# Patient Record
Sex: MALE | Race: WHITE | NOT HISPANIC OR LATINO | Employment: PART TIME | URBAN - METROPOLITAN AREA
[De-identification: names, ages, dates, MRNs, and addresses within clinical notes are randomized per-mention and may not be internally consistent; named-entity substitution may affect disease eponyms.]

---

## 2017-02-03 ENCOUNTER — ALLSCRIPTS OFFICE VISIT (OUTPATIENT)
Dept: OTHER | Facility: OTHER | Age: 72
End: 2017-02-03

## 2017-02-03 DIAGNOSIS — J44.9 CHRONIC OBSTRUCTIVE PULMONARY DISEASE (HCC): ICD-10-CM

## 2017-02-13 ENCOUNTER — HOSPITAL ENCOUNTER (OUTPATIENT)
Dept: CT IMAGING | Facility: HOSPITAL | Age: 72
Discharge: HOME | End: 2017-02-13
Attending: INTERNAL MEDICINE
Payer: COMMERCIAL

## 2017-02-13 DIAGNOSIS — J44.9 CHRONIC OBSTRUCTIVE PULMONARY DISEASE (HCC): ICD-10-CM

## 2017-02-23 ENCOUNTER — GENERIC CONVERSION - ENCOUNTER (OUTPATIENT)
Dept: OTHER | Facility: OTHER | Age: 72
End: 2017-02-23

## 2017-02-24 ENCOUNTER — ALLSCRIPTS OFFICE VISIT (OUTPATIENT)
Dept: OTHER | Facility: OTHER | Age: 72
End: 2017-02-24

## 2018-01-12 VITALS
SYSTOLIC BLOOD PRESSURE: 132 MMHG | HEIGHT: 65 IN | BODY MASS INDEX: 36.99 KG/M2 | OXYGEN SATURATION: 98 % | HEART RATE: 68 BPM | WEIGHT: 222 LBS | DIASTOLIC BLOOD PRESSURE: 62 MMHG

## 2018-01-14 VITALS
HEART RATE: 70 BPM | SYSTOLIC BLOOD PRESSURE: 150 MMHG | HEIGHT: 65 IN | BODY MASS INDEX: 38.32 KG/M2 | DIASTOLIC BLOOD PRESSURE: 80 MMHG | OXYGEN SATURATION: 95 % | WEIGHT: 230 LBS

## 2018-01-16 NOTE — RESULT NOTES
PFT Results v2:   Diagnosis/Reason For Study: emphysema   Referring Provider: Dr Paulette Blevins   Spirometry: Forced vital capacity: 3 02L and 85% Predicted Values  Forced expiratory volume in one second: 2 22L and 87% Predicted Value  FEV1/FVC ratio is 100% Predicted Values  Post Bronchodilator Spirometry: Forced vital capacity : 2 95L and 83% Predicted Values  Forced expiratory volume in one second : 2 18L and 85% Predicted Value  FEV1/FVC ratio is 101% Predicted Values  Lung Volumes: Total lung capacity : 4 30L and 71% Predicted Values  RV: 58% Predicted Values  RV/T% Predicted Values  DLCO:   DLCO 68% Predicted Values  DLCO corrected for volume is 88%    PFT Interpretation:   patient had a full lung function testing with spirometry lung volumes and DLCO  Patient gave a good effort  Results meet the ATS standards for acceptability and repeat ability  The flow volume curve is normal   There is no obstructive or restrictive ventilatory limitation  The lung volumes and DLCO are normal   Clinical correlation is required  Future Appointments    Date/Time Provider Specialty Site   2018 08:30 AM RALPH Pace   Pulmonary Medicine [facility] [employer] [facility]      Electronically signed by : RALPH Foster ; Mar 13 2017  4:04PM EST                       (Author)

## 2018-02-20 DIAGNOSIS — Z87.891 PERSONAL HISTORY OF NICOTINE DEPENDENCE: ICD-10-CM

## 2018-02-26 ENCOUNTER — OFFICE VISIT (OUTPATIENT)
Dept: PULMONOLOGY | Facility: CLINIC | Age: 73
End: 2018-02-26
Payer: MEDICARE

## 2018-02-26 VITALS
HEART RATE: 73 BPM | OXYGEN SATURATION: 98 % | BODY MASS INDEX: 35.99 KG/M2 | SYSTOLIC BLOOD PRESSURE: 122 MMHG | DIASTOLIC BLOOD PRESSURE: 84 MMHG | HEIGHT: 65 IN | WEIGHT: 216 LBS

## 2018-02-26 DIAGNOSIS — J41.0 SIMPLE CHRONIC BRONCHITIS (HCC): Primary | ICD-10-CM

## 2018-02-26 DIAGNOSIS — E66.9 OBESITY (BMI 30-39.9): ICD-10-CM

## 2018-02-26 DIAGNOSIS — G47.33 OSA ON CPAP: ICD-10-CM

## 2018-02-26 DIAGNOSIS — Z99.89 OSA ON CPAP: ICD-10-CM

## 2018-02-26 PROCEDURE — 99214 OFFICE O/P EST MOD 30 MIN: CPT | Performed by: INTERNAL MEDICINE

## 2018-02-26 RX ORDER — BUDESONIDE AND FORMOTEROL FUMARATE DIHYDRATE 160; 4.5 UG/1; UG/1
AEROSOL RESPIRATORY (INHALATION)
COMMUNITY
Start: 2017-12-22

## 2018-02-26 RX ORDER — METOPROLOL SUCCINATE 100 MG/1
TABLET, EXTENDED RELEASE ORAL
COMMUNITY
Start: 2018-02-02

## 2018-02-26 RX ORDER — METFORMIN HYDROCHLORIDE 500 MG/1
TABLET, EXTENDED RELEASE ORAL
COMMUNITY
Start: 2017-06-29

## 2018-02-26 RX ORDER — ALBUTEROL SULFATE 90 UG/1
AEROSOL, METERED RESPIRATORY (INHALATION)
COMMUNITY
Start: 2015-06-16

## 2018-02-26 RX ORDER — IPRATROPIUM BROMIDE 21 UG/1
SPRAY, METERED NASAL
COMMUNITY
Start: 2017-12-22

## 2018-02-26 RX ORDER — ALBUTEROL SULFATE 2.5 MG/3ML
2.5 SOLUTION RESPIRATORY (INHALATION)
COMMUNITY
Start: 2016-01-15

## 2018-02-26 RX ORDER — LOVASTATIN 10 MG/1
TABLET ORAL
COMMUNITY
Start: 2018-02-02

## 2018-02-26 RX ORDER — ERGOCALCIFEROL (VITAMIN D2) 10 MCG
TABLET ORAL
COMMUNITY

## 2018-02-26 RX ORDER — LANCETS
EACH MISCELLANEOUS
COMMUNITY
Start: 2016-03-18

## 2018-02-26 RX ORDER — GLIPIZIDE 10 MG/1
TABLET, FILM COATED, EXTENDED RELEASE ORAL
COMMUNITY
Start: 2017-12-22

## 2018-02-26 NOTE — PROGRESS NOTES
Assessment/Plan:   Diagnoses and all orders for this visit:    Simple chronic bronchitis (HCC)    ARACELY on CPAP    Obesity (BMI 30-39  9)    Other orders  -     albuterol (PROVENTIL HFA,VENTOLIN HFA) 90 mcg/act inhaler; 2 puffs every 4 hours as needed  -     SYMBICORT 160-4 5 MCG/ACT inhaler;   -     albuterol (2 5 mg/3 mL) 0 083 % nebulizer solution; Inhale 2 5 mg  -     aspirin 81 MG tablet; Take by mouth  -     multivitamin-minerals (CENTRUM) tablet; Take by mouth  -     Multiple Vitamin (DAILY VALUE MULTIVITAMIN) TABS; Take by mouth  -     Omega-3 Fatty Acids (FISH OIL) 645 MG CAPS; Take by mouth  -     glipiZIDE (GLUCOTROL XL) 10 mg 24 hr tablet;   -     glucose blood test strip; Check twice a day and as needed, dx E11 9,  -     ipratropium (ATROVENT) 0 03 % nasal spray; 4 times daily  -     lovastatin (MEVACOR) 10 MG tablet;   -     metFORMIN (GLUCOPHAGE-XR) 500 mg 24 hr tablet; TAKE 2 TABLETS IN MORNING AND 2 TABLETS WITH DINNER  -     metoprolol succinate (TOPROL-XL) 100 mg 24 hr tablet;   -     Misc  Devices MISC; by Does not apply route  -     Lancets (ONETOUCH ULTRASOFT) lancets; Check twice a day and as needed, dx E11 9,  -     hydrocortisone (PROCTOSOL HC) 2 5 % rectal cream; Insert into the rectum      Chronic bronchitis/COPD  PFTs with moderate restrictive ventilatory limitation with normal DLCO probably secondary to his obesity  Patient will continue with his Symbicort 160/4 52 puffs twice daily  MDI technique reviewed with the patient  Rinse mouth after use  Albuterol via the new blood is a 4 times daily as needed  CT of the chest for lung cancer screening with evidence of bilateral centrilobular emphysema no lung nodules or masses seen  Needs repeat CT of the chest in one year for lung cancer screening    Obstructive sleep apnea on CPAP continue with the current settings  Changes CPAP supplies every 6 months discussed      Return in about 3 months (around 5/26/2018)    All questions are answered to the patient's satisfaction and understanding  He verbalizes understanding  He is encouraged to call with any further questions or concerns  Portions of the record may have been created with voice recognition software  Occasional wrong word or "sound a like" substitutions may have occurred due to the inherent limitations of voice recognition software  Read the chart carefully and recognize, using context, where substitutions have occurred  ______________________________________________________________________    Chief Complaint:   Chief Complaint   Patient presents with    COPD    Sleep Apnea       Patient ID: Cata Hernández is a 67 y o  y o  male has a past medical history of Chronic bronchitis ([address] ); COPD (chronic obstructive pulmonary disease) ([facility] [address] ); Obesity; and ARACELY (obstructive sleep apnea)  2/26/2018  Patient is a very pleasant 25-year-old gentleman former smoker who quit several years ago, with history of chronic bronchitis and COPD being followed up at [facility], has been on bronchodilators  has history of obstructive sleep apnea on CPAP being followed up by a sleep specialist in [facility]  Review of Systems   Constitutional: Positive for fatigue  Negative for activity change, appetite change, chills, diaphoresis, fever and unexpected weight change  HENT: Negative for congestion, dental problem, drooling, nosebleeds, postnasal drip, rhinorrhea, sinus pressure, sore throat and voice change  Eyes: Negative for discharge, itching and visual disturbance  Respiratory: Positive for cough (clear sputum, no hemoptysis), shortness of breath and wheezing  Cardiovascular: Negative for chest pain, palpitations and leg swelling  Endocrine: Negative for cold intolerance and heat intolerance  Allergic/Immunologic: Negative for food allergies and immunocompromised state  Neurological: Negative for dizziness, facial asymmetry, speech difficulty and weakness     Hematological: Negative for adenopathy  Psychiatric/Behavioral: Negative for agitation, confusion and sleep disturbance  The patient is not nervous/anxious  Smoking history: He reports that he quit smoking about 31 years ago  His smoking use included Cigarettes  He has a 175 00 pack-year smoking history  He has never used smokeless tobacco     The following portions of the patient's history were reviewed and updated as appropriate: allergies, current medications, past family history, past medical history, past social history, past surgical history and problem list       There is no immunization history on file for this patient  Current Outpatient Prescriptions   Medication Sig Dispense Refill    albuterol (2 5 mg/3 mL) 0 083 % nebulizer solution Inhale 2 5 mg      albuterol (PROVENTIL HFA,VENTOLIN HFA) 90 mcg/act inhaler 2 puffs every 4 hours as needed      glucose blood test strip Check twice a day and as needed, dx E11 9,      ipratropium (ATROVENT) 0 03 % nasal spray 4 times daily      Lancets (ONETOUCH ULTRASOFT) lancets Check twice a day and as needed, dx E11 9,      metFORMIN (GLUCOPHAGE-XR) 500 mg 24 hr tablet TAKE 2 TABLETS IN MORNING AND 2 TABLETS WITH DINNER      multivitamin-minerals (CENTRUM) tablet Take by mouth      aspirin 81 MG tablet Take by mouth      glipiZIDE (GLUCOTROL XL) 10 mg 24 hr tablet       hydrocortisone (PROCTOSOL HC) 2 5 % rectal cream Insert into the rectum      lovastatin (MEVACOR) 10 MG tablet       metoprolol succinate (TOPROL-XL) 100 mg 24 hr tablet       Misc  Devices MISC by Does not apply route      Multiple Vitamin (DAILY VALUE MULTIVITAMIN) TABS Take by mouth      Omega-3 Fatty Acids (FISH OIL) 645 MG CAPS Take by mouth      SYMBICORT 160-4 5 MCG/ACT inhaler        No current facility-administered medications for this visit  Allergies: Gemfibrozil;  Carbamazepine; and Carbamazepine and analogs    Objective:  Vitals:    02/26/18 0839   BP: 122/84   BP Location: Left arm Patient Position: Sitting   Cuff Size: Standard   Pulse: 73   SpO2: 98%   Weight: 98 kg (216 lb)   Height: 5' 5" (1 651 m)   Oxygen Therapy  SpO2: 98 %    Wt Readings from Last 3 Encounters:   02/26/18 98 kg (216 lb)   02/24/17 101 kg (222 lb)   02/03/17 104 kg (230 lb)     Body mass index is 35 94 kg/m²  Physical Exam   Constitutional: He is oriented to person, place, and time  He appears well-developed and well-nourished  HENT:   Head: Normocephalic and atraumatic  Eyes: Conjunctivae are normal  Pupils are equal, round, and reactive to light  Neck: Normal range of motion  Neck supple  No JVD present  No thyromegaly present  Cardiovascular: Normal rate, regular rhythm and normal heart sounds  Exam reveals no gallop and no friction rub  No murmur heard  Pulmonary/Chest: Effort normal and breath sounds normal  No respiratory distress  He has no wheezes  He has no rales  He exhibits no tenderness  Abdominal: Soft  Bowel sounds are normal    Musculoskeletal: Normal range of motion  He exhibits no edema, tenderness or deformity  Lymphadenopathy:     He has no cervical adenopathy  Neurological: He is alert and oriented to person, place, and time  Skin: Skin is warm and dry  Psychiatric: He has a normal mood and affect  Nursing note and vitals reviewed  Ghazala Sifuentes

## 2018-02-28 ENCOUNTER — HOSPITAL ENCOUNTER (OUTPATIENT)
Dept: CT IMAGING | Facility: HOSPITAL | Age: 73
Discharge: HOME | End: 2018-02-28
Attending: INTERNAL MEDICINE
Payer: COMMERCIAL

## 2018-02-28 DIAGNOSIS — Z87.891 PERSONAL HISTORY OF NICOTINE DEPENDENCE: ICD-10-CM

## 2018-05-14 ENCOUNTER — OFFICE VISIT (OUTPATIENT)
Dept: PULMONOLOGY | Facility: CLINIC | Age: 73
End: 2018-05-14
Payer: MEDICARE

## 2018-05-14 VITALS
HEART RATE: 71 BPM | HEIGHT: 65 IN | WEIGHT: 223 LBS | OXYGEN SATURATION: 95 % | BODY MASS INDEX: 37.15 KG/M2 | DIASTOLIC BLOOD PRESSURE: 90 MMHG | SYSTOLIC BLOOD PRESSURE: 160 MMHG

## 2018-05-14 DIAGNOSIS — Z99.89 OSA ON CPAP: ICD-10-CM

## 2018-05-14 DIAGNOSIS — G47.33 OSA ON CPAP: ICD-10-CM

## 2018-05-14 DIAGNOSIS — E66.9 OBESITY (BMI 30-39.9): ICD-10-CM

## 2018-05-14 DIAGNOSIS — J41.0 SIMPLE CHRONIC BRONCHITIS (HCC): Primary | ICD-10-CM

## 2018-05-14 PROCEDURE — 99214 OFFICE O/P EST MOD 30 MIN: CPT | Performed by: INTERNAL MEDICINE

## 2018-05-15 NOTE — PROGRESS NOTES
Assessment/Plan:   Diagnoses and all orders for this visit:    Simple chronic bronchitis (HCC)    ARACELY on CPAP    Obesity (BMI 30-39  9)      Chronic bronchitis/COPD  PFTs with moderate restrictive ventilatory limitation with normal DLCO probably secondary to his obesity  Discussed with the patient to continue with Symbicort 160/4 52 puffs twice daily  Rinse mouth after use  Albuterol via the nebulizer 4 times daily as needed  Recent CT of the chest for lung cancer screening with bilateral centrilobular emphysema no lung nodules, needs CT of the chest in one year for lung cancer screening  Obstructive sleep apnea continue with current settings  Change of CPAP supplies every 6 months discussed  Recommend weight loss  Follow-up in 6 months or when necessary earlier as needed  Return in about 6 months (around 11/14/2018)  All questions are answered to the patient's satisfaction and understanding  He verbalizes understanding  He is encouraged to call with any further questions or concerns  Portions of the record may have been created with voice recognition software  Occasional wrong word or "sound a like" substitutions may have occurred due to the inherent limitations of voice recognition software  Read the chart carefully and recognize, using context, where substitutions have occurred  ______________________________________________________________________    Chief Complaint: No chief complaint on file  Patient ID: Hilario Little is a 67 y o  y o  male has a past medical history of Chronic bronchitis ([address] ); COPD (chronic obstructive pulmonary disease) ([facility] [address] ); Obesity; and ARACELY (obstructive sleep apnea)  5/14/2018  Patient with history of COPD and obstructive sleep apnea on CPAP  For follow-up  Review of Systems   Constitutional: Positive for fatigue  Negative for activity change, appetite change, chills, diaphoresis, fever and unexpected weight change     HENT: Negative for congestion, dental problem, drooling, nosebleeds, postnasal drip, rhinorrhea, sinus pressure, sore throat and voice change  Eyes: Negative for discharge, itching and visual disturbance  Respiratory: Positive for cough (clear sputum, no hemoptysis) and shortness of breath  Negative for wheezing  Cardiovascular: Negative for chest pain, palpitations and leg swelling  Endocrine: Negative for cold intolerance and heat intolerance  Allergic/Immunologic: Negative for food allergies and immunocompromised state  Neurological: Negative for dizziness, facial asymmetry, speech difficulty and weakness  Hematological: Negative for adenopathy  Psychiatric/Behavioral: Negative for agitation, confusion and sleep disturbance  The patient is not nervous/anxious  Smoking history: He reports that he quit smoking about 31 years ago  His smoking use included Cigarettes  He has a 175 00 pack-year smoking history  He has never used smokeless tobacco     The following portions of the patient's history were reviewed and updated as appropriate: allergies, current medications, past family history, past medical history, past social history, past surgical history and problem list       There is no immunization history on file for this patient    Current Outpatient Prescriptions   Medication Sig Dispense Refill    albuterol (2 5 mg/3 mL) 0 083 % nebulizer solution Inhale 2 5 mg      albuterol (PROVENTIL HFA,VENTOLIN HFA) 90 mcg/act inhaler 2 puffs every 4 hours as needed      aspirin 81 MG tablet Take by mouth      glipiZIDE (GLUCOTROL XL) 10 mg 24 hr tablet       glucose blood test strip Check twice a day and as needed, dx E11 9,      hydrocortisone (PROCTOSOL HC) 2 5 % rectal cream Insert into the rectum      ipratropium (ATROVENT) 0 03 % nasal spray 4 times daily      Lancets (ONETOUCH ULTRASOFT) lancets Check twice a day and as needed, dx E11 9,      lovastatin (MEVACOR) 10 MG tablet       metFORMIN (GLUCOPHAGE-XR) 500 mg 24 hr tablet TAKE 2 TABLETS IN MORNING AND 2 TABLETS WITH DINNER      metoprolol succinate (TOPROL-XL) 100 mg 24 hr tablet       Misc  Devices MISC by Does not apply route      Multiple Vitamin (DAILY VALUE MULTIVITAMIN) TABS Take by mouth      Omega-3 Fatty Acids (FISH OIL) 645 MG CAPS Take by mouth      SYMBICORT 160-4 5 MCG/ACT inhaler        No current facility-administered medications for this visit  Allergies: Gemfibrozil; Carbamazepine; and Carbamazepine and analogs    Objective:  Vitals:    05/14/18 1147   BP: 160/90   Pulse: 71   SpO2: 95%   Weight: 101 kg (223 lb)   Height: 5' 5" (1 651 m)   Oxygen Therapy  SpO2: 95 %    Wt Readings from Last 3 Encounters:   05/14/18 101 kg (223 lb)   02/26/18 98 kg (216 lb)   02/24/17 101 kg (222 lb)     Body mass index is 37 11 kg/m²  Physical Exam   Constitutional: He is oriented to person, place, and time  He appears well-developed and well-nourished  HENT:   Head: Normocephalic and atraumatic  Crowded oropharyngeal airways   Eyes: Conjunctivae are normal  Pupils are equal, round, and reactive to light  Neck: Normal range of motion  Neck supple  No JVD present  No thyromegaly present  Short and wide neck   Cardiovascular: Normal rate, regular rhythm and normal heart sounds  Exam reveals no gallop and no friction rub  No murmur heard  Pulmonary/Chest: Effort normal and breath sounds normal  No respiratory distress  He has no wheezes  He has no rales  He exhibits no tenderness  Abdominal: Soft  Bowel sounds are normal    Musculoskeletal: Normal range of motion  He exhibits no edema, tenderness or deformity  Lymphadenopathy:     He has no cervical adenopathy  Neurological: He is alert and oriented to person, place, and time  Skin: Skin is warm and dry  Psychiatric: He has a normal mood and affect  Nursing note and vitals reviewed

## 2018-11-26 ENCOUNTER — OFFICE VISIT (OUTPATIENT)
Dept: PULMONOLOGY | Facility: CLINIC | Age: 73
End: 2018-11-26
Payer: MEDICARE

## 2018-11-26 VITALS
SYSTOLIC BLOOD PRESSURE: 140 MMHG | HEART RATE: 67 BPM | OXYGEN SATURATION: 96 % | BODY MASS INDEX: 36.82 KG/M2 | HEIGHT: 65 IN | DIASTOLIC BLOOD PRESSURE: 86 MMHG | WEIGHT: 221 LBS

## 2018-11-26 DIAGNOSIS — Z99.89 OSA ON CPAP: ICD-10-CM

## 2018-11-26 DIAGNOSIS — G47.33 OSA ON CPAP: ICD-10-CM

## 2018-11-26 DIAGNOSIS — E66.9 OBESITY (BMI 30-39.9): ICD-10-CM

## 2018-11-26 DIAGNOSIS — J41.0 SIMPLE CHRONIC BRONCHITIS (HCC): Primary | ICD-10-CM

## 2018-11-26 DIAGNOSIS — Z87.891 FORMER SMOKER: ICD-10-CM

## 2018-11-26 PROCEDURE — 99214 OFFICE O/P EST MOD 30 MIN: CPT | Performed by: INTERNAL MEDICINE

## 2018-11-26 NOTE — PROGRESS NOTES
Assessment/Plan:   Diagnoses and all orders for this visit:    Simple chronic bronchitis (HCC)    Obesity (BMI 30-39 9)    ARACELY on CPAP    Former smoker  -     CT lung screening program; Future          Chronic bronchitis/COPD  PFTs with moderate restrictive ventilatory limitation with normal DLCO probably secondary to his obesity  Discussed with the patient to continue with Symbicort 160/4 52 puffs twice daily  Rinse mouth after use  Albuterol via the nebulizer 4 times daily as needed  Recent CT of the chest for lung cancer screening with bilateral centrilobular emphysema no lung nodules, needs CT of the chest in one year  From then , due in feb/march 2019, for lung cancer screening  Obstructive sleep apnea continue with current settings  Change of CPAP supplies every 6 months discussed  Recommend weight loss  Follow-up in 6 months or when necessary earlier as needed  Return in about 6 months (around 5/26/2019)  All questions are answered to the patient's satisfaction and understanding  He verbalizes understanding  He is encouraged to call with any further questions or concerns  Portions of the record may have been created with voice recognition software  Occasional wrong word or "sound a like" substitutions may have occurred due to the inherent limitations of voice recognition software  Read the chart carefully and recognize, using context, where substitutions have occurred  Electronically Signed by Isabella Cleary MD    ______________________________________________________________________    Chief Complaint:   Chief Complaint   Patient presents with    Bronchitis     fup       Patient ID: Ingrid Saul is a 75 y o  y o  male has a past medical history of Chronic bronchitis ([address] ); COPD (chronic obstructive pulmonary disease) ([address] ); Obesity; and ARACELY (obstructive sleep apnea)  11/26/2018  Patient presents today for follow-up visit    Patient is a very pleasant 69-year-old gentleman former smoker who quit several years ago, with history of chronic bronchitis and COPD being followed up at [facility], has been on bronchodilators  has history of obstructive sleep apnea on CPAP being followed up by a sleep specialist in [address]   Constitutional: Negative for appetite change, chills, diaphoresis, fatigue, fever and unexpected weight change  HENT: Negative for congestion, ear discharge, ear pain, nosebleeds, postnasal drip, rhinorrhea, sinus pain, sore throat and voice change  Eyes: Negative for pain, discharge and visual disturbance  Respiratory: Negative for apnea, cough, choking, chest tightness, shortness of breath, wheezing and stridor  Cardiovascular: Negative for chest pain, palpitations and leg swelling  Gastrointestinal: Negative for abdominal pain, blood in stool, constipation, diarrhea and vomiting  Endocrine: Negative for cold intolerance, heat intolerance, polydipsia, polyphagia and polyuria  Genitourinary: Negative for difficulty urinating and dysuria  Musculoskeletal: Negative for arthralgias and neck pain  Skin: Negative for pallor and rash  Allergic/Immunologic: Negative for environmental allergies and food allergies  Neurological: Negative for dizziness, speech difficulty, weakness and light-headedness  Hematological: Negative for adenopathy  Does not bruise/bleed easily  Psychiatric/Behavioral: Negative for agitation, confusion and sleep disturbance  The patient is not nervous/anxious  Smoking history: He reports that he quit smoking about 31 years ago  His smoking use included Cigarettes  He has a 175 00 pack-year smoking history   He has never used smokeless tobacco     The following portions of the patient's history were reviewed and updated as appropriate: allergies, current medications, past family history, past medical history, past social history, past surgical history and problem list       There is no immunization history on file for this patient  Current Outpatient Prescriptions   Medication Sig Dispense Refill    albuterol (2 5 mg/3 mL) 0 083 % nebulizer solution Inhale 2 5 mg      albuterol (PROVENTIL HFA,VENTOLIN HFA) 90 mcg/act inhaler 2 puffs every 4 hours as needed      aspirin 81 MG tablet Take by mouth      glucose blood test strip Check twice a day and as needed, dx E11 9,      hydrocortisone (PROCTOSOL HC) 2 5 % rectal cream Insert into the rectum      Lancets (ONETOUCH ULTRASOFT) lancets Check twice a day and as needed, dx E11 9,      lovastatin (MEVACOR) 10 MG tablet       metFORMIN (GLUCOPHAGE-XR) 500 mg 24 hr tablet TAKE 2 TABLETS IN MORNING AND 2 TABLETS WITH DINNER      metoprolol succinate (TOPROL-XL) 100 mg 24 hr tablet       Misc  Devices MISC by Does not apply route      Multiple Vitamin (DAILY VALUE MULTIVITAMIN) TABS Take by mouth      Omega-3 Fatty Acids (FISH OIL) 645 MG CAPS Take by mouth      SYMBICORT 160-4 5 MCG/ACT inhaler       glipiZIDE (GLUCOTROL XL) 10 mg 24 hr tablet       ipratropium (ATROVENT) 0 03 % nasal spray 4 times daily       No current facility-administered medications for this visit  Allergies: Gemfibrozil; Carbamazepine; and Carbamazepine and analogs    Objective:  Vitals:    11/26/18 0841   BP: 140/86   Pulse: 67   SpO2: 96%   Weight: 100 kg (221 lb)   Height: 5' 5" (1 651 m)   Oxygen Therapy  SpO2: 96 %    Wt Readings from Last 3 Encounters:   11/26/18 100 kg (221 lb)   05/14/18 101 kg (223 lb)   02/26/18 98 kg (216 lb)     Body mass index is 36 78 kg/m²  Physical Exam   Constitutional: He is oriented to person, place, and time  He appears well-developed and well-nourished  HENT:   Head: Normocephalic and atraumatic  Crowded oropharyngeal airways, Mallampati score   Eyes: Pupils are equal, round, and reactive to light  EOM are normal    Neck: Normal range of motion  Neck supple  Short and wide neck   Cardiovascular: Normal rate, regular rhythm and normal heart sounds  Pulmonary/Chest: Effort normal and breath sounds normal    Abdominal: Soft  Bowel sounds are normal    Musculoskeletal: Normal range of motion  Neurological: He is alert and oriented to person, place, and time  Skin: Skin is warm and dry  Psychiatric: He has a normal mood and affect   His behavior is normal

## 2019-03-26 ENCOUNTER — HOSPITAL ENCOUNTER (OUTPATIENT)
Dept: CT IMAGING | Facility: HOSPITAL | Age: 74
Discharge: HOME | End: 2019-03-26
Attending: INTERNAL MEDICINE
Payer: COMMERCIAL

## 2019-03-26 DIAGNOSIS — Z87.891 FORMER SMOKER: ICD-10-CM

## 2019-05-20 ENCOUNTER — OFFICE VISIT (OUTPATIENT)
Dept: PULMONOLOGY | Facility: CLINIC | Age: 74
End: 2019-05-20
Payer: MEDICARE

## 2019-05-20 VITALS
OXYGEN SATURATION: 98 % | BODY MASS INDEX: 36.65 KG/M2 | WEIGHT: 220 LBS | DIASTOLIC BLOOD PRESSURE: 80 MMHG | HEIGHT: 65 IN | SYSTOLIC BLOOD PRESSURE: 136 MMHG | HEART RATE: 76 BPM

## 2019-05-20 DIAGNOSIS — G47.33 OSA ON CPAP: ICD-10-CM

## 2019-05-20 DIAGNOSIS — Z99.89 OSA ON CPAP: ICD-10-CM

## 2019-05-20 DIAGNOSIS — Z87.891 FORMER SMOKER: ICD-10-CM

## 2019-05-20 DIAGNOSIS — J41.0 SIMPLE CHRONIC BRONCHITIS (HCC): Primary | ICD-10-CM

## 2019-05-20 DIAGNOSIS — E66.9 OBESITY (BMI 30-39.9): ICD-10-CM

## 2019-05-20 PROCEDURE — 99214 OFFICE O/P EST MOD 30 MIN: CPT | Performed by: INTERNAL MEDICINE

## 2019-05-20 RX ORDER — ALBUTEROL SULFATE 90 UG/1
2 AEROSOL, METERED RESPIRATORY (INHALATION) EVERY 6 HOURS PRN
Qty: 1 INHALER | Refills: 3 | Status: SHIPPED | OUTPATIENT
Start: 2019-05-20

## 2020-05-29 ENCOUNTER — TELEPHONE (OUTPATIENT)
Dept: PULMONOLOGY | Facility: CLINIC | Age: 75
End: 2020-05-29

## 2020-06-18 ENCOUNTER — HOSPITAL ENCOUNTER (OUTPATIENT)
Dept: CT IMAGING | Facility: HOSPITAL | Age: 75
Discharge: HOME | End: 2020-06-18
Attending: INTERNAL MEDICINE
Payer: COMMERCIAL

## 2020-06-18 DIAGNOSIS — Z87.891 FORMER SMOKER: ICD-10-CM

## 2020-06-18 PROCEDURE — G0297 LDCT FOR LUNG CA SCREEN: HCPCS

## 2024-04-09 ENCOUNTER — APPOINTMENT (EMERGENCY)
Dept: RADIOLOGY | Facility: HOSPITAL | Age: 79
DRG: 432 | End: 2024-04-09
Payer: MEDICARE

## 2024-04-09 ENCOUNTER — HOSPITAL ENCOUNTER (INPATIENT)
Facility: HOSPITAL | Age: 79
LOS: 4 days | Discharge: HOME | DRG: 432 | End: 2024-04-13
Attending: STUDENT IN AN ORGANIZED HEALTH CARE EDUCATION/TRAINING PROGRAM | Admitting: INTERNAL MEDICINE
Payer: MEDICARE

## 2024-04-09 DIAGNOSIS — D64.9 SYMPTOMATIC ANEMIA: ICD-10-CM

## 2024-04-09 DIAGNOSIS — K92.2 GI BLEEDING: Primary | ICD-10-CM

## 2024-04-09 DIAGNOSIS — K70.31 ALCOHOLIC CIRRHOSIS OF LIVER WITH ASCITES (HCC): ICD-10-CM

## 2024-04-09 PROBLEM — I25.10 CAD (CORONARY ARTERY DISEASE): Status: ACTIVE | Noted: 2024-04-09

## 2024-04-09 PROBLEM — N18.31 CHRONIC KIDNEY DISEASE, STAGE 3A (HCC): Status: ACTIVE | Noted: 2021-10-11

## 2024-04-09 PROBLEM — D61.818 PANCYTOPENIA (HCC): Status: ACTIVE | Noted: 2024-01-11

## 2024-04-09 PROBLEM — I73.9 PVD (PERIPHERAL VASCULAR DISEASE) (HCC): Status: ACTIVE | Noted: 2018-11-14

## 2024-04-09 PROBLEM — J44.89 CHRONIC BRONCHITIS WITH COPD (CHRONIC OBSTRUCTIVE PULMONARY DISEASE): Status: ACTIVE | Noted: 2017-02-03

## 2024-04-09 PROBLEM — K74.60 CIRRHOSIS (HCC): Status: ACTIVE | Noted: 2024-04-09

## 2024-04-09 PROBLEM — E11.9 DIABETES (HCC): Status: ACTIVE | Noted: 2024-04-09

## 2024-04-09 PROBLEM — I70.219 ATHEROSCLEROSIS OF LOWER EXTREMITY WITH INTERMITTENT CLAUDICATION (HCC): Chronic | Status: ACTIVE | Noted: 2018-05-24

## 2024-04-09 LAB
2HR DELTA HS TROPONIN: -2 NG/L
ABO GROUP BLD: NORMAL
ABO GROUP BLD: NORMAL
ALBUMIN SERPL BCP-MCNC: 2.5 G/DL (ref 3.5–5)
ALP SERPL-CCNC: 82 U/L (ref 34–104)
ALT SERPL W P-5'-P-CCNC: 21 U/L (ref 7–52)
ANION GAP SERPL CALCULATED.3IONS-SCNC: 12 MMOL/L (ref 4–13)
AST SERPL W P-5'-P-CCNC: 36 U/L (ref 13–39)
BASOPHILS # BLD AUTO: 0.04 THOUSANDS/ÂΜL (ref 0–0.1)
BASOPHILS NFR BLD AUTO: 1 % (ref 0–1)
BILIRUB SERPL-MCNC: 0.54 MG/DL (ref 0.2–1)
BLD GP AB SCN SERPL QL: NEGATIVE
BUN SERPL-MCNC: 29 MG/DL (ref 5–25)
CALCIUM ALBUM COR SERPL-MCNC: 8.8 MG/DL (ref 8.3–10.1)
CALCIUM SERPL-MCNC: 7.6 MG/DL (ref 8.4–10.2)
CARDIAC TROPONIN I PNL SERPL HS: 17 NG/L
CARDIAC TROPONIN I PNL SERPL HS: 19 NG/L
CHLORIDE SERPL-SCNC: 100 MMOL/L (ref 96–108)
CO2 SERPL-SCNC: 20 MMOL/L (ref 21–32)
CREAT SERPL-MCNC: 1.36 MG/DL (ref 0.6–1.3)
EOSINOPHIL # BLD AUTO: 0.11 THOUSAND/ÂΜL (ref 0–0.61)
EOSINOPHIL NFR BLD AUTO: 2 % (ref 0–6)
ERYTHROCYTE [DISTWIDTH] IN BLOOD BY AUTOMATED COUNT: 18 % (ref 11.6–15.1)
GFR SERPL CREATININE-BSD FRML MDRD: 49 ML/MIN/1.73SQ M
GLUCOSE SERPL-MCNC: 116 MG/DL (ref 65–140)
HCT VFR BLD AUTO: 21.9 % (ref 36.5–49.3)
HGB BLD-MCNC: 7.1 G/DL (ref 12–17)
IMM GRANULOCYTES # BLD AUTO: 0.04 THOUSAND/UL (ref 0–0.2)
IMM GRANULOCYTES NFR BLD AUTO: 1 % (ref 0–2)
LYMPHOCYTES # BLD AUTO: 1.19 THOUSANDS/ÂΜL (ref 0.6–4.47)
LYMPHOCYTES NFR BLD AUTO: 16 % (ref 14–44)
MCH RBC QN AUTO: 28.4 PG (ref 26.8–34.3)
MCHC RBC AUTO-ENTMCNC: 32.4 G/DL (ref 31.4–37.4)
MCV RBC AUTO: 88 FL (ref 82–98)
MONOCYTES # BLD AUTO: 0.98 THOUSAND/ÂΜL (ref 0.17–1.22)
MONOCYTES NFR BLD AUTO: 13 % (ref 4–12)
NEUTROPHILS # BLD AUTO: 5.13 THOUSANDS/ÂΜL (ref 1.85–7.62)
NEUTS SEG NFR BLD AUTO: 67 % (ref 43–75)
NRBC BLD AUTO-RTO: 0 /100 WBCS
PLATELET # BLD AUTO: 106 THOUSANDS/UL (ref 149–390)
PMV BLD AUTO: 10.4 FL (ref 8.9–12.7)
POTASSIUM SERPL-SCNC: 3.5 MMOL/L (ref 3.5–5.3)
PROT SERPL-MCNC: 5.6 G/DL (ref 6.4–8.4)
RBC # BLD AUTO: 2.5 MILLION/UL (ref 3.88–5.62)
RH BLD: POSITIVE
RH BLD: POSITIVE
SODIUM SERPL-SCNC: 132 MMOL/L (ref 135–147)
SPECIMEN EXPIRATION DATE: NORMAL
TSH SERPL DL<=0.05 MIU/L-ACNC: 2.19 UIU/ML (ref 0.45–4.5)
WBC # BLD AUTO: 7.49 THOUSAND/UL (ref 4.31–10.16)

## 2024-04-09 PROCEDURE — 86923 COMPATIBILITY TEST ELECTRIC: CPT

## 2024-04-09 PROCEDURE — 86850 RBC ANTIBODY SCREEN: CPT | Performed by: STUDENT IN AN ORGANIZED HEALTH CARE EDUCATION/TRAINING PROGRAM

## 2024-04-09 PROCEDURE — 86900 BLOOD TYPING SEROLOGIC ABO: CPT | Performed by: STUDENT IN AN ORGANIZED HEALTH CARE EDUCATION/TRAINING PROGRAM

## 2024-04-09 PROCEDURE — 30233N1 TRANSFUSION OF NONAUTOLOGOUS RED BLOOD CELLS INTO PERIPHERAL VEIN, PERCUTANEOUS APPROACH: ICD-10-PCS | Performed by: STUDENT IN AN ORGANIZED HEALTH CARE EDUCATION/TRAINING PROGRAM

## 2024-04-09 PROCEDURE — 84443 ASSAY THYROID STIM HORMONE: CPT | Performed by: STUDENT IN AN ORGANIZED HEALTH CARE EDUCATION/TRAINING PROGRAM

## 2024-04-09 PROCEDURE — 84484 ASSAY OF TROPONIN QUANT: CPT | Performed by: STUDENT IN AN ORGANIZED HEALTH CARE EDUCATION/TRAINING PROGRAM

## 2024-04-09 PROCEDURE — 86920 COMPATIBILITY TEST SPIN: CPT

## 2024-04-09 PROCEDURE — 93005 ELECTROCARDIOGRAM TRACING: CPT

## 2024-04-09 PROCEDURE — 36430 TRANSFUSION BLD/BLD COMPNT: CPT

## 2024-04-09 PROCEDURE — 99285 EMERGENCY DEPT VISIT HI MDM: CPT | Performed by: STUDENT IN AN ORGANIZED HEALTH CARE EDUCATION/TRAINING PROGRAM

## 2024-04-09 PROCEDURE — 80053 COMPREHEN METABOLIC PANEL: CPT | Performed by: STUDENT IN AN ORGANIZED HEALTH CARE EDUCATION/TRAINING PROGRAM

## 2024-04-09 PROCEDURE — P9016 RBC LEUKOCYTES REDUCED: HCPCS

## 2024-04-09 PROCEDURE — 36415 COLL VENOUS BLD VENIPUNCTURE: CPT | Performed by: STUDENT IN AN ORGANIZED HEALTH CARE EDUCATION/TRAINING PROGRAM

## 2024-04-09 PROCEDURE — 71046 X-RAY EXAM CHEST 2 VIEWS: CPT

## 2024-04-09 PROCEDURE — 85025 COMPLETE CBC W/AUTO DIFF WBC: CPT | Performed by: STUDENT IN AN ORGANIZED HEALTH CARE EDUCATION/TRAINING PROGRAM

## 2024-04-09 PROCEDURE — 99285 EMERGENCY DEPT VISIT HI MDM: CPT

## 2024-04-09 PROCEDURE — 86901 BLOOD TYPING SEROLOGIC RH(D): CPT | Performed by: STUDENT IN AN ORGANIZED HEALTH CARE EDUCATION/TRAINING PROGRAM

## 2024-04-09 PROCEDURE — 99223 1ST HOSP IP/OBS HIGH 75: CPT | Performed by: INTERNAL MEDICINE

## 2024-04-09 RX ORDER — INSULIN LISPRO 100 [IU]/ML
1-5 INJECTION, SOLUTION INTRAVENOUS; SUBCUTANEOUS
Status: DISCONTINUED | OUTPATIENT
Start: 2024-04-10 | End: 2024-04-13 | Stop reason: HOSPADM

## 2024-04-09 RX ORDER — BUDESONIDE AND FORMOTEROL FUMARATE DIHYDRATE 160; 4.5 UG/1; UG/1
2 AEROSOL RESPIRATORY (INHALATION) 2 TIMES DAILY
Status: DISCONTINUED | OUTPATIENT
Start: 2024-04-09 | End: 2024-04-13 | Stop reason: HOSPADM

## 2024-04-09 RX ORDER — METOPROLOL SUCCINATE 100 MG/1
100 TABLET, EXTENDED RELEASE ORAL DAILY
Status: DISCONTINUED | OUTPATIENT
Start: 2024-04-10 | End: 2024-04-13 | Stop reason: HOSPADM

## 2024-04-09 RX ORDER — OCTREOTIDE ACETATE 100 UG/ML
50 INJECTION, SOLUTION INTRAVENOUS; SUBCUTANEOUS ONCE
Qty: 1 ML | Refills: 0 | Status: COMPLETED | OUTPATIENT
Start: 2024-04-09 | End: 2024-04-10

## 2024-04-09 RX ORDER — ACETAMINOPHEN 325 MG/1
650 TABLET ORAL EVERY 6 HOURS PRN
Status: DISCONTINUED | OUTPATIENT
Start: 2024-04-09 | End: 2024-04-09

## 2024-04-09 RX ORDER — PANTOPRAZOLE SODIUM 40 MG/10ML
40 INJECTION, POWDER, LYOPHILIZED, FOR SOLUTION INTRAVENOUS EVERY 12 HOURS SCHEDULED
Status: DISCONTINUED | OUTPATIENT
Start: 2024-04-09 | End: 2024-04-13 | Stop reason: HOSPADM

## 2024-04-09 RX ORDER — ALBUTEROL SULFATE 90 UG/1
2 AEROSOL, METERED RESPIRATORY (INHALATION) EVERY 6 HOURS PRN
Status: DISCONTINUED | OUTPATIENT
Start: 2024-04-09 | End: 2024-04-13 | Stop reason: HOSPADM

## 2024-04-09 NOTE — ED PROVIDER NOTES
History  Chief Complaint   Patient presents with    Weakness - Generalized     Weakness that started today after cardiac rehab this morning. PT presents with abdominal swelling. Paracentesis earlier this week. Recent aortic valve replacement 24.        HPI    Patient is a 78-year-old male present emerged from for generalized weakness.  Patient was walking and gently sat on the ground.  Patient is unable to stand up even with assistance or use a walker.  Patient just says he has whole body weakness.  Discomfort is not unilateral.  Denies any headache or vision changes.  Patient has felt weak previously.  Denies any recent fevers chills nausea or vomiting.  Denies any chest pain or difficulty breathing.  Denies any abdominal discomfort or change in bowel bladder habits.  Patient recently had 6 L drained via paracentesis.  Still undergoing evaluation for his liver.  Other history includes COPD and ARACELY.  Former smoker.    Prior to Admission Medications   Prescriptions Last Dose Informant Patient Reported? Taking?   Lancets (ONETOUCH ULTRASOFT) lancets 2024 Self Yes Yes   Sig: Check twice a day and as needed, dx E11.9,   Misc. Devices MISC 2024 Self Yes Yes   Sig: by Does not apply route   Multiple Vitamin (DAILY VALUE MULTIVITAMIN) TABS 2024 Self Yes Yes   Sig: Take by mouth   Omega-3 Fatty Acids (FISH OIL) 645 MG CAPS Past Month Self Yes Yes   Sig: Take by mouth   SYMBICORT 160-4.5 MCG/ACT inhaler 2024 Self Yes Yes   albuterol (2.5 mg/3 mL) 0.083 % nebulizer solution Not Taking Self Yes No   Sig: Inhale 2.5 mg   Patient not taking: Reported on 2024   albuterol (PROVENTIL HFA,VENTOLIN HFA) 90 mcg/act inhaler Unknown Self Yes No   Si puffs every 4 hours as needed   albuterol (VENTOLIN HFA) 90 mcg/act inhaler Unknown  No No   Sig: Inhale 2 puffs every 6 (six) hours as needed for wheezing   aspirin 81 MG tablet 2024 Self Yes Yes   Sig: Take by mouth   glipiZIDE (GLUCOTROL XL) 10 mg 24 hr  tablet Not Taking Self Yes No   Patient not taking: Reported on 2024   glucose blood test strip 2024 Self Yes Yes   Sig: Check twice a day and as needed, dx E11.9,   hydrocortisone (PROCTOSOL HC) 2.5 % rectal cream More than a month Self Yes No   Sig: Insert into the rectum   ipratropium (ATROVENT) 0.03 % nasal spray Not Taking Self Yes No   Si times daily   Patient not taking: Reported on 2024   lovastatin (MEVACOR) 10 MG tablet 2024 Self Yes Yes   metFORMIN (GLUCOPHAGE-XR) 500 mg 24 hr tablet 2024 Self Yes Yes   Sig: TAKE 2 TABLETS IN MORNING AND 2 TABLETS WITH DINNER   metoprolol succinate (TOPROL-XL) 100 mg 24 hr tablet 2024 Self Yes Yes      Facility-Administered Medications: None       Past Medical History:   Diagnosis Date    Chronic bronchitis (HCC)     COPD (chronic obstructive pulmonary disease) (HCC)     Obesity     ARACELY (obstructive sleep apnea)        Past Surgical History:   Procedure Laterality Date    IR PARACENTESIS  2024    IR PARACENTESIS  3/7/2024    IR PARACENTESIS  2024       History reviewed. No pertinent family history.  I have reviewed and agree with the history as documented.    E-Cigarette/Vaping    E-Cigarette Use Never User      E-Cigarette/Vaping Substances    Nicotine No     THC No     CBD No     Flavoring No     Other No     Unknown No      Social History     Tobacco Use    Smoking status: Former     Current packs/day: 0.00     Average packs/day: 5.0 packs/day for 35.0 years (175.0 ttl pk-yrs)     Types: Cigarettes     Start date:      Quit date:      Years since quittin.3    Smokeless tobacco: Never   Vaping Use    Vaping status: Never Used   Substance Use Topics    Alcohol use: No    Drug use: No       Review of Systems   Constitutional:  Negative for chills and fever.   HENT:  Negative for ear pain and sore throat.    Eyes:  Negative for pain and visual disturbance.   Respiratory:  Negative for cough and shortness of breath.     Cardiovascular:  Negative for chest pain and palpitations.   Gastrointestinal:  Negative for abdominal pain and vomiting.   Genitourinary:  Negative for dysuria and hematuria.   Musculoskeletal:  Negative for arthralgias and back pain.   Skin:  Negative for color change and rash.   Neurological:  Positive for weakness. Negative for seizures and syncope.   All other systems reviewed and are negative.      Physical Exam  Physical Exam  Vitals and nursing note reviewed.   Constitutional:       General: He is not in acute distress.     Appearance: He is well-developed.   HENT:      Head: Normocephalic and atraumatic.   Eyes:      Conjunctiva/sclera: Conjunctivae normal.   Cardiovascular:      Rate and Rhythm: Normal rate and regular rhythm.      Heart sounds: No murmur heard.  Pulmonary:      Effort: Pulmonary effort is normal. No respiratory distress.      Breath sounds: Normal breath sounds.   Abdominal:      General: There is distension.      Palpations: Abdomen is soft. There is no mass.      Tenderness: There is no abdominal tenderness.   Musculoskeletal:         General: No swelling.      Cervical back: Neck supple.      Right lower leg: Edema present.      Left lower leg: Edema present.   Skin:     General: Skin is warm and dry.      Capillary Refill: Capillary refill takes less than 2 seconds.      Coloration: Skin is pale.   Neurological:      Mental Status: He is alert.   Psychiatric:         Mood and Affect: Mood normal.         Vital Signs  ED Triage Vitals   Temperature Pulse Respirations Blood Pressure SpO2   04/09/24 1802 04/09/24 1757 04/09/24 1757 04/09/24 1757 04/09/24 1757   98.9 °F (37.2 °C) 97 20 114/65 97 %      Temp Source Heart Rate Source Patient Position - Orthostatic VS BP Location FiO2 (%)   04/09/24 1802 04/09/24 1757 04/09/24 1757 04/09/24 1757 --   Oral Monitor Sitting Right arm       Pain Score       04/09/24 2347       No Pain           Vitals:    04/12/24 1500 04/12/24 2207 04/13/24  0804 04/13/24 1445   BP: 142/65 143/65 137/64    Pulse: 65 70 60 62   Patient Position - Orthostatic VS: Lying  Lying          Visual Acuity  Visual Acuity      Flowsheet Row Most Recent Value   L Pupil Size (mm) 3   R Pupil Size (mm) 3   L Pupil Shape Round   R Pupil Shape Round            ED Medications  Medications   octreotide (SandoSTATIN) injection 50 mcg (50 mcg Intravenous Given 4/10/24 0205)   lidocaine 1% buffered (10 mL Infiltration Given 4/11/24 1419)   albumin human (FLEXBUMIN) 25 % injection 50 g (50 g Intravenous New Bag 4/11/24 1546)   magnesium sulfate 2 g/50 mL IVPB (premix) 2 g (2 g Intravenous New Bag 4/12/24 0971)       Diagnostic Studies  Results Reviewed       Procedure Component Value Units Date/Time    Basic metabolic panel [[id number]]  (Abnormal) Collected: 04/10/24 0525    Lab Status: Final result Specimen: Blood from Arm, Left Updated: 04/10/24 0608     Sodium 133 mmol/L      Potassium 3.4 mmol/L      Chloride 102 mmol/L      CO2 22 mmol/L      ANION GAP 9 mmol/L      BUN 30 mg/dL      Creatinine 1.38 mg/dL      Glucose 96 mg/dL      Calcium 7.5 mg/dL      eGFR 48 ml/min/1.73sq m     Narrative:      National Kidney Disease Foundation guidelines for Chronic Kidney Disease (CKD):     Stage 1 with normal or high GFR (GFR > 90 mL/min/1.73 square meters)    Stage 2 Mild CKD (GFR = 60-89 mL/min/1.73 square meters)    Stage 3A Moderate CKD (GFR = 45-59 mL/min/1.73 square meters)    Stage 3B Moderate CKD (GFR = 30-44 mL/min/1.73 square meters)    Stage 4 Severe CKD (GFR = 15-29 mL/min/1.73 square meters)    Stage 5 End Stage CKD (GFR <15 mL/min/1.73 square meters)  Note: GFR calculation is accurate only with a steady state creatinine    CBC and differential [[id number]]  (Abnormal) Collected: 04/10/24 0525    Lab Status: Final result Specimen: Blood from Arm, Left Updated: 04/10/24 0601     WBC 3.30 Thousand/uL      RBC 2.45 Million/uL      Hemoglobin 7.1 g/dL      Hematocrit 21.2 %      MCV 87 fL       MCH 29.0 pg      MCHC 33.5 g/dL      RDW 17.2 %      MPV 11.5 fL      Platelets 62 Thousands/uL      nRBC 0 /100 WBCs      Segmented % 60 %      Immature Grans % 0 %      Lymphocytes % 22 %      Monocytes % 15 %      Eosinophils Relative 2 %      Basophils Relative 1 %      Absolute Neutrophils 1.95 Thousands/µL      Absolute Immature Grans 0.01 Thousand/uL      Absolute Lymphocytes 0.74 Thousands/µL      Absolute Monocytes 0.50 Thousand/µL      Eosinophils Absolute 0.08 Thousand/µL      Basophils Absolute 0.02 Thousands/µL     HS Troponin I 2hr [[id number]]  (Normal) Collected: 04/09/24 2050    Lab Status: Final result Specimen: Blood from Arm, Right Updated: 04/09/24 2122     hs TnI 2hr 17 ng/L      Delta 2hr hsTnI -2 ng/L     TSH [[id number]]  (Normal) Collected: 04/09/24 1828    Lab Status: Final result Specimen: Blood from Arm, Right Updated: 04/09/24 1903     TSH 3RD GENERATON 2.185 uIU/mL     HS Troponin 0hr (reflex protocol) [[id number]]  (Normal) Collected: 04/09/24 1828    Lab Status: Final result Specimen: Blood from Arm, Right Updated: 04/09/24 1854     hs TnI 0hr 19 ng/L     Comprehensive metabolic panel [[id number]]  (Abnormal) Collected: 04/09/24 1828    Lab Status: Final result Specimen: Blood from Arm, Right Updated: 04/09/24 1847     Sodium 132 mmol/L      Potassium 3.5 mmol/L      Chloride 100 mmol/L      CO2 20 mmol/L      ANION GAP 12 mmol/L      BUN 29 mg/dL      Creatinine 1.36 mg/dL      Glucose 116 mg/dL      Calcium 7.6 mg/dL      Corrected Calcium 8.8 mg/dL      AST 36 U/L      ALT 21 U/L      Alkaline Phosphatase 82 U/L      Total Protein 5.6 g/dL      Albumin 2.5 g/dL      Total Bilirubin 0.54 mg/dL      eGFR 49 ml/min/1.73sq m     Narrative:      National Kidney Disease Foundation guidelines for Chronic Kidney Disease (CKD):     Stage 1 with normal or high GFR (GFR > 90 mL/min/1.73 square meters)    Stage 2 Mild CKD (GFR = 60-89 mL/min/1.73 square meters)    Stage 3A Moderate CKD (GFR  = 45-59 mL/min/1.73 square meters)    Stage 3B Moderate CKD (GFR = 30-44 mL/min/1.73 square meters)    Stage 4 Severe CKD (GFR = 15-29 mL/min/1.73 square meters)    Stage 5 End Stage CKD (GFR <15 mL/min/1.73 square meters)  Note: GFR calculation is accurate only with a steady state creatinine    CBC and differential [[id number]]  (Abnormal) Collected: 04/09/24 1828    Lab Status: Final result Specimen: Blood from Arm, Right Updated: 04/09/24 1832     WBC 7.49 Thousand/uL      RBC 2.50 Million/uL      Hemoglobin 7.1 g/dL      Hematocrit 21.9 %      MCV 88 fL      MCH 28.4 pg      MCHC 32.4 g/dL      RDW 18.0 %      MPV 10.4 fL      Platelets 106 Thousands/uL      nRBC 0 /100 WBCs      Segmented % 67 %      Immature Grans % 1 %      Lymphocytes % 16 %      Monocytes % 13 %      Eosinophils Relative 2 %      Basophils Relative 1 %      Absolute Neutrophils 5.13 Thousands/µL      Absolute Immature Grans 0.04 Thousand/uL      Absolute Lymphocytes 1.19 Thousands/µL      Absolute Monocytes 0.98 Thousand/µL      Eosinophils Absolute 0.11 Thousand/µL      Basophils Absolute 0.04 Thousands/µL                    IR INPATIENT Paracentesis   Final Result by Agus Calvillo MD (04/15 0848)   Impression:      Ultrasound-guided paracentesis.      Signed, performed, and dictated by Ying BRAND under the supervision of Dr. Agus Calvillo.            Workstation performed: [id number]         XR chest 2 views   Final Result by Juan Rosado MD (04/10 0818)      No radiographic evidence of acute intrathoracic process.            Workstation performed: [id number]                    Procedures  Procedures         ED Course  ED Course as of 04/23/24 2337   Tue Apr 09, 2024   1857 Hemoglobin(!): 7.1   1900 GFR, Calculated: 49                               SBIRT 22yo+      Flowsheet Row Most Recent Value   Initial Alcohol Screen: US AUDIT-C     1. How often do you have a drink containing alcohol? 0  Filed at: 04/09/2024 1800   2. How many drinks containing alcohol do you have on a typical day you are drinking?  0 Filed at: 04/09/2024 1800   3a. Male UNDER 65: How often do you have five or more drinks on one occasion? 0 Filed at: 04/09/2024 1800   Audit-C Score 0 Filed at: 04/09/2024 1800   GRAHAM: How many times in the past year have you...    Used an illegal drug or used a prescription medication for non-medical reasons? Never Filed at: 04/09/2024 1800                      Medical Decision Making  Differential ACS, dehydration, arrhythmia, anemia.    Patient is a 78-year-old male present emergency department in no acute respiratory distress.  Patient appears to have symptomatic weakness.    Patient's Hemoccult did test positive for blood.  Patient's hemoglobin also 7.1.  Blood was typed and screened.  Consent was obtained for transfusion.  Will transfuse a unit of blood given his symptomatic presentation.    Will admit patient for further monitoring and management.  Patient agreeable disposition answer questions at bedside with family present.    EKG: rate 89 a fib, with PVC.  No prior EKGs.    Amount and/or Complexity of Data Reviewed  Labs: ordered. Decision-making details documented in ED Course.  Radiology: ordered.    Risk  Decision regarding hospitalization.             Disposition  Final diagnoses:   GI bleeding   Symptomatic anemia     Time reflects when diagnosis was documented in both MDM as applicable and the Disposition within this note       Time User Action Codes Description Comment    4/9/2024  8:55 PM Michaelle Dacosta Add [K92.2] GI bleeding     4/9/2024  8:56 PM Michaelle Dacosta Add [D64.9] Symptomatic anemia     4/10/2024  9:07 AM Grazyna Mtz Add [K70.31] 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)     4/10/2024 11:16 AM Chelle Ward Modify [K92.2] GI bleeding     4/10/2024 11:16 AM Chelle Ward Modify [D64.9] Symptomatic anemia     4/10/2024 11:16 AM Chelle Ward Modify  [K70.31] 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)           ED Disposition       ED Disposition   Admit    Condition   Stable    Date/Time   Tue Apr 9, 2024 2056    Comment   Case was discussed with hospitalist and the patient's admission status was agreed to be Admission Status: inpatient status to the service of Dr. Tellez .               Follow-up Information    None         Discharge Medication List as of 4/13/2024 11:47 AM        START taking these medications    Details   furosemide (LASIX) 40 mg tablet Take 1 tablet (40 mg total) by mouth daily Do not start before April 14, 2024., Starting Sun 4/14/2024, Normal      pantoprazole (PROTONIX) 40 mg tablet Take 1 tablet (40 mg total) by mouth daily, Starting Sat 4/13/2024, Normal      spironolactone (ALDACTONE) 50 mg tablet Take 1 tablet (50 mg total) by mouth daily, Starting Sun 4/14/2024, Normal           CONTINUE these medications which have NOT CHANGED    Details   aspirin 81 MG tablet Take by mouth, Historical Med      glucose blood test strip Check twice a day and as needed, dx E11.9,, Historical Med      Lancets (ONETOUCH ULTRASOFT) lancets Check twice a day and as needed, dx E11.9,, Historical Med      lovastatin (MEVACOR) 10 MG tablet Starting Fri 2/2/2018, Historical Med      metFORMIN (GLUCOPHAGE-XR) 500 mg 24 hr tablet TAKE 2 TABLETS IN MORNING AND 2 TABLETS WITH DINNER, Historical Med      metoprolol succinate (TOPROL-XL) 100 mg 24 hr tablet Starting Fri 2/2/2018, Historical Med      Misc. Devices MISC by Does not apply route, Historical Med      Multiple Vitamin (DAILY VALUE MULTIVITAMIN) TABS Take by mouth, Historical Med      Omega-3 Fatty Acids (FISH OIL) 645 MG CAPS Take by mouth, Historical Med      SYMBICORT 160-4.5 MCG/ACT inhaler Starting Fri 12/22/2017, Historical Med      albuterol (PROVENTIL HFA,VENTOLIN HFA) 90 mcg/act inhaler 2 puffs every 4 hours as needed, Historical Med      hydrocortisone (PROCTOSOL HC) 2.5 % rectal cream  Insert into the rectum, Historical Med           STOP taking these medications       albuterol (2.5 mg/3 mL) 0.083 % nebulizer solution Comments:   Reason for Stopping:         glipiZIDE (GLUCOTROL XL) 10 mg 24 hr tablet Comments:   Reason for Stopping:         ipratropium (ATROVENT) 0.03 % nasal spray Comments:   Reason for Stopping:               Outpatient Discharge Orders   Ambulatory referral to Gastroenterology   Standing Status: Future Standing Exp. Date: 04/13/25      Discharge Diet     Activity as tolerated       PDMP Review       None            ED Provider  Electronically Signed by             Michaelle Dacosta DO  04/23/24 8073

## 2024-04-10 ENCOUNTER — ANESTHESIA EVENT (INPATIENT)
Dept: GASTROENTEROLOGY | Facility: HOSPITAL | Age: 79
End: 2024-04-10
Payer: MEDICARE

## 2024-04-10 ENCOUNTER — APPOINTMENT (INPATIENT)
Dept: GASTROENTEROLOGY | Facility: HOSPITAL | Age: 79
DRG: 432 | End: 2024-04-10
Payer: MEDICARE

## 2024-04-10 ENCOUNTER — ANESTHESIA (INPATIENT)
Dept: GASTROENTEROLOGY | Facility: HOSPITAL | Age: 79
End: 2024-04-10
Payer: MEDICARE

## 2024-04-10 PROBLEM — Z95.2 HISTORY OF AORTIC VALVE REPLACEMENT: Status: ACTIVE | Noted: 2024-04-10

## 2024-04-10 PROBLEM — J44.9 CHRONIC OBSTRUCTIVE PULMONARY DISEASE (HCC): Status: ACTIVE | Noted: 2024-04-10

## 2024-04-10 PROBLEM — M54.9 BACK PAIN: Status: ACTIVE | Noted: 2024-04-10

## 2024-04-10 LAB
ABO GROUP BLD BPU: NORMAL
ANION GAP SERPL CALCULATED.3IONS-SCNC: 9 MMOL/L (ref 4–13)
ATRIAL RATE: 84 BPM
BASOPHILS # BLD AUTO: 0.02 THOUSANDS/ÂΜL (ref 0–0.1)
BASOPHILS NFR BLD AUTO: 1 % (ref 0–1)
BPU ID: NORMAL
BUN SERPL-MCNC: 30 MG/DL (ref 5–25)
CALCIUM SERPL-MCNC: 7.5 MG/DL (ref 8.4–10.2)
CHLORIDE SERPL-SCNC: 102 MMOL/L (ref 96–108)
CO2 SERPL-SCNC: 22 MMOL/L (ref 21–32)
CREAT SERPL-MCNC: 1.38 MG/DL (ref 0.6–1.3)
CROSSMATCH: NORMAL
EOSINOPHIL # BLD AUTO: 0.08 THOUSAND/ÂΜL (ref 0–0.61)
EOSINOPHIL NFR BLD AUTO: 2 % (ref 0–6)
ERYTHROCYTE [DISTWIDTH] IN BLOOD BY AUTOMATED COUNT: 17.2 % (ref 11.6–15.1)
GFR SERPL CREATININE-BSD FRML MDRD: 48 ML/MIN/1.73SQ M
GLUCOSE SERPL-MCNC: 135 MG/DL (ref 65–140)
GLUCOSE SERPL-MCNC: 197 MG/DL (ref 65–140)
GLUCOSE SERPL-MCNC: 96 MG/DL (ref 65–140)
HCT VFR BLD AUTO: 21.2 % (ref 36.5–49.3)
HGB BLD-MCNC: 7.1 G/DL (ref 12–17)
IMM GRANULOCYTES # BLD AUTO: 0.01 THOUSAND/UL (ref 0–0.2)
IMM GRANULOCYTES NFR BLD AUTO: 0 % (ref 0–2)
LYMPHOCYTES # BLD AUTO: 0.74 THOUSANDS/ÂΜL (ref 0.6–4.47)
LYMPHOCYTES NFR BLD AUTO: 22 % (ref 14–44)
MCH RBC QN AUTO: 29 PG (ref 26.8–34.3)
MCHC RBC AUTO-ENTMCNC: 33.5 G/DL (ref 31.4–37.4)
MCV RBC AUTO: 87 FL (ref 82–98)
MONOCYTES # BLD AUTO: 0.5 THOUSAND/ÂΜL (ref 0.17–1.22)
MONOCYTES NFR BLD AUTO: 15 % (ref 4–12)
NEUTROPHILS # BLD AUTO: 1.95 THOUSANDS/ÂΜL (ref 1.85–7.62)
NEUTS SEG NFR BLD AUTO: 60 % (ref 43–75)
NRBC BLD AUTO-RTO: 0 /100 WBCS
PLATELET # BLD AUTO: 62 THOUSANDS/UL (ref 149–390)
PMV BLD AUTO: 11.5 FL (ref 8.9–12.7)
POTASSIUM SERPL-SCNC: 3.4 MMOL/L (ref 3.5–5.3)
QRS AXIS: 76 DEGREES
QRSD INTERVAL: 136 MS
QT INTERVAL: 434 MS
QTC INTERVAL: 528 MS
RBC # BLD AUTO: 2.45 MILLION/UL (ref 3.88–5.62)
SODIUM SERPL-SCNC: 133 MMOL/L (ref 135–147)
T WAVE AXIS: 25 DEGREES
UNIT DISPENSE STATUS: NORMAL
UNIT PRODUCT CODE: NORMAL
UNIT PRODUCT VOLUME: 350 ML
UNIT RH: NORMAL
VENTRICULAR RATE: 89 BPM
WBC # BLD AUTO: 3.3 THOUSAND/UL (ref 4.31–10.16)

## 2024-04-10 PROCEDURE — P9016 RBC LEUKOCYTES REDUCED: HCPCS

## 2024-04-10 PROCEDURE — 97162 PT EVAL MOD COMPLEX 30 MIN: CPT

## 2024-04-10 PROCEDURE — 85025 COMPLETE CBC W/AUTO DIFF WBC: CPT | Performed by: INTERNAL MEDICINE

## 2024-04-10 PROCEDURE — C9113 INJ PANTOPRAZOLE SODIUM, VIA: HCPCS | Performed by: INTERNAL MEDICINE

## 2024-04-10 PROCEDURE — 43239 EGD BIOPSY SINGLE/MULTIPLE: CPT | Performed by: INTERNAL MEDICINE

## 2024-04-10 PROCEDURE — 88305 TISSUE EXAM BY PATHOLOGIST: CPT | Performed by: PATHOLOGY

## 2024-04-10 PROCEDURE — 80048 BASIC METABOLIC PNL TOTAL CA: CPT | Performed by: INTERNAL MEDICINE

## 2024-04-10 PROCEDURE — 06L38CZ OCCLUSION OF ESOPHAGEAL VEIN WITH EXTRALUMINAL DEVICE, VIA NATURAL OR ARTIFICIAL OPENING ENDOSCOPIC: ICD-10-PCS | Performed by: INTERNAL MEDICINE

## 2024-04-10 PROCEDURE — 99233 SBSQ HOSP IP/OBS HIGH 50: CPT | Performed by: INTERNAL MEDICINE

## 2024-04-10 PROCEDURE — 82948 REAGENT STRIP/BLOOD GLUCOSE: CPT

## 2024-04-10 PROCEDURE — 97110 THERAPEUTIC EXERCISES: CPT

## 2024-04-10 PROCEDURE — 93010 ELECTROCARDIOGRAM REPORT: CPT | Performed by: INTERNAL MEDICINE

## 2024-04-10 PROCEDURE — 97166 OT EVAL MOD COMPLEX 45 MIN: CPT

## 2024-04-10 PROCEDURE — 43244 EGD VARICES LIGATION: CPT | Performed by: INTERNAL MEDICINE

## 2024-04-10 RX ORDER — LIDOCAINE HYDROCHLORIDE 20 MG/ML
INJECTION, SOLUTION EPIDURAL; INFILTRATION; INTRACAUDAL; PERINEURAL AS NEEDED
Status: DISCONTINUED | OUTPATIENT
Start: 2024-04-10 | End: 2024-04-10

## 2024-04-10 RX ORDER — PHENYLEPHRINE HCL IN 0.9% NACL 1 MG/10 ML
SYRINGE (ML) INTRAVENOUS AS NEEDED
Status: DISCONTINUED | OUTPATIENT
Start: 2024-04-10 | End: 2024-04-10

## 2024-04-10 RX ORDER — SODIUM CHLORIDE, SODIUM LACTATE, POTASSIUM CHLORIDE, CALCIUM CHLORIDE 600; 310; 30; 20 MG/100ML; MG/100ML; MG/100ML; MG/100ML
INJECTION, SOLUTION INTRAVENOUS CONTINUOUS PRN
Status: DISCONTINUED | OUTPATIENT
Start: 2024-04-10 | End: 2024-04-10

## 2024-04-10 RX ORDER — PROPOFOL 10 MG/ML
INJECTION, EMULSION INTRAVENOUS AS NEEDED
Status: DISCONTINUED | OUTPATIENT
Start: 2024-04-10 | End: 2024-04-10

## 2024-04-10 RX ADMIN — INSULIN LISPRO 1 UNITS: 100 INJECTION, SOLUTION INTRAVENOUS; SUBCUTANEOUS at 18:14

## 2024-04-10 RX ADMIN — METOPROLOL SUCCINATE 100 MG: 100 TABLET, EXTENDED RELEASE ORAL at 09:15

## 2024-04-10 RX ADMIN — LIDOCAINE HYDROCHLORIDE 60 MG: 20 INJECTION, SOLUTION EPIDURAL; INFILTRATION; INTRACAUDAL; PERINEURAL at 11:08

## 2024-04-10 RX ADMIN — PROPOFOL 20 MG: 10 INJECTION, EMULSION INTRAVENOUS at 11:19

## 2024-04-10 RX ADMIN — PANTOPRAZOLE SODIUM 40 MG: 40 INJECTION, POWDER, FOR SOLUTION INTRAVENOUS at 09:15

## 2024-04-10 RX ADMIN — OCTREOTIDE ACETATE 50 MCG/HR: 500 INJECTION, SOLUTION INTRAVENOUS; SUBCUTANEOUS at 19:36

## 2024-04-10 RX ADMIN — CEFTRIAXONE SODIUM 1000 MG: 10 INJECTION, POWDER, FOR SOLUTION INTRAVENOUS at 02:06

## 2024-04-10 RX ADMIN — BUDESONIDE AND FORMOTEROL FUMARATE DIHYDRATE 2 PUFF: 160; 4.5 AEROSOL RESPIRATORY (INHALATION) at 18:13

## 2024-04-10 RX ADMIN — PANTOPRAZOLE SODIUM 40 MG: 40 INJECTION, POWDER, FOR SOLUTION INTRAVENOUS at 00:30

## 2024-04-10 RX ADMIN — OCTREOTIDE ACETATE 50 MCG/HR: 500 INJECTION, SOLUTION INTRAVENOUS; SUBCUTANEOUS at 03:50

## 2024-04-10 RX ADMIN — PROPOFOL 80 MG: 10 INJECTION, EMULSION INTRAVENOUS at 11:08

## 2024-04-10 RX ADMIN — BUDESONIDE AND FORMOTEROL FUMARATE DIHYDRATE 2 PUFF: 160; 4.5 AEROSOL RESPIRATORY (INHALATION) at 02:01

## 2024-04-10 RX ADMIN — PROPOFOL 20 MG: 10 INJECTION, EMULSION INTRAVENOUS at 11:09

## 2024-04-10 RX ADMIN — BUDESONIDE AND FORMOTEROL FUMARATE DIHYDRATE 2 PUFF: 160; 4.5 AEROSOL RESPIRATORY (INHALATION) at 09:26

## 2024-04-10 RX ADMIN — PROPOFOL 20 MG: 10 INJECTION, EMULSION INTRAVENOUS at 11:12

## 2024-04-10 RX ADMIN — SODIUM CHLORIDE, SODIUM LACTATE, POTASSIUM CHLORIDE, AND CALCIUM CHLORIDE: .6; .31; .03; .02 INJECTION, SOLUTION INTRAVENOUS at 11:04

## 2024-04-10 RX ADMIN — OCTREOTIDE ACETATE 50 MCG: 100 INJECTION, SOLUTION INTRAVENOUS; SUBCUTANEOUS at 02:05

## 2024-04-10 RX ADMIN — Medication 100 MCG: at 11:17

## 2024-04-10 NOTE — ASSESSMENT & PLAN NOTE
77yo M with hx of cirrhosis and ascites presents with symptomatic anemia - Hb 7.1 on admission baseline previously 11  Has not had recent endoscopy. Mentions hemorrhoids but no significant active bleeding down there currently although does endorse that intermittently.  Patient admitted with acute blood loss anemia needing blood transfusion.  Differential diagnosis includes variceal bleed, PUD, esophagitis among other causes. Continue IV PPI twice daily. Keep NPO for EGD. Octreotide drip and ceftriaxone.   According to ED Hemoccult positive  Start IV Protonix twice a day  Start octreotide gtt  Transfused 1U PRBC (patient consented by ED provider), still Hb 7.1, given that patient is symptomatic from acute blood needing blood transfusion- will give 1 more unit blood transfusion  Ceftriaxone for SBP prophylaxis  Monitor hemodynamics  Anemia likely in setting of previous variceal bleed and/or severe esophagitis with esophageal ulcer.  No evidence of active bleeding.

## 2024-04-10 NOTE — CONSULTS
Consultation -  [facility]  Bimal Lugo 78 y.o. male MRN: [id number]  Unit/Bed#: -01 Encounter: [id number]        Inpatient consult to gastroenterology  Consult performed by: Grazyna Mtz PA-C  Consult ordered by: Michaelle Dacosta DO          Reason for Consult / Principal Problem: Anemia    HPI: 78-year-old male with a history of alcohol cirrhosis, valvular heart disease status post aortic valve replacement in January of this year, diabetes mellitus, peripheral vascular disease, chronic kidney disease, COPD, coronary artery disease who presented to [facility] yesterday with weakness.  Per the patient and his wife at bedside he has been having shortness of breath and weakness off and on for over a year.  It was all felt to be secondary to aortic valve stenosis.  He underwent an aortic valve replacement in January with out significant for quick improvement in his symptoms.  Yesterday the patient had gone out to breakfast with his wife and going to cardiac rehab.  When he returned home he was barely able to get out of the car due to profound weakness.  When he was brought to the emergency room he was noted to have a hemoglobin of 7.1.  He did admit to black stools.  He reports that this has been problematic for several weeks.  He initially blamed it on taking an iron supplement but admits that it has been worse recently.  He has had abdominal discomfort, early satiety and nausea.  He said no hematemesis.  He denies any issues with bright red blood per rectum.  He does follow with the [facility] gastroenterology department.  He has been diagnosed with alcohol cirrhosis.  He was diagnosed with this several years ago and admits that he has not had alcohol over that time.  He previously drank a significant amount.  Recently he has struggled with ascites.  This is known.  In March she had a paracentesis for the first time with 3.9 L of fluid removed.  This was sent for analysis  without any evidence of infection or malignancy.  On April 4 this was repeated this time was 6.2 L removed.  Per the patient's wife after the first paracentesis many of his gastrointestinal symptoms improved significantly however after this last 1 they did not.  He reports that he is maintained on diuretics in the outpatient setting however this is not listed on his home medication list.    He denies ever having had an upper endoscopy.  He reports that he had a colonoscopy for the first time 3 years ago.  He notes having had 11 polyps removed.  Because of this they recommended a repeat colonoscopy in 1 year which she had done with 2 polyps removed.  He was again advised to follow-up in 1 year for repeat exam.  This was last year but due to his cardiac issues was deferred.    REVIEW OF SYSTEMS:    CONSTITUTIONAL: Denies any fever, chills, or rigors. Good appetite, and no recent weight loss.  HEENT: No earache or tinnitus. Denies hearing loss or visual disturbances.  CARDIOVASCULAR: No chest pain or palpitations.   RESPIRATORY: Denies any cough, hemoptysis, shortness of breath or dyspnea on exertion.  GASTROINTESTINAL: As noted in the History of Present Illness.   GENITOURINARY: No problems with urination. Denies any hematuria or dysuria.  NEUROLOGIC: No dizziness or vertigo, denies headaches.   MUSCULOSKELETAL: Denies any muscle or joint pain.   SKIN: Denies skin rashes or itching.   ENDOCRINE: Denies excessive thirst. Denies intolerance to heat or cold.  PSYCHOSOCIAL: Denies depression or anxiety. Denies any recent memory loss.       Historical Information   Past Medical History:   Diagnosis Date    Chronic bronchitis (HCC)     COPD (chronic obstructive pulmonary disease) (HCC)     Obesity     ARACELY (obstructive sleep apnea)      Past Surgical History:   Procedure Laterality Date    IR PARACENTESIS  4/4/2024    IR PARACENTESIS  3/7/2024     Social History   Social History     Substance and Sexual Activity   Alcohol  Use No     Social History     Substance and Sexual Activity   Drug Use No     Social History     Tobacco Use   Smoking Status Former    Current packs/day: 0.00    Average packs/day: 5.0 packs/day for 35.0 years (175.0 ttl pk-yrs)    Types: Cigarettes    Start date:     Quit date:     Years since quittin.2   Smokeless Tobacco Never     No family history on file.    Meds/Allergies     Medications Prior to Admission   Medication    aspirin 81 MG tablet    glucose blood test strip    Lancets (ONETOUCH ULTRASOFT) lancets    lovastatin (MEVACOR) 10 MG tablet    metFORMIN (GLUCOPHAGE-XR) 500 mg 24 hr tablet    metoprolol succinate (TOPROL-XL) 100 mg 24 hr tablet    Misc. Devices MISC    Multiple Vitamin (DAILY VALUE MULTIVITAMIN) TABS    Omega-3 Fatty Acids (FISH OIL) 645 MG CAPS    SYMBICORT 160-4.5 MCG/ACT inhaler    albuterol (2.5 mg/3 mL) 0.083 % nebulizer solution    albuterol (PROVENTIL HFA,VENTOLIN HFA) 90 mcg/act inhaler    albuterol (VENTOLIN HFA) 90 mcg/act inhaler    glipiZIDE (GLUCOTROL XL) 10 mg 24 hr tablet    hydrocortisone (PROCTOSOL HC) 2.5 % rectal cream    ipratropium (ATROVENT) 0.03 % nasal spray     Current Facility-Administered Medications   Medication Dose Route Frequency    albuterol (PROVENTIL HFA,VENTOLIN HFA) inhaler 2 puff  2 puff Inhalation Q6H PRN    budesonide-formoterol (SYMBICORT) 160-4.5 mcg/act inhaler 2 puff  2 puff Inhalation BID    ceftriaxone (ROCEPHIN) 1 g/50 mL in dextrose IVPB  1,000 mg Intravenous Q24H    insulin lispro (HumALOG/ADMELOG) 100 units/mL subcutaneous injection 1-5 Units  1-5 Units Subcutaneous TID AC    metoprolol succinate (TOPROL-XL) 24 hr tablet 100 mg  100 mg Oral Daily    octreotide (SandoSTATIN) 500 mcg in sodium chloride 0.9 % 250 mL infusion  50 mcg/hr Intravenous Continuous    pantoprazole (PROTONIX) injection 40 mg  40 mg Intravenous Q12H LUBA       Allergies   Allergen Reactions    Gemfibrozil Swelling and Rash    Carbamazepine     Carbamazepine  And Analogs      swelling    Oxycodone Other (See Comments)     Pt states he hallucinates           Objective     Blood pressure 131/58, pulse 78, temperature 98.4 °F (36.9 °C), temperature source Oral, resp. rate 18, weight 101 kg (223 lb 8.7 oz), SpO2 96%.      Intake/Output Summary (Last 24 hours) at 4/10/2024 0959  Last data filed at 4/10/2024 0005  Gross per 24 hour   Intake 336.67 ml   Output --   Net 336.67 ml         PHYSICAL EXAM:      General Appearance:   Alert, cooperative, no distress, appears stated age    HEENT:   Normocephalic, atraumatic, anicteric.     Neck:  Supple, symmetrical, trachea midline, no adenopathy;    thyroid: no enlargement/tenderness/nodules; no carotid  bruit or JVD    Lungs:   Clear to auscultation bilaterally; no rales, rhonchi or wheezing; respirations unlabored    Heart::   S1 and S2 normal; regular rate and rhythm; no murmur, rub, or gallop.   Abdomen:   Soft, tender, distended; normal bowel sounds; no masses, no organomegaly    Genitalia:   Deferred    Rectal:   Deferred    Extremities:  No cyanosis, clubbing (+) edema LE B/L   Pulses:  2+ and symmetric all extremities    Skin:  Skin color, texture, turgor normal, no rashes or lesions    Lymph nodes:  No palpable cervical, axillary or inguinal lymphadenopathy        Lab Results:   Results from last 7 days   Lab Units 04/10/24  0525   WBC Thousand/uL 3.30*   HEMOGLOBIN g/dL 7.1*   HEMATOCRIT % 21.2*   PLATELETS Thousands/uL 62*   SEGS PCT % 60   LYMPHO PCT % 22   MONO PCT % 15*   EOS PCT % 2     Results from last 7 days   Lab Units 04/10/24  0525 04/09/24  1828   POTASSIUM mmol/L 3.4* 3.5   CHLORIDE mmol/L 102 100   CO2 mmol/L 22 20*   BUN mg/dL 30* 29*   CREATININE mg/dL 1.38* 1.36*   CALCIUM mg/dL 7.5* 7.6*   ALK PHOS U/L  --  82   ALT U/L  --  21   AST U/L  --  36               Imaging Studies: I have personally reviewed pertinent imaging studies.    XR chest 2 views    Result Date: 4/10/2024  Impression: No radiographic  evidence of acute intrathoracic process. Workstation performed: [id number]       ASSESSMENT and PLAN:      Anemia  Melena  - Patient presented to [facility] yesterday with severe weakness and shortness of breath  - Hgb on admission was noted to be 7.1 (11.0 on 1/23/24)  - He reports black stools for several weeks  - Will plan EGD today - he denies ever having one  - If normal will need colonoscopy tomorrow.  His last colonoscopy was 2 years ago with 2 polyps removed.  He was told to have a f/u exam last year but this was put on hold due to cardiac issues  - He is s/p AVR in January of this year only maintained on Asa 81mg daily  - Continue to trend Hgb/Hct and transfuse PRBC as needed  - Patient will be kept NPO  - He has cirrhosis and is currently on octreotide until EGD rules out variceal bleeding    ETOH Cirrhosis  - Will check INR in order to calculate MELD  - He notes no alcohol in several years  - Complicated by ascites.  This is new.  Paracentesis 3/7/24 with 3.9 liters removed with analysis negative for SBP and repeated on 4/4 with 6.2 liters removed.  He reports being maintained on 2 diuretics in the outpatient setting which are not listed on his home medications  - Will repeat paracentesis with fluid analysis  - Will give antibiotic prophylaxis given GIB - Patient is on Ceftriaxone  - Advised low Na diet <2000mg per day  - Will restart diuresis  - EGD today to evaluate for ascites or PHG  - CT AP from 9/1/23 without liver lesions.  He will need updated HCC screening.  Also check AFP  - Per patient/wife no history of HE.  Currently grade 0  - Continue to avoid ETOH      Patient was seen and examined by Dr Roy. All rodriguez medical decisions were made by Dr Roy. Thank you for allowing us to participate in the care of this present patient. We will follow-up with you closely.

## 2024-04-10 NOTE — H&P
"[facility]  H&P  Name: Bimal Lugo 78 y.o. male I MRN: [id number]  Unit/Bed#: ED 09 I Date of Admission: 4/9/2024   Date of Service: 4/9/2024 I Hospital Day: 0      Assessment/Plan   * Symptomatic anemia  Assessment & Plan  77yo M with hx of cirrhosis and ascites presents with symptomatic anemia - Hb 7.1 on admission baseline previously 11  Has not had recent endoscopy. Mentions hemorrhoids but no significant active bleeding down there currently although does endorse that intermittently.  According to ED Hemoccult positive    Start IV Protonix twice a day  Start octreotide gtt  Transfuse 1U PRBC (patient consented by ED provider), monitor CBC after  N.p.o. after midnight  Ceftriaxone for SBP prophylaxis  GI consult, may benefit from EGD +/- flexsig or colonoscopy  Monitor hemodynamics    Cirrhosis (HCC)  Assessment & Plan  Hx of cirrhosis requiring periodic paracentesis   Currently no signs of encephalopathy  GI consult    Diabetes (HCC)  Assessment & Plan  Lab Results   Component Value Date    HGBA1C 5.7 (H) 04/04/2024       No results for input(s): \"POCGLU\" in the last 72 hours.    Blood Sugar Average: Last 72 hrs:    Well controlled with A1c of 5.7  HOLD glipizide and metformin for now.  SSI      PVD (peripheral vascular disease) ([facility])  Assessment & Plan  On aspirin  Currently on hold due to anemia    Chronic kidney disease, stage 3a (HCC)  Assessment & Plan  Lab Results   Component Value Date    EGFR 49 04/09/2024    EGFR 54 (L) 01/23/2024    EGFR 54 (L) 09/14/2023    CREATININE 1.36 (H) 04/09/2024    CREATININE 1.3 (H) 01/23/2024    CREATININE 1.3 (H) 09/14/2023     Creatinine is 1.3-1.5 at baseline  Currently at baseline  Monitor  Avoid nephrotoxins    Chronic bronchitis with COPD (chronic obstructive pulmonary disease)  Assessment & Plan  Overall stable respiratory status  Continue Symbicort and PRN albuterol    CAD (coronary artery disease)  Assessment & Plan  Reported hx of CAD and " AVR on Jan 2024.  DENIES chest pain or SOB currently  Monitor           VTE Prophylaxis: Pharmacologic VTE Prophylaxis contraindicated due to anemia   / sequential compression device   Code Status: FC  POLST: POLST form is not discussed and not completed at this time.  Discussion with family: Discussed with family at the bedside    Anticipated Length of Stay:  Patient will be admitted on an Inpatient basis with an anticipated length of stay of  at least 2 midnights.   Justification for Hospital Stay: Anemia, symptomatic    Total Time for Visit, including Counseling / Coordination of Care: 90 minutes.  Greater than 50% of this total time spent on direct patient counseling and coordination of care.    Chief Complaint:   Fatigue,     History of Present Illness:    Bimal Lugo is a 78 y.o. male who presents with Lightheadedness, extreme fatigue.  The patient has a history of cirrhosis with recurrent ascites status post paracentesis, most recent 1 yesterday, also has a history of recent aortic valve replacement on January, currently undergoing outpatient cardiac rehab, patient mentions that recently has been having severe weakness and extreme fatigue to the point that he was not able to complete cardiac rehab this morning.  He has extreme fatigue and weakness to the point that he had to sit on the ground.  He is only on aspirin is not taking any other blood thinners apparently.  He does have a history of hemorrhoids and states that they do bleed occasionally although not large volume.  Also mentions dark stools but apparently he was on iron tablets at some point in the past.  He was currently undergoing outpatient investigation for his cirrhosis which apparently so far is cryptogenic, he never had an EGD because apparently they are waiting to fix his valve before.  At the ED he was noted to have a hemoglobin of 7.1 (baseline was around 11 back in January), Hemoccult positive as per ED,        Review of Systems:    Review  of Systems   Constitutional:  Positive for fatigue.   All other systems reviewed and are negative.      Past Medical and Surgical History:     Past Medical History:   Diagnosis Date    Chronic bronchitis (HCC)     COPD (chronic obstructive pulmonary disease) (HCC)     Obesity     ARACELY (obstructive sleep apnea)        Past Surgical History:   Procedure Laterality Date    IR PARACENTESIS  4/4/2024    IR PARACENTESIS  3/7/2024       Meds/Allergies:    Prior to Admission medications    Medication Sig Start Date End Date Taking? Authorizing Provider   albuterol (2.5 mg/3 mL) 0.083 % nebulizer solution Inhale 2.5 mg 1/15/16   Historical Provider, MD   albuterol (PROVENTIL HFA,VENTOLIN HFA) 90 mcg/act inhaler 2 puffs every 4 hours as needed 6/16/15   Historical Provider, MD   albuterol (VENTOLIN HFA) 90 mcg/act inhaler Inhale 2 puffs every 6 (six) hours as needed for wheezing 5/20/19   Anabel Haywood MD   aspirin 81 MG tablet Take by mouth    Historical Provider, MD   glipiZIDE (GLUCOTROL XL) 10 mg 24 hr tablet  12/22/17   Historical Provider, MD   glucose blood test strip Check twice a day and as needed, dx E11.9, 3/18/16   Historical Provider, MD   hydrocortisone (PROCTOSOL HC) 2.5 % rectal cream Insert into the rectum    Historical Provider, MD   ipratropium (ATROVENT) 0.03 % nasal spray 4 times daily 12/22/17   Historical Provider, MD   Lancets (ONETOUCH ULTRASOFT) lancets Check twice a day and as needed, dx E11.9, 3/18/16   Historical Provider, MD   lovastatin (MEVACOR) 10 MG tablet  2/2/18   Historical Provider, MD   metFORMIN (GLUCOPHAGE-XR) 500 mg 24 hr tablet TAKE 2 TABLETS IN MORNING AND 2 TABLETS WITH DINNER 6/29/17   Historical Provider, MD   metoprolol succinate (TOPROL-XL) 100 mg 24 hr tablet  2/2/18   Historical Provider, MD   Misc. Devices MISC by Does not apply route    Historical Provider, MD   Multiple Vitamin (DAILY VALUE MULTIVITAMIN) TABS Take by mouth    Historical Provider, MD   Omega-3 Fatty Acids  (FISH OIL) 645 MG CAPS Take by mouth    Historical Provider, MD   SYMBICORT 160-4.5 MCG/ACT inhaler  17   Historical Provider, MD     I have reviewed home medications with patient personally.    Allergies:   Allergies   Allergen Reactions    Gemfibrozil Swelling and Rash    Carbamazepine     Carbamazepine And Analogs      swelling       Social History:     Marital Status: /Civil Union   Substance Use History:   Social History     Substance and Sexual Activity   Alcohol Use No     Social History     Tobacco Use   Smoking Status Former    Current packs/day: 0.00    Average packs/day: 5.0 packs/day for 35.0 years (175.0 ttl pk-yrs)    Types: Cigarettes    Start date:     Quit date:     Years since quittin.2   Smokeless Tobacco Never     Social History     Substance and Sexual Activity   Drug Use No       Family History:    non-contributory    Physical Exam:     Vitals:   Blood Pressure: 112/59 (24)  Pulse: 96 (24)  Temperature: 98.2 °F (36.8 °C) (24)  Temp Source: Oral (24)  Respirations: 22 (24)  Weight - Scale: 101 kg (223 lb 8.7 oz) (24)  SpO2: 98 % (24)    Physical Exam  Vitals and nursing note reviewed.   Constitutional:       Appearance: Normal appearance. He is ill-appearing. He is not diaphoretic.      Comments: Chronically ill appearing male in bed, awake   HENT:      Head: Normocephalic and atraumatic.      Right Ear: External ear normal.      Left Ear: External ear normal.      Nose: Nose normal. No congestion or rhinorrhea.      Mouth/Throat:      Mouth: Mucous membranes are dry.      Pharynx: Oropharynx is clear. No oropharyngeal exudate or posterior oropharyngeal erythema.   Eyes:      General: No scleral icterus.        Right eye: No discharge.         Left eye: No discharge.      Pupils: Pupils are equal, round, and reactive to light.   Neck:      Vascular: No carotid bruit.   Cardiovascular:      Rate  and Rhythm: Normal rate and regular rhythm.      Pulses: Normal pulses.      Heart sounds: No murmur heard.     No friction rub. No gallop.   Pulmonary:      Effort: Pulmonary effort is normal. No respiratory distress.      Breath sounds: Normal breath sounds. No stridor. No wheezing, rhonchi or rales.   Abdominal:      General: Abdomen is flat. Bowel sounds are normal. There is distension.      Palpations: Abdomen is soft. There is no mass.      Tenderness: There is no abdominal tenderness. There is no guarding or rebound.      Hernia: No hernia is present.   Musculoskeletal:         General: No swelling, tenderness, deformity or signs of injury. Normal range of motion.      Cervical back: Normal range of motion. No rigidity. No muscular tenderness.      Right lower leg: Edema present.      Left lower leg: Edema present.      Comments: Edema 3+/4+ bilateral lower extremities   Lymphadenopathy:      Cervical: No cervical adenopathy.   Skin:     General: Skin is warm.      Capillary Refill: Capillary refill takes less than 2 seconds.      Coloration: Skin is pale. Skin is not jaundiced.      Findings: No bruising or erythema.   Neurological:      General: No focal deficit present.      Mental Status: He is alert and oriented to person, place, and time. Mental status is at baseline.      Cranial Nerves: No cranial nerve deficit.      Sensory: No sensory deficit.      Motor: No weakness.      Coordination: Coordination normal.      Deep Tendon Reflexes: Reflexes normal.   Psychiatric:         Mood and Affect: Mood normal.         Behavior: Behavior normal.         Thought Content: Thought content normal.         Judgment: Judgment normal.             Additional Data:     Lab Results: I have personally reviewed pertinent reports.      Results from last 7 days   Lab Units 04/09/24  1828   WBC Thousand/uL 7.49   HEMOGLOBIN g/dL 7.1*   HEMATOCRIT % 21.9*   PLATELETS Thousands/uL 106*   SEGS PCT % 67   LYMPHO PCT % 16    MONO PCT % 13*   EOS PCT % 2     Results from last 7 days   Lab Units 04/09/24  1828   SODIUM mmol/L 132*   POTASSIUM mmol/L 3.5   CHLORIDE mmol/L 100   CO2 mmol/L 20*   BUN mg/dL 29*   CREATININE mg/dL 1.36*   ANION GAP mmol/L 12   CALCIUM mg/dL 7.6*   ALBUMIN g/dL 2.5*   TOTAL BILIRUBIN mg/dL 0.54   ALK PHOS U/L 82   ALT U/L 21   AST U/L 36   GLUCOSE RANDOM mg/dL 116             Results from last 7 days   Lab Units 04/04/24  0717   HEMOGLOBIN A1C % 5.7*           Imaging: I have personally reviewed pertinent reports.      XR chest 2 views    (Results Pending)           [facility]\"" / Epic Records Reviewed: Yes     ** Please Note: This note has been constructed using a voice recognition system. **

## 2024-04-10 NOTE — UTILIZATION REVIEW
Initial Clinical Review    Admission: Date/Time/Statement:   Admission Orders (From admission, onward)       Ordered        04/09/24 2056  Inpatient Admission  Once                          Orders Placed This Encounter   Procedures    Inpatient Admission     Standing Status:   Standing     Number of Occurrences:   1     Order Specific Question:   Level of Care     Answer:   Med Surg [16]     Order Specific Question:   Estimated length of stay     Answer:   More than 2 Midnights     Order Specific Question:   Certification     Answer:   I certify that inpatient services are medically necessary for this patient for a duration of greater than two midnights. See H&P and MD Progress Notes for additional information about the patient's course of treatment.     ED Arrival Information       Expected   -    Arrival   4/9/2024 17:47    Acuity   Emergent              Means of arrival   Wheelchair    Escorted by   Family Member    Service   Hospitalist    Admission type   Emergency              Arrival complaint   Weakness             Chief Complaint   Patient presents with    Weakness - Generalized     Weakness that started today after cardiac rehab this morning. PT presents with abdominal swelling. Paracentesis earlier this week. Recent aortic valve replacement 1/4/24.          Initial Presentation: 78 y.o. male      Date: ***   Day 2: ***    ED Triage Vitals   Temperature Pulse Respirations Blood Pressure SpO2   04/09/24 1802 04/09/24 1757 04/09/24 1757 04/09/24 1757 04/09/24 1757   98.9 °F (37.2 °C) 97 20 114/65 97 %      Temp Source Heart Rate Source Patient Position - Orthostatic VS BP Location FiO2 (%)   04/09/24 1802 04/09/24 1757 04/09/24 1757 04/09/24 1757 --   Oral Monitor Sitting Right arm       Pain Score       04/09/24 2347       No Pain          Wt Readings from Last 1 Encounters:   04/09/24 101 kg (223 lb 8.7 oz)     Additional Vital Signs: vital Signs (last 2 days)    Date/Time Temp Pulse Resp BP MAP (mmHg)  SpO2 O2 Device Patient Position - Orthostatic VS   04/10/24 09:21:43 -- 78 -- 131/58 82 96 % -- --   04/10/24 0915 -- 84 -- 131/58 -- -- -- --   04/10/24 0726 98.4 °F (36.9 °C) 88 18 135/55 82 95 % None (Room air) Lying   04/10/24 0000 98.3 °F (36.8 °C) 92 20 110/60 -- 94 % None (Room air) --   04/09/24 22:00:08 98.4 °F (36.9 °C) 95 -- 108/59 75 98 % -- --   04/09/24 2130 98.7 °F (37.1 °C) 94 20 118/65 -- 98 % None (Room air) --   04/09/24 2053 98.2 °F (36.8 °C) 96 22 112/59 -- 98 % None (Room air) --   04/09/24 2034 98.7 °F (37.1 °C) 104 24 Abnormal  112/59 -- 99 % None (Room air) Lying   04/09/24 1802 98.9 °F (37.2 °C) -- -- -- -- -- -- --   04/09/24 1757 -- 97 20 114/65 80 97 % None (Room air) Sitting     Pertinent Labs/Diagnostic Test Results:     XR chest 2 views   Final Result by Juan Rosado MD (04/10 0818)      No radiographic evidence of acute intrathoracic process.            Workstation performed: [id number]         IR INPATIENT Paracentesis    (Results Pending)         Results from last 7 days   Lab Units 04/10/24  0525 04/09/24  1828   WBC Thousand/uL 3.30* 7.49   HEMOGLOBIN g/dL 7.1* 7.1*   HEMATOCRIT % 21.2* 21.9*   PLATELETS Thousands/uL 62* 106*   TOTAL NEUT ABS Thousands/µL 1.95 5.13         Results from last 7 days   Lab Units 04/10/24  0525 04/09/24  1828   SODIUM mmol/L 133* 132*   POTASSIUM mmol/L 3.4* 3.5   CHLORIDE mmol/L 102 100   CO2 mmol/L 22 20*   ANION GAP mmol/L 9 12   BUN mg/dL 30* 29*   CREATININE mg/dL 1.38* 1.36*   EGFR ml/min/1.73sq m 48 49   CALCIUM mg/dL 7.5* 7.6*     Results from last 7 days   Lab Units 04/09/24  1828   AST U/L 36   ALT U/L 21   ALK PHOS U/L 82   TOTAL PROTEIN g/dL 5.6*   ALBUMIN g/dL 2.5*   TOTAL BILIRUBIN mg/dL 0.54     Results from last 7 days   Lab Units 04/10/24  0723   POC GLUCOSE mg/dl 135     Results from last 7 days   Lab Units 04/10/24  0525 04/09/24  1828   GLUCOSE RANDOM mg/dL 96 116         Results from last 7 days   Lab Units  04/04/24  0717   HEMOGLOBIN A1C % 5.7*   EAG mg/dL 117     Results from last 7 days   Lab Units 04/09/24  2050 04/09/24  1828   HS TNI 0HR ng/L  --  19   HS TNI 2HR ng/L 17  --    HSTNI D2 ng/L -2  --      Results from last 7 days   Lab Units 04/09/24  1828   TSH 3RD GENERATON uIU/mL 2.185           Results from last 7 days   Lab Units 04/10/24  0410   UNIT PRODUCT CODE  [id number]   UNIT NUMBER  [id number]   UNITABO  O   UNITRH  POS   CROSSMATCH  Compatible   UNIT DISPENSE STATUS  Presumed Trans   UNIT PRODUCT VOL ml 350     Results from last 7 days   Lab Units 04/04/24  0717   CREATININE UR mg/dL 99         Past Medical History:   Diagnosis Date    Chronic bronchitis (HCC)     COPD (chronic obstructive pulmonary disease) (HCC)     Obesity     ARACELY (obstructive sleep apnea)            Admitting Diagnosis: GI bleeding [K92.2]  Weakness [R53.1]  Symptomatic anemia [D64.9]  Age/Sex: 78 y.o. male  Admission Orders:  Scheduled Medications:  budesonide-formoterol, 2 puff, Inhalation, BID  cefTRIAXone, 1,000 mg, Intravenous, Q24H  insulin lispro, 1-5 Units, Subcutaneous, TID AC  metoprolol succinate, 100 mg, Oral, Daily  pantoprazole, 40 mg, Intravenous, Q12H LUBA      Continuous IV Infusions:  octreotide, 50 mcg/hr, Intravenous, Continuous      PRN Meds:  albuterol, 2 puff, Inhalation, Q6H PRN        IP CONSULT TO GASTROENTEROLOGY    Network Utilization Review Department  ATTENTION: Please call with any questions or concerns to [phone] and carefully listen to the prompts so that you are directed to the right person. All voicemails are confidential.   For Discharge needs, contact Care Management DC Support Team at [phone] opt. [phone]  Send all requests for admission clinical reviews, approved or denied determinations and any other requests to dedicated fax number below belonging to the campus where the patient is receiving treatment. List of dedicated fax numbers for the Facilities:  FACILITY NAME UR FAX NUMBER    ADMISSION DENIALS (Administrative/Medical Necessity) [phone]   DISCHARGE SUPPORT TEAM (NETWORK) [phone]   PARENT CHILD HEALTH (Maternity/NICU/Pediatrics) [phone]   [facility] [phone]   [facility] [phone]   [facility] [phone]   [facility] [phone]   [facility] [phone]   [facility] [phone]   [facility] [phone]   [facility] [phone]   [facility] [phone]   Critical access hospital [phone]   [facility] [phone]   [facility] [phone]

## 2024-04-10 NOTE — ASSESSMENT & PLAN NOTE
"Lab Results   Component Value Date    HGBA1C 5.7 (H) 04/04/2024       No results for input(s): \"POCGLU\" in the last 72 hours.    Blood Sugar Average: Last 72 hrs:    Well controlled with A1c of 5.7  HOLD glipizide and metformin for now.  SSI    "

## 2024-04-10 NOTE — ASSESSMENT & PLAN NOTE
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 pylori  The 1st part of the duodenum and 2nd part of the duodenum appeared normal.    Surgery Recs: Okay to restart diet.  Continue octreotide for 72 hours total then patient can be discharged.  Ceftriaxone or oral equivalent for 5 days.  Patient will need repeat EGD in 4 weeks for repeat variceal ligation.   GI following, appreciate recommendations

## 2024-04-10 NOTE — PROGRESS NOTES
[facility]  Progress Note  Name: Bimal Lugo I  MRN: [id number]  Unit/Bed#: -01 I Date of Admission: 4/9/2024   Date of Service: 4/10/2024 I Hospital Day: 1    Assessment/Plan   * Symptomatic anemia  Assessment & Plan  77yo M with hx of cirrhosis and ascites presents with symptomatic anemia - Hb 7.1 on admission baseline previously 11  Has not had recent endoscopy. Mentions hemorrhoids but no significant active bleeding down there currently although does endorse that intermittently.  Patient admitted with acute blood loss anemia needing blood transfusion.  Differential diagnosis includes variceal bleed, PUD, esophagitis among other causes. Continue IV PPI twice daily. Keep NPO for EGD. Octreotide drip and ceftriaxone.   According to ED Hemoccult positive  Start IV Protonix twice a day  Start octreotide gtt  Transfused 1U PRBC (patient consented by ED provider), still Hb 7.1, given that patient is symptomatic from acute blood needing blood transfusion- will give 1 more unit blood transfusion  Ceftriaxone for SBP prophylaxis  Monitor hemodynamics  Anemia likely in setting of previous variceal bleed and/or severe esophagitis with esophageal ulcer.  No evidence of active bleeding.    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 pylori  The 1st part of the duodenum and 2nd part of the duodenum appeared normal.    Surgery Recs: Okay to restart diet.  Continue octreotide for 72 hours total then  patient can be discharged.  Ceftriaxone or oral equivalent for 5 days.  Patient will need repeat EGD in 4 weeks for repeat variceal ligation.   GI following, appreciate recommendations    Diabetes ([facility])  Assessment & Plan  Lab Results   Component Value Date    HGBA1C 5.7 (H) 04/04/2024       Recent Labs     04/10/24  0723   POCGLU 135       Blood Sugar Average: Last 72 hrs:  (P) 135  Well controlled with A1c of 5.7  HOLD glipizide and metformin for now.  SSI      PVD (peripheral vascular disease) ([facility])  Assessment & Plan  On aspirin  Currently on hold due to anemia    Chronic kidney disease, stage 3a ([facility])  Assessment & Plan  Lab Results   Component Value Date    EGFR 48 04/10/2024    EGFR 49 04/09/2024    EGFR 54 (L) 01/23/2024    CREATININE 1.38 (H) 04/10/2024    CREATININE 1.36 (H) 04/09/2024    CREATININE 1.3 (H) 01/23/2024     Creatinine is 1.3-1.5 at baseline  Currently at baseline  Monitor  Avoid nephrotoxins    Chronic bronchitis with COPD (chronic obstructive pulmonary disease)  Assessment & Plan  Overall stable respiratory status  Continue Symbicort and PRN albuterol    CAD (coronary artery disease)  Assessment & Plan  Reported hx of CAD and AVR on Jan 2024.  DENIES chest pain or SOB currently  Monitor               VTE Pharmacologic Prophylaxis:   High Risk (Score >/= 5) - Pharmacological DVT Prophylaxis Contraindicated. Sequential Compression Devices Ordered.    Mobility:   Basic Mobility Inpatient Raw Score: 18  JH-HLM Goal: 6: Walk 10 steps or more  JH-HLM Achieved: 4: Move to chair/commode  JH-HLM Goal NOT achieved. Continue with multidisciplinary rounding and encourage appropriate mobility to improve upon JH-HLM goals.    Patient Centered Rounds: I performed bedside rounds with nursing staff today.  Discussions with Specialists or Other Care Team Provider:  IP CONSULT TO GASTROENTEROLOGY    Total Time for Visit, including Counseling / Coordination of Care: 50 minutes.  Greater than 50% of this total  time spent on direct patient counseling and coordination of care.  I spoke extensively with gastroenterology and the patient and his wife    Education and Discussions with Family / Patient: Patient, gastroenterology    Current Length of Stay: 1 day(s)  Current Patient Status: Inpatient   Certification Statement: The patient will continue to require additional inpatient hospital stay due to plan as noted above  Discharge Plan: Anticipate discharge in 24-48 hrs to home.    Code Status: Level 1 - Full Code    Subjective:   Patient was sitting in the chair joined by his wife on the couch. Very pleasant. He says he is doing much better than last night, where he was pale, dizzy, weak, and SOB. In the ED, transfused 1U pRBCs and after, felt much better. Last Thursday, he had 12L removed via paracentesis. He is going in for endoscopy this morning and has been NPO.     Objective:     Vitals:   Temp (24hrs), Av.1 °F (36.7 °C), Min:97.4 °F (36.3 °C), Max:98.9 °F (37.2 °C)    Temp:  [97.4 °F (36.3 °C)-98.9 °F (37.2 °C)] 97.6 °F (36.4 °C)  HR:  [] 77  Resp:  [16-24] 18  BP: ()/(53-66) 99/66  SpO2:  [91 %-99 %] 95 %  Body mass index is 37.2 kg/m².     Input and Output Summary (last 24 hours):     Intake/Output Summary (Last 24 hours) at 4/10/2024 1636  Last data filed at 4/10/2024 1122  Gross per 24 hour   Intake 436.67 ml   Output --   Net 436.67 ml       Physical Exam:   Physical Exam  Cardiovascular:      Rate and Rhythm: Normal rate and regular rhythm.      Pulses: Normal pulses.      Heart sounds: No murmur heard.     No friction rub. No gallop.   Pulmonary:      Effort: Pulmonary effort is normal.      Breath sounds: Normal breath sounds.   Abdominal:      General: Abdomen is protuberant. There is distension.      Palpations: There is shifting dullness and fluid wave.      Tenderness: There is generalized abdominal tenderness. There is rebound.   Musculoskeletal:         General: Swelling present.      Right  lower leg: 3+ Edema present.      Left lower le+ Edema present.   Skin:     General: Skin is warm.   Neurological:      General: No focal deficit present.      Mental Status: He is alert and oriented to person, place, and time.   Psychiatric:         Mood and Affect: Mood normal.         Behavior: Behavior normal.         Thought Content: Thought content normal.      General exam- looks a little weak  HEENT - atraumatic and normocephalic  Neck- supple  Skin - no fresh rash  Extremities no fresh focal deformities  Cardiovascular- S1-S2 heard  Respiratory- bilateral air entry present, no crackles or rhonchi  Skin - no fresh rash  Abdomen - normal bowel sounds present, no rebound tenderness  CNS- No fresh focal deficits  Psych- no acute psychosis      Additional Data:     Labs:  Results from last 7 days   Lab Units 04/10/24  0525   WBC Thousand/uL 3.30*   HEMOGLOBIN g/dL 7.1*   HEMATOCRIT % 21.2*   PLATELETS Thousands/uL 62*   SEGS PCT % 60   LYMPHO PCT % 22   MONO PCT % 15*   EOS PCT % 2     Results from last 7 days   Lab Units 04/10/24  0525 24  1828   SODIUM mmol/L 133* 132*   POTASSIUM mmol/L 3.4* 3.5   CHLORIDE mmol/L 102 100   CO2 mmol/L 22 20*   BUN mg/dL 30* 29*   CREATININE mg/dL 1.38* 1.36*   ANION GAP mmol/L 9 12   CALCIUM mg/dL 7.5* 7.6*   ALBUMIN g/dL  --  2.5*   TOTAL BILIRUBIN mg/dL  --  0.54   ALK PHOS U/L  --  82   ALT U/L  --  21   AST U/L  --  36   GLUCOSE RANDOM mg/dL 96 116         Results from last 7 days   Lab Units 04/10/24  1615 04/10/24  0723   POC GLUCOSE mg/dl 197* 135     Results from last 7 days   Lab Units 24  0717   HEMOGLOBIN A1C % 5.7*           Lines/Drains:  Invasive Devices       Peripheral Intravenous Line  Duration             Peripheral IV 24 Right Antecubital <1 day                          Imaging: Reviewed radiology reports from this admission including:   XR chest 2 views    Result Date: 4/10/2024  Impression: No radiographic evidence of acute  intrathoracic process. Workstation performed: [id number]       XR chest 2 views    Result Date: 4/10/2024  Impression No radiographic evidence of acute intrathoracic process. Workstation performed: [id number]        No results found for this or any previous visit.      Recent Cultures (last 7 days):         Last 24 Hours Medication List:   Current Facility-Administered Medications   Medication Dose Route Frequency Provider Last Rate    albuterol  2 puff Inhalation Q6H PRN Anival Obrien MD      budesonide-formoterol  2 puff Inhalation BID Anival Obrien MD      cefTRIAXone  1,000 mg Intravenous Q24H Anival Obrien MD 1,000 mg (04/10/24 0206)    insulin lispro  1-5 Units Subcutaneous TID AC Anival Obrien MD      metoprolol succinate  100 mg Oral Daily Anival Obrien MD      octreotide  50 mcg/hr Intravenous Continuous Anival Obrien MD 50 mcg/hr (04/10/24 0350)    pantoprazole  40 mg Intravenous Q12H [facility] Anival Obrien MD          Today, Patient Was Seen By: Ramone Green MD       **Please Note: This note may have been constructed using a voice recognition system.**

## 2024-04-10 NOTE — ASSESSMENT & PLAN NOTE
79yo M with hx of cirrhosis and ascites presents with symptomatic anemia - Hb 7.1 on admission baseline previously 11  Has not had recent endoscopy. Mentions hemorrhoids but no significant active bleeding down there currently although does endorse that intermittently.  According to ED Hemoccult positive    Start IV Protonix twice a day  Start octreotide gtt  Transfuse 1U PRBC (patient consented by ED provider), monitor CBC after  N.p.o. after midnight  Ceftriaxone for SBP prophylaxis  GI consult, may benefit from EGD +/- flexsig or colonoscopy  Monitor hemodynamics

## 2024-04-10 NOTE — ANESTHESIA PREPROCEDURE EVALUATION
Procedure:  EGD    Relevant Problems   ANESTHESIA (within normal limits)      CARDIO   (+) CAD (coronary artery disease)   (+) History of aortic valve replacement (Bioprosthetic AVR 1/2024)   (+) PVD (peripheral vascular disease) (HCC)      ENDO (within normal limits)      GI/HEPATIC  Confirmed NPO appropriate  Has required large volume paracenteses in past, most recently several weeks ago.  Reports his current level of abdominal distention is at his baseline   (+) Cirrhosis (HCC)      /RENAL   (+) Chronic kidney disease, stage 3a (HCC)      HEMATOLOGY   (+) Symptomatic anemia      MUSCULOSKELETAL   (+) Back pain      NEURO/PSYCH (within normal limits)      PULMONARY  Former smoker   (+) Chronic obstructive pulmonary disease (HCC)   (-) Smoking   (-) URI (upper respiratory infection)      Endocrine   (+) Diabetes (HCC)      Respiratory/Allergy   (+) Chronic bronchitis with COPD (chronic obstructive pulmonary disease)        Physical Exam    Airway    Mallampati score: II  TM Distance: >3 FB  Neck ROM: full     Dental   Comment: edentulous     Cardiovascular  Rhythm: regular, Rate: normal    Pulmonary   Breath sounds clear to auscultation    Other Findings        Anesthesia Plan  ASA Score- 3     Anesthesia Type- IV sedation with anesthesia with ASA Monitors.         Additional Monitors:     Airway Plan:     Comment: I discussed the risks and benefits of IV sedation anesthesia including the possibility of the need to convert to general anesthesia and the potential risk of awareness.  The patient was given the opportunity to ask questions, which were answered..       Plan Factors-Exercise tolerance (METS): <4 METS.Exercise comment: Functional status limited 2/2 hip and back pain.    Chart reviewed. EKG reviewed.  Existing labs reviewed.     Patient is not a current smoker.              Induction- intravenous.    Postoperative Plan-     Informed Consent- Anesthetic plan and risks discussed with patient.  I personally  reviewed this patient with the CRNA. Discussed and agreed on the Anesthesia Plan with the CRNA..              Lab Results   Component Value Date    WBC 3.30 (L) 04/10/2024    HGB 7.1 (L) 04/10/2024    HCT 21.2 (L) 04/10/2024    MCV 87 04/10/2024    PLT 62 (L) 04/10/2024     Lab Results   Component Value Date    SODIUM 133 (L) 04/10/2024    K 3.4 (L) 04/10/2024     04/10/2024    CO2 22 04/10/2024    BUN 30 (H) 04/10/2024    CREATININE 1.38 (H) 04/10/2024    GLUC 96 04/10/2024    CALCIUM 7.5 (L) 04/10/2024     Lab Results   Component Value Date    ALT 21 04/09/2024    AST 36 04/09/2024    ALKPHOS 82 04/09/2024     Lab Results   Component Value Date    INR 1.13 (H) 05/31/2019    INR 1.12 (H) 03/29/2019    INR 1.08 03/07/2019     Lab Results   Component Value Date    HGBA1C 5.7 (H) 04/04/2024

## 2024-04-10 NOTE — ASSESSMENT & PLAN NOTE
Lab Results   Component Value Date    EGFR 49 04/09/2024    EGFR 54 (L) 01/23/2024    EGFR 54 (L) 09/14/2023    CREATININE 1.36 (H) 04/09/2024    CREATININE 1.3 (H) 01/23/2024    CREATININE 1.3 (H) 09/14/2023     Creatinine is 1.3-1.5 at baseline  Currently at baseline  Monitor  Avoid nephrotoxins

## 2024-04-10 NOTE — ASSESSMENT & PLAN NOTE
Lab Results   Component Value Date    HGBA1C 5.7 (H) 04/04/2024       Recent Labs     04/10/24  0723   POCGLU 135       Blood Sugar Average: Last 72 hrs:  (P) 135  Well controlled with A1c of 5.7  HOLD glipizide and metformin for now.  SSI

## 2024-04-10 NOTE — ASSESSMENT & PLAN NOTE
Hx of cirrhosis requiring periodic paracentesis   Currently no signs of encephalopathy  GI consult

## 2024-04-10 NOTE — ASSESSMENT & PLAN NOTE
Lab Results   Component Value Date    EGFR 48 04/10/2024    EGFR 49 04/09/2024    EGFR 54 (L) 01/23/2024    CREATININE 1.38 (H) 04/10/2024    CREATININE 1.36 (H) 04/09/2024    CREATININE 1.3 (H) 01/23/2024     Creatinine is 1.3-1.5 at baseline  Currently at baseline  Monitor  Avoid nephrotoxins

## 2024-04-10 NOTE — PHYSICAL THERAPY NOTE
Physical Therapy Evaluation     Patient's Name: Bimal Lugo    Admitting Diagnosis  GI bleeding [K92.2]  Weakness [R53.1]  Symptomatic anemia [D64.9]    Problem List  Patient Active Problem List   Diagnosis    Atherosclerosis of lower extremity with intermittent claudication (HCC)    CAD (coronary artery disease)    Chronic bronchitis with COPD (chronic obstructive pulmonary disease)    Chronic kidney disease, stage 3a (HCC)    PVD (peripheral vascular disease) (HCC)    Symptomatic anemia    Diabetes (HCC)    Cirrhosis (HCC)    Chronic obstructive pulmonary disease (HCC)    History of aortic valve replacement    Back pain     Past Medical History  Past Medical History:   Diagnosis Date    Chronic bronchitis (HCC)     COPD (chronic obstructive pulmonary disease) (HCC)     Obesity     ARACELY (obstructive sleep apnea)      Past Surgical History  Past Surgical History:   Procedure Laterality Date    IR PARACENTESIS  4/4/2024    IR PARACENTESIS  3/7/2024      04/10/24 0724   PT Last Visit   PT Visit Date 04/10/24   Note Type   Note type Evaluation and Treatment   Pain Assessment   Pain Assessment Tool 0-10   Pain Score 8   Pain Location/Orientation Orientation: Left;Location: Hip   Pain Onset/Description Frequency: Intermittent;Other (Comment)  (pt reported with ambulation)   Hospital Pain Intervention(s) Repositioned;Ambulation/increased activity   Restrictions/Precautions   Weight Bearing Precautions Per Order No   Other Precautions Chair Alarm;Bed Alarm;Multiple lines;Fall Risk;Telemetry;Pain   Home Living   Type of Home House   Home Layout One level;Able to live on main level with bedroom/bathroom;Performs ADLs on one level;Stairs to enter with rails  (3 SCARLETT)   Bathroom Shower/Tub Walk-in shower   Bathroom Toilet Raised   Bathroom Equipment Grab bars in shower;Shower chair;Grab bars around toilet   Bathroom Accessibility Accessible   Home Equipment Other (Comment)  (none per patient; plans to order a cane)  "  Additional Comments Pt ambulates without an AD.   Prior Function   Level of [place] Independent with functional mobility;Independent with ADLs;Independent with IADLS   Lives With Spouse   Receives Help From Family;Outpatient therapy  (OP PT 3x/week)   IADLs Independent with driving;Independent with medication management;Family/Friend/Other provides meals   Falls in the last 6 months 1 to 4  (1 fall)   Vocational Retired   General   Family/Caregiver Present Yes  (pt's spouse)   Cognition   Overall Cognitive Status WFL   Arousal/Participation Alert   Attention Within functional limits   Orientation Level Oriented X4   Memory Within functional limits   Following Commands Follows all commands and directions without difficulty   Comments Pt agreeable to PT.   Subjective   Subjective \"I can walk, but I was weak.\"   RLE Assessment   RLE Assessment X   Strength RLE   RLE Overall Strength 4-/5   LLE Assessment   LLE Assessment X   Strength LLE   LLE Overall Strength 4-/5   Light Touch   RLE Light Touch Grossly intact   LLE Light Touch Grossly intact   Bed Mobility   Supine to Sit 4  Minimal assistance   Additional items Assist x 1;HOB elevated;Increased time required;Verbal cues;LE management   Transfers   Sit to Stand   (CG assist)   Additional items Assist x 1;Increased time required;Verbal cues   Stand to Sit   (CG assist)   Additional items Assist x 1;Armrests;Increased time required;Verbal cues   Ambulation/Elevation   Gait pattern Decreased toe off;Decreased heel strike;Decreased hip extension;Short stride;Shuffling   Gait Assistance 4  Minimal assist  (CG assist)   Additional items Assist x 1;Verbal cues   Assistive Device None   Distance 80 feet   Balance   Static Sitting Fair +   Dynamic Sitting Fair   Static Standing Fair   Dynamic Standing Fair -   Ambulatory Fair -   Endurance Deficit   Endurance Deficit Yes   Endurance Deficit Description decreased activity tolerance   Activity Tolerance   Activity " Tolerance Patient tolerated treatment well   Medical Staff Made Aware OT Tigist  (Co-evaluation performed with OT secondary to complex medical condition of patient and regression of functional status from baseline. PT/OT goals were addressed separately.)   Assessment   Prognosis Good   Problem List Decreased strength;Decreased endurance;Decreased mobility;Impaired balance;Pain   Assessment Pt is 78 year old male seen for PT evaluation s/p admit to [facility] on 4/9/2024 with Symptomatic anemia. PT consulted to assess pt's functional mobility and discharge needs. Order placed for PT evaluation and treatment, with up and out of bed as tolerated order. Comorbidities affecting pt's physical performance at time of assessment include CAD, chronic bronchitis with COPD, CKD stage 3a, peripheral vascular disease, diabetes, cirrhosis, and history of aortic valve replacement. Prior to hospitalization, pt was independent with all functional mobility without an AD. Pt ambulates unrestricted distances on all terrain and elevations. Pt resides with his spouse, in a one level house with three steps to enter. Personal factors affecting pt at time of initial evaluation include stairs to enter home, inability to ambulate community distances, difficulty navigating level surfaces without external assistance, unable to perform dynamic tasks in the community, positive fall history, and difficulty performing ADLs and IADLs. Please find objective findings from PT assessment regarding body systems outlined above with impairments and limitations including weakness, impaired balance, decreased endurance, gait deviations, pain, decreased activity tolerance, decreased functional mobility tolerance, and fall risk. The following objective measures were performed on initial evaluation Barthel Index: 45/100, Modified [place]: 4 (moderate/severe disability), and AM-PAC 6-Clicks: 18/24. Pt's clinical presentation is currently evolving seen in  pt's presentation of need for ongoing medical management/monitoring, pt is a fall risk, and pt requires cues and assist for safety with functional mobility. Pt to benefit from continued PT treatment to address deficits as defined above and maximize pt's level of function and independence with mobility. From a PT standpoint, recommendation at time of discharge would be level 3, minimal resource intensity in order to facilitate return to prior level of function.   Barriers to Discharge Inaccessible home environment   Goals   Patient Goals to get stronger   STG Expiration Date 04/20/24   Short Term Goal #1 In 10 days: Increase bilateral LE strength 1/2 grade to facilitate independent mobility, Perform all bed mobility tasks modified independent to decrease caregiver burden, Perform all transfers modified independent to improve independence, Ambulate > 250 ft. with least restrictive assistive device modified independent w/o LOB and w/ normalized gait pattern 100% of the time, Navigate 3 stairs modified independent with unilateral handrail to facilitate return to previous living environment, and Increase all balance 1/2 grade to decrease risk for falls   PT Treatment Day 1   Plan   Treatment/Interventions Functional transfer training;LE strengthening/ROM;Elevations;Therapeutic exercise;Endurance training;Patient/family training;Bed mobility;Gait training;Spoke to nursing;OT;Family   PT Frequency 2-3x/wk   Discharge Recommendation   Rehab Resource Intensity Level, PT III (Minimum Resource Intensity)   AM-PAC Basic Mobility Inpatient   Turning in Flat Bed Without Bedrails 3   Lying on Back to Sitting on Edge of Flat Bed Without Bedrails 3   Moving Bed to Chair 3   Standing Up From Chair Using Arms 3   Walk in Room 3   Climb 3-5 Stairs With Railing 3   Basic Mobility Inpatient Raw Score 18   Basic Mobility Standardized Score 41.05   [facility] Highest Level Of Mobility   -[facility] Goal 6: Walk 10 steps or more   -[facility]  Achieved 7: Walk 25 feet or more   Modified Marty Scale   Modified [place] Scale 4   Barthel Index   Feeding 10   Bathing 0   Grooming Score 0   Dressing Score 5   Bladder Score 5   Bowels Score 10   Toilet Use Score 5   Transfers (Bed/Chair) Score 10   Mobility (Level Surface) Score 0   Stairs Score 0   Barthel Index Score 45   Additional Treatment Session   Start Time 0737   End Time 0748   Treatment Assessment Pt agreeable to PT treatment session following PT evaluation. Pt performed seated therapeutic exercise as indicated below. Pt required verbal cues for correct technique and form. Pt tolerated therapeutic exercise well without complaints of pain. Pt continues to exhibit decreased lower extremity strength, impaired balance, decreased endurance, gait deviations, and decreased functional mobility. PT to continue to recommend level 3, minimum resource intensity. PT to continue to follow and treat as appropriate.   Exercises   Hip Flexion Sitting;20 reps;AROM;Bilateral   Hip Adduction Sitting;20 reps;AROM;Bilateral   Knee AROM Long Arc Quad Sitting;20 reps;AROM;Bilateral   Ankle Pumps Sitting;20 reps;AROM;Bilateral   End of Consult   Patient Position at End of Consult Bedside chair;Bed/Chair alarm activated;All needs within reach     PT Evaluation Time: 0724-0736    PT Treatment Time: 8196-6508  11 minutes  Lori Wang, PT, DPT

## 2024-04-10 NOTE — CASE MANAGEMENT
Case Management Assessment & Discharge Planning Note    Patient name Bimal Lugo  Location /-01 MRN [id number]  : 1945 Date 4/10/2024       Current Admission Date: 2024  Current Admission Diagnosis:Symptomatic anemia   Patient Active Problem List    Diagnosis Date Noted    Chronic obstructive pulmonary disease (HCC) 04/10/2024    History of aortic valve replacement 04/10/2024    Back pain 04/10/2024    CAD (coronary artery disease) 2024    Diabetes (HCC) 2024    Cirrhosis (HCC) 2024    Symptomatic anemia 2024    Chronic kidney disease, stage 3a (HCC) 10/11/2021    PVD (peripheral vascular disease) ([facility]) 2018    Atherosclerosis of lower extremity with intermittent claudication (HCC) 2018    Chronic bronchitis with COPD (chronic obstructive pulmonary disease) 2017      LOS (days): 1  Geometric Mean LOS (GMLOS) (days): 2.2  Days to GMLOS:1.5     OBJECTIVE:    Risk of Unplanned Readmission Score: 14.81         Current admission status: Inpatient       Preferred Pharmacy:   [facility] [place] [place] - [address]  [address] [facility] [address]  Phone: [phone] Fax: [phone]    Primary Care Provider: Vivian Van DO    Primary Insurance: MEDICARE  Secondary Insurance: BLUE CROSS    ASSESSMENT:  Active Health Care Proxies       Viki Lugo Health Care Representative - Spouse   Primary Phone: [phone] (Home)                 Advance Directives  Does patient have a Health Care POA?: No  Was patient offered paperwork?: Yes (Declined.)  Does patient currently have a Health Care decision maker?: Yes, please see Health Care Proxy section  Does patient have Advance Directives?: No  Was patient offered paperwork?: Yes (Declined.)  Primary Contact: Patient's Wife (Viki: [phone])    Readmission Root Cause  30 Day Readmission: No    Patient Information  Admitted from:: Home  Mental Status: Alert  During  Assessment patient was accompanied by: Spouse  Assessment information provided by:: Patient, Spouse  Primary Caregiver: Self  Support Systems: Self, Family members, Spouse/significant other, Children  County of Residence: [place]  What city do you live in[place]  Home entry access options. Select all that apply.: Stairs  Number of steps to enter home.: 4  Do the steps have railings?: Yes  Type of Current Residence: [facility]  Living Arrangements: Lives w/ Spouse/significant other  Is patient a ?: Yes  Is [facility] with [facility] ( behav[facility])?: No    Activities of Daily Living Prior to Admission  Functional Status: Independent  Completes ADLs independently?: Yes  Ambulates independently?: Yes  Does patient use assisted devices?: No  Does patient currently own DME?: Yes  What DME does the patient currently own?: Walker, Wheelchair  Does patient have a history of Outpatient Therapy (PT/OT)?: Yes (Patient reported that he is currently in OP cardiac rehab.)  Does the patient have a history of Short-Term Rehab?: No  Does patient have a history of HHC?: No  Does patient currently have HHC?: No    Patient Information Continued  Income Source: Pension/[employer]  Does patient have prescription coverage?: Yes (Patient confirmed that he uses [facility] in [facility], and he denied any barriers to obtaining or affording prescriptions.)  Does patient receive dialysis treatments?: No  Does patient have a history of substance abuse?: No  Does patient have a history of Mental Health Diagnosis?: No    PHQ 2/9 Screening   Reviewed PHQ 2/9 Depression Screening Score?: No    Means of Transportation  Means of Transport to Appts:: Drives Self    Social Determinants of Health (SDOH)      Flowsheet Row Most Recent Value   Housing Stability    In the last 12 months, was there a time when you were not able to pay the mortgage or rent on time? N   In the last 12 months, how many places have you lived? 1   In the last 12  months, was there a time when you did not have a steady place to sleep or slept in a shelter (including now)? N   Transportation Needs    In the past 12 months, has lack of transportation kept you from medical appointments or from getting medications? no   In the past 12 months, has lack of transportation kept you from meetings, work, or from getting things needed for daily living? No   Food Insecurity    Within the past 12 months, you worried that your food would run out before you got the money to buy more. Never true   Within the past 12 months, the food you bought just didn't last and you didn't have money to get more. Never true   Utilities    In the past 12 months has the electric, gas, oil, or water company threatened to shut off services in your home? No          DISCHARGE DETAILS:    Discharge planning discussed with:: Patient and Patient's Wife  Freedom of Choice: Yes  Comments - Freedom of Choice: CM discussed freedom of choice as it pertains to discharge planning. Patient and his wife denied any needs at this time and prefer that patient resumes OP cardiac rehab once discharged. Patient is aware that he will need to follow up with cardiology and seek medical clearance to resume OP therapy. His children will provide transportation home at the time of discharge.  CM contacted family/caregiver?: Yes  Were Treatment Team discharge recommendations reviewed with patient/caregiver?: Yes  Did patient/caregiver verbalize understanding of patient care needs?: Yes  Were patient/caregiver advised of the risks associated with not following Treatment Team discharge recommendations?: Yes    Contacts  Patient Contacts: Viki  Relationship to Patient:: Family  Contact Method: In Person  Reason/Outcome: Continuity of Care, Discharge Planning    Requested Home Health Care         Is the patient interested in C at discharge?: No    DME Referral Provided  Referral made for DME?: No    Other  Referral/Resources/Interventions Provided:  Interventions: None Indicated    Would you like to participate in our [facility] service program?  : No - Declined    Treatment Team Recommendation: Home  Discharge Destination Plan:: Home  Transport at Discharge : Family

## 2024-04-10 NOTE — PLAN OF CARE
Problem: PHYSICAL THERAPY ADULT  Goal: Performs mobility at highest level of function for planned discharge setting.  See evaluation for individualized goals.  Description: Treatment/Interventions: Functional transfer training, LE strengthening/ROM, Elevations, Therapeutic exercise, Endurance training, Patient/family training, Bed mobility, Gait training, Spoke to nursing, OT, Family          See flowsheet documentation for full assessment, interventions and recommendations.  Note: Prognosis: Good  Problem List: Decreased strength, Decreased endurance, Decreased mobility, Impaired balance, Pain  Assessment: Pt is 78 year old male seen for PT evaluation s/p admit to [facility] on 4/9/2024 with Symptomatic anemia. PT consulted to assess pt's functional mobility and discharge needs. Order placed for PT evaluation and treatment, with up and out of bed as tolerated order. Comorbidities affecting pt's physical performance at time of assessment include CAD, chronic bronchitis with COPD, CKD stage 3a, peripheral vascular disease, diabetes, cirrhosis, and history of aortic valve replacement. Prior to hospitalization, pt was independent with all functional mobility without an AD. Pt ambulates unrestricted distances on all terrain and elevations. Pt resides with his spouse, in a one level house with three steps to enter. Personal factors affecting pt at time of initial evaluation include stairs to enter home, inability to ambulate community distances, difficulty navigating level surfaces without external assistance, unable to perform dynamic tasks in the community, positive fall history, and difficulty performing ADLs and IADLs. Please find objective findings from PT assessment regarding body systems outlined above with impairments and limitations including weakness, impaired balance, decreased endurance, gait deviations, pain, decreased activity tolerance, decreased functional mobility tolerance, and fall risk. The  following objective measures were performed on initial evaluation Barthel Index: 45/100, Modified [place]: 4 (moderate/severe disability), and AM-PAC 6-Clicks: 18/24. Pt's clinical presentation is currently evolving seen in pt's presentation of need for ongoing medical management/monitoring, pt is a fall risk, and pt requires cues and assist for safety with functional mobility. Pt to benefit from continued PT treatment to address deficits as defined above and maximize pt's level of function and independence with mobility. From a PT standpoint, recommendation at time of discharge would be level 3, minimal resource intensity in order to facilitate return to prior level of function.    Barriers to Discharge: Inaccessible home environment     Rehab Resource Intensity Level, PT: III (Minimum Resource Intensity)    See flowsheet documentation for full assessment.

## 2024-04-10 NOTE — ANESTHESIA POSTPROCEDURE EVALUATION
Post-Op Assessment Note    CV Status:  Stable  Pain Score: 0    Pain management: adequate       Mental Status:  Alert and awake   Hydration Status:  Euvolemic   PONV Controlled:  Controlled   Airway Patency:  Patent and adequate  Two or more mitigation strategies used for obstructive sleep apnea   Post Op Vitals Reviewed: Yes    No anethesia notable event occurred.    Staff: MORIAH               BP 98/53 (04/10/24 1129)    Temp 97.5 °F (36.4 °C) (04/10/24 1129)    Pulse 68 (04/10/24 1129)   Resp 16 (04/10/24 1129)    SpO2 93 % (04/10/24 1129)

## 2024-04-10 NOTE — PLAN OF CARE
Problem: OCCUPATIONAL THERAPY ADULT  Goal: Performs self-care activities at highest level of function for planned discharge setting.  See evaluation for individualized goals.  Description: Treatment Interventions: ADL retraining, Functional transfer training, UE strengthening/ROM, Endurance training, Patient/family training, Equipment evaluation/education, Compensatory technique education, Energy conservation  Equipment Recommended: Bedside commode       See flowsheet documentation for full assessment, interventions and recommendations.   Note: Limitation: Decreased ADL status, Decreased UE strength, Decreased endurance, Decreased self-care trans, Decreased high-level ADLs  Prognosis: Good  Assessment: Patient is a 78 y.o. male seen for OT evaluation s/p admit to [facility]  on 4/9/2024 w/Symptomatic anemia. Commorbidities affecting patient's functional performance at time of assessment include: CAD, COPD, CKD, PVD, diabetes, cirrhosis, and hx of aortic valve replacement. Orders placed for OT evaluation and treatment and up and OOB as tolerated . Performed at least two patient identifiers during session including name and wristband.  Prior to admission, Patient reporting being independent with ADLs/IADLs, ambulatory with no AD, and lives with wife. Personal factors affecting patient at time of initial evaluation include: steps to enter, difficulty performing ADLs, and difficulty performing IADLs. Upon evaluation, patient requires supervision assist for UB ADLs, minimal  assist for LB ADLs, transfers and functional ambulation in room and bathroom with contact guard assist, with the use of  no AD .  Patient is oriented x 4.  Occupational performance is affected by the following deficits: decreased muscle strength, decreased activity tolerance, increased pain, and delayed righting and equilibrium reactions. Patient to benefit from continued Occupational Therapy treatment while in the hospital to address  deficits as defined above and maximize level of functional independence with ADLs and functional mobility. Occupational Performance areas to address include: grooming , bathing/ shower, dressing, toilet hygiene, transfer to all surfaces, functional ambulation, medication routine/ management, IADLS: Household maintenance, IADLs: safety procedures, and IADLs: meal prep/ clean up. From OT standpoint, recommendation at time of d/c would be Level III (minimim resource intensity).     Rehab Resource Intensity Level, OT: III (Minimum Resource Intensity)     Tigist TRIPP, OTR/L

## 2024-04-11 ENCOUNTER — APPOINTMENT (OUTPATIENT)
Dept: NON INVASIVE DIAGNOSTICS | Facility: HOSPITAL | Age: 79
DRG: 432 | End: 2024-04-11
Payer: MEDICARE

## 2024-04-11 LAB
ABO GROUP BLD BPU: NORMAL
ALBUMIN SERPL BCP-MCNC: 2.4 G/DL (ref 3.5–5)
ALP SERPL-CCNC: 73 U/L (ref 34–104)
ALT SERPL W P-5'-P-CCNC: 17 U/L (ref 7–52)
ANION GAP SERPL CALCULATED.3IONS-SCNC: 8 MMOL/L (ref 4–13)
APPEARANCE FLD: CLEAR
AST SERPL W P-5'-P-CCNC: 29 U/L (ref 13–39)
BASOPHILS # BLD AUTO: 0.02 THOUSANDS/ÂΜL (ref 0–0.1)
BASOPHILS NFR BLD AUTO: 1 % (ref 0–1)
BILIRUB DIRECT SERPL-MCNC: 0.1 MG/DL (ref 0–0.2)
BILIRUB SERPL-MCNC: 0.56 MG/DL (ref 0.2–1)
BPU ID: NORMAL
BUN SERPL-MCNC: 28 MG/DL (ref 5–25)
CALCIUM SERPL-MCNC: 7.6 MG/DL (ref 8.4–10.2)
CHLORIDE SERPL-SCNC: 102 MMOL/L (ref 96–108)
CO2 SERPL-SCNC: 22 MMOL/L (ref 21–32)
COLOR FLD: NORMAL
CREAT SERPL-MCNC: 1.34 MG/DL (ref 0.6–1.3)
CROSSMATCH: NORMAL
EOSINOPHIL # BLD AUTO: 0.1 THOUSAND/ÂΜL (ref 0–0.61)
EOSINOPHIL NFR BLD AUTO: 2 % (ref 0–6)
ERYTHROCYTE [DISTWIDTH] IN BLOOD BY AUTOMATED COUNT: 17.5 % (ref 11.6–15.1)
GFR SERPL CREATININE-BSD FRML MDRD: 50 ML/MIN/1.73SQ M
GLUCOSE SERPL-MCNC: 144 MG/DL (ref 65–140)
GLUCOSE SERPL-MCNC: 155 MG/DL (ref 65–140)
GLUCOSE SERPL-MCNC: 167 MG/DL (ref 65–140)
GLUCOSE SERPL-MCNC: 191 MG/DL (ref 65–140)
GLUCOSE SERPL-MCNC: 203 MG/DL (ref 65–140)
HCT VFR BLD AUTO: 27.7 % (ref 36.5–49.3)
HGB BLD-MCNC: 9.1 G/DL (ref 12–17)
IMM GRANULOCYTES # BLD AUTO: 0.02 THOUSAND/UL (ref 0–0.2)
IMM GRANULOCYTES NFR BLD AUTO: 1 % (ref 0–2)
INR PPP: 1.25 (ref 0.84–1.19)
LYMPHOCYTES # BLD AUTO: 0.56 THOUSANDS/ÂΜL (ref 0.6–4.47)
LYMPHOCYTES NFR BLD AUTO: 13 % (ref 14–44)
LYMPHOCYTES NFR BLD AUTO: 31 %
MAGNESIUM SERPL-MCNC: 1.6 MG/DL (ref 1.9–2.7)
MCH RBC QN AUTO: 28.5 PG (ref 26.8–34.3)
MCHC RBC AUTO-ENTMCNC: 32.9 G/DL (ref 31.4–37.4)
MCV RBC AUTO: 87 FL (ref 82–98)
MONO+MESO NFR FLD MANUAL: 13 %
MONOCYTES # BLD AUTO: 0.58 THOUSAND/ÂΜL (ref 0.17–1.22)
MONOCYTES NFR BLD AUTO: 14 % (ref 4–12)
MONOCYTES NFR BLD AUTO: 51 %
NEUTROPHILS # BLD AUTO: 3.03 THOUSANDS/ÂΜL (ref 1.85–7.62)
NEUTS SEG NFR BLD AUTO: 5 %
NEUTS SEG NFR BLD AUTO: 69 % (ref 43–75)
NRBC BLD AUTO-RTO: 0 /100 WBCS
PLATELET # BLD AUTO: 72 THOUSANDS/UL (ref 149–390)
PMV BLD AUTO: 10.6 FL (ref 8.9–12.7)
POTASSIUM SERPL-SCNC: 3.7 MMOL/L (ref 3.5–5.3)
PROT SERPL-MCNC: 5.4 G/DL (ref 6.4–8.4)
PROTHROMBIN TIME: 16.4 SECONDS (ref 11.6–14.5)
RBC # BLD AUTO: 3.19 MILLION/UL (ref 3.88–5.62)
SITE: NORMAL
SODIUM SERPL-SCNC: 132 MMOL/L (ref 135–147)
TOTAL CELLS COUNTED SPEC: 100
TOTAL NUCLEATED CELL COUNT: 165 /UL
UNIT DISPENSE STATUS: NORMAL
UNIT PRODUCT CODE: NORMAL
UNIT PRODUCT VOLUME: 350 ML
UNIT RH: NORMAL
WBC # BLD AUTO: 4.31 THOUSAND/UL (ref 4.31–10.16)

## 2024-04-11 PROCEDURE — C9113 INJ PANTOPRAZOLE SODIUM, VIA: HCPCS | Performed by: INTERNAL MEDICINE

## 2024-04-11 PROCEDURE — 83735 ASSAY OF MAGNESIUM: CPT | Performed by: STUDENT IN AN ORGANIZED HEALTH CARE EDUCATION/TRAINING PROGRAM

## 2024-04-11 PROCEDURE — 89051 BODY FLUID CELL COUNT: CPT | Performed by: PHYSICIAN ASSISTANT

## 2024-04-11 PROCEDURE — 49083 ABD PARACENTESIS W/IMAGING: CPT

## 2024-04-11 PROCEDURE — 99233 SBSQ HOSP IP/OBS HIGH 50: CPT | Performed by: STUDENT IN AN ORGANIZED HEALTH CARE EDUCATION/TRAINING PROGRAM

## 2024-04-11 PROCEDURE — 80048 BASIC METABOLIC PNL TOTAL CA: CPT | Performed by: STUDENT IN AN ORGANIZED HEALTH CARE EDUCATION/TRAINING PROGRAM

## 2024-04-11 PROCEDURE — 99232 SBSQ HOSP IP/OBS MODERATE 35: CPT | Performed by: INTERNAL MEDICINE

## 2024-04-11 PROCEDURE — 85025 COMPLETE CBC W/AUTO DIFF WBC: CPT | Performed by: STUDENT IN AN ORGANIZED HEALTH CARE EDUCATION/TRAINING PROGRAM

## 2024-04-11 PROCEDURE — 85610 PROTHROMBIN TIME: CPT | Performed by: PHYSICIAN ASSISTANT

## 2024-04-11 PROCEDURE — 80076 HEPATIC FUNCTION PANEL: CPT | Performed by: PHYSICIAN ASSISTANT

## 2024-04-11 PROCEDURE — 0W9G3ZZ DRAINAGE OF PERITONEAL CAVITY, PERCUTANEOUS APPROACH: ICD-10-PCS | Performed by: STUDENT IN AN ORGANIZED HEALTH CARE EDUCATION/TRAINING PROGRAM

## 2024-04-11 PROCEDURE — 87070 CULTURE OTHR SPECIMN AEROBIC: CPT | Performed by: PHYSICIAN ASSISTANT

## 2024-04-11 PROCEDURE — 82948 REAGENT STRIP/BLOOD GLUCOSE: CPT

## 2024-04-11 PROCEDURE — 87205 SMEAR GRAM STAIN: CPT | Performed by: PHYSICIAN ASSISTANT

## 2024-04-11 RX ORDER — LIDOCAINE WITH 8.4% SOD BICARB 0.9%(10ML)
SYRINGE (ML) INJECTION AS NEEDED
Status: COMPLETED | OUTPATIENT
Start: 2024-04-11 | End: 2024-04-11

## 2024-04-11 RX ORDER — ALBUMIN (HUMAN) 12.5 G/50ML
50 SOLUTION INTRAVENOUS ONCE
Status: COMPLETED | OUTPATIENT
Start: 2024-04-11 | End: 2024-04-11

## 2024-04-11 RX ORDER — HEPARIN SODIUM 5000 [USP'U]/ML
5000 INJECTION, SOLUTION INTRAVENOUS; SUBCUTANEOUS EVERY 8 HOURS SCHEDULED
Status: DISCONTINUED | OUTPATIENT
Start: 2024-04-11 | End: 2024-04-11

## 2024-04-11 RX ORDER — FUROSEMIDE 10 MG/ML
20 INJECTION INTRAMUSCULAR; INTRAVENOUS DAILY
Status: DISCONTINUED | OUTPATIENT
Start: 2024-04-11 | End: 2024-04-13 | Stop reason: HOSPADM

## 2024-04-11 RX ORDER — SPIRONOLACTONE 25 MG/1
50 TABLET ORAL DAILY
Status: DISCONTINUED | OUTPATIENT
Start: 2024-04-11 | End: 2024-04-13 | Stop reason: HOSPADM

## 2024-04-11 RX ADMIN — CEFTRIAXONE SODIUM 1000 MG: 10 INJECTION, POWDER, FOR SOLUTION INTRAVENOUS at 01:00

## 2024-04-11 RX ADMIN — PANTOPRAZOLE SODIUM 40 MG: 40 INJECTION, POWDER, FOR SOLUTION INTRAVENOUS at 00:32

## 2024-04-11 RX ADMIN — INSULIN LISPRO 1 UNITS: 100 INJECTION, SOLUTION INTRAVENOUS; SUBCUTANEOUS at 17:47

## 2024-04-11 RX ADMIN — INSULIN LISPRO 1 UNITS: 100 INJECTION, SOLUTION INTRAVENOUS; SUBCUTANEOUS at 13:08

## 2024-04-11 RX ADMIN — Medication 10 ML: at 14:19

## 2024-04-11 RX ADMIN — INSULIN LISPRO 1 UNITS: 100 INJECTION, SOLUTION INTRAVENOUS; SUBCUTANEOUS at 10:21

## 2024-04-11 RX ADMIN — PANTOPRAZOLE SODIUM 40 MG: 40 INJECTION, POWDER, FOR SOLUTION INTRAVENOUS at 21:27

## 2024-04-11 RX ADMIN — OCTREOTIDE ACETATE 50 MCG/HR: 500 INJECTION, SOLUTION INTRAVENOUS; SUBCUTANEOUS at 09:58

## 2024-04-11 RX ADMIN — METOPROLOL SUCCINATE 100 MG: 100 TABLET, EXTENDED RELEASE ORAL at 09:57

## 2024-04-11 RX ADMIN — BUDESONIDE AND FORMOTEROL FUMARATE DIHYDRATE 2 PUFF: 160; 4.5 AEROSOL RESPIRATORY (INHALATION) at 17:47

## 2024-04-11 RX ADMIN — BUDESONIDE AND FORMOTEROL FUMARATE DIHYDRATE 2 PUFF: 160; 4.5 AEROSOL RESPIRATORY (INHALATION) at 10:21

## 2024-04-11 RX ADMIN — SPIRONOLACTONE 50 MG: 25 TABLET ORAL at 13:15

## 2024-04-11 RX ADMIN — FUROSEMIDE 20 MG: 10 INJECTION, SOLUTION INTRAMUSCULAR; INTRAVENOUS at 13:00

## 2024-04-11 RX ADMIN — ALBUMIN (HUMAN) 50 G: 0.25 INJECTION, SOLUTION INTRAVENOUS at 15:46

## 2024-04-11 RX ADMIN — OCTREOTIDE ACETATE 50 MCG/HR: 500 INJECTION, SOLUTION INTRAVENOUS; SUBCUTANEOUS at 22:08

## 2024-04-11 RX ADMIN — PANTOPRAZOLE SODIUM 40 MG: 40 INJECTION, POWDER, FOR SOLUTION INTRAVENOUS at 09:57

## 2024-04-11 RX ADMIN — CEFTRIAXONE SODIUM 1000 MG: 10 INJECTION, POWDER, FOR SOLUTION INTRAVENOUS at 21:27

## 2024-04-11 NOTE — PLAN OF CARE
Problem: Potential for Falls  Goal: Patient will remain free of falls  Description: INTERVENTIONS:  - Educate patient/family on patient safety including physical limitations  - Instruct patient to call for assistance with activity   - Consult OT/PT to assist with strengthening/mobility   - Keep Call bell within reach  - Keep bed low and locked with side rails adjusted as appropriate  - Keep care items and personal belongings within reach  - Initiate and maintain comfort rounds  - Make Fall Risk Sign visible to staff  - Offer Toileting every 2 Hours, in advance of need  - Initiate/Maintain 2alarm  - Obtain necessary fall risk management equipment: 2  - Apply yellow socks and bracelet for high fall risk patients  - Consider moving patient to room near nurses station  Outcome: Progressing     Problem: Prexisting or High Potential for Compromised Skin Integrity  Goal: Skin integrity is maintained or improved  Description: INTERVENTIONS:  - Identify patients at risk for skin breakdown  - Assess and monitor skin integrity  - Assess and monitor nutrition and hydration status  - Monitor labs   - Assess for incontinence   - Turn and reposition patient  - Assist with mobility/ambulation  - Relieve pressure over bony prominences  - Avoid friction and shearing  - Provide appropriate hygiene as needed including keeping skin clean and dry  - Evaluate need for skin moisturizer/barrier cream  - Collaborate with interdisciplinary team   - Patient/family teaching  - Consider wound care consult   Outcome: Progressing     Problem: GASTROINTESTINAL - ADULT  Goal: Maintains or returns to baseline bowel function  Description: INTERVENTIONS:  - Assess bowel function  - Encourage oral fluids to ensure adequate hydration  - Administer IV fluids if ordered to ensure adequate hydration  - Administer ordered medications as needed  - Encourage mobilization and activity  - Consider nutritional services referral to assist patient with adequate  nutrition and appropriate food choices  Outcome: Progressing     Problem: GENITOURINARY - ADULT  Goal: Absence of urinary retention  Description: INTERVENTIONS:  - Assess patient’s ability to void and empty bladder  - Monitor I/O  - Bladder scan as needed  - Discuss with physician/AP medications to alleviate retention as needed  - Discuss catheterization for long term situations as appropriate  Outcome: Progressing     Problem: METABOLIC, FLUID AND ELECTROLYTES - ADULT  Goal: Fluid balance maintained  Description: INTERVENTIONS:  - Monitor labs   - Monitor I/O and WT  - Instruct patient on fluid and nutrition as appropriate  - Assess for signs & symptoms of volume excess or deficit  Outcome: Progressing     Problem: SKIN/TISSUE INTEGRITY - ADULT  Goal: Skin Integrity remains intact(Skin Breakdown Prevention)  Description: Assess:  -Perform Boby assessment every 2  -Clean and moisturize skin every 2  -Inspect skin when repositioning, toileting, and assisting with ADLS  -Assess under medical devices such as 2 every 2  -Assess extremities for adequate circulation and sensation     Bed Management:  -Have minimal linens on bed & keep smooth, unwrinkled  -Change linens as needed when moist or perspiring  -Avoid sitting or lying in one position for more than 2 hours while in bed  -Keep HOB at 2degrees     Toileting:  -Offer bedside commode  -Assess for incontinence every 2  -Use incontinent care products after each incontinent episode such as 2    Activity:  -Mobilize patient 2 times a day  -Encourage activity and walks on unit  -Encourage or provide ROM exercises   -Turn and reposition patient every 2 Hours  -Use appropriate equipment to lift or move patient in bed  -Instruct/ Assist with weight shifting every 2 when out of bed in chair  -Consider limitation of chair time 2 hour intervals    Skin Care:  -Avoid use of baby powder, tape, friction and shearing, hot water or constrictive clothing  -Relieve pressure over  bony prominences using 2  -Do not massage red bony areas    Next Steps:  -Teach patient strategies to minimize risks such as 2   -Consider consults to  interdisciplinary teams such as 2  Outcome: Progressing     Problem: HEMATOLOGIC - ADULT  Goal: Maintains hematologic stability  Description: INTERVENTIONS  - Assess for signs and symptoms of bleeding or hemorrhage  - Monitor labs  - Administer supportive blood products/factors as ordered and appropriate  Outcome: Progressing

## 2024-04-11 NOTE — ASSESSMENT & PLAN NOTE
Lab Results   Component Value Date    HGBA1C 5.7 (H) 04/04/2024       Recent Labs     04/10/24  0723 04/10/24  1615 04/11/24  0712 04/11/24  1106   POCGLU 135 197* 155* 191*         Blood Sugar Average: Last 72 hrs:  (P) 169.5  Well controlled with A1c of 5.7  HOLD glipizide and metformin for now.  SSI

## 2024-04-11 NOTE — BRIEF OP NOTE (RAD/CATH)
IR PARACENTESIS Procedure Note    PATIENT NAME: Bimal Lugo  : 1945  MRN: [id number]    Pre-op Diagnosis:   1. GI bleeding    2. Symptomatic anemia    3. 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 GI bleeding    2. Symptomatic anemia    3. 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)        Surgeon:   SUNDAY Fierro  Assistants:     No qualified resident was available, Resident is only observing    Estimated Blood Loss: minimal  Findings: 7000 ml cloudy yellow ascites fluid, RUQ.    Specimens: sent as requested - culture/gram stain/cell count    Complications:  none immediate    Anesthesia: local    SUNDAY Fierro     Date: 2024  Time: 3:21 PM

## 2024-04-11 NOTE — PROGRESS NOTES
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 pylori  The 1st part of the duodenum and 2nd part of the duodenum appeared normal.    Surgery Recs: Okay to restart diet.  Continue octreotide for 72 hours total then patient can be discharged.  Ceftriaxone or oral equivalent for 5 days.  Patient will need repeat EGD in 4 weeks for repeat variceal ligation.   GI following, appreciate recommendations    Diabetes (HCC)  Assessment & Plan  Lab Results   Component Value Date    HGBA1C 5.7 (H) 04/04/2024       Recent Labs     04/10/24  0723 04/10/24  1615 04/11/24  0712 04/11/24  1106   POCGLU 135 197* 155* 191*         Blood Sugar Average: Last 72 hrs:  (P) 169.5  Well controlled with A1c of 5.7  HOLD glipizide and metformin for now.  SSI      PVD (peripheral vascular disease) (HCC)  Assessment & Plan  On aspirin  Currently on hold due to anemia    Chronic kidney disease, stage 3a (HCC)  Assessment & Plan  Lab Results   Component Value Date    EGFR 50 04/11/2024    EGFR 48 04/10/2024    EGFR 49 04/09/2024    CREATININE 1.34 (H) 04/11/2024    CREATININE 1.38 (H) 04/10/2024    CREATININE 1.36 (H) 04/09/2024     Creatinine is 1.3-1.5 at baseline  Currently at baseline  Monitor  Avoid nephrotoxins    Chronic bronchitis with COPD  (chronic obstructive pulmonary disease)  Assessment & Plan  Overall stable respiratory status  Continue Symbicort and PRN albuterol    CAD (coronary artery disease)  Assessment & Plan  Reported hx of CAD and AVR on Jan 2024.  Denied chest pain or SOB currently  Monitor    * Symptomatic anemia  Assessment & Plan  77yo M with hx of cirrhosis and ascites presents with symptomatic anemia - Hb 7.1 on admission baseline previously 11  Has not had recent endoscopy. Mentions hemorrhoids but no significant active bleeding down there currently although does endorse that intermittently.  Patient admitted with acute blood loss anemia needing blood transfusion.  Differential diagnosis includes variceal bleed, PUD, esophagitis among other causes. Continue IV PPI twice daily. Keep NPO for EGD. Octreotide drip and ceftriaxone.   According to ED Hemoccult positive  Start IV Protonix twice a day  Start octreotide gtt  Transfused 1U PRBC (patient consented by ED provider), still Hb 7.1, given that patient is symptomatic from acute blood needing blood transfusion- will give 1 more unit blood transfusion  Ceftriaxone for SBP prophylaxis  Monitor hemodynamics  Anemia likely in setting of previous variceal bleed and/or severe esophagitis with esophageal ulcer.  No evidence of active bleeding.               VTE Pharmacologic Prophylaxis:   Moderate Risk (Score 3-4) - Pharmacological DVT Prophylaxis Ordered: heparin.    Mobility:   Basic Mobility Inpatient Raw Score: 18  JH-HLM Goal: 6: Walk 10 steps or more  JH-HLM Achieved: 3: Sit at edge of bed  JH-HLM Goal achieved. Continue to encourage appropriate mobility.    Patient Centered Rounds: I performed bedside rounds with nursing staff today.   Discussions with Specialists or Other Care Team Provider: none    Education and Discussions with Family / Patient: Updated  (daughter) at bedside.    Total Time Spent on Date of Encounter in care of patient: 55 mins. This time was spent  on one or more of the following: performing physical exam; counseling and coordination of care; obtaining or reviewing history; documenting in the medical record; reviewing/ordering tests, medications or procedures; communicating with other healthcare professionals and discussing with patient's family/caregivers.    Current Length of Stay: 2 day(s)  Current Patient Status: Inpatient   Certification Statement: The patient will continue to require additional inpatient hospital stay due to GIB  Discharge Plan: Anticipate discharge in 24-48 hrs to home.    Code Status: Level 1 - Full Code    Subjective:   No new issues. Denied chest pain     Objective:     Vitals:   Temp (24hrs), Av.6 °F (37 °C), Min:97.5 °F (36.4 °C), Max:99.1 °F (37.3 °C)    Temp:  [97.5 °F (36.4 °C)-99.1 °F (37.3 °C)] 98.5 °F (36.9 °C)  HR:  [64-77] 69  Resp:  [16-20] 16  BP: ()/(55-71) 142/67  SpO2:  [93 %-99 %] 95 %  Body mass index is 37.2 kg/m².     Input and Output Summary (last 24 hours):     Intake/Output Summary (Last 24 hours) at 2024 1552  Last data filed at 2024 1501  Gross per 24 hour   Intake 707.5 ml   Output 7475 ml   Net -6767.5 ml       Physical Exam:   Physical Exam   NAD  Nonlabored resp   RRR  NTND abd   aaox3    Additional Data:     Labs:  Results from last 7 days   Lab Units 24  0913   WBC Thousand/uL 4.31   HEMOGLOBIN g/dL 9.1*   HEMATOCRIT % 27.7*   PLATELETS Thousands/uL 72*   SEGS PCT % 69   LYMPHO PCT % 13*   MONO PCT % 14*   EOS PCT % 2     Results from last 7 days   Lab Units 24  0913   SODIUM mmol/L 132*   POTASSIUM mmol/L 3.7   CHLORIDE mmol/L 102   CO2 mmol/L 22   BUN mg/dL 28*   CREATININE mg/dL 1.34*   ANION GAP mmol/L 8   CALCIUM mg/dL 7.6*   ALBUMIN g/dL 2.4*   TOTAL BILIRUBIN mg/dL 0.56   ALK PHOS U/L 73   ALT U/L 17   AST U/L 29   GLUCOSE RANDOM mg/dL 203*     Results from last 7 days   Lab Units 24  1142   INR  1.25*     Results from last 7 days   Lab Units 24  1106  04/11/24  0712 04/10/24  1615 04/10/24  0723   POC GLUCOSE mg/dl 191* 155* 197* 135               Lines/Drains:  Invasive Devices       Peripheral Intravenous Line  Duration             Peripheral IV 04/09/24 Right Antecubital 1 day    Peripheral IV 04/10/24 Distal;Dorsal (posterior);Left Forearm <1 day                          Imaging: Reviewed radiology reports from this admission including: ultrasound(s)    Recent Cultures (last 7 days):         Last 24 Hours Medication List:   Current Facility-Administered Medications   Medication Dose Route Frequency Provider Last Rate    albuterol  2 puff Inhalation Q6H PRN Anival Obrien MD      budesonide-formoterol  2 puff Inhalation BID Anival Obrien MD      cefTRIAXone  1,000 mg Intravenous Q24H Anival Obrien MD 1,000 mg (04/11/24 0100)    furosemide  20 mg Intravenous Daily Grazyna Mtz PA-C      insulin lispro  1-5 Units Subcutaneous TID AC Anival Obrien MD      metoprolol succinate  100 mg Oral Daily Anival Obrien MD      octreotide  50 mcg/hr Intravenous Continuous Anival Obrien MD 50 mcg/hr (04/11/24 0958)    pantoprazole  40 mg Intravenous Q12H LUBA Anival Obrien MD      spironolactone  50 mg Oral Daily Grazyna Mtz PA-C          Today, Patient Was Seen By: Carlitos Seth MD    **Please Note: This note may have been constructed using a voice recognition system.**

## 2024-04-11 NOTE — PROGRESS NOTES
Srinivasan Lugo is a 77yo male with PMH of cirrhosis, DMT2, CKD, PVD and chronic bronchitis with COPD who is admitted for symptomatic anemia due to variceal bleeding.    Subjective:   Feeling a lot better this morning after his second transfusion last night. No acute overnight events. He had a bowel movement last night and this morning that had no bright red blood but it was still significantly black. He was able to walk to the bathroom today without SOB. Fatigue improving.     Objective:     Vitals:   Temp (24hrs), Av.2 °F (36.8 °C), Min:97.4 °F (36.3 °C), Max:99.1 °F (37.3 °C)    Temp:  [97.4 °F (36.3 °C)-99.1 °F (37.3 °C)] 98.5 °F (36.9 °C)  HR:  [68-77] 72  Resp:  [16-20] 20  BP: ()/(53-71) 121/58  SpO2:  [91 %-99 %] 93 %  Body mass index is 37.2 kg/m².     Input and Output Summary (last 24 hours):     Intake/Output Summary (Last 24 hours) at 2024 1025  Last data filed at 2024 0848  Gross per 24 hour   Intake 687.5 ml   Output 275 ml   Net 412.5 ml       Physical Exam:   Physical Exam  Constitutional:       Appearance: Normal appearance.   HENT:      Head: Normocephalic and atraumatic.   Cardiovascular:      Rate and Rhythm: Normal rate.      Heart sounds: Murmur (s/p aortic valve replacement.  valve sounds.) heard.   Pulmonary:      Effort: Pulmonary effort is normal.      Breath sounds: Normal breath sounds.   Abdominal:      General: There is distension (No fluid wave.).   Musculoskeletal:      Right lower leg: 3+ Pitting Edema present.      Left lower le+ Pitting Edema present.   Skin:     General: Skin is warm.      Capillary Refill: Capillary refill takes less than 2 seconds.      Coloration: Skin is not jaundiced.   Neurological:      General: No focal deficit present.      Mental Status: He is alert and oriented to person, place, and time.      ***    Additional Data:     Labs:  Results from last 7 days   Lab Units 24  0913   WBC Thousand/uL 4.31   HEMOGLOBIN g/dL 9.1*    HEMATOCRIT % 27.7*   PLATELETS Thousands/uL 72*   SEGS PCT % 69   LYMPHO PCT % 13*   MONO PCT % 14*   EOS PCT % 2     Results from last 7 days   Lab Units 04/11/24  0913 04/10/24  0525 04/09/24  1828   SODIUM mmol/L 132*   < > 132*   POTASSIUM mmol/L 3.7   < > 3.5   CHLORIDE mmol/L 102   < > 100   CO2 mmol/L 22   < > 20*   BUN mg/dL 28*   < > 29*   CREATININE mg/dL 1.34*   < > 1.36*   ANION GAP mmol/L 8   < > 12   CALCIUM mg/dL 7.6*   < > 7.6*   ALBUMIN g/dL  --   --  2.5*   TOTAL BILIRUBIN mg/dL  --   --  0.54   ALK PHOS U/L  --   --  82   ALT U/L  --   --  21   AST U/L  --   --  36   GLUCOSE RANDOM mg/dL 203*   < > 116    < > = values in this interval not displayed.         Results from last 7 days   Lab Units 04/11/24  0712 04/10/24  1615 04/10/24  0723   POC GLUCOSE mg/dl 155* 197* 135                 Imaging: Reviewed radiology reports from this admission including:   EGD    Result Date: 4/10/2024  Impression: Three large varices with evidence of recent bleeding (red paty sign) in the esophagus; placed 3 bands successfully, resulting in partial eradication Grade D esophagitis in the lower third of the esophagus with discrete ulcer at the GE junction Moderate and mosaic portal hypertensive gastropathy Performed forceps biopsies in the body of the stomach, incisura and antrum to rule out H. pylori The 1st part of the duodenum and 2nd part of the duodenum appeared normal. Anemia likely in setting of previous variceal bleed and/or severe esophagitis with esophageal ulcer.  No evidence of active bleeding. RECOMMENDATION:  Okay to restart diet.  Continue octreotide for 72 hours total then patient can be discharged.  Ceftriaxone or oral equivalent for 5 days.  Patient will need repeat EGD in 4 weeks for repeat variceal ligation.    Dominique Roy MD     XR chest 2 views    Result Date: 4/10/2024  Impression: No radiographic evidence of acute intrathoracic process. Workstation performed: [id number]       XR chest 2  views    Result Date: 4/10/2024  Impression No radiographic evidence of acute intrathoracic process. Workstation performed: [id number]        No results found for this or any previous visit.      Recent Cultures (last 7 days):         Last 24 Hours Medication List:   Current Facility-Administered Medications   Medication Dose Route Frequency Provider Last Rate    albuterol  2 puff Inhalation Q6H PRN Anival Obrien MD      budesonide-formoterol  2 puff Inhalation BID Anival Obrien MD      cefTRIAXone  1,000 mg Intravenous Q24H Anival Obrien MD 1,000 mg (04/11/24 0100)    insulin lispro  1-5 Units Subcutaneous TID AC Anival Obrien MD      metoprolol succinate  100 mg Oral Daily Anival Obrien MD      octreotide  50 mcg/hr Intravenous Continuous Anival Obrien MD 50 mcg/hr (04/11/24 0958)    pantoprazole  40 mg Intravenous Q12H [facility] Anival Obrien MD         Assessment and plan    #Symptomatic anemia  A:  -Hb stable today at 9.1. After procedure yesterday it was 7.1  -received 2 units so far  -Hb on arrival was 7.1    P:  -continue octreotide 50 mcg per hour through Saturday afternoon (for total of 72 hours post EGD)  -continue ceftriaxone 1g through Saturday as well which will be day 5  -repeat EGD in 4 weeks   -can start on diet  -IV PPI twice daily 40mg    #Cirrhosis  A: still distended with tense abdomen.   P:  -paracentesis today per GI  -GI following      #Diabetes Mellitus  A: sugars stable  P:   -continue to hold glipizide and metformin  -SSI with accucheck before meals    #COPD  A: stable respiratory status  P: continue Symbicort and PRN albuterol    PVD  -on aspirin at home  -hold due to anemia    CKD stage 3a  -avoid nephrotoxins  -creatinine is at baseline                 Today, Patient Was Seen By: Lori Zelaya and the attending physician, Carlitos Seth MD        **Please Note: This note may have been constructed using a voice  recognition system.**

## 2024-04-11 NOTE — ASSESSMENT & PLAN NOTE
79yo M with hx of cirrhosis and ascites presents with symptomatic anemia - Hb 7.1 on admission baseline previously 11  Has not had recent endoscopy. Mentions hemorrhoids but no significant active bleeding down there currently although does endorse that intermittently.  Patient admitted with acute blood loss anemia needing blood transfusion.  Differential diagnosis includes variceal bleed, PUD, esophagitis among other causes. Continue IV PPI twice daily. Keep NPO for EGD. Octreotide drip and ceftriaxone.   According to ED Hemoccult positive  Start IV Protonix twice a day  Start octreotide gtt  Transfused 1U PRBC (patient consented by ED provider), still Hb 7.1, given that patient is symptomatic from acute blood needing blood transfusion- will give 1 more unit blood transfusion  Ceftriaxone for SBP prophylaxis  Monitor hemodynamics  Anemia likely in setting of previous variceal bleed and/or severe esophagitis with esophageal ulcer.  No evidence of active bleeding.

## 2024-04-11 NOTE — PROGRESS NOTES
Progress Note - Bimal Lugo 78 y.o. male MRN: [id number]    Unit/Bed#: -01 Encounter: [id number]    Subjective:     Patient notes ongoing black stool.  He denies any maroon or red stools.  Denies abdominal pain.  He continues with poor appetite.    Objective:     Vitals: Blood pressure 121/58, pulse 72, temperature 98.5 °F (36.9 °C), temperature source Oral, resp. rate 20, weight 101 kg (223 lb 8.7 oz), SpO2 93%.,Body mass index is 37.2 kg/m².      Intake/Output Summary (Last 24 hours) at 4/11/2024 1026  Last data filed at 4/11/2024 0848  Gross per 24 hour   Intake 687.5 ml   Output 275 ml   Net 412.5 ml       Physical Exam:     General Appearance: Alert, appears stated age and cooperative  Lungs: Clear to auscultation bilaterally, no rales or rhonchi, no labored breathing/accessory muscle use  Heart: Regular rate and rhythm, S1, S2 normal, no murmur, click, rub or gallop  Abdomen: Soft, non-tender, distended; bowel sounds normal; no masses or no organomegaly  Extremities: No cyanosis, edema    Invasive Devices       Peripheral Intravenous Line  Duration             Peripheral IV 04/09/24 Right Antecubital 1 day    Peripheral IV 04/10/24 Distal;Dorsal (posterior);Left Forearm <1 day                    Lab Results:  Results from last 7 days   Lab Units 04/11/24  0913   WBC Thousand/uL 4.31   HEMOGLOBIN g/dL 9.1*   HEMATOCRIT % 27.7*   PLATELETS Thousands/uL 72*   SEGS PCT % 69   LYMPHO PCT % 13*   MONO PCT % 14*   EOS PCT % 2     Results from last 7 days   Lab Units 04/11/24  0913 04/10/24  0525 04/09/24  1828   POTASSIUM mmol/L 3.7   < > 3.5   CHLORIDE mmol/L 102   < > 100   CO2 mmol/L 22   < > 20*   BUN mg/dL 28*   < > 29*   CREATININE mg/dL 1.34*   < > 1.36*   CALCIUM mg/dL 7.6*   < > 7.6*   ALK PHOS U/L  --   --  82   ALT U/L  --   --  21   AST U/L  --   --  36    < > = values in this interval not displayed.               Imaging Studies: I have personally reviewed pertinent imaging studies.     EGD    Result Date: 4/10/2024  Impression: Three large varices with evidence of recent bleeding (red paty sign) in the esophagus; placed 3 bands successfully, resulting in partial eradication Grade D esophagitis in the lower third of the esophagus with discrete ulcer at the GE junction Moderate and mosaic portal hypertensive gastropathy Performed forceps biopsies in the body of the stomach, incisura and antrum to rule out H. pylori The 1st part of the duodenum and 2nd part of the duodenum appeared normal. Anemia likely in setting of previous variceal bleed and/or severe esophagitis with esophageal ulcer.  No evidence of active bleeding. RECOMMENDATION:  Okay to restart diet.  Continue octreotide for 72 hours total then patient can be discharged.  Ceftriaxone or oral equivalent for 5 days.  Patient will need repeat EGD in 4 weeks for repeat variceal ligation.    Dominique Roy MD     XR chest 2 views    Result Date: 4/10/2024  Impression: No radiographic evidence of acute intrathoracic process. Workstation performed: [id number]       Assessment and Plan:       Anemia  Melena  - Patient presented to [facility] yesterday with severe weakness and shortness of breath  - Hgb on admission was noted to be 7.1 (11.0 on 1/23/24)  - He reports black stools for several weeks  - EGD 4/10 with 3 large varices s/p banding, severe grade D esophagitis, GE junction ulcer, PHG  - He will need repeat EGD in 4 weeks for repeat banding  - He will need colonoscopy .  His last colonoscopy was 2 years ago with 2 polyps removed.  He was told to have a f/u exam last year but this was put on hold due to cardiac issues. Will plan colonoscopy along with repeat EGD in 4 weeks  - He is s/p AVR in January of this year only maintained on Asa 81mg daily  - Continue to trend Hgb/Hct and transfuse PRBC as needed.  Hgb improved to 9.1 today     ETOH Cirrhosis  - Check INR and Bilirubin today to calculate MELD  - He notes no alcohol in several years  -  Complicated by ascites.  This is new.  Paracentesis 3/7/24 with 3.9 liters removed with analysis negative for SBP and repeated on 4/4 with 6.2 liters removed.  He reports being maintained on 2 diuretics in the outpatient setting which are not listed on his home medications  - Will repeat paracentesis with fluid analysis today  - Will give antibiotic prophylaxis given GIB - Patient is on Ceftriaxone - will complete a total of 5 days. Currently on day 3/5  - Advised low Na diet <2000mg per day  - Will restart diuresis - Lasix and Aldactone ordered  - EGD with large varices s/p banding and PHG  - CT AP from 9/1/23 without liver lesions.  He will need updated HCC screening.  Also check AFP  - Per patient/wife no history of HE.  Currently grade 0  - Continue to avoid ETOH        Patient was seen and examined by Dr Roy. All rodriguez medical decisions were made by Dr Roy. Thank you for allowing us to participate in the care of this present patient. We will follow-up with you closely.

## 2024-04-11 NOTE — CASE MANAGEMENT
Case Management Progress Note    Patient name Bimal Lugo  Location /-01 MRN [id number]  : 1945 Date 2024       LOS (days): 2  Geometric Mean LOS (GMLOS) (days): 2.2  Days to GMLOS:0.5        OBJECTIVE:        Current admission status: Inpatient  Preferred Pharmacy:   [facility] AT [facility], [place] - [address]  [address] [address]  Phone: [phone] Fax: [phone]    Primary Care Provider: Vivian Van DO    Primary Insurance: MEDICARE  Secondary Insurance: BLUE CROSS    PROGRESS NOTE:  Discussed patient's case in interdisciplinary rounds this morning with [facility] provider. Patient had EGD yesterday, open for paracentesis today. Needs octreotide infusion through Friday night, can be cleared for discharge Saturday morning. CM will continue to follow for needs.

## 2024-04-11 NOTE — ASSESSMENT & PLAN NOTE
Lab Results   Component Value Date    EGFR 50 04/11/2024    EGFR 48 04/10/2024    EGFR 49 04/09/2024    CREATININE 1.34 (H) 04/11/2024    CREATININE 1.38 (H) 04/10/2024    CREATININE 1.36 (H) 04/09/2024     Creatinine is 1.3-1.5 at baseline  Currently at baseline  Monitor  Avoid nephrotoxins

## 2024-04-12 LAB
ANION GAP SERPL CALCULATED.3IONS-SCNC: 8 MMOL/L (ref 4–13)
BASOPHILS # BLD AUTO: 0.02 THOUSANDS/ÂΜL (ref 0–0.1)
BASOPHILS NFR BLD AUTO: 1 % (ref 0–1)
BUN SERPL-MCNC: 22 MG/DL (ref 5–25)
CALCIUM SERPL-MCNC: 7.6 MG/DL (ref 8.4–10.2)
CHLORIDE SERPL-SCNC: 106 MMOL/L (ref 96–108)
CO2 SERPL-SCNC: 22 MMOL/L (ref 21–32)
CREAT SERPL-MCNC: 1.12 MG/DL (ref 0.6–1.3)
EOSINOPHIL # BLD AUTO: 0.1 THOUSAND/ÂΜL (ref 0–0.61)
EOSINOPHIL NFR BLD AUTO: 3 % (ref 0–6)
ERYTHROCYTE [DISTWIDTH] IN BLOOD BY AUTOMATED COUNT: 17.2 % (ref 11.6–15.1)
GFR SERPL CREATININE-BSD FRML MDRD: 62 ML/MIN/1.73SQ M
GLUCOSE SERPL-MCNC: 134 MG/DL (ref 65–140)
GLUCOSE SERPL-MCNC: 146 MG/DL (ref 65–140)
GLUCOSE SERPL-MCNC: 146 MG/DL (ref 65–140)
GLUCOSE SERPL-MCNC: 176 MG/DL (ref 65–140)
GLUCOSE SERPL-MCNC: 197 MG/DL (ref 65–140)
HCT VFR BLD AUTO: 26.1 % (ref 36.5–49.3)
HGB BLD-MCNC: 8.7 G/DL (ref 12–17)
IMM GRANULOCYTES # BLD AUTO: 0.02 THOUSAND/UL (ref 0–0.2)
IMM GRANULOCYTES NFR BLD AUTO: 1 % (ref 0–2)
LYMPHOCYTES # BLD AUTO: 0.66 THOUSANDS/ÂΜL (ref 0.6–4.47)
LYMPHOCYTES NFR BLD AUTO: 18 % (ref 14–44)
MAGNESIUM SERPL-MCNC: 1.6 MG/DL (ref 1.9–2.7)
MCH RBC QN AUTO: 28.9 PG (ref 26.8–34.3)
MCHC RBC AUTO-ENTMCNC: 33.3 G/DL (ref 31.4–37.4)
MCV RBC AUTO: 87 FL (ref 82–98)
MONOCYTES # BLD AUTO: 0.43 THOUSAND/ÂΜL (ref 0.17–1.22)
MONOCYTES NFR BLD AUTO: 12 % (ref 4–12)
NEUTROPHILS # BLD AUTO: 2.46 THOUSANDS/ÂΜL (ref 1.85–7.62)
NEUTS SEG NFR BLD AUTO: 65 % (ref 43–75)
NRBC BLD AUTO-RTO: 0 /100 WBCS
PLATELET # BLD AUTO: 60 THOUSANDS/UL (ref 149–390)
PMV BLD AUTO: 10.7 FL (ref 8.9–12.7)
POTASSIUM SERPL-SCNC: 3.6 MMOL/L (ref 3.5–5.3)
RBC # BLD AUTO: 3.01 MILLION/UL (ref 3.88–5.62)
SODIUM SERPL-SCNC: 136 MMOL/L (ref 135–147)
WBC # BLD AUTO: 3.69 THOUSAND/UL (ref 4.31–10.16)

## 2024-04-12 PROCEDURE — 80048 BASIC METABOLIC PNL TOTAL CA: CPT | Performed by: STUDENT IN AN ORGANIZED HEALTH CARE EDUCATION/TRAINING PROGRAM

## 2024-04-12 PROCEDURE — 83735 ASSAY OF MAGNESIUM: CPT | Performed by: STUDENT IN AN ORGANIZED HEALTH CARE EDUCATION/TRAINING PROGRAM

## 2024-04-12 PROCEDURE — 99232 SBSQ HOSP IP/OBS MODERATE 35: CPT | Performed by: INTERNAL MEDICINE

## 2024-04-12 PROCEDURE — 85025 COMPLETE CBC W/AUTO DIFF WBC: CPT | Performed by: STUDENT IN AN ORGANIZED HEALTH CARE EDUCATION/TRAINING PROGRAM

## 2024-04-12 PROCEDURE — 82948 REAGENT STRIP/BLOOD GLUCOSE: CPT

## 2024-04-12 PROCEDURE — 99233 SBSQ HOSP IP/OBS HIGH 50: CPT | Performed by: STUDENT IN AN ORGANIZED HEALTH CARE EDUCATION/TRAINING PROGRAM

## 2024-04-12 PROCEDURE — C9113 INJ PANTOPRAZOLE SODIUM, VIA: HCPCS | Performed by: INTERNAL MEDICINE

## 2024-04-12 PROCEDURE — 88305 TISSUE EXAM BY PATHOLOGIST: CPT | Performed by: PATHOLOGY

## 2024-04-12 RX ORDER — MAGNESIUM SULFATE HEPTAHYDRATE 40 MG/ML
2 INJECTION, SOLUTION INTRAVENOUS ONCE
Status: COMPLETED | OUTPATIENT
Start: 2024-04-12 | End: 2024-04-12

## 2024-04-12 RX ADMIN — OCTREOTIDE ACETATE 50 MCG/HR: 500 INJECTION, SOLUTION INTRAVENOUS; SUBCUTANEOUS at 09:59

## 2024-04-12 RX ADMIN — BUDESONIDE AND FORMOTEROL FUMARATE DIHYDRATE 2 PUFF: 160; 4.5 AEROSOL RESPIRATORY (INHALATION) at 09:41

## 2024-04-12 RX ADMIN — ASPIRIN 81 MG: 81 TABLET, COATED ORAL at 11:48

## 2024-04-12 RX ADMIN — CEFTRIAXONE SODIUM 1000 MG: 10 INJECTION, POWDER, FOR SOLUTION INTRAVENOUS at 22:14

## 2024-04-12 RX ADMIN — PANTOPRAZOLE SODIUM 40 MG: 40 INJECTION, POWDER, FOR SOLUTION INTRAVENOUS at 22:14

## 2024-04-12 RX ADMIN — BUDESONIDE AND FORMOTEROL FUMARATE DIHYDRATE 2 PUFF: 160; 4.5 AEROSOL RESPIRATORY (INHALATION) at 17:38

## 2024-04-12 RX ADMIN — MAGNESIUM SULFATE HEPTAHYDRATE 2 G: 40 INJECTION, SOLUTION INTRAVENOUS at 09:52

## 2024-04-12 RX ADMIN — INSULIN LISPRO 1 UNITS: 100 INJECTION, SOLUTION INTRAVENOUS; SUBCUTANEOUS at 11:48

## 2024-04-12 RX ADMIN — FUROSEMIDE 20 MG: 10 INJECTION, SOLUTION INTRAMUSCULAR; INTRAVENOUS at 09:41

## 2024-04-12 RX ADMIN — PANTOPRAZOLE SODIUM 40 MG: 40 INJECTION, POWDER, FOR SOLUTION INTRAVENOUS at 09:41

## 2024-04-12 RX ADMIN — INSULIN LISPRO 1 UNITS: 100 INJECTION, SOLUTION INTRAVENOUS; SUBCUTANEOUS at 17:38

## 2024-04-12 RX ADMIN — SPIRONOLACTONE 50 MG: 25 TABLET ORAL at 09:41

## 2024-04-12 RX ADMIN — METOPROLOL SUCCINATE 100 MG: 100 TABLET, EXTENDED RELEASE ORAL at 09:41

## 2024-04-12 NOTE — ASSESSMENT & PLAN NOTE
79yo M with hx of cirrhosis and ascites presents with symptomatic anemia - Hb 7.1 on admission baseline previously 11  Has not had recent endoscopy. Mentions hemorrhoids but no significant active bleeding down there currently although does endorse that intermittently.  Patient admitted with acute blood loss anemia needing blood transfusion.  Differential diagnosis includes variceal bleed, PUD, esophagitis among other causes. Continue IV PPI twice daily. Keep NPO for EGD. Octreotide drip and ceftriaxone.   According to ED Hemoccult positive  IV Protonix twice a day  octreotide gtt  S/p 2 unit PRBC   Ceftriaxone for SBP prophylaxis x 5 d (switch to oral on dc)   Monitor hemodynamics  Anemia likely in setting of previous variceal bleed and/or severe esophagitis with esophageal ulcer.  No evidence of active bleeding.

## 2024-04-12 NOTE — CASE MANAGEMENT
Case Management Discharge Planning Note    Patient name Bmial Lugo  Location /-01 MRN [id number]  : 1945 Date 2024       Current Admission Date: 2024  Current Admission Diagnosis:Symptomatic anemia   Patient Active Problem List    Diagnosis Date Noted    Chronic obstructive pulmonary disease (HCC) 04/10/2024    History of aortic valve replacement 04/10/2024    Back pain 04/10/2024    CAD (coronary artery disease) 2024    Diabetes (HCC) 2024    Cirrhosis (HCC) 2024    Symptomatic anemia 2024    Chronic kidney disease, stage 3a (HCC) 10/11/2021    PVD (peripheral vascular disease) ([facility]) 2018    Atherosclerosis of lower extremity with intermittent claudication ([facility]) 2018    Chronic bronchitis with COPD (chronic obstructive pulmonary disease) 2017      LOS (days): 3  Geometric Mean LOS (GMLOS) (days): 2.2  Days to GMLOS:-0.4     OBJECTIVE:  Risk of Unplanned Readmission Score: 14.57         Current admission status: Inpatient   Preferred Pharmacy:   [facility] AT [facility] [place] - [address]  [address] [address]  Phone: [phone] Fax: [phone]    Primary Care Provider: Vivian Van DO    Primary Insurance: MEDICARE  Secondary Insurance: BLUE CROSS    DISCHARGE DETAILS:                                                                                        IMM Given (Date):: 24  IMM Given to:: Patient     Additional Comments: IMM and Medicare rights reviewed with patient at the bedside. Patient verbalized understanding and signed original. Copy given to patient, signed original filed to medical records.

## 2024-04-12 NOTE — ASSESSMENT & PLAN NOTE
Lab Results   Component Value Date    HGBA1C 5.7 (H) 04/04/2024       Recent Labs     04/11/24  1106 04/11/24  1552 04/11/24  2102 04/12/24  0735   POCGLU 191* 167* 144* 146*         Blood Sugar Average: Last 72 hrs:  (P) 162.9705951387291404  Well controlled with A1c of 5.7  HOLD glipizide and metformin for now.  SSI

## 2024-04-12 NOTE — PLAN OF CARE
Problem: GENITOURINARY - ADULT  Goal: Absence of urinary retention  Description: INTERVENTIONS:  - Assess patient’s ability to void and empty bladder  - Monitor I/O  - Bladder scan as needed  - Discuss with physician/AP medications to alleviate retention as needed  - Discuss catheterization for long term situations as appropriate  Outcome: Progressing

## 2024-04-12 NOTE — PLAN OF CARE
Problem: Potential for Falls  Goal: Patient will remain free of falls  Description: INTERVENTIONS:  - Educate patient/family on patient safety including physical limitations  - Instruct patient to call for assistance with activity   - Consult OT/PT to assist with strengthening/mobility   - Keep Call bell within reach  - Keep bed low and locked with side rails adjusted as appropriate  - Keep care items and personal belongings within reach  - Initiate and maintain comfort rounds  - Make Fall Risk Sign visible to staff  - Offer Toileting every 4   Hours, in advance of need  - Initiate/Maintain bed alarm  - Obtain necessary fall risk management equipment: walker  - Apply yellow socks and bracelet for high fall risk patients  - Consider moving patient to room near nurses station  Outcome: Progressing     Problem: Prexisting or High Potential for Compromised Skin Integrity  Goal: Skin integrity is maintained or improved  Description: INTERVENTIONS:  - Identify patients at risk for skin breakdown  - Assess and monitor skin integrity  - Assess and monitor nutrition and hydration status  - Monitor labs   - Assess for incontinence   - Turn and reposition patient  - Assist with mobility/ambulation  - Relieve pressure over bony prominences  - Avoid friction and shearing  - Provide appropriate hygiene as needed including keeping skin clean and dry  - Evaluate need for skin moisturizer/barrier cream  - Collaborate with interdisciplinary team   - Patient/family teaching  - Consider wound care consult   Outcome: Progressing     Problem: METABOLIC, FLUID AND ELECTROLYTES - ADULT  Goal: Fluid balance maintained  Description: INTERVENTIONS:  - Monitor labs   - Monitor I/O and WT  - Instruct patient on fluid and nutrition as appropriate  - Assess for signs & symptoms of volume excess or deficit  Outcome: Progressing     Problem: HEMATOLOGIC - ADULT  Goal: Maintains hematologic stability  Description: INTERVENTIONS  - Assess for signs  and symptoms of bleeding or hemorrhage  - Monitor labs  - Administer supportive blood products/factors as ordered and appropriate  Outcome: Progressing

## 2024-04-12 NOTE — ASSESSMENT & PLAN NOTE
Lab Results   Component Value Date    EGFR 62 04/12/2024    EGFR 50 04/11/2024    EGFR 48 04/10/2024    CREATININE 1.12 04/12/2024    CREATININE 1.34 (H) 04/11/2024    CREATININE 1.38 (H) 04/10/2024     Creatinine is 1.3-1.5 at baseline  Currently better than previously thought baseline  Monitor

## 2024-04-12 NOTE — PROGRESS NOTES
[facility]  Progress Note  Name: Bimal Lugo I  MRN: [id number]  Unit/Bed#: [facility]-01 I Date of Admission: 4/9/2024   Date of Service: 4/12/2024 I Hospital Day: 3    Assessment/Plan   Cirrhosis (HCC)  Assessment & Plan  Hx of cirrhosis requiring periodic paracentesis   Currently no signs of encephalopathy  Endoscopy (EGD) 4/10 completed:   Three large varices with evidence of recent bleeding (red paty sign) in the esophagus; placed 3 bands successfully, resulting in partial eradication  Grade D esophagitis in the lower third of the esophagus with discrete ulcer at the GE junction  Moderate and mosaic portal hypertensive gastropathy  Performed forceps biopsies in the body of the stomach, incisura and antrum to rule out H. pylori  The 1st part of the duodenum and 2nd part of the duodenum appeared normal.    Surgery Recs: Okay to restart diet.  Continue octreotide for 72 hours total then patient can be discharged.  Ceftriaxone or oral equivalent for 5 days.  Patient will need repeat EGD in 4 weeks for repeat variceal ligation.   GI following, appreciate recommendations    Diabetes ([facility])  Assessment & Plan  Lab Results   Component Value Date    HGBA1C 5.7 (H) 04/04/2024       Recent Labs     04/11/24  1106 04/11/24  1552 04/11/24  2102 04/12/24  0735   POCGLU 191* 167* 144* 146*         Blood Sugar Average: Last 72 hrs:  (P) [id number]  Well controlled with A1c of 5.7  HOLD glipizide and metformin for now.  SSI      PVD (peripheral vascular disease) ([facility])  Assessment & Plan  Resume aspirin    Chronic kidney disease, stage 3a ([facility])  Assessment & Plan  Lab Results   Component Value Date    EGFR 62 04/12/2024    EGFR 50 04/11/2024    EGFR 48 04/10/2024    CREATININE 1.12 04/12/2024    CREATININE 1.34 (H) 04/11/2024    CREATININE 1.38 (H) 04/10/2024     Creatinine is 1.3-1.5 at baseline  Currently better than previously thought baseline  Monitor    Chronic bronchitis with COPD (chronic  obstructive pulmonary disease)  Assessment & Plan  Overall stable respiratory status  Continue Symbicort and PRN albuterol    CAD (coronary artery disease)  Assessment & Plan  Reported hx of CAD and AVR on Jan 2024.  Denied chest pain or SOB currently  Monitor    * Symptomatic anemia  Assessment & Plan  77yo M with hx of cirrhosis and ascites presents with symptomatic anemia - Hb 7.1 on admission baseline previously 11  Has not had recent endoscopy. Mentions hemorrhoids but no significant active bleeding down there currently although does endorse that intermittently.  Patient admitted with acute blood loss anemia needing blood transfusion.  Differential diagnosis includes variceal bleed, PUD, esophagitis among other causes. Continue IV PPI twice daily. Keep NPO for EGD. Octreotide drip and ceftriaxone.   According to ED Hemoccult positive  IV Protonix twice a day  octreotide gtt  S/p 2 unit PRBC   Ceftriaxone for SBP prophylaxis x 5 d (switch to oral on dc)   Monitor hemodynamics  Anemia likely in setting of previous variceal bleed and/or severe esophagitis with esophageal ulcer.  No evidence of active bleeding.               VTE Pharmacologic Prophylaxis:    contraindicated given thrombocytopenia and anemia    Mobility:   Basic Mobility Inpatient Raw Score: 18  JH-HLM Goal: 6: Walk 10 steps or more  JH-HLM Achieved: 6: Walk 10 steps or more  JH-HLM Goal achieved. Continue to encourage appropriate mobility.    Patient Centered Rounds: I performed bedside rounds with nursing staff today.   Discussions with Specialists or Other Care Team Provider: none    Education and Discussions with Family / Patient: Updated  (wife) at bedside.    Total Time Spent on Date of Encounter in care of patient: 55 mins. This time was spent on one or more of the following: performing physical exam; counseling and coordination of care; obtaining or reviewing history; documenting in the medical record; reviewing/ordering tests,  medications or procedures; communicating with other healthcare professionals and discussing with patient's family/caregivers.    Current Length of Stay: 3 day(s)  Current Patient Status: Inpatient   Certification Statement: The patient will continue to require additional inpatient hospital stay due to octreotide drip in the setting of GIB  Discharge Plan: Anticipate discharge tomorrow to home.    Code Status: Level 1 - Full Code    Subjective:   No new issues. Stools are returning to normal color. Having some diarrhea. Feels much better after para yday.     Objective:     Vitals:   Temp (24hrs), Av.1 °F (36.7 °C), Min:98 °F (36.7 °C), Max:98.2 °F (36.8 °C)    Temp:  [98 °F (36.7 °C)-98.2 °F (36.8 °C)] 98 °F (36.7 °C)  HR:  [63-79] 63  Resp:  [16-20] 18  BP: (138-148)/(65-70) 146/65  SpO2:  [95 %-100 %] 95 %  Body mass index is 37.2 kg/m².     Input and Output Summary (last 24 hours):     Intake/Output Summary (Last 24 hours) at 2024 1035  Last data filed at 2024 0300  Gross per 24 hour   Intake 590 ml   Output 8200 ml   Net -7610 ml       Physical Exam:   Physical Exam   NAD  Nonlabored respirations on room air  Decreased abdominal distention, no tenderness to palpation  Regular rate and rhythm  aaox3    Additional Data:     Labs:  Results from last 7 days   Lab Units 24  0505   WBC Thousand/uL 3.69*   HEMOGLOBIN g/dL 8.7*   HEMATOCRIT % 26.1*   PLATELETS Thousands/uL 60*   SEGS PCT % 65   LYMPHO PCT % 18   MONO PCT % 12   EOS PCT % 3     Results from last 7 days   Lab Units 24  0505 24  0913   SODIUM mmol/L 136 132*   POTASSIUM mmol/L 3.6 3.7   CHLORIDE mmol/L 106 102   CO2 mmol/L 22 22   BUN mg/dL 22 28*   CREATININE mg/dL 1.12 1.34*   ANION GAP mmol/L 8 8   CALCIUM mg/dL 7.6* 7.6*   ALBUMIN g/dL  --  2.4*   TOTAL BILIRUBIN mg/dL  --  0.56   ALK PHOS U/L  --  73   ALT U/L  --  17   AST U/L  --  29   GLUCOSE RANDOM mg/dL 134 203*     Results from last 7 days   Lab Units  04/11/24  1142   INR  1.25*     Results from last 7 days   Lab Units 04/12/24  0735 04/11/24  2102 04/11/24  1552 04/11/24  1106 04/11/24  0712 04/10/24  1615 04/10/24  0723   POC GLUCOSE mg/dl 146* 144* 167* 191* 155* 197* 135               Lines/Drains:  Invasive Devices       Peripheral Intravenous Line  Duration             Peripheral IV 04/10/24 Distal;Dorsal (posterior);Left Forearm 1 day    Peripheral IV 04/11/24 Left Antecubital <1 day                          Imaging: Reviewed radiology reports from this admission including: abdominal/pelvic CT    Recent Cultures (last 7 days):   Results from last 7 days   Lab Units 04/11/24  1424   GRAM STAIN RESULT  Rare Disintegrating polys  No bacteria seen       Last 24 Hours Medication List:   Current Facility-Administered Medications   Medication Dose Route Frequency Provider Last Rate    albuterol  2 puff Inhalation Q6H PRN Anival Obrien MD      aspirin  81 mg Oral Daily Carlitos Seth MD      budesonide-formoterol  2 puff Inhalation BID Anival Obrien MD      cefTRIAXone  1,000 mg Intravenous Q24H Anival Obrien MD 1,000 mg (04/11/24 2127)    furosemide  20 mg Intravenous Daily Grazyna Mtz PA-C      insulin lispro  1-5 Units Subcutaneous TID AC Anival Obrien MD      magnesium sulfate  2 g Intravenous Once Carlitos Seth MD 2 g (04/12/24 0952)    metoprolol succinate  100 mg Oral Daily Anival Obrien MD      octreotide  50 mcg/hr Intravenous Continuous Anival Obrien MD 50 mcg/hr (04/12/24 0959)    pantoprazole  40 mg Intravenous Q12H [facility] Anival Obrien MD      spironolactone  50 mg Oral Daily Grazyna Mtz PA-C          Today, Patient Was Seen By: Carlitos Seth MD    **Please Note: This note may have been constructed using a voice recognition system.**

## 2024-04-12 NOTE — PROGRESS NOTES
"Progress Note - Bimal Lugo 78 y.o. male MRN: [id number]    Unit/Bed#: -01 Encounter: [id number]    Subjective:     Patient is without complaints today.  He reports that his abdominal discomfort is improved status post paracentesis.  He is tolerating a diet.    Objective:     Vitals: Blood pressure 146/65, pulse 63, temperature 98 °F (36.7 °C), temperature source Oral, resp. rate 18, height 5' 5\" (1.651 m), weight 101 kg (223 lb 8.7 oz), SpO2 95%.,Body mass index is 37.2 kg/m².      Intake/Output Summary (Last 24 hours) at 4/12/2024 1243  Last data filed at 4/12/2024 1101  Gross per 24 hour   Intake 590 ml   Output 8700 ml   Net -8110 ml       Physical Exam:     General Appearance: Alert, appears stated age and cooperative  Lungs: Clear to auscultation bilaterally, no rales or rhonchi, no labored breathing/accessory muscle use  Heart: Regular rate and rhythm, S1, S2 normal, no murmur, click, rub or gallop  Abdomen: Soft, non-tender, non-distended; bowel sounds normal; no masses or no organomegaly  Extremities: No cyanosis, edema    Invasive Devices       Peripheral Intravenous Line  Duration             Peripheral IV 04/10/24 Distal;Dorsal (posterior);Left Forearm 1 day    Peripheral IV 04/11/24 Left Antecubital <1 day                    Lab Results:  Results from last 7 days   Lab Units 04/12/24  0505   WBC Thousand/uL 3.69*   HEMOGLOBIN g/dL 8.7*   HEMATOCRIT % 26.1*   PLATELETS Thousands/uL 60*   SEGS PCT % 65   LYMPHO PCT % 18   MONO PCT % 12   EOS PCT % 3     Results from last 7 days   Lab Units 04/12/24  0505 04/11/24  0913   POTASSIUM mmol/L 3.6 3.7   CHLORIDE mmol/L 106 102   CO2 mmol/L 22 22   BUN mg/dL 22 28*   CREATININE mg/dL 1.12 1.34*   CALCIUM mg/dL 7.6* 7.6*   ALK PHOS U/L  --  73   ALT U/L  --  17   AST U/L  --  29     Results from last 7 days   Lab Units 04/11/24  1142   INR  1.25*           Imaging Studies: I have personally reviewed pertinent imaging studies.    EGD    Result Date: " 4/10/2024  Impression: Three large varices with evidence of recent bleeding (red paty sign) in the esophagus; placed 3 bands successfully, resulting in partial eradication Grade D esophagitis in the lower third of the esophagus with discrete ulcer at the GE junction Moderate and mosaic portal hypertensive gastropathy Performed forceps biopsies in the body of the stomach, incisura and antrum to rule out H. pylori The 1st part of the duodenum and 2nd part of the duodenum appeared normal. Anemia likely in setting of previous variceal bleed and/or severe esophagitis with esophageal ulcer.  No evidence of active bleeding. RECOMMENDATION:  Okay to restart diet.  Continue octreotide for 72 hours total then patient can be discharged.  Ceftriaxone or oral equivalent for 5 days.  Patient will need repeat EGD in 4 weeks for repeat variceal ligation.    Dominique Roy MD     XR chest 2 views    Result Date: 4/10/2024  Impression: No radiographic evidence of acute intrathoracic process. Workstation performed: [id number]       Assessment and Plan:     Anemia  Melena  - Patient presented to [facility] yesterday with severe weakness and shortness of breath  - Hgb on admission was noted to be 7.1 (11.0 on 1/23/24)  - He reports black stools for several weeks  - EGD 4/10 with 3 large varices s/p banding, severe grade D esophagitis, GE junction ulcer, PHG  - He will need repeat EGD in 4 weeks for repeat banding  - He will need colonoscopy .  His last colonoscopy was 2 years ago with 2 polyps removed.  He was told to have a f/u exam last year but this was put on hold due to cardiac issues. Will plan colonoscopy along with repeat EGD in 4 weeks  - He is s/p AVR in January of this year only maintained on Asa 81mg daily  - Continue to trend Hgb/Hct and transfuse PRBC as needed.  Hgb 8.7 today     ETOH Cirrhosis  MELD 3.0: 12 at 4/12/2024  5:05 AM  MELD-Na: 10 at 4/12/2024  5:05 AM  Calculated from:  Serum Creatinine: 1.12 mg/dL at 4/12/2024  5:05  AM  Serum Sodium: 136 mmol/L at 4/12/2024  5:05 AM  Total Bilirubin: 0.56 mg/dL (Using min of 1 mg/dL) at 4/11/2024  9:13 AM  Serum Albumin: 2.4 g/dL at 4/11/2024  9:13 AM  INR(ratio): 1.25 at 4/11/2024 11:42 AM  Age at listing (hypothetical): 78 years  Sex: Male at 4/12/2024  5:05 AM      - He notes no alcohol in several years  - Complicated by ascites.  This is new.  Paracentesis 3/7/24 with 3.9 liters removed with analysis negative for SBP and repeated on 4/4 with 6.2 liters removed.  Yesterday 7 liters removed with albumin administered.   - Continue antibiotic prophylaxis given GIB - Patient is on Ceftriaxone - will complete a total of 5 days. Currently on day 4/5  - Advised low Na diet <2000mg per day  - Will restart diuresis - Lasix and Aldactone ordered  - EGD with large varices s/p banding and PHG  - Continue Octreotide x 72 hours - will finish course tonight  - CT AP from 9/1/23 without liver lesions.  He will need updated HCC screening.  Also check AFP  - Per patient/wife no history of HE.  Currently grade 0  - Continue to avoid ETOH     Patient will likely be d/c tomorrow morning  We will sign off  We will f/u patient routinely in the outpatient setting     Patient was seen and examined by Dr Roy. All rodriguez medical decisions were made by Dr Roy. Thank you for allowing us to participate in the care of this present patient. We will follow-up with you closely.

## 2024-04-13 VITALS
HEIGHT: 65 IN | TEMPERATURE: 98.5 F | RESPIRATION RATE: 16 BRPM | BODY MASS INDEX: 37.25 KG/M2 | WEIGHT: 223.55 LBS | HEART RATE: 62 BPM | DIASTOLIC BLOOD PRESSURE: 64 MMHG | OXYGEN SATURATION: 94 % | SYSTOLIC BLOOD PRESSURE: 137 MMHG

## 2024-04-13 LAB
ANION GAP SERPL CALCULATED.3IONS-SCNC: 7 MMOL/L (ref 4–13)
BASOPHILS # BLD AUTO: 0.04 THOUSANDS/ÂΜL (ref 0–0.1)
BASOPHILS NFR BLD AUTO: 1 % (ref 0–1)
BUN SERPL-MCNC: 23 MG/DL (ref 5–25)
CALCIUM SERPL-MCNC: 8 MG/DL (ref 8.4–10.2)
CHLORIDE SERPL-SCNC: 105 MMOL/L (ref 96–108)
CO2 SERPL-SCNC: 23 MMOL/L (ref 21–32)
CREAT SERPL-MCNC: 1.2 MG/DL (ref 0.6–1.3)
EOSINOPHIL # BLD AUTO: 0.13 THOUSAND/ÂΜL (ref 0–0.61)
EOSINOPHIL NFR BLD AUTO: 3 % (ref 0–6)
ERYTHROCYTE [DISTWIDTH] IN BLOOD BY AUTOMATED COUNT: 17.2 % (ref 11.6–15.1)
GFR SERPL CREATININE-BSD FRML MDRD: 57 ML/MIN/1.73SQ M
GLUCOSE SERPL-MCNC: 125 MG/DL (ref 65–140)
GLUCOSE SERPL-MCNC: 137 MG/DL (ref 65–140)
HCT VFR BLD AUTO: 30 % (ref 36.5–49.3)
HGB BLD-MCNC: 9.7 G/DL (ref 12–17)
IMM GRANULOCYTES # BLD AUTO: 0.01 THOUSAND/UL (ref 0–0.2)
IMM GRANULOCYTES NFR BLD AUTO: 0 % (ref 0–2)
LYMPHOCYTES # BLD AUTO: 0.76 THOUSANDS/ÂΜL (ref 0.6–4.47)
LYMPHOCYTES NFR BLD AUTO: 17 % (ref 14–44)
MAGNESIUM SERPL-MCNC: 2 MG/DL (ref 1.9–2.7)
MCH RBC QN AUTO: 28.4 PG (ref 26.8–34.3)
MCHC RBC AUTO-ENTMCNC: 32.3 G/DL (ref 31.4–37.4)
MCV RBC AUTO: 88 FL (ref 82–98)
MONOCYTES # BLD AUTO: 0.52 THOUSAND/ÂΜL (ref 0.17–1.22)
MONOCYTES NFR BLD AUTO: 12 % (ref 4–12)
NEUTROPHILS # BLD AUTO: 2.91 THOUSANDS/ÂΜL (ref 1.85–7.62)
NEUTS SEG NFR BLD AUTO: 67 % (ref 43–75)
NRBC BLD AUTO-RTO: 0 /100 WBCS
PLATELET # BLD AUTO: 62 THOUSANDS/UL (ref 149–390)
PMV BLD AUTO: 10.9 FL (ref 8.9–12.7)
POTASSIUM SERPL-SCNC: 4 MMOL/L (ref 3.5–5.3)
RBC # BLD AUTO: 3.42 MILLION/UL (ref 3.88–5.62)
SODIUM SERPL-SCNC: 135 MMOL/L (ref 135–147)
WBC # BLD AUTO: 4.37 THOUSAND/UL (ref 4.31–10.16)

## 2024-04-13 PROCEDURE — C9113 INJ PANTOPRAZOLE SODIUM, VIA: HCPCS | Performed by: INTERNAL MEDICINE

## 2024-04-13 PROCEDURE — 83735 ASSAY OF MAGNESIUM: CPT | Performed by: STUDENT IN AN ORGANIZED HEALTH CARE EDUCATION/TRAINING PROGRAM

## 2024-04-13 PROCEDURE — 82948 REAGENT STRIP/BLOOD GLUCOSE: CPT

## 2024-04-13 PROCEDURE — 99239 HOSP IP/OBS DSCHRG MGMT >30: CPT | Performed by: STUDENT IN AN ORGANIZED HEALTH CARE EDUCATION/TRAINING PROGRAM

## 2024-04-13 PROCEDURE — 85025 COMPLETE CBC W/AUTO DIFF WBC: CPT | Performed by: STUDENT IN AN ORGANIZED HEALTH CARE EDUCATION/TRAINING PROGRAM

## 2024-04-13 PROCEDURE — 80048 BASIC METABOLIC PNL TOTAL CA: CPT | Performed by: STUDENT IN AN ORGANIZED HEALTH CARE EDUCATION/TRAINING PROGRAM

## 2024-04-13 RX ORDER — SPIRONOLACTONE 50 MG/1
50 TABLET, FILM COATED ORAL DAILY
Qty: 30 TABLET | Refills: 0 | Status: SHIPPED | OUTPATIENT
Start: 2024-04-14

## 2024-04-13 RX ORDER — PANTOPRAZOLE SODIUM 40 MG/1
40 TABLET, DELAYED RELEASE ORAL DAILY
Qty: 30 TABLET | Refills: 0 | Status: SHIPPED | OUTPATIENT
Start: 2024-04-13

## 2024-04-13 RX ORDER — FUROSEMIDE 40 MG/1
40 TABLET ORAL DAILY
Qty: 30 TABLET | Refills: 0 | Status: SHIPPED | OUTPATIENT
Start: 2024-04-14

## 2024-04-13 RX ADMIN — BUDESONIDE AND FORMOTEROL FUMARATE DIHYDRATE 2 PUFF: 160; 4.5 AEROSOL RESPIRATORY (INHALATION) at 09:34

## 2024-04-13 RX ADMIN — METOPROLOL SUCCINATE 100 MG: 100 TABLET, EXTENDED RELEASE ORAL at 09:34

## 2024-04-13 RX ADMIN — PANTOPRAZOLE SODIUM 40 MG: 40 INJECTION, POWDER, FOR SOLUTION INTRAVENOUS at 09:34

## 2024-04-13 RX ADMIN — ASPIRIN 81 MG: 81 TABLET, COATED ORAL at 09:34

## 2024-04-13 RX ADMIN — SPIRONOLACTONE 50 MG: 25 TABLET ORAL at 09:34

## 2024-04-13 RX ADMIN — FUROSEMIDE 20 MG: 10 INJECTION, SOLUTION INTRAMUSCULAR; INTRAVENOUS at 09:34

## 2024-04-13 NOTE — RESPIRATORY THERAPY NOTE
"RT Protocol Note  Bimal Lugo 78 y.o. male MRN: [id number]  Unit/Bed#: -01 Encounter: [id number]    Assessment    Principal Problem:    Symptomatic anemia  Active Problems:    CAD (coronary artery disease)    Chronic bronchitis with COPD (chronic obstructive pulmonary disease)    Chronic kidney disease, stage 3a (HCC)    PVD (peripheral vascular disease) (HCC)    Diabetes (HCC)    Cirrhosis (HCC)    Chronic obstructive pulmonary disease (HCC)    History of aortic valve replacement    Physical Exam:   Assessment Type: Assess only  General Appearance: Awake  Respiratory Pattern: Normal  Chest Assessment: Chest expansion symmetrical  Bilateral Breath Sounds: Clear  O2 Device: ra    Vitals:  Blood pressure 137/64, pulse 62, temperature 98.5 °F (36.9 °C), temperature source Oral, resp. rate 16, height 5' 5\" (1.651 m), weight 101 kg (223 lb 8.7 oz), SpO2 94%.  O2 Device: ra     Plan    Respiratory Plan: No distress/Pulmonary history        Resp Comments: pt admitted for anemia. hx of COPD. pt uses prn MDI at home, will cont w PRN mdi. BBS clear. no distress noted 94% spo2   "

## 2024-04-13 NOTE — ASSESSMENT & PLAN NOTE
Lab Results   Component Value Date    EGFR 57 04/13/2024    EGFR 62 04/12/2024    EGFR 50 04/11/2024    CREATININE 1.20 04/13/2024    CREATININE 1.12 04/12/2024    CREATININE 1.34 (H) 04/11/2024     Creatinine is 1.3-1.5 at baseline  Has been stable

## 2024-04-13 NOTE — PLAN OF CARE
Problem: GENITOURINARY - ADULT  Goal: Absence of urinary retention  Description: INTERVENTIONS:  - Assess patient’s ability to void and empty bladder  - Monitor I/O  - Bladder scan as needed  - Discuss with physician/AP medications to alleviate retention as needed  - Discuss catheterization for long term situations as appropriate  Outcome: Progressing     Problem: HEMATOLOGIC - ADULT  Goal: Maintains hematologic stability  Description: INTERVENTIONS  - Assess for signs and symptoms of bleeding or hemorrhage  - Monitor labs  - Administer supportive blood products/factors as ordered and appropriate  Outcome: Progressing

## 2024-04-13 NOTE — ASSESSMENT & PLAN NOTE
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 pylori  The 1st part of the duodenum and 2nd part of the duodenum appeared normal.       GI following, appreciate recommendations  Completed octreotide for 72 hrs   Completed abx for sbp ppx per gi recs   Will need outpatient GI follow up for repeat EGD in 4 weeks

## 2024-04-13 NOTE — ASSESSMENT & PLAN NOTE
Lab Results   Component Value Date    HGBA1C 5.7 (H) 04/04/2024       Recent Labs     04/12/24  1106 04/12/24  1629 04/12/24 2030 04/13/24  0803   POCGLU 197* 176* 146* 137         Blood Sugar Average: Last 72 hrs:  (P) 162.2545160513472961  Well controlled with A1c of 5.7  HOLD glipizide and metformin for now.  SSI

## 2024-04-13 NOTE — DISCHARGE SUMMARY
[facility]  Discharge- Bimal Lugo 1945, 78 y.o. male MRN: [id number]  Unit/Bed#: MS Mendes01 Encounter: [id number]  Primary Care Provider: Vivian Van DO   Date and time admitted to hospital: 4/9/2024  5:50 PM    * Symptomatic anemia  Assessment & Plan  -77yo M with hx of cirrhosis and ascites presents with symptomatic anemia - Hb 7.1 on admission baseline previously 11  - Has not had recent endoscopy. Mentions hemorrhoids but no significant active bleeding down there currently although does endorse that intermittently.  Patient admitted with acute blood loss anemia needing blood transfusion.  Differential diagnosis includes variceal bleed, PUD, esophagitis among other causes. Continue IV PPI twice daily. Keep NPO for EGD. Octreotide drip and ceftriaxone.   According to ED Hemoccult positive  IV Protonix twice a day  octreotide gtt  S/p 2 unit PRBC   Ceftriaxone for SBP prophylaxis x 5 d (switch to oral on dc)   Monitor hemodynamics  Anemia likely in setting of previous variceal bleed and/or severe esophagitis with esophageal ulcer.  No evidence of active bleeding.  Hgb has remained stable since transfusion     Cirrhosis (HCC)  Assessment & Plan  Hx of cirrhosis requiring periodic paracentesis   Currently no signs of encephalopathy  Endoscopy (EGD) 4/10 completed:   Three large varices with evidence of recent bleeding (red paty sign) in the esophagus; placed 3 bands successfully, resulting in partial eradication  Grade D esophagitis in the lower third of the esophagus with discrete ulcer at the GE junction  Moderate and mosaic portal hypertensive gastropathy  Performed forceps biopsies in the body of the stomach, incisura and antrum to rule out H. pylori  The 1st part of the duodenum and 2nd part of the duodenum appeared normal.       GI following, appreciate recommendations  Completed octreotide for 72 hrs   Will send Rx for 2 additional days of cipro 500 BID x 2 additional days to  complete 5 days of abx for sbp ppx  Will need outpatient GI follow up for repeat EGD in 4 weeks     Diabetes ([facility])  Assessment & Plan  Lab Results   Component Value Date    HGBA1C 5.7 (H) 04/04/2024       Recent Labs     04/12/24  1106 04/12/24  1629 04/12/24  2030 04/13/24  0803   POCGLU 197* 176* 146* 137         Blood Sugar Average: Last 72 hrs:  (P) [id number]  Well controlled with A1c of 5.7  HOLD glipizide and metformin for now.  SSI      PVD (peripheral vascular disease) ([facility])  Assessment & Plan  Resume aspirin    Chronic kidney disease, stage 3a ([facility])  Assessment & Plan  Lab Results   Component Value Date    EGFR 57 04/13/2024    EGFR 62 04/12/2024    EGFR 50 04/11/2024    CREATININE 1.20 04/13/2024    CREATININE 1.12 04/12/2024    CREATININE 1.34 (H) 04/11/2024     Creatinine is 1.3-1.5 at baseline  Has been stable     Chronic bronchitis with COPD (chronic obstructive pulmonary disease)  Assessment & Plan  Overall stable respiratory status  Continue Symbicort and PRN albuterol    CAD (coronary artery disease)  Assessment & Plan  Reported hx of CAD and AVR on Jan 2024.  Denied chest pain or SOB currently  Monitor        Medical Problems       Resolved Problems  Date Reviewed: 5/20/2019   None       Discharging Physician / Practitioner: Carlitos Seth MD  PCP: Vivian Van DO  Admission Date:   Admission Orders (From admission, onward)       Ordered        04/09/24 2056  Inpatient Admission  Once                          Discharge Date: 04/13/24    Consultations During Hospital Stay:  GI     Procedures Performed:   EGD    Significant Findings / Test Results:   EGD - large varices with banding x 3    Incidental Findings:   none       Test Results Pending at Discharge (will require follow up):   none     Outpatient Tests Requested:  none    Complications:  none    Reason for Admission: abdominal distension, symptomatic anemia    Hospital Course:   Bimal Lugo is a 78 y.o. male patient who originally  presented to the hospital on 4/9/2024 due to abd distension and symptomatic anemia. He received 1 unit PRBC on admission. GI was consulted and he underwent EGD and variceal banding x 3. He also underwent IR guided paracentesis with relief of symptoms. He will follow up with GI in 4 weeks for repeat EGD and colonoscopy. He is recommended to follow up with his PCP within 1 week.       Please see above list of diagnoses and related plan for additional information.     Condition at Discharge: fair    Discharge Day Visit / Exam:   * Please refer to separate progress note for these details *    Discussion with Family: Updated  (wife) at bedside.    Discharge instructions/Information to patient and family:   See after visit summary for information provided to patient and family.      Provisions for Follow-Up Care:  See after visit summary for information related to follow-up care and any pertinent home health orders.      Mobility at time of Discharge:   Basic Mobility Inpatient Raw Score: 18  JH-HLM Goal: 6: Walk 10 steps or more  JH-HLM Achieved: 6: Walk 10 steps or more  HLM Goal achieved. Continue to encourage appropriate mobility.     Disposition:   Home    Planned Readmission: none      Discharge Statement:  I spent 55 minutes discharging the patient. This time was spent on the day of discharge. I had direct contact with the patient on the day of discharge. Greater than 50% of the total time was spent examining patient, answering all patient questions, arranging and discussing plan of care with patient as well as directly providing post-discharge instructions.  Additional time then spent on discharge activities.    Discharge Medications:  See after visit summary for reconciled discharge medications provided to patient and/or family.      **Please Note: This note may have been constructed using a voice recognition system**

## 2024-04-13 NOTE — DISCHARGE INSTR - AVS FIRST PAGE
- Lasix, Spironolactone and pantoprazole prescriptions were sent to [facility] pharmacy at [place]  - Follow up with outpatient gastroenterology within 4 weeks for repeat EGD and colonoscopy  - Follow up with PCP within 1-2 weeks

## 2024-04-13 NOTE — ASSESSMENT & PLAN NOTE
-79yo M with hx of cirrhosis and ascites presents with symptomatic anemia - Hb 7.1 on admission baseline previously 11  - Has not had recent endoscopy. Mentions hemorrhoids but no significant active bleeding down there currently although does endorse that intermittently.  Patient admitted with acute blood loss anemia needing blood transfusion.  Differential diagnosis includes variceal bleed, PUD, esophagitis among other causes. Continue IV PPI twice daily. Keep NPO for EGD. Octreotide drip and ceftriaxone.   According to ED Hemoccult positive  IV Protonix twice a day  octreotide gtt  S/p 2 unit PRBC   Ceftriaxone for SBP prophylaxis    Monitor hemodynamics  Anemia likely in setting of previous variceal bleed and/or severe esophagitis with esophageal ulcer.  No evidence of active bleeding.  Hgb has remained stable since transfusion

## 2024-04-14 LAB
BACTERIA SPEC BFLD CULT: NO GROWTH
GRAM STN SPEC: NORMAL
GRAM STN SPEC: NORMAL

## 2024-04-15 LAB — GLUCOSE SERPL-MCNC: 150 MG/DL (ref 65–140)

## 2024-04-17 ENCOUNTER — TELEPHONE (OUTPATIENT)
Age: 79
End: 2024-04-17

## 2024-04-17 DIAGNOSIS — K70.31 ALCOHOLIC CIRRHOSIS OF LIVER WITH ASCITES (HCC): Primary | ICD-10-CM

## 2024-04-17 NOTE — TELEPHONE ENCOUNTER
Patients GI provider:  ELLEN Hdez    Number to return call: [phone]    Reason for call: Pt called per referral needing hospital follow up / Appointment scheduled / Patient requesting a sooner appt  / Patient will await call    Scheduled procedure/appointment date if applicable: 8/20/2024 @ 10:00

## 2024-04-17 NOTE — TELEPHONE ENCOUNTER
Hospital discharge 4/13.  Was calling to let pt know he can schedule EGD/Colon without office visit per hosp note and wife stated he is having issues.    Spoke with pt wife per pt verbal consent, stomach getting hard, having issues eating, pain in stomach and nausea. Vomit x1. Denies SOB. Did advise if develops SOB, lightheaded, dizziness, confusion, report back to ER.

## 2024-04-18 RX ORDER — INSULIN ASPART 100 [IU]/ML
19 INJECTION, SOLUTION INTRAVENOUS; SUBCUTANEOUS 3 TIMES DAILY
COMMUNITY
Start: 2024-01-06

## 2024-04-18 RX ORDER — ISOSORBIDE MONONITRATE 30 MG/1
1 TABLET, EXTENDED RELEASE ORAL DAILY
COMMUNITY
Start: 2024-04-09

## 2024-04-18 RX ORDER — ATORVASTATIN CALCIUM 40 MG/1
40 TABLET, FILM COATED ORAL
COMMUNITY

## 2024-04-18 RX ORDER — FLUCONAZOLE 100 %
POWDER (GRAM) MISCELLANEOUS 2 TIMES DAILY
COMMUNITY
End: 2024-04-19

## 2024-04-18 RX ORDER — NYSTATIN 100000 [USP'U]/G
POWDER TOPICAL
COMMUNITY
Start: 2024-01-02

## 2024-04-18 RX ORDER — INDAPAMIDE 1.25 MG/1
1.25 TABLET ORAL DAILY
COMMUNITY
End: 2024-04-19

## 2024-04-18 RX ORDER — NIFEDIPINE 30 MG
30 TABLET, EXTENDED RELEASE ORAL 2 TIMES DAILY
COMMUNITY
Start: 2024-02-14

## 2024-04-18 NOTE — TELEPHONE ENCOUNTER
Called and spoke to patients wife. Gave her message as per Grazyna. Confirmed patients appointment for Friday 4/19/2024 @ 11:30 am with Grazyna. She voiced understanding and had no further questions or cocnerns

## 2024-04-19 ENCOUNTER — OFFICE VISIT (OUTPATIENT)
Dept: GASTROENTEROLOGY | Facility: CLINIC | Age: 79
End: 2024-04-19
Payer: MEDICARE

## 2024-04-19 VITALS
TEMPERATURE: 97.8 F | BODY MASS INDEX: 36.82 KG/M2 | OXYGEN SATURATION: 97 % | HEIGHT: 65 IN | SYSTOLIC BLOOD PRESSURE: 110 MMHG | WEIGHT: 221 LBS | HEART RATE: 110 BPM | DIASTOLIC BLOOD PRESSURE: 68 MMHG

## 2024-04-19 DIAGNOSIS — D69.6 THROMBOCYTOPENIA (HCC): ICD-10-CM

## 2024-04-19 DIAGNOSIS — K70.31 ALCOHOLIC CIRRHOSIS OF LIVER WITH ASCITES (HCC): Primary | ICD-10-CM

## 2024-04-19 DIAGNOSIS — I85.00 ESOPHAGEAL VARICES WITHOUT BLEEDING, UNSPECIFIED ESOPHAGEAL VARICES TYPE (HCC): ICD-10-CM

## 2024-04-19 PROCEDURE — 99213 OFFICE O/P EST LOW 20 MIN: CPT | Performed by: PHYSICIAN ASSISTANT

## 2024-04-19 NOTE — H&P (VIEW-ONLY)
[facility] - Outpatient Follow-up Note  Bimal Lugo 78 y.o. male MRN: [id number]  Encounter: [id number]          ASSESSMENT AND PLAN:      1. 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)  MELD 3.0: 13 at 4/13/2024  5:15 AM  MELD-Na: 11 at 4/13/2024  5:15 AM  Calculated from:  Serum Creatinine: 1.20 mg/dL at 4/13/2024  5:15 AM  Serum Sodium: 135 mmol/L at 4/13/2024  5:15 AM  Total Bilirubin: 0.56 mg/dL (Using min of 1 mg/dL) at 4/11/2024  9:13 AM  Serum Albumin: 2.4 g/dL at 4/11/2024  9:13 AM  INR(ratio): 1.25 at 4/11/2024 11:42 AM  Age at listing (hypothetical): 78 years  Sex: Male at 4/13/2024  5:15 AM    He is completely sober for several years    Recently this is c/b Ascites and Varices    He is on Lasix 40mg and Spironolactone 50mg daily  He is trying to watch his sodium intake   He has had 4 paracentesis recently with the most recent being 4/11 with 7000 liters drained    EGD 4/10 with 3 large varices s/p banding   He was treated with 72hrs of octreotide  Also noted to have PHG and a small GE junction ulcer  Will repeat EGD in may for repeat banding    He is on Pantoprazole 40mg daily  He is on an iron supplement  He continues to note black stools    Will repeat labs    He is due routinely for a colonoscopy per his previous GI at [facility] - will schedule this as well    He is s/p AVR in January    ______________________________________________________________________    SUBJECTIVE: 78-year-old male with a history of valvular heart disease status post aortic valve replacement in January at [facility], alcohol abuse, alcohol cirrhosis, ascites, esophageal varices, GERD who presents for hospital follow-up.  The patient was hospitalized last week at [facility].  He initially presented with complaints of profound weakness.  When evaluated he was found to have a hemoglobin of 7.1.  He was also reporting black stools.  It was noted that his abdomen was distended.  A CT was  performed which showed large ascites.  He noted that he had just had a paracentesis on April 4 at which time 6.2 L of ascitic fluid was removed.  Prior to that he had a paracentesis on March 7 with 3.9 L removed.  During his hospitalization on April 11 another 7 L was removed.  Due to his black stool he underwent an endoscopy on April 10.  This documented 3 large esophageal varices and portal hypertensive gastropathy.  3 bands were successfully placed on the varices.  He was advised to have a follow-up endoscopy in 1 month.  He completed a 72-hour octreotide course and was treated prophylactically with antibiotics due to his ascites.  Since discharge she feels okay.  He continues with abdominal discomfort and swelling.  His legs remain swollen.  He is taking Lasix 40 mg once daily and spironolactone 50 mg once daily.  He is trying to watch the salt in his diet but does not actually go out of his way to do this.  He does not salt his food.  He continues to report black stools but is on an iron supplement.  He denies any vomiting.  He is tolerating a diet but admits to a poor appetite.  He has not had any alcohol in many years.  He was previously following at [facility].      REVIEW OF SYSTEMS IS OTHERWISE NEGATIVE.      Historical Information   Past Medical History:   Diagnosis Date    Chronic bronchitis (HCC)     COPD (chronic obstructive pulmonary disease) (HCC)     Obesity     ARACELY (obstructive sleep apnea)      Past Surgical History:   Procedure Laterality Date    IR PARACENTESIS  4/4/2024    IR PARACENTESIS  3/7/2024    IR PARACENTESIS  4/11/2024     Social History   Social History     Substance and Sexual Activity   Alcohol Use No     Social History     Substance and Sexual Activity   Drug Use No     Social History     Tobacco Use   Smoking Status Former    Current packs/day: 0.00    Average packs/day: 5.0 packs/day for 35.0 years (175.0 ttl pk-yrs)    Types: Cigarettes    Start date: 1952    Quit date: 1987     "Years since quittin.3   Smokeless Tobacco Never     History reviewed. No pertinent family history.    Meds/Allergies       Current Outpatient Medications:     albuterol (PROVENTIL HFA,VENTOLIN HFA) 90 mcg/act inhaler    aspirin 81 MG tablet    atorvastatin (LIPITOR) 40 mg tablet    furosemide (LASIX) 40 mg tablet    glucose 4-6 GM-MG    glucose blood test strip    hydrocortisone (PROCTOSOL HC) 2.5 % rectal cream    insulin aspart (NovoLOG FlexPen) 100 UNIT/ML injection pen    insulin degludec (TRESIBA) 100 units/mL injection pen    isosorbide mononitrate (IMDUR) 30 mg 24 hr tablet    Lancets (ONETOUCH ULTRASOFT) lancets    lovastatin (MEVACOR) 10 MG tablet    metFORMIN (GLUCOPHAGE-XR) 500 mg 24 hr tablet    metoprolol succinate (TOPROL-XL) 100 mg 24 hr tablet    Misc. Devices MISC    Multiple Vitamin (DAILY VALUE MULTIVITAMIN) TABS    NIFEdipine ER (ADALAT CC) 30 MG 24 hr tablet    nystatin (MYCOSTATIN) powder    Omega-3 Fatty Acids (FISH OIL) 645 MG CAPS    pantoprazole (PROTONIX) 40 mg tablet    spironolactone (ALDACTONE) 50 mg tablet    SYMBICORT 160-4.5 MCG/ACT inhaler    Fluconazole POWD    indapamide (LOZOL) 1.25 mg tablet    Allergies   Allergen Reactions    Gemfibrozil Swelling and Rash    Carbamazepine     Carbamazepine And Analogs      swelling    Oxycodone Other (See Comments)     Pt states he hallucinates           Objective     Blood pressure 110/68, pulse (!) 110, temperature 97.8 °F (36.6 °C), temperature source Temporal, height 5' 5\" (1.651 m), weight 100 kg (221 lb), SpO2 97%. Body mass index is 36.78 kg/m².      PHYSICAL EXAM:      General Appearance:   Alert, cooperative, no distress   HEENT:   Normocephalic, atraumatic, anicteric.     Neck:  Supple, symmetrical, trachea midline   Lungs:   Clear to auscultation bilaterally; no rales, rhonchi or wheezing; respirations unlabored    Heart::   Regular rate and rhythm; no murmur, rub, or gallop.   Abdomen:   Soft, non-tender, -distended; normal " bowel sounds; no masses, no organomegaly    Genitalia:   Deferred    Rectal:   Deferred    Extremities:  No cyanosis, clubbing or (+) Edema   Pulses:  2+ and symmetric    Skin:  No jaundice, rashes, or lesions    Lymph nodes:  No palpable cervical lymphadenopathy        Lab Results:   No visits with results within 1 Day(s) from this visit.   Latest known visit with results is:   No results displayed because visit has over 200 results.            Radiology Results:   IR INPATIENT Paracentesis    Result Date: 4/15/2024  Narrative: Ultrasound-guided paracentesis Clinical History: Symptomatic ascites. Procedure: After explaining the risks and benefits of the procedure to the patient, informed consent was obtained. Ultrasound used to localize the right upper quadrant ascites. The overlying skin was prepped and draped in usual sterile fashion and local anesthesia was obtained with the 1% lidocaine solution. A 5 Syrian Yueh needle was advanced until fluid was aspirated under live ultrasound guidance. Approximately 7000 cc' s of cloudy, yellow fluid was aspirated. The catheter was then removed and a Band-Aid applied. Multiple specimens were sent to the lab for evaluation. Albumin 50 g was infused. The patient tolerated the procedure well and left the department in stable condition.     Impression: Impression: Ultrasound-guided paracentesis. Signed, performed, and dictated by Ying BRAND under the supervision of Dr. Agus Calvillo. Workstation performed: [id number]     EGD    Result Date: 4/10/2024  Narrative: Table formatting from the original result was not included.  [facility] [address] [facility] [id number] [phone] DATE OF SERVICE: 4/10/24 PHYSICIAN(S): Attending: Dominique Roy MD Fellow: No Staff Documented INDICATION: 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), Symptomatic anemia POST-OP DIAGNOSIS: See the impression below. PREPROCEDURE: Informed consent  was obtained for the procedure, including sedation.  Risks of perforation, hemorrhage, adverse drug reaction and aspiration were discussed. The patient was placed in the left lateral decubitus position. Patient was explained about the risks and benefits of the procedure. Risks including but not limited to bleeding, infection, and perforation were explained in detail. Also explained about less than 100% sensitivity with the exam and other alternatives. PROCEDURE: EGD DETAILS OF PROCEDURE: Patient was taken to the procedure room where a time out was performed to confirm correct patient and correct procedure. The patient underwent monitored anesthesia care, which was administered by an anesthesia professional. The patient's blood pressure, heart rate, level of consciousness, respirations, oxygen, ECG and ETCO2 were monitored throughout the procedure. The scope was introduced through the mouth and advanced to the second part of the duodenum. Retroflexion was performed in the fundus. The patient experienced no blood loss. The procedure was not difficult. The patient tolerated the procedure well. There were no apparent adverse events. ANESTHESIA INFORMATION: ASA: III Anesthesia Type: IV Sedation with Anesthesia MEDICATIONS: No administrations occurring from 1104 to 1123 on 04/10/24 FINDINGS: Three large varices (red paty sign) in the esophagus; placed 3 bands successfully, resulting in partial eradication Severe grade D esophagitis with mucosal breaks measuring 5 mm or more, continuous between folds, covering 75% or more of the circumference in the lower third of the esophagus Single 5 mm small ulcer in the GE junction with clean base (Bud III) Moderate, diffuse and mosaic portal hypertensive gastropathy Performed multiple forceps biopsies in the body of the stomach, incisura and antrum to rule out H. pylori The 1st part of the duodenum and 2nd part of the duodenum appeared normal. SPECIMENS: ID Type Source Tests  Collected by Time Destination 1 :  Tissue Stomach TISSUE EXAM Dominique Roy MD 4/10/2024 11:14 AM      Impression: Three large varices with evidence of recent bleeding (red paty sign) in the esophagus; placed 3 bands successfully, resulting in partial eradication Grade D esophagitis in the lower third of the esophagus with discrete ulcer at the GE junction Moderate and mosaic portal hypertensive gastropathy Performed forceps biopsies in the body of the stomach, incisura and antrum to rule out H. pylori The 1st part of the duodenum and 2nd part of the duodenum appeared normal. Anemia likely in setting of previous variceal bleed and/or severe esophagitis with esophageal ulcer.  No evidence of active bleeding. RECOMMENDATION:  Okay to restart diet.  Continue octreotide for 72 hours total then patient can be discharged.  Ceftriaxone or oral equivalent for 5 days.  Patient will need repeat EGD in 4 weeks for repeat variceal ligation.    Dominique Roy MD     XR chest 2 views    Result Date: 4/10/2024  Narrative: XR CHEST PA & LATERAL INDICATION: weakness. COMPARISON: None FINDINGS: No airspace consolidation, pneumothorax, pulmonary edema, or pleural effusion.  . Normal cardiac silhouette. Prosthetic aortic valve. Prior median sternotomy. Degenerative changes of bilateral shoulders. Multilevel thoracic spondylosis and paravertebral ossification in a pattern suggestive of diffuse idiopathic skeletal hyperostosis. Cholecystectomy clips.     Impression: No radiographic evidence of acute intrathoracic process. Workstation performed: [id number]     IR paracentesis    Result Date: 4/4/2024  Narrative: PROCEDURE: Ultrasound-guided paracentesis. INDICATION: Symptomatic Ascites. : Zak Rosales PA-C SUPERVISING PHYSICIAN: Trevor Galvez MD SCRUBBED RESIDENT (OPERATING PHYSICIAN): None. SUPPORTING PROVIDER (ASSISTANT): None. CONSENT: After a detailed discussion of the procedure, risks, benefits and alternative  treatment options, informed consent was obtained. TIME OUT: A time out procedure was performed. The patient's identification was verified. Informed consent with agreement of procedure, site and position was obtained.  All necessary equipment was available prior to procedure. CONTRAST: No contrast was administered. COMPLICATIONS: None. ANESTHESIA: Lidocaine, 10 mL. SEDATION TIME: N/A MEDICATIONS: See MAR PROCEDURE DESCRIPTION: The right lower quadrant of the abdomen was prepped and draped in the usual sterile fashion. Using ultrasound guidance, the peritoneal cavity was punctured and a catheter was placed. Ultrasound images demonstrated catheter tip in the ascites fluid. Digital ultrasound images were acquired and digitally archived. A total of 6.2 liters of clear, yellow fluid was collected in empty bottles and discarded. The catheter was then removed, hemostasis was achieved and the site was dressed with an occlusive dressing. The procedure was performed under the personal supervision of Dr. Galvez who was present for the key and critical portions of the procedure and immediately available for the entire procedure. FINDINGS: Ascites was noted on ultrasound. Post paracentesis there is minimal residual fluid.    Impression: IMPRESSION: Successful ultrasound-guided paracentesis.    US abdomen limited    Result Date: 3/22/2024  Narrative: EXAM US ABDOMEN LIMITED-3/22/2024 7:51 am HISTORY cirrhosis, ascites. TECHNIQUE Limited abdominal ultrasound for assessment of ascites. COMPARISON Correlated to CT scan from 09/01/2023. FINDINGS Diffuse abdominopelvic ascites is present with the largest pocket measuring 11.7 x 9.1 cm in the midline pelvis.  The ascites is new when correlated to the CT scan dated 09/01/2023.  Ascites is present on the ultrasound dated 02/19/2024 on the ultrasound evaluating the right upper quadrant. Heterogenous echotexture liver with nodularity is partially imaged.    Impression: IMPRESSION  Diffuse abdominopelvic ascites. Partially imaged liver with morphological changes of cirrhosis.

## 2024-04-19 NOTE — PATIENT INSTRUCTIONS
Scheduled date of EGD/colonoscopy (as of today):5/7/24  Physician performing EGD/colonoscopy:Kirill  Location of EGD/colonoscopy:[facility]  Desired bowel prep reviewed with patient:Leonora/Miralax  Instructions reviewed with patient by:Flo fonseca  Clearances:   none

## 2024-04-19 NOTE — PROGRESS NOTES
[facility] - Outpatient Follow-up Note  Bimal Lugo 78 y.o. male MRN: [id number]  Encounter: [id number]          ASSESSMENT AND PLAN:      1. 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)  MELD 3.0: 13 at 4/13/2024  5:15 AM  MELD-Na: 11 at 4/13/2024  5:15 AM  Calculated from:  Serum Creatinine: 1.20 mg/dL at 4/13/2024  5:15 AM  Serum Sodium: 135 mmol/L at 4/13/2024  5:15 AM  Total Bilirubin: 0.56 mg/dL (Using min of 1 mg/dL) at 4/11/2024  9:13 AM  Serum Albumin: 2.4 g/dL at 4/11/2024  9:13 AM  INR(ratio): 1.25 at 4/11/2024 11:42 AM  Age at listing (hypothetical): 78 years  Sex: Male at 4/13/2024  5:15 AM    He is completely sober for several years    Recently this is c/b Ascites and Varices    He is on Lasix 40mg and Spironolactone 50mg daily  He is trying to watch his sodium intake   He has had 4 paracentesis recently with the most recent being 4/11 with 7000 liters drained    EGD 4/10 with 3 large varices s/p banding   He was treated with 72hrs of octreotide  Also noted to have PHG and a small GE junction ulcer  Will repeat EGD in may for repeat banding    He is on Pantoprazole 40mg daily  He is on an iron supplement  He continues to note black stools    Will repeat labs    He is due routinely for a colonoscopy per his previous GI at [facility] - will schedule this as well    He is s/p AVR in January    ______________________________________________________________________    SUBJECTIVE: 78-year-old male with a history of valvular heart disease status post aortic valve replacement in January at [facility], alcohol abuse, alcohol cirrhosis, ascites, esophageal varices, GERD who presents for hospital follow-up.  The patient was hospitalized last week at [facility].  He initially presented with complaints of profound weakness.  When evaluated he was found to have a hemoglobin of 7.1.  He was also reporting black stools.  It was noted that his abdomen was distended.  A CT was  performed which showed large ascites.  He noted that he had just had a paracentesis on April 4 at which time 6.2 L of ascitic fluid was removed.  Prior to that he had a paracentesis on March 7 with 3.9 L removed.  During his hospitalization on April 11 another 7 L was removed.  Due to his black stool he underwent an endoscopy on April 10.  This documented 3 large esophageal varices and portal hypertensive gastropathy.  3 bands were successfully placed on the varices.  He was advised to have a follow-up endoscopy in 1 month.  He completed a 72-hour octreotide course and was treated prophylactically with antibiotics due to his ascites.  Since discharge she feels okay.  He continues with abdominal discomfort and swelling.  His legs remain swollen.  He is taking Lasix 40 mg once daily and spironolactone 50 mg once daily.  He is trying to watch the salt in his diet but does not actually go out of his way to do this.  He does not salt his food.  He continues to report black stools but is on an iron supplement.  He denies any vomiting.  He is tolerating a diet but admits to a poor appetite.  He has not had any alcohol in many years.  He was previously following at [facility].      REVIEW OF SYSTEMS IS OTHERWISE NEGATIVE.      Historical Information   Past Medical History:   Diagnosis Date    Chronic bronchitis (HCC)     COPD (chronic obstructive pulmonary disease) (HCC)     Obesity     ARACELY (obstructive sleep apnea)      Past Surgical History:   Procedure Laterality Date    IR PARACENTESIS  4/4/2024    IR PARACENTESIS  3/7/2024    IR PARACENTESIS  4/11/2024     Social History   Social History     Substance and Sexual Activity   Alcohol Use No     Social History     Substance and Sexual Activity   Drug Use No     Social History     Tobacco Use   Smoking Status Former    Current packs/day: 0.00    Average packs/day: 5.0 packs/day for 35.0 years (175.0 ttl pk-yrs)    Types: Cigarettes    Start date: 1952    Quit date: 1987     "Years since quittin.3   Smokeless Tobacco Never     History reviewed. No pertinent family history.    Meds/Allergies       Current Outpatient Medications:     albuterol (PROVENTIL HFA,VENTOLIN HFA) 90 mcg/act inhaler    aspirin 81 MG tablet    atorvastatin (LIPITOR) 40 mg tablet    furosemide (LASIX) 40 mg tablet    glucose 4-6 GM-MG    glucose blood test strip    hydrocortisone (PROCTOSOL HC) 2.5 % rectal cream    insulin aspart (NovoLOG FlexPen) 100 UNIT/ML injection pen    insulin degludec (TRESIBA) 100 units/mL injection pen    isosorbide mononitrate (IMDUR) 30 mg 24 hr tablet    Lancets (ONETOUCH ULTRASOFT) lancets    lovastatin (MEVACOR) 10 MG tablet    metFORMIN (GLUCOPHAGE-XR) 500 mg 24 hr tablet    metoprolol succinate (TOPROL-XL) 100 mg 24 hr tablet    Misc. Devices MISC    Multiple Vitamin (DAILY VALUE MULTIVITAMIN) TABS    NIFEdipine ER (ADALAT CC) 30 MG 24 hr tablet    nystatin (MYCOSTATIN) powder    Omega-3 Fatty Acids (FISH OIL) 645 MG CAPS    pantoprazole (PROTONIX) 40 mg tablet    spironolactone (ALDACTONE) 50 mg tablet    SYMBICORT 160-4.5 MCG/ACT inhaler    Fluconazole POWD    indapamide (LOZOL) 1.25 mg tablet    Allergies   Allergen Reactions    Gemfibrozil Swelling and Rash    Carbamazepine     Carbamazepine And Analogs      swelling    Oxycodone Other (See Comments)     Pt states he hallucinates           Objective     Blood pressure 110/68, pulse (!) 110, temperature 97.8 °F (36.6 °C), temperature source Temporal, height 5' 5\" (1.651 m), weight 100 kg (221 lb), SpO2 97%. Body mass index is 36.78 kg/m².      PHYSICAL EXAM:      General Appearance:   Alert, cooperative, no distress   HEENT:   Normocephalic, atraumatic, anicteric.     Neck:  Supple, symmetrical, trachea midline   Lungs:   Clear to auscultation bilaterally; no rales, rhonchi or wheezing; respirations unlabored    Heart::   Regular rate and rhythm; no murmur, rub, or gallop.   Abdomen:   Soft, non-tender, -distended; normal " bowel sounds; no masses, no organomegaly    Genitalia:   Deferred    Rectal:   Deferred    Extremities:  No cyanosis, clubbing or (+) Edema   Pulses:  2+ and symmetric    Skin:  No jaundice, rashes, or lesions    Lymph nodes:  No palpable cervical lymphadenopathy        Lab Results:   No visits with results within 1 Day(s) from this visit.   Latest known visit with results is:   No results displayed because visit has over 200 results.            Radiology Results:   IR INPATIENT Paracentesis    Result Date: 4/15/2024  Narrative: Ultrasound-guided paracentesis Clinical History: Symptomatic ascites. Procedure: After explaining the risks and benefits of the procedure to the patient, informed consent was obtained. Ultrasound used to localize the right upper quadrant ascites. The overlying skin was prepped and draped in usual sterile fashion and local anesthesia was obtained with the 1% lidocaine solution. A 5 Cymraes Yueh needle was advanced until fluid was aspirated under live ultrasound guidance. Approximately 7000 cc' s of cloudy, yellow fluid was aspirated. The catheter was then removed and a Band-Aid applied. Multiple specimens were sent to the lab for evaluation. Albumin 50 g was infused. The patient tolerated the procedure well and left the department in stable condition.     Impression: Impression: Ultrasound-guided paracentesis. Signed, performed, and dictated by Ying BRAND under the supervision of Dr. Agus Calvillo. Workstation performed: [id number]     EGD    Result Date: 4/10/2024  Narrative: Table formatting from the original result was not included.  [facility] [address] [id number] [phone] DATE OF SERVICE: 4/10/24 PHYSICIAN(S): Attending: Dominique Roy MD Fellow: No Staff Documented INDICATION: 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), Symptomatic anemia POST-OP DIAGNOSIS: See the impression below. PREPROCEDURE: Informed consent  was obtained for the procedure, including sedation.  Risks of perforation, hemorrhage, adverse drug reaction and aspiration were discussed. The patient was placed in the left lateral decubitus position. Patient was explained about the risks and benefits of the procedure. Risks including but not limited to bleeding, infection, and perforation were explained in detail. Also explained about less than 100% sensitivity with the exam and other alternatives. PROCEDURE: EGD DETAILS OF PROCEDURE: Patient was taken to the procedure room where a time out was performed to confirm correct patient and correct procedure. The patient underwent monitored anesthesia care, which was administered by an anesthesia professional. The patient's blood pressure, heart rate, level of consciousness, respirations, oxygen, ECG and ETCO2 were monitored throughout the procedure. The scope was introduced through the mouth and advanced to the second part of the duodenum. Retroflexion was performed in the fundus. The patient experienced no blood loss. The procedure was not difficult. The patient tolerated the procedure well. There were no apparent adverse events. ANESTHESIA INFORMATION: ASA: III Anesthesia Type: IV Sedation with Anesthesia MEDICATIONS: No administrations occurring from 1104 to 1123 on 04/10/24 FINDINGS: Three large varices (red paty sign) in the esophagus; placed 3 bands successfully, resulting in partial eradication Severe grade D esophagitis with mucosal breaks measuring 5 mm or more, continuous between folds, covering 75% or more of the circumference in the lower third of the esophagus Single 5 mm small ulcer in the GE junction with clean base (Bud III) Moderate, diffuse and mosaic portal hypertensive gastropathy Performed multiple forceps biopsies in the body of the stomach, incisura and antrum to rule out H. pylori The 1st part of the duodenum and 2nd part of the duodenum appeared normal. SPECIMENS: ID Type Source Tests  Collected by Time Destination 1 :  Tissue Stomach TISSUE EXAM Dominique Roy MD 4/10/2024 11:14 AM      Impression: Three large varices with evidence of recent bleeding (red paty sign) in the esophagus; placed 3 bands successfully, resulting in partial eradication Grade D esophagitis in the lower third of the esophagus with discrete ulcer at the GE junction Moderate and mosaic portal hypertensive gastropathy Performed forceps biopsies in the body of the stomach, incisura and antrum to rule out H. pylori The 1st part of the duodenum and 2nd part of the duodenum appeared normal. Anemia likely in setting of previous variceal bleed and/or severe esophagitis with esophageal ulcer.  No evidence of active bleeding. RECOMMENDATION:  Okay to restart diet.  Continue octreotide for 72 hours total then patient can be discharged.  Ceftriaxone or oral equivalent for 5 days.  Patient will need repeat EGD in 4 weeks for repeat variceal ligation.    Dominique Roy MD     XR chest 2 views    Result Date: 4/10/2024  Narrative: XR CHEST PA & LATERAL INDICATION: weakness. COMPARISON: None FINDINGS: No airspace consolidation, pneumothorax, pulmonary edema, or pleural effusion.  . Normal cardiac silhouette. Prosthetic aortic valve. Prior median sternotomy. Degenerative changes of bilateral shoulders. Multilevel thoracic spondylosis and paravertebral ossification in a pattern suggestive of diffuse idiopathic skeletal hyperostosis. Cholecystectomy clips.     Impression: No radiographic evidence of acute intrathoracic process. Workstation performed: [id number]     IR paracentesis    Result Date: 4/4/2024  Narrative: PROCEDURE: Ultrasound-guided paracentesis. INDICATION: Symptomatic Ascites. : Zak Rosales PA-C SUPERVISING PHYSICIAN: Trevor Galvez MD SCRUBBED RESIDENT (OPERATING PHYSICIAN): None. SUPPORTING PROVIDER (ASSISTANT): None. CONSENT: After a detailed discussion of the procedure, risks, benefits and alternative  treatment options, informed consent was obtained. TIME OUT: A time out procedure was performed. The patient's identification was verified. Informed consent with agreement of procedure, site and position was obtained.  All necessary equipment was available prior to procedure. CONTRAST: No contrast was administered. COMPLICATIONS: None. ANESTHESIA: Lidocaine, 10 mL. SEDATION TIME: N/A MEDICATIONS: See MAR PROCEDURE DESCRIPTION: The right lower quadrant of the abdomen was prepped and draped in the usual sterile fashion. Using ultrasound guidance, the peritoneal cavity was punctured and a catheter was placed. Ultrasound images demonstrated catheter tip in the ascites fluid. Digital ultrasound images were acquired and digitally archived. A total of 6.2 liters of clear, yellow fluid was collected in empty bottles and discarded. The catheter was then removed, hemostasis was achieved and the site was dressed with an occlusive dressing. The procedure was performed under the personal supervision of Dr. Galvez who was present for the key and critical portions of the procedure and immediately available for the entire procedure. FINDINGS: Ascites was noted on ultrasound. Post paracentesis there is minimal residual fluid.    Impression: IMPRESSION: Successful ultrasound-guided paracentesis.    US abdomen limited    Result Date: 3/22/2024  Narrative: EXAM US ABDOMEN LIMITED-3/22/2024 7:51 am HISTORY cirrhosis, ascites. TECHNIQUE Limited abdominal ultrasound for assessment of ascites. COMPARISON Correlated to CT scan from 09/01/2023. FINDINGS Diffuse abdominopelvic ascites is present with the largest pocket measuring 11.7 x 9.1 cm in the midline pelvis.  The ascites is new when correlated to the CT scan dated 09/01/2023.  Ascites is present on the ultrasound dated 02/19/2024 on the ultrasound evaluating the right upper quadrant. Heterogenous echotexture liver with nodularity is partially imaged.    Impression: IMPRESSION  Diffuse abdominopelvic ascites. Partially imaged liver with morphological changes of cirrhosis.

## 2024-04-26 ENCOUNTER — HOSPITAL ENCOUNTER (OUTPATIENT)
Dept: NON INVASIVE DIAGNOSTICS | Facility: HOSPITAL | Age: 79
Discharge: HOME | End: 2024-04-26
Payer: MEDICARE

## 2024-04-26 VITALS
DIASTOLIC BLOOD PRESSURE: 70 MMHG | OXYGEN SATURATION: 98 % | HEART RATE: 72 BPM | RESPIRATION RATE: 17 BRPM | SYSTOLIC BLOOD PRESSURE: 128 MMHG

## 2024-04-26 DIAGNOSIS — K70.31 ALCOHOLIC CIRRHOSIS OF LIVER WITH ASCITES (HCC): ICD-10-CM

## 2024-04-26 PROCEDURE — 49083 ABD PARACENTESIS W/IMAGING: CPT

## 2024-04-26 RX ORDER — ALBUMIN (HUMAN) 12.5 G/50ML
SOLUTION INTRAVENOUS
Status: COMPLETED | OUTPATIENT
Start: 2024-04-26 | End: 2024-04-26

## 2024-04-26 RX ORDER — LIDOCAINE WITH 8.4% SOD BICARB 0.9%(10ML)
SYRINGE (ML) INJECTION AS NEEDED
Status: COMPLETED | OUTPATIENT
Start: 2024-04-26 | End: 2024-04-26

## 2024-04-26 RX ADMIN — ALBUMIN (HUMAN) 50 G: 5 SOLUTION INTRAVENOUS at 13:54

## 2024-04-26 RX ADMIN — Medication 10 ML: at 13:19

## 2024-04-26 RX ADMIN — ALBUMIN (HUMAN) 25 G: 5 SOLUTION INTRAVENOUS at 14:06

## 2024-04-26 NOTE — BRIEF OP NOTE (RAD/CATH)
IR PARACENTESIS Procedure Note    PATIENT NAME: Bimal Lugo  : 1945  MRN: [id number]    Pre-op Diagnosis:   1. 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)        Surgeon:   SUNDAY Fierro  Assistants:     No qualified resident was available, Resident is only observing    Estimated Blood Loss: minimal  Findings: 8140 ml clear yellow ascites fluid, RUQ. Albumin 75g infused.    Specimens: none    Complications:  none immediate    Anesthesia: local    SUNDAY Fierro     Date: 2024  Time: 2:28 PM

## 2024-05-07 ENCOUNTER — ANESTHESIA (OUTPATIENT)
Dept: GASTROENTEROLOGY | Facility: HOSPITAL | Age: 79
End: 2024-05-07

## 2024-05-07 ENCOUNTER — HOSPITAL ENCOUNTER (OUTPATIENT)
Dept: GASTROENTEROLOGY | Facility: HOSPITAL | Age: 79
Setting detail: OUTPATIENT SURGERY
Discharge: HOME | End: 2024-05-07
Payer: MEDICARE

## 2024-05-07 ENCOUNTER — ANESTHESIA EVENT (OUTPATIENT)
Dept: GASTROENTEROLOGY | Facility: HOSPITAL | Age: 79
End: 2024-05-07

## 2024-05-07 VITALS
SYSTOLIC BLOOD PRESSURE: 158 MMHG | BODY MASS INDEX: 36.14 KG/M2 | HEIGHT: 65 IN | WEIGHT: 216.93 LBS | HEART RATE: 92 BPM | OXYGEN SATURATION: 95 % | DIASTOLIC BLOOD PRESSURE: 71 MMHG | RESPIRATION RATE: 22 BRPM | TEMPERATURE: 97.7 F

## 2024-05-07 DIAGNOSIS — Z12.11 SCREENING FOR COLON CANCER: ICD-10-CM

## 2024-05-07 DIAGNOSIS — K70.31 ALCOHOLIC CIRRHOSIS OF LIVER WITH ASCITES (HCC): ICD-10-CM

## 2024-05-07 DIAGNOSIS — I85.00 ESOPHAGEAL VARICES WITHOUT BLEEDING, UNSPECIFIED ESOPHAGEAL VARICES TYPE (HCC): ICD-10-CM

## 2024-05-07 PROBLEM — G47.30 SLEEP APNEA: Status: ACTIVE | Noted: 2024-05-07

## 2024-05-07 LAB — GLUCOSE SERPL-MCNC: 137 MG/DL (ref 65–140)

## 2024-05-07 PROCEDURE — 45380 COLONOSCOPY AND BIOPSY: CPT | Performed by: INTERNAL MEDICINE

## 2024-05-07 PROCEDURE — 45385 COLONOSCOPY W/LESION REMOVAL: CPT | Performed by: INTERNAL MEDICINE

## 2024-05-07 PROCEDURE — 82948 REAGENT STRIP/BLOOD GLUCOSE: CPT

## 2024-05-07 PROCEDURE — 88305 TISSUE EXAM BY PATHOLOGIST: CPT | Performed by: PATHOLOGY

## 2024-05-07 PROCEDURE — 43244 EGD VARICES LIGATION: CPT | Performed by: INTERNAL MEDICINE

## 2024-05-07 RX ORDER — LIDOCAINE HYDROCHLORIDE 20 MG/ML
INJECTION, SOLUTION EPIDURAL; INFILTRATION; INTRACAUDAL; PERINEURAL AS NEEDED
Status: DISCONTINUED | OUTPATIENT
Start: 2024-05-07 | End: 2024-05-07

## 2024-05-07 RX ORDER — PROPOFOL 10 MG/ML
INJECTION, EMULSION INTRAVENOUS AS NEEDED
Status: DISCONTINUED | OUTPATIENT
Start: 2024-05-07 | End: 2024-05-07

## 2024-05-07 RX ORDER — SODIUM CHLORIDE, SODIUM LACTATE, POTASSIUM CHLORIDE, CALCIUM CHLORIDE 600; 310; 30; 20 MG/100ML; MG/100ML; MG/100ML; MG/100ML
INJECTION, SOLUTION INTRAVENOUS CONTINUOUS PRN
Status: DISCONTINUED | OUTPATIENT
Start: 2024-05-07 | End: 2024-05-07

## 2024-05-07 RX ADMIN — PROPOFOL 20 MG: 10 INJECTION, EMULSION INTRAVENOUS at 07:22

## 2024-05-07 RX ADMIN — PROPOFOL 20 MG: 10 INJECTION, EMULSION INTRAVENOUS at 07:26

## 2024-05-07 RX ADMIN — PROPOFOL 20 MG: 10 INJECTION, EMULSION INTRAVENOUS at 07:20

## 2024-05-07 RX ADMIN — PROPOFOL 20 MG: 10 INJECTION, EMULSION INTRAVENOUS at 07:24

## 2024-05-07 RX ADMIN — PROPOFOL 20 MG: 10 INJECTION, EMULSION INTRAVENOUS at 07:31

## 2024-05-07 RX ADMIN — PROPOFOL 20 MG: 10 INJECTION, EMULSION INTRAVENOUS at 07:34

## 2024-05-07 RX ADMIN — PROPOFOL 150 MG: 10 INJECTION, EMULSION INTRAVENOUS at 07:15

## 2024-05-07 RX ADMIN — LIDOCAINE HYDROCHLORIDE 100 MG: 20 INJECTION, SOLUTION EPIDURAL; INFILTRATION; INTRACAUDAL; PERINEURAL at 07:15

## 2024-05-07 RX ADMIN — SODIUM CHLORIDE, SODIUM LACTATE, POTASSIUM CHLORIDE, AND CALCIUM CHLORIDE: .6; .31; .03; .02 INJECTION, SOLUTION INTRAVENOUS at 07:11

## 2024-05-07 RX ADMIN — PROPOFOL 20 MG: 10 INJECTION, EMULSION INTRAVENOUS at 07:18

## 2024-05-07 RX ADMIN — PROPOFOL 20 MG: 10 INJECTION, EMULSION INTRAVENOUS at 07:28

## 2024-05-07 NOTE — ANESTHESIA PREPROCEDURE EVALUATION
Procedure:  EGD  COLONOSCOPY    Relevant Problems   ANESTHESIA (within normal limits)      CARDIO   (+) Atherosclerosis of lower extremity with intermittent claudication (HCC)   (+) CAD (coronary artery disease)   (+) History of aortic valve replacement      ENDO (within normal limits)      GI/HEPATIC   (+) Cirrhosis (HCC)      /RENAL   (+) Chronic kidney disease, stage 3a (HCC)      HEMATOLOGY   (+) Symptomatic anemia   (+) Thrombocytopenia (HCC)      MUSCULOSKELETAL   (+) Back pain      NEURO/PSYCH (within normal limits)      PULMONARY  Former smoker, 150+ pack year history   (+) Chronic obstructive pulmonary disease (HCC)   (+) Sleep apnea   (-) URI (upper respiratory infection)      Endocrine   (+) Diabetes (HCC)        Physical Exam    Airway    Mallampati score: II  TM Distance: >3 FB  Neck ROM: full     Dental   Comment: edentulous     Cardiovascular  Rate: abnormal    Pulmonary   Breath sounds clear to auscultation, No wheezes    Other Findings  Distended abdomen      Anesthesia Plan  ASA Score- 3     Anesthesia Type- IV sedation with anesthesia with ASA Monitors.         Additional Monitors:     Airway Plan:     Comment: I discussed the risks and benefits of IV sedation anesthesia including the possibility of the need to convert to general anesthesia and the potential risk of awareness.  The patient was given the opportunity to ask questions, which were answered..       Plan Factors-Exercise tolerance (METS): >4 METS.    Chart reviewed. EKG reviewed.  Existing labs reviewed.     Patient is not a current smoker.              Induction- intravenous.    Postoperative Plan-     Informed Consent- Anesthetic plan and risks discussed with patient.  I personally reviewed this patient with the CRNA. Discussed and agreed on the Anesthesia Plan with the CRNA..            Lab Results   Component Value Date    WBC 4.37 04/13/2024    HGB 9.7 (L) 04/13/2024    HCT 30.0 (L) 04/13/2024    MCV 88 04/13/2024    PLT 62 (L)  04/13/2024     Lab Results   Component Value Date    SODIUM 135 04/13/2024    K 4.0 04/13/2024     04/13/2024    CO2 23 04/13/2024    BUN 23 04/13/2024    CREATININE 1.20 04/13/2024    GLUC 125 04/13/2024    CALCIUM 8.0 (L) 04/13/2024     Lab Results   Component Value Date    ALT 17 04/11/2024    AST 29 04/11/2024    ALKPHOS 73 04/11/2024     Lab Results   Component Value Date    INR 1.25 (H) 04/11/2024    INR 1.13 (H) 05/31/2019    INR 1.12 (H) 03/29/2019    PROTIME 16.4 (H) 04/11/2024     Lab Results   Component Value Date    HGBA1C 5.7 (H) 04/04/2024

## 2024-05-07 NOTE — ANESTHESIA POSTPROCEDURE EVALUATION
Post-Op Assessment Note    CV Status:  Stable    Pain management: adequate       Mental Status:  Sleepy and arousable   Hydration Status:  Euvolemic   PONV Controlled:  Controlled   Airway Patency:  Patent     Post Op Vitals Reviewed: Yes    No anethesia notable event occurred.    Staff: Anesthesiologist, CRNA               /70 (05/07/24 0743)    Temp 97.7 °F (36.5 °C) (05/07/24 0743)    Pulse 88 (05/07/24 0743)   Resp 21 (05/07/24 0743)    SpO2 95 % (05/07/24 0743)

## 2024-05-09 ENCOUNTER — HOSPITAL ENCOUNTER (OUTPATIENT)
Dept: NON INVASIVE DIAGNOSTICS | Facility: HOSPITAL | Age: 79
Discharge: HOME | End: 2024-05-09
Payer: MEDICARE

## 2024-05-09 VITALS
OXYGEN SATURATION: 92 % | HEART RATE: 99 BPM | RESPIRATION RATE: 17 BRPM | SYSTOLIC BLOOD PRESSURE: 140 MMHG | DIASTOLIC BLOOD PRESSURE: 75 MMHG

## 2024-05-09 DIAGNOSIS — K70.31 ALCOHOLIC CIRRHOSIS OF LIVER WITH ASCITES (HCC): ICD-10-CM

## 2024-05-09 PROCEDURE — 49083 ABD PARACENTESIS W/IMAGING: CPT

## 2024-05-09 PROCEDURE — 49083 ABD PARACENTESIS W/IMAGING: CPT | Performed by: RADIOLOGY

## 2024-05-09 RX ORDER — LIDOCAINE WITH 8.4% SOD BICARB 0.9%(10ML)
SYRINGE (ML) INJECTION AS NEEDED
Status: COMPLETED | OUTPATIENT
Start: 2024-05-09 | End: 2024-05-09

## 2024-05-09 RX ORDER — ALBUMIN (HUMAN) 12.5 G/50ML
SOLUTION INTRAVENOUS
Status: COMPLETED | OUTPATIENT
Start: 2024-05-09 | End: 2024-05-09

## 2024-05-09 RX ADMIN — Medication 10 ML: at 11:31

## 2024-05-09 RX ADMIN — ALBUMIN (HUMAN) 50 G: 12.5 SOLUTION INTRAVENOUS at 11:49

## 2024-05-09 RX ADMIN — ALBUMIN (HUMAN) 25 G: 12.5 SOLUTION INTRAVENOUS at 11:58

## 2024-05-09 NOTE — H&P
Interventional Radiology  History and Physical 2024     Bimal Lugo   1945   [id number]    Assessment/Plan:  78 year old male with cirrhosis and recurrent ascites returns for paracentesis.    Problem List Items Addressed This Visit          Digestive    Cirrhosis (HCC)    Relevant Orders    IR paracentesis          Subjective:     Patient ID: Bimal Lugo is a 78 y.o. male.    History of Present Illness  Patient with cirrhosis and recurrent ascites returns for paracentesis.    Review of Systems      Past Medical History:   Diagnosis Date    Chronic bronchitis (HCC)     COPD (chronic obstructive pulmonary disease) (HCC)     Hyperlipidemia     Hypertension     Obesity     ARACELY (obstructive sleep apnea)         Past Surgical History:   Procedure Laterality Date    AORTIC VALVE REPLACEMENT      2024    IR PARACENTESIS  2024    IR PARACENTESIS  2024    IR PARACENTESIS  2024    IR PARACENTESIS  2024        Social History     Tobacco Use   Smoking Status Former    Current packs/day: 0.00    Average packs/day: 5.0 packs/day for 35.0 years (175.0 ttl pk-yrs)    Types: Cigarettes    Start date:     Quit date:     Years since quittin.3   Smokeless Tobacco Never        Social History     Substance and Sexual Activity   Alcohol Use Not Currently    Comment: sober- quit         Social History     Substance and Sexual Activity   Drug Use Not Currently        Allergies   Allergen Reactions    Gemfibrozil Swelling and Rash    Carbamazepine     Carbamazepine And Analogs      swelling    Oxycodone Other (See Comments)     Pt states he hallucinates       Current Outpatient Medications   Medication Sig Dispense Refill    albuterol (PROVENTIL HFA,VENTOLIN HFA) 90 mcg/act inhaler Inhale 2 puffs every 4 (four) hours as needed      aspirin 81 MG tablet Take 81 mg by mouth daily in the early morning      atorvastatin (LIPITOR) 40 mg tablet Take 40 mg by mouth daily in the early morning       furosemide (LASIX) 40 mg tablet Take 1 tablet (40 mg total) by mouth daily Do not start before April 14, 2024. (Patient taking differently: Take 40 mg by mouth daily in the early morning) 30 tablet 0    glucose 4-6 GM-MG Chew 16 g daily as needed      glucose blood test strip Check twice a day and as needed, dx E11.9,      hydrocortisone (PROCTOSOL HC) 2.5 % rectal cream Insert into the rectum      insulin aspart (NovoLOG FlexPen) 100 UNIT/ML injection pen Inject 19 Units under the skin Three times a day      insulin degludec (TRESIBA) 100 units/mL injection pen Inject 10 units under the skin once daily. Increase dose as directed up to max 30 units daily. (Patient not taking: Reported on 4/25/2024)      isosorbide mononitrate (IMDUR) 30 mg 24 hr tablet Take 1 tablet by mouth daily (Patient not taking: Reported on 4/25/2024)      Lancets (ONETOUCH ULTRASOFT) lancets Check twice a day and as needed, dx E11.9,      losartan (COZAAR) 100 MG tablet Take 100 mg by mouth daily in the early morning      lovastatin (MEVACOR) 10 MG tablet       metFORMIN (GLUCOPHAGE-XR) 500 mg 24 hr tablet Take 1,000 mg by mouth 2 (two) times a day      metoprolol succinate (TOPROL-XL) 100 mg 24 hr tablet  (Patient not taking: Reported on 4/25/2024)      Misc. Devices MISC by Does not apply route      Multiple Vitamin (DAILY VALUE MULTIVITAMIN) TABS Take by mouth      NIFEdipine ER (ADALAT CC) 30 MG 24 hr tablet Take 30 mg by mouth 2 (two) times a day      nystatin (MYCOSTATIN) powder Apply topically      Omega-3 Fatty Acids (FISH OIL) 645 MG CAPS Take by mouth      pantoprazole (PROTONIX) 40 mg tablet Take 1 tablet (40 mg total) by mouth daily (Patient taking differently: Take 40 mg by mouth daily in the early morning) 30 tablet 0    spironolactone (ALDACTONE) 50 mg tablet Take 1 tablet (50 mg total) by mouth daily 30 tablet 0    SYMBICORT 160-4.5 MCG/ACT inhaler Inhale 2 puffs 2 (two) times a day       No current facility-administered  "medications for this encounter.          Objective:    There were no vitals filed for this visit.     Physical Exam  Constitutional:       Appearance: Normal appearance.   Pulmonary:      Effort: Pulmonary effort is normal.   Abdominal:      General: There is distension.           No results found for: \"BNP\"   Lab Results   Component Value Date    WBC 4.37 04/13/2024    HGB 9.7 (L) 04/13/2024    HCT 30.0 (L) 04/13/2024    MCV 88 04/13/2024    PLT 62 (L) 04/13/2024     Lab Results   Component Value Date    INR 1.25 (H) 04/11/2024    INR 1.13 (H) 05/31/2019    INR 1.12 (H) 03/29/2019    PROTIME 16.4 (H) 04/11/2024     No results found for: \"PTT\"      I have personally reviewed pertinent imaging and laboratory results.     Code Status: Prior  Advance Directive and Living Will:      Power of :    POLST:      This text is generated with voice recognition software. There may be translation, syntax,  or grammatical errors. If you have any questions, please contact the dictating provider.   "

## 2024-05-09 NOTE — BRIEF OP NOTE (RAD/CATH)
INTERVENTIONAL RADIOLOGY PROCEDURE NOTE    Date: 5/9/2024    Procedure:   Procedure Summary       Date: 05/09/24 Room / Location: [facility] Cardiac Cath Lab    Anesthesia Start:  Anesthesia Stop:     Procedure: IR PARACENTESIS Diagnosis:       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)      (ascites)    Scheduled Providers:  Responsible Provider:     Anesthesia Type: Not recorded ASA Status: Not recorded            Preoperative diagnosis:   1. 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)         Postoperative diagnosis: Same.    Surgeon: Fernando Howard MD     Assistant: None. No qualified resident was available.    Blood loss: None    Specimens: 8610 mL clear yellow ascites fluid removed.     Findings: Successful paracentesis.    Complications: None immediate.    Anesthesia: local

## 2024-05-10 PROCEDURE — 88305 TISSUE EXAM BY PATHOLOGIST: CPT | Performed by: PATHOLOGY

## 2024-05-16 ENCOUNTER — HOSPITAL ENCOUNTER (OUTPATIENT)
Dept: NON INVASIVE DIAGNOSTICS | Facility: HOSPITAL | Age: 79
Discharge: HOME | End: 2024-05-16
Payer: MEDICARE

## 2024-05-16 VITALS
HEART RATE: 91 BPM | OXYGEN SATURATION: 98 % | SYSTOLIC BLOOD PRESSURE: 146 MMHG | DIASTOLIC BLOOD PRESSURE: 63 MMHG | RESPIRATION RATE: 13 BRPM

## 2024-05-16 DIAGNOSIS — K70.31 ALCOHOLIC CIRRHOSIS OF LIVER WITH ASCITES (HCC): ICD-10-CM

## 2024-05-16 PROCEDURE — 49083 ABD PARACENTESIS W/IMAGING: CPT

## 2024-05-16 RX ORDER — LIDOCAINE HYDROCHLORIDE 10 MG/ML
INJECTION, SOLUTION EPIDURAL; INFILTRATION; INTRACAUDAL; PERINEURAL AS NEEDED
Status: COMPLETED | OUTPATIENT
Start: 2024-05-16 | End: 2024-05-16

## 2024-05-16 RX ADMIN — LIDOCAINE HYDROCHLORIDE 10 ML: 10 INJECTION, SOLUTION EPIDURAL; INFILTRATION; INTRACAUDAL; PERINEURAL at 13:10

## 2024-05-23 ENCOUNTER — HOSPITAL ENCOUNTER (OUTPATIENT)
Dept: NON INVASIVE DIAGNOSTICS | Facility: HOSPITAL | Age: 79
Discharge: HOME | End: 2024-05-23
Payer: MEDICARE

## 2024-05-23 DIAGNOSIS — K70.31 ALCOHOLIC CIRRHOSIS OF LIVER WITH ASCITES (HCC): ICD-10-CM

## 2024-05-23 PROCEDURE — 49083 ABD PARACENTESIS W/IMAGING: CPT

## 2024-05-23 RX ORDER — ALBUMIN (HUMAN) 12.5 G/50ML
SOLUTION INTRAVENOUS
Status: COMPLETED | OUTPATIENT
Start: 2024-05-23 | End: 2024-05-23

## 2024-05-23 RX ADMIN — ALBUMIN (HUMAN) 50 G: 12.5 SOLUTION INTRAVENOUS at 10:22

## 2024-05-23 NOTE — BRIEF OP NOTE (RAD/CATH)
IR PARACENTESIS Procedure Note    PATIENT NAME: Bimal Lugo  : 1945  MRN: [id number]    Pre-op Diagnosis:   1. 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)        Surgeon:   SUNDAY Fierro  Assistants:     No qualified resident was available, Resident is only observing    Estimated Blood Loss: minimal  Findings: 6500 ml clear yellow ascites fluid, RLQ. Albumin 50g infused.    Specimens: none    Complications:  none immediate    Anesthesia: local    SUNDAY Fierro     Date: 2024  Time: 12:06 PM

## 2024-06-06 ENCOUNTER — HOSPITAL ENCOUNTER (OUTPATIENT)
Dept: NON INVASIVE DIAGNOSTICS | Facility: HOSPITAL | Age: 79
Discharge: HOME | End: 2024-06-06
Admitting: STUDENT IN AN ORGANIZED HEALTH CARE EDUCATION/TRAINING PROGRAM
Payer: MEDICARE

## 2024-06-06 VITALS
OXYGEN SATURATION: 95 % | DIASTOLIC BLOOD PRESSURE: 71 MMHG | RESPIRATION RATE: 20 BRPM | HEART RATE: 87 BPM | SYSTOLIC BLOOD PRESSURE: 159 MMHG

## 2024-06-06 DIAGNOSIS — K70.31 ALCOHOLIC CIRRHOSIS OF LIVER WITH ASCITES (HCC): ICD-10-CM

## 2024-06-06 PROCEDURE — 49083 ABD PARACENTESIS W/IMAGING: CPT

## 2024-06-06 PROCEDURE — 49083 ABD PARACENTESIS W/IMAGING: CPT | Performed by: NURSE PRACTITIONER

## 2024-06-06 RX ORDER — LIDOCAINE HYDROCHLORIDE 10 MG/ML
INJECTION, SOLUTION EPIDURAL; INFILTRATION; INTRACAUDAL; PERINEURAL AS NEEDED
Status: COMPLETED | OUTPATIENT
Start: 2024-06-06 | End: 2024-06-06

## 2024-06-06 RX ORDER — ALBUMIN (HUMAN) 12.5 G/50ML
SOLUTION INTRAVENOUS
Status: COMPLETED | OUTPATIENT
Start: 2024-06-06 | End: 2024-06-06

## 2024-06-06 RX ADMIN — ALBUMIN (HUMAN) 50 G: 12.5 SOLUTION INTRAVENOUS at 09:59

## 2024-06-06 RX ADMIN — ALBUMIN (HUMAN) 25 G: 12.5 SOLUTION INTRAVENOUS at 10:16

## 2024-06-06 RX ADMIN — LIDOCAINE HYDROCHLORIDE 10 ML: 10 INJECTION, SOLUTION EPIDURAL; INFILTRATION; INTRACAUDAL; PERINEURAL at 09:44

## 2024-06-06 NOTE — BRIEF OP NOTE (RAD/CATH)
IR PARACENTESIS Procedure Note    PATIENT NAME: Bimal Lugo  : 1945  MRN: [id number]    Pre-op Diagnosis:   1. 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)        Provider:   SUNDAY Worley  Assistants:     No qualified resident was available, Resident is only observing    Estimated Blood Loss: None     Findings: 9150 ml of clear yellow ascites aspirated from LEFT sided ultrasound guided paracentesis. Albumin 75 g infused.    Specimens: None     Complications:  None immediate    Anesthesia: local    SUNDAY Worley     Date: 2024  Time: 10:33 AM

## 2024-06-10 ENCOUNTER — OFFICE VISIT (OUTPATIENT)
Dept: GASTROENTEROLOGY | Facility: CLINIC | Age: 79
End: 2024-06-10
Payer: MEDICARE

## 2024-06-10 ENCOUNTER — APPOINTMENT (OUTPATIENT)
Dept: LAB | Facility: HOSPITAL | Age: 79
End: 2024-06-10
Payer: MEDICARE

## 2024-06-10 VITALS
BODY MASS INDEX: 38.59 KG/M2 | HEART RATE: 74 BPM | OXYGEN SATURATION: 97 % | WEIGHT: 231.6 LBS | TEMPERATURE: 98.5 F | DIASTOLIC BLOOD PRESSURE: 70 MMHG | SYSTOLIC BLOOD PRESSURE: 130 MMHG | HEIGHT: 65 IN

## 2024-06-10 DIAGNOSIS — I85.00 ESOPHAGEAL VARICES WITHOUT BLEEDING, UNSPECIFIED ESOPHAGEAL VARICES TYPE (HCC): ICD-10-CM

## 2024-06-10 DIAGNOSIS — K70.31 ALCOHOLIC CIRRHOSIS OF LIVER WITH ASCITES (HCC): Primary | ICD-10-CM

## 2024-06-10 DIAGNOSIS — K70.31 ALCOHOLIC CIRRHOSIS OF LIVER WITH ASCITES (HCC): ICD-10-CM

## 2024-06-10 DIAGNOSIS — D69.3 IMMUNE THROMBOCYTOPENIA (HCC): ICD-10-CM

## 2024-06-10 DIAGNOSIS — K76.82 HEPATIC ENCEPHALOPATHY (HCC): ICD-10-CM

## 2024-06-10 LAB
ANION GAP SERPL CALCULATED.3IONS-SCNC: 8 MMOL/L (ref 4–13)
BUN SERPL-MCNC: 16 MG/DL (ref 5–25)
CALCIUM SERPL-MCNC: 9.1 MG/DL (ref 8.4–10.2)
CHLORIDE SERPL-SCNC: 106 MMOL/L (ref 96–108)
CO2 SERPL-SCNC: 22 MMOL/L (ref 21–32)
CREAT SERPL-MCNC: 1.11 MG/DL (ref 0.6–1.3)
GFR SERPL CREATININE-BSD FRML MDRD: 62 ML/MIN/1.73SQ M
GLUCOSE SERPL-MCNC: 136 MG/DL (ref 65–140)
POTASSIUM SERPL-SCNC: 4.5 MMOL/L (ref 3.5–5.3)
SODIUM SERPL-SCNC: 136 MMOL/L (ref 135–147)

## 2024-06-10 PROCEDURE — 99213 OFFICE O/P EST LOW 20 MIN: CPT | Performed by: PHYSICIAN ASSISTANT

## 2024-06-10 PROCEDURE — 80048 BASIC METABOLIC PNL TOTAL CA: CPT

## 2024-06-10 PROCEDURE — 36415 COLL VENOUS BLD VENIPUNCTURE: CPT

## 2024-06-10 RX ORDER — INSULIN DEGLUDEC 100 U/ML
10 INJECTION, SOLUTION SUBCUTANEOUS
COMMUNITY

## 2024-06-10 RX ORDER — LACTULOSE 10 G/15ML
SOLUTION ORAL
Qty: 240 ML | Refills: 0 | Status: SHIPPED | OUTPATIENT
Start: 2024-06-10

## 2024-06-10 RX ORDER — FUROSEMIDE 40 MG/1
40 TABLET ORAL 2 TIMES DAILY
Qty: 60 TABLET | Refills: 6 | Status: SHIPPED | OUTPATIENT
Start: 2024-06-10

## 2024-06-10 NOTE — PROGRESS NOTES
[facility] - Outpatient Follow-up Note  Bimal Lugo 78 y.o. male MRN: [id number]  Encounter: [id number]          ASSESSMENT AND PLAN:      1. 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 Esophageal varices without bleeding, unspecified esophageal varices type (HCC)    MELD 3.0: 13 at 4/13/2024  5:15 AM  MELD-Na: 11 at 4/13/2024  5:15 AM  Calculated from:  Serum Creatinine: 1.20 mg/dL at 4/13/2024  5:15 AM  Serum Sodium: 135 mmol/L at 4/13/2024  5:15 AM  Total Bilirubin: 0.56 mg/dL (Using min of 1 mg/dL) at 4/11/2024  9:13 AM  Serum Albumin: 2.4 g/dL at 4/11/2024  9:13 AM  INR(ratio): 1.25 at 4/11/2024 11:42 AM  Age at listing (hypothetical): 78 years  Sex: Male at 4/13/2024  5:15 AM    He continues to require weekly LVP   He is on Lasix 40mg bid and Aldactone 50mg daily- recent renal function bumped  Repeat BMP -if creatinine is worsening we will have to cut back on these  Again reviewed importance of salt restricted diet less than 2000 mg daily -Per his wife he is good with this  Unfortunately, he is unlikely to be a TIPS candidate due to his cardiac issues as well as his 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  Unfortunately, he continues to struggle significantly with fluid overload.  He has significant swelling of his lower extremities as well as increasing fluid accumulation in his abdomen.  He still goes for weekly paracentesis.  He did miss 1 week  recently and on his last paracentesis 9.1 L were removed.  He reports abdominal discomfort.  He has no severe pain.  He said no fevers or chills.  His wife is concerned because he recently has been having some increasing confusion.  He is not sleeping well at night but is always sleepy during the day he recently forgot how to red an egg which she has been doing for years.  He has no rectal bleeding.  He is having 2-3 loose stools a day.  He is not taking lactulose and has never been on this.  He has esophageal varices and has been undergoing EGD with banding.  His most recent banding was May 7 at which time 3 medium varices were banded.  He was advised to follow-up in 1 month for repeat.  He maintains on furosemide 40 mg twice daily and spironolactone 50 mg daily.  He follows a salt restricted diet.  Recent labs from May show a slight bump in his creatinine.  He is not drinking alcohol for several years.      REVIEW OF SYSTEMS IS OTHERWISE NEGATIVE.      Historical Information   Past Medical History:   Diagnosis Date    Chronic bronchitis (HCC)     COPD (chronic obstructive pulmonary disease) (HCC)     Hyperlipidemia     Hypertension     Obesity     ARACELY (obstructive sleep apnea)      Past Surgical History:   Procedure Laterality Date    AORTIC VALVE REPLACEMENT      Jan 4 2024    IR PARACENTESIS  04/04/2024    IR PARACENTESIS  03/07/2024    IR PARACENTESIS  04/11/2024    IR PARACENTESIS  4/26/2024    IR PARACENTESIS  5/9/2024    IR PARACENTESIS  5/16/2024    IR PARACENTESIS  5/23/2024    IR PARACENTESIS  6/6/2024     Social History   Social History     Substance and Sexual Activity   Alcohol Use Not Currently    Comment: sober- quit 1987     Social History     Substance and Sexual Activity   Drug Use Not Currently     Social History     Tobacco Use   Smoking Status Former    Current packs/day: 0.00    Average packs/day: 5.0 packs/day for 35.0 years (175.0 ttl pk-yrs)    Types: Cigarettes    Start date: 1952    Quit  "date:     Years since quittin.4   Smokeless Tobacco Never     No family history on file.    Meds/Allergies       Current Outpatient Medications:     albuterol (PROVENTIL HFA,VENTOLIN HFA) 90 mcg/act inhaler    aspirin 81 MG tablet    atorvastatin (LIPITOR) 40 mg tablet    furosemide (LASIX) 40 mg tablet    glucose 4-6 GM-MG    glucose blood test strip    hydrocortisone (PROCTOSOL HC) 2.5 % rectal cream    insulin aspart (NovoLOG FlexPen) 100 UNIT/ML injection pen    insulin degludec (TRESIBA) 100 units/mL injection pen    isosorbide mononitrate (IMDUR) 30 mg 24 hr tablet    Lancets (ONETOUCH ULTRASOFT) lancets    losartan (COZAAR) 100 MG tablet    lovastatin (MEVACOR) 10 MG tablet    metFORMIN (GLUCOPHAGE-XR) 500 mg 24 hr tablet    metoprolol succinate (TOPROL-XL) 100 mg 24 hr tablet    Misc. Devices MISC    Multiple Vitamin (DAILY VALUE MULTIVITAMIN) TABS    NIFEdipine ER (ADALAT CC) 30 MG 24 hr tablet    nystatin (MYCOSTATIN) powder    Omega-3 Fatty Acids (FISH OIL) 645 MG CAPS    pantoprazole (PROTONIX) 40 mg tablet    spironolactone (ALDACTONE) 50 mg tablet    SYMBICORT 160-4.5 MCG/ACT inhaler    Allergies   Allergen Reactions    Gemfibrozil Swelling and Rash    Carbamazepine     Carbamazepine And Analogs      swelling    Oxycodone Other (See Comments)     Pt states he hallucinates           Objective     Height 5' 5\" (1.651 m), weight 105 kg (231 lb 9.6 oz). Body mass index is 38.54 kg/m².      PHYSICAL EXAM:      General Appearance:   Alert, cooperative, no distress   HEENT:   Normocephalic, atraumatic, anicteric.     Neck:  Supple, symmetrical, trachea midline   Lungs:   Clear to auscultation bilaterally; no rales, rhonchi or wheezing; respirations unlabored    Heart::   Regular rate and rhythm; no murmur, rub, or gallop.   Abdomen:   Soft, non-tender, non-distended; normal bowel sounds; no masses, no organomegaly    Genitalia:   Deferred    Rectal:   Deferred    Extremities:  No cyanosis, clubbing " or edema    Pulses:  2+ and symmetric    Skin:  No jaundice, rashes, or lesions    Lymph nodes:  No palpable cervical lymphadenopathy        Lab Results:   No visits with results within 1 Day(s) from this visit.   Latest known visit with results is:   Hospital Outpatient Visit on 05/07/2024   Component Date Value    POC Glucose 05/07/2024 137     Case Report 05/07/2024                      Value:Surgical Pathology Report                         Case: [id number]                                  Authorizing Provider:  Dominique Roy MD           Collected:           05/07/2024 0731              Ordering Location:      [facility]       Received:            05/07/2024 [address] [place] [address] Endoscopy                                                             Pathologist:           Arya Moore MD                                                           Specimens:   A) - Polyp, Colorectal, ascending colon polyp x 1                                                   B) - Polyp, Colorectal, hepatic flexure polyp x 1                                                   C) - Polyp, Colorectal, transverse colon polyp x 1                                         Final Diagnosis 05/07/2024                      Value:This result contains rich text formatting which cannot be displayed here.    Additional Information 05/07/2024                      Value:This result contains rich text formatting which cannot be displayed here.    Synoptic Checklist 05/07/2024                      Value:                            COLON/RECTUM POLYP FORM - GI - C, B, A                                                                                     :    Adenoma(s)                                                         :    Serrated Adenoma      Gross Description 05/07/2024                      Value:This result contains rich text formatting which cannot be displayed here.    Clinical Information 05/07/2024                       Value:Cold bx polypectomy x 1    FINDINGS:  · Two sessile polyps measuring smaller than 5 mm in the ascending colon and transverse colon; performed cold forceps biopsy  · One sessile polyp measuring 5-9 mm in the hepatic flexure; performed cold snare removal  · Diverticula in the descending colon, sigmoid colon and rectosigmoid  · The cecum and rectum appeared normal.  · Internal small hemorrhoids           Radiology Results:   IR paracentesis    Result Date: 6/7/2024  Narrative: Ultrasound-guided paracentesis Clinical History: Recurrent symptomatic ascites Procedure: Ongoing consent utilized for this procedure. Ultrasound used to localize the left lower quadrant ascites. The overlying skin was prepped and draped in usual sterile fashion and local anesthesia was obtained with the 1% lidocaine solution. A 5 [place]ian Yueh needle was advanced until fluid was aspirated under live ultrasound guidance. Approximately 9150 cc' s of clear, yellow fluid was aspirated. The patient tolerated the procedure well and left the department in stable condition.     Impression: Impression: Ultrasound-guided paracentesis. Signed, performed, and dictated by SUNDAY Guillen under the supervision of Dr. Bocanegra. Workstation performed: [id number]     IR paracentesis    Result Date: 5/23/2024  Narrative: Ultrasound-guided paracentesis Clinical History: Symptomatic recurrent ascites. Procedure: Ongoing consent was utilized for this procedure. Ultrasound used to localize the right lower quadrant ascites. The overlying skin was prepped and draped in usual sterile fashion and local anesthesia was obtained with the 1% lidocaine solution.  A 5 [place]ian Yueh needle was advanced until fluid was aspirated under live ultrasound guidance. Approximately 6500 cc' s of clear, yellow fluid was aspirated. The catheter was removed and a Band-Aid applied albumin 50 g was infused. The patient tolerated the procedure well and left the department  in stable condition.     Impression: Impression: Ultrasound-guided paracentesis. Signed, performed, and dictated by Ying BRAND under the supervision of Dr. Alec Bergeron. Workstation performed: [id number]     IR paracentesis    Result Date: 5/17/2024  Narrative: Ultrasound-guided paracentesis Clinical History: Symptomatic recurrent ascites. Procedure: Ongoing consent was utilized for this procedure. Ultrasound used to localize the right lower quadrant ascites. The overlying skin was prepped and draped in usual sterile fashion and local anesthesia was obtained with the 1% lidocaine solution.  A 5 Bengali Yueh needle was advanced until fluid was aspirated under live ultrasound guidance. Approximately 5500 cc' s of clear, yellow fluid was aspirated. The catheter was then removed and a Band-Aid applied. Albumin was infused. The patient tolerated the procedure well and left the department in stable condition.     Impression: Impression: Ultrasound-guided paracentesis. Signed, performed, and dictated by Ying BRAND under the supervision of Dr. Olivia Valadez. Workstation performed: [id number]   Answers submitted by the patient for this visit:  Abdominal Pain Questionnaire (Submitted on 6/9/2024)  Chief Complaint: Abdominal pain  Progression since onset: unchanged  Pain location: generalized abdominal region  Pain - numeric: 8/10  Pain quality: sharp  anorexia: Yes  constipation: Yes  diarrhea: Yes  nausea: Yes  Aggravated by: eating  Relieved by: being still  Diagnostic workup: upper endoscopy

## 2024-06-10 NOTE — H&P (VIEW-ONLY)
[facility] - Outpatient Follow-up Note  Bimal Lugo 78 y.o. male MRN: [id number]  Encounter: [id number]          ASSESSMENT AND PLAN:      1. 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 Esophageal varices without bleeding, unspecified esophageal varices type (HCC)    MELD 3.0: 13 at 4/13/2024  5:15 AM  MELD-Na: 11 at 4/13/2024  5:15 AM  Calculated from:  Serum Creatinine: 1.20 mg/dL at 4/13/2024  5:15 AM  Serum Sodium: 135 mmol/L at 4/13/2024  5:15 AM  Total Bilirubin: 0.56 mg/dL (Using min of 1 mg/dL) at 4/11/2024  9:13 AM  Serum Albumin: 2.4 g/dL at 4/11/2024  9:13 AM  INR(ratio): 1.25 at 4/11/2024 11:42 AM  Age at listing (hypothetical): 78 years  Sex: Male at 4/13/2024  5:15 AM    He continues to require weekly LVP   He is on Lasix 40mg bid and Aldactone 50mg daily- recent renal function bumped  Repeat BMP -if creatinine is worsening we will have to cut back on these  Again reviewed importance of salt restricted diet less than 2000 mg daily -Per his wife he is good with this  Unfortunately, he is unlikely to be a TIPS candidate due to his cardiac issues as well as his 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  Unfortunately, he continues to struggle significantly with fluid overload.  He has significant swelling of his lower extremities as well as increasing fluid accumulation in his abdomen.  He still goes for weekly paracentesis.  He did miss 1 week  recently and on his last paracentesis 9.1 L were removed.  He reports abdominal discomfort.  He has no severe pain.  He said no fevers or chills.  His wife is concerned because he recently has been having some increasing confusion.  He is not sleeping well at night but is always sleepy during the day he recently forgot how to red an egg which she has been doing for years.  He has no rectal bleeding.  He is having 2-3 loose stools a day.  He is not taking lactulose and has never been on this.  He has esophageal varices and has been undergoing EGD with banding.  His most recent banding was May 7 at which time 3 medium varices were banded.  He was advised to follow-up in 1 month for repeat.  He maintains on furosemide 40 mg twice daily and spironolactone 50 mg daily.  He follows a salt restricted diet.  Recent labs from May show a slight bump in his creatinine.  He is not drinking alcohol for several years.      REVIEW OF SYSTEMS IS OTHERWISE NEGATIVE.      Historical Information   Past Medical History:   Diagnosis Date    Chronic bronchitis (HCC)     COPD (chronic obstructive pulmonary disease) (HCC)     Hyperlipidemia     Hypertension     Obesity     ARACELY (obstructive sleep apnea)      Past Surgical History:   Procedure Laterality Date    AORTIC VALVE REPLACEMENT      Jan 4 2024    IR PARACENTESIS  04/04/2024    IR PARACENTESIS  03/07/2024    IR PARACENTESIS  04/11/2024    IR PARACENTESIS  4/26/2024    IR PARACENTESIS  5/9/2024    IR PARACENTESIS  5/16/2024    IR PARACENTESIS  5/23/2024    IR PARACENTESIS  6/6/2024     Social History   Social History     Substance and Sexual Activity   Alcohol Use Not Currently    Comment: sober- quit 1987     Social History     Substance and Sexual Activity   Drug Use Not Currently     Social History     Tobacco Use   Smoking Status Former    Current packs/day: 0.00    Average packs/day: 5.0 packs/day for 35.0 years (175.0 ttl pk-yrs)    Types: Cigarettes    Start date: 1953    Quit  "date:     Years since quittin.4   Smokeless Tobacco Never     No family history on file.    Meds/Allergies       Current Outpatient Medications:     albuterol (PROVENTIL HFA,VENTOLIN HFA) 90 mcg/act inhaler    aspirin 81 MG tablet    atorvastatin (LIPITOR) 40 mg tablet    furosemide (LASIX) 40 mg tablet    glucose 4-6 GM-MG    glucose blood test strip    hydrocortisone (PROCTOSOL HC) 2.5 % rectal cream    insulin aspart (NovoLOG FlexPen) 100 UNIT/ML injection pen    insulin degludec (TRESIBA) 100 units/mL injection pen    isosorbide mononitrate (IMDUR) 30 mg 24 hr tablet    Lancets (ONETOUCH ULTRASOFT) lancets    losartan (COZAAR) 100 MG tablet    lovastatin (MEVACOR) 10 MG tablet    metFORMIN (GLUCOPHAGE-XR) 500 mg 24 hr tablet    metoprolol succinate (TOPROL-XL) 100 mg 24 hr tablet    Misc. Devices MISC    Multiple Vitamin (DAILY VALUE MULTIVITAMIN) TABS    NIFEdipine ER (ADALAT CC) 30 MG 24 hr tablet    nystatin (MYCOSTATIN) powder    Omega-3 Fatty Acids (FISH OIL) 645 MG CAPS    pantoprazole (PROTONIX) 40 mg tablet    spironolactone (ALDACTONE) 50 mg tablet    SYMBICORT 160-4.5 MCG/ACT inhaler    Allergies   Allergen Reactions    Gemfibrozil Swelling and Rash    Carbamazepine     Carbamazepine And Analogs      swelling    Oxycodone Other (See Comments)     Pt states he hallucinates           Objective     Height 5' 5\" (1.651 m), weight 105 kg (231 lb 9.6 oz). Body mass index is 38.54 kg/m².      PHYSICAL EXAM:      General Appearance:   Alert, cooperative, no distress   HEENT:   Normocephalic, atraumatic, anicteric.     Neck:  Supple, symmetrical, trachea midline   Lungs:   Clear to auscultation bilaterally; no rales, rhonchi or wheezing; respirations unlabored    Heart::   Regular rate and rhythm; no murmur, rub, or gallop.   Abdomen:   Soft, non-tender, non-distended; normal bowel sounds; no masses, no organomegaly    Genitalia:   Deferred    Rectal:   Deferred    Extremities:  No cyanosis, clubbing " or edema    Pulses:  2+ and symmetric    Skin:  No jaundice, rashes, or lesions    Lymph nodes:  No palpable cervical lymphadenopathy        Lab Results:   No visits with results within 1 Day(s) from this visit.   Latest known visit with results is:   Hospital Outpatient Visit on 05/07/2024   Component Date Value    POC Glucose 05/07/2024 137     Case Report 05/07/2024                      Value:Surgical Pathology Report                         Case: [id number]                                  Authorizing Provider:  Dominique Roy MD           Collected:           05/07/2024 0731              Ordering Location:      [facility]       Received:            05/07/2024 [address] [place] [address] Endoscopy                                                             Pathologist:           Arya Moore MD                                                           Specimens:   A) - Polyp, Colorectal, ascending colon polyp x 1                                                   B) - Polyp, Colorectal, hepatic flexure polyp x 1                                                   C) - Polyp, Colorectal, transverse colon polyp x 1                                         Final Diagnosis 05/07/2024                      Value:This result contains rich text formatting which cannot be displayed here.    Additional Information 05/07/2024                      Value:This result contains rich text formatting which cannot be displayed here.    Synoptic Checklist 05/07/2024                      Value:                            COLON/RECTUM POLYP FORM - GI - C, B, A                                                                                     :    Adenoma(s)                                                         :    Serrated Adenoma      Gross Description 05/07/2024                      Value:This result contains rich text formatting which cannot be displayed here.    Clinical Information 05/07/2024                       Value:Cold bx polypectomy x 1    FINDINGS:  · Two sessile polyps measuring smaller than 5 mm in the ascending colon and transverse colon; performed cold forceps biopsy  · One sessile polyp measuring 5-9 mm in the hepatic flexure; performed cold snare removal  · Diverticula in the descending colon, sigmoid colon and rectosigmoid  · The cecum and rectum appeared normal.  · Internal small hemorrhoids           Radiology Results:   IR paracentesis    Result Date: 6/7/2024  Narrative: Ultrasound-guided paracentesis Clinical History: Recurrent symptomatic ascites Procedure: Ongoing consent utilized for this procedure. Ultrasound used to localize the left lower quadrant ascites. The overlying skin was prepped and draped in usual sterile fashion and local anesthesia was obtained with the 1% lidocaine solution. A 5 [place] Islander Yueh needle was advanced until fluid was aspirated under live ultrasound guidance. Approximately 9150 cc' s of clear, yellow fluid was aspirated. The patient tolerated the procedure well and left the department in stable condition.     Impression: Impression: Ultrasound-guided paracentesis. Signed, performed, and dictated by SUNDAY Guillen under the supervision of Dr. Bocanegra. Workstation performed: [id number]     IR paracentesis    Result Date: 5/23/2024  Narrative: Ultrasound-guided paracentesis Clinical History: Symptomatic recurrent ascites. Procedure: Ongoing consent was utilized for this procedure. Ultrasound used to localize the right lower quadrant ascites. The overlying skin was prepped and draped in usual sterile fashion and local anesthesia was obtained with the 1% lidocaine solution.  A 5 [place] Islander Yueh needle was advanced until fluid was aspirated under live ultrasound guidance. Approximately 6500 cc' s of clear, yellow fluid was aspirated. The catheter was removed and a Band-Aid applied albumin 50 g was infused. The patient tolerated the procedure well and left the department  in stable condition.     Impression: Impression: Ultrasound-guided paracentesis. Signed, performed, and dictated by Ying BRAND under the supervision of Dr. Alec Bergeron. Workstation performed: [id number]     IR paracentesis    Result Date: 5/17/2024  Narrative: Ultrasound-guided paracentesis Clinical History: Symptomatic recurrent ascites. Procedure: Ongoing consent was utilized for this procedure. Ultrasound used to localize the right lower quadrant ascites. The overlying skin was prepped and draped in usual sterile fashion and local anesthesia was obtained with the 1% lidocaine solution.  A 5 Serbian Yueh needle was advanced until fluid was aspirated under live ultrasound guidance. Approximately 5500 cc' s of clear, yellow fluid was aspirated. The catheter was then removed and a Band-Aid applied. Albumin was infused. The patient tolerated the procedure well and left the department in stable condition.     Impression: Impression: Ultrasound-guided paracentesis. Signed, performed, and dictated by Ying BRAND under the supervision of Dr. Olivia Valadez. Workstation performed: [id number]   Answers submitted by the patient for this visit:  Abdominal Pain Questionnaire (Submitted on 6/9/2024)  Chief Complaint: Abdominal pain  Progression since onset: unchanged  Pain location: generalized abdominal region  Pain - numeric: 8/10  Pain quality: sharp  anorexia: Yes  constipation: Yes  diarrhea: Yes  nausea: Yes  Aggravated by: eating  Relieved by: being still  Diagnostic workup: upper endoscopy

## 2024-06-10 NOTE — PATIENT INSTRUCTIONS
Scheduled date of EGD(as of today):6/19/24  Physician performing EGD:Kirill  Location of EGD:[facility]  Instructions reviewed with patient by:Flo fonseca  Clearances:  none

## 2024-06-13 ENCOUNTER — TELEPHONE (OUTPATIENT)
Dept: GASTROENTEROLOGY | Facility: CLINIC | Age: 79
End: 2024-06-13

## 2024-06-13 ENCOUNTER — HOSPITAL ENCOUNTER (OUTPATIENT)
Dept: NON INVASIVE DIAGNOSTICS | Facility: HOSPITAL | Age: 79
Discharge: HOME | End: 2024-06-13
Payer: MEDICARE

## 2024-06-13 VITALS
OXYGEN SATURATION: 95 % | DIASTOLIC BLOOD PRESSURE: 61 MMHG | RESPIRATION RATE: 18 BRPM | HEART RATE: 88 BPM | SYSTOLIC BLOOD PRESSURE: 138 MMHG

## 2024-06-13 DIAGNOSIS — K70.31 ALCOHOLIC CIRRHOSIS OF LIVER WITH ASCITES (HCC): ICD-10-CM

## 2024-06-13 PROCEDURE — 49083 ABD PARACENTESIS W/IMAGING: CPT | Performed by: NURSE PRACTITIONER

## 2024-06-13 PROCEDURE — 49083 ABD PARACENTESIS W/IMAGING: CPT

## 2024-06-13 RX ORDER — LIDOCAINE HYDROCHLORIDE 10 MG/ML
INJECTION, SOLUTION EPIDURAL; INFILTRATION; INTRACAUDAL; PERINEURAL AS NEEDED
Status: COMPLETED | OUTPATIENT
Start: 2024-06-13 | End: 2024-06-13

## 2024-06-13 RX ORDER — ALBUMIN (HUMAN) 12.5 G/50ML
SOLUTION INTRAVENOUS
Status: COMPLETED | OUTPATIENT
Start: 2024-06-13 | End: 2024-06-13

## 2024-06-13 RX ADMIN — ALBUMIN (HUMAN) 75 G: 12.5 SOLUTION INTRAVENOUS at 10:24

## 2024-06-13 RX ADMIN — LIDOCAINE HYDROCHLORIDE 10 ML: 10 INJECTION, SOLUTION EPIDURAL; INFILTRATION; INTRACAUDAL; PERINEURAL at 09:47

## 2024-06-13 NOTE — TELEPHONE ENCOUNTER
----- Message from Grazyna Mtz PA-C sent at 6/10/2024  1:24 PM EDT -----  Please advise patient that his kidney function and his potassium are normal on this blood test.  He can increase his spironolactone to 2 pills daily if he would like to try that.  I would still advise that he split his furosemide dosing

## 2024-06-13 NOTE — BRIEF OP NOTE (RAD/CATH)
IR PARACENTESIS Procedure Note    PATIENT NAME: Bimal Lugo  : 1945  MRN: [id number]    Pre-op Diagnosis:   1. 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 Albumin 75 g infused    Specimens: None    Complications:  None immediate    Anesthesia: local    SUNDAY Worley     Date: 2024  Time: 11:18 AM

## 2024-06-19 ENCOUNTER — ANESTHESIA (OUTPATIENT)
Dept: GASTROENTEROLOGY | Facility: HOSPITAL | Age: 79
End: 2024-06-19

## 2024-06-19 ENCOUNTER — HOSPITAL ENCOUNTER (OUTPATIENT)
Dept: GASTROENTEROLOGY | Facility: HOSPITAL | Age: 79
Setting detail: OUTPATIENT SURGERY
Discharge: HOME | End: 2024-06-19
Payer: MEDICARE

## 2024-06-19 ENCOUNTER — ANESTHESIA EVENT (OUTPATIENT)
Dept: GASTROENTEROLOGY | Facility: HOSPITAL | Age: 79
End: 2024-06-19

## 2024-06-19 VITALS
SYSTOLIC BLOOD PRESSURE: 147 MMHG | BODY MASS INDEX: 41.21 KG/M2 | RESPIRATION RATE: 15 BRPM | TEMPERATURE: 97.5 F | HEART RATE: 95 BPM | HEIGHT: 65 IN | WEIGHT: 247.36 LBS | DIASTOLIC BLOOD PRESSURE: 69 MMHG | OXYGEN SATURATION: 95 %

## 2024-06-19 DIAGNOSIS — K70.31 ALCOHOLIC CIRRHOSIS OF LIVER WITH ASCITES (HCC): ICD-10-CM

## 2024-06-19 DIAGNOSIS — I85.00 ESOPHAGEAL VARICES WITHOUT BLEEDING, UNSPECIFIED ESOPHAGEAL VARICES TYPE (HCC): ICD-10-CM

## 2024-06-19 LAB — GLUCOSE SERPL-MCNC: 136 MG/DL (ref 65–140)

## 2024-06-19 PROCEDURE — 82948 REAGENT STRIP/BLOOD GLUCOSE: CPT

## 2024-06-19 PROCEDURE — 43244 EGD VARICES LIGATION: CPT | Performed by: INTERNAL MEDICINE

## 2024-06-19 RX ORDER — LIDOCAINE HYDROCHLORIDE 20 MG/ML
INJECTION, SOLUTION EPIDURAL; INFILTRATION; INTRACAUDAL; PERINEURAL AS NEEDED
Status: DISCONTINUED | OUTPATIENT
Start: 2024-06-19 | End: 2024-06-19

## 2024-06-19 RX ORDER — PROPOFOL 10 MG/ML
INJECTION, EMULSION INTRAVENOUS AS NEEDED
Status: DISCONTINUED | OUTPATIENT
Start: 2024-06-19 | End: 2024-06-19

## 2024-06-19 RX ORDER — SODIUM CHLORIDE, SODIUM LACTATE, POTASSIUM CHLORIDE, CALCIUM CHLORIDE 600; 310; 30; 20 MG/100ML; MG/100ML; MG/100ML; MG/100ML
125 INJECTION, SOLUTION INTRAVENOUS CONTINUOUS
Status: DISCONTINUED | OUTPATIENT
Start: 2024-06-19 | End: 2024-06-23 | Stop reason: HOSPADM

## 2024-06-19 RX ORDER — SODIUM CHLORIDE, SODIUM LACTATE, POTASSIUM CHLORIDE, CALCIUM CHLORIDE 600; 310; 30; 20 MG/100ML; MG/100ML; MG/100ML; MG/100ML
INJECTION, SOLUTION INTRAVENOUS CONTINUOUS PRN
Status: DISCONTINUED | OUTPATIENT
Start: 2024-06-19 | End: 2024-06-19

## 2024-06-19 RX ADMIN — PROPOFOL 100 MG: 10 INJECTION, EMULSION INTRAVENOUS at 07:30

## 2024-06-19 RX ADMIN — SODIUM CHLORIDE, SODIUM LACTATE, POTASSIUM CHLORIDE, AND CALCIUM CHLORIDE: .6; .31; .03; .02 INJECTION, SOLUTION INTRAVENOUS at 07:25

## 2024-06-19 RX ADMIN — PROPOFOL 50 MG: 10 INJECTION, EMULSION INTRAVENOUS at 07:35

## 2024-06-19 RX ADMIN — LIDOCAINE HYDROCHLORIDE 50 MG: 20 INJECTION, SOLUTION EPIDURAL; INFILTRATION; INTRACAUDAL; PERINEURAL at 07:30

## 2024-06-19 NOTE — ANESTHESIA POSTPROCEDURE EVALUATION
Post-Op Assessment Note    CV Status:  Stable  Pain Score: 0    Pain management: adequate       Mental Status:  Sleepy   Hydration Status:  Stable   PONV Controlled:  None   Airway Patency:  Patent     Post Op Vitals Reviewed: Yes    No anethesia notable event occurred.    Staff: CRNA               BP   125/66   Temp   97.6   Pulse  87   Resp   16   SpO2   98

## 2024-06-19 NOTE — INTERVAL H&P NOTE
H&P reviewed. After examining the patient I find no changes in the patients condition since the H&P had been written.    Vitals:    06/19/24 0701   BP: 138/69   Pulse: 101   Resp: 18   Temp: 98.1 °F (36.7 °C)   SpO2: 98%

## 2024-06-19 NOTE — ANESTHESIA PREPROCEDURE EVALUATION
Procedure:  EGD    Relevant Problems   CARDIO   (+) CAD (coronary artery disease)   (+) History of aortic valve replacement      GI/HEPATIC   (+) Cirrhosis (HCC)      /RENAL   (+) Chronic kidney disease, stage 3a (HCC)      HEMATOLOGY   (+) Symptomatic anemia   (+) Thrombocytopenia (HCC)      MUSCULOSKELETAL   (+) Back pain      PULMONARY   (+) Chronic obstructive pulmonary disease (HCC)   (+) Sleep apnea      Respiratory/Allergy   (+) Chronic bronchitis with COPD (chronic obstructive pulmonary disease)      Other   (+) Body mass index (BMI) of 40.0 to 44.9 in adult (HCC)      6/13/24-paracentesis--9700 ml clear yellow ascites aspirated from RIGHT sided ultrasound guided paracentesis. Albumin 75 g infused. Scheduled for repeat tomorrow (weekly). Reports breathing is baseline. No supplemental oxygen at home     EGD and banding last month. Here for followup.    Echo not available for review, TAVR done at [place] in January per patient. Doing well from cardiac standpoint per patient.      Physical Exam    Airway    Mallampati score: III  TM Distance: >3 FB  Neck ROM: full     Dental   Comment: edentulous     Cardiovascular  Cardiovascular exam normal    Pulmonary  Pulmonary exam normal     Other Findings  Portions of exam deferred due to low yield and/or unknown COVID status      Anesthesia Plan  ASA Score- 4     Anesthesia Type- IV sedation with anesthesia with ASA Monitors.         Additional Monitors:     Airway Plan:            Plan Factors-Exercise tolerance (METS): >4 METS.    Chart reviewed.   Existing labs reviewed. Patient summary reviewed.    Patient is not a current smoker.              Induction- intravenous.    Postoperative Plan-         Informed Consent- Anesthetic plan and risks discussed with patient.  I personally reviewed this patient with the CRNA. Discussed and agreed on the Anesthesia Plan with the CRNA..

## 2024-06-20 ENCOUNTER — HOSPITAL ENCOUNTER (OUTPATIENT)
Dept: NON INVASIVE DIAGNOSTICS | Facility: HOSPITAL | Age: 79
Discharge: HOME | End: 2024-06-20
Payer: MEDICARE

## 2024-06-20 VITALS
RESPIRATION RATE: 20 BRPM | DIASTOLIC BLOOD PRESSURE: 64 MMHG | HEART RATE: 87 BPM | OXYGEN SATURATION: 94 % | SYSTOLIC BLOOD PRESSURE: 152 MMHG

## 2024-06-20 DIAGNOSIS — K70.31 ALCOHOLIC CIRRHOSIS OF LIVER WITH ASCITES (HCC): ICD-10-CM

## 2024-06-20 PROCEDURE — 49083 ABD PARACENTESIS W/IMAGING: CPT

## 2024-06-20 RX ORDER — LIDOCAINE WITH 8.4% SOD BICARB 0.9%(10ML)
SYRINGE (ML) INJECTION AS NEEDED
Status: COMPLETED | OUTPATIENT
Start: 2024-06-20 | End: 2024-06-20

## 2024-06-20 RX ORDER — ALBUMIN (HUMAN) 12.5 G/50ML
SOLUTION INTRAVENOUS
Status: COMPLETED | OUTPATIENT
Start: 2024-06-20 | End: 2024-06-20

## 2024-06-20 RX ADMIN — Medication 10 ML: at 09:44

## 2024-06-20 RX ADMIN — ALBUMIN (HUMAN) 50 G: 12.5 SOLUTION INTRAVENOUS at 09:55

## 2024-06-20 NOTE — BRIEF OP NOTE (RAD/CATH)
IR PARACENTESIS Procedure Note    PATIENT NAME: Bimal Lugo  : 1945  MRN: [id number]    Pre-op Diagnosis:   1. 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)        Surgeon:   SUNDAY Fierro  Assistants:     No qualified resident was available, Resident is only observing    Estimated Blood Loss: minimal  Findings: 6450 ml clear yellow ascites fluid, RLQ. Albumin 50g infused.    Specimens: none    Complications:  none immediate    Anesthesia: local    SUNDAY Fierro     Date: 2024  Time: 10:22 AM

## 2024-06-27 ENCOUNTER — HOSPITAL ENCOUNTER (OUTPATIENT)
Dept: NON INVASIVE DIAGNOSTICS | Facility: HOSPITAL | Age: 79
Discharge: HOME | End: 2024-06-27
Payer: MEDICARE

## 2024-06-27 VITALS
OXYGEN SATURATION: 95 % | HEART RATE: 88 BPM | DIASTOLIC BLOOD PRESSURE: 63 MMHG | RESPIRATION RATE: 17 BRPM | SYSTOLIC BLOOD PRESSURE: 143 MMHG

## 2024-06-27 DIAGNOSIS — K70.31 ALCOHOLIC CIRRHOSIS OF LIVER WITH ASCITES (HCC): ICD-10-CM

## 2024-06-27 PROCEDURE — 49083 ABD PARACENTESIS W/IMAGING: CPT | Performed by: NURSE PRACTITIONER

## 2024-06-27 PROCEDURE — 49083 ABD PARACENTESIS W/IMAGING: CPT

## 2024-06-27 RX ORDER — LIDOCAINE WITH 8.4% SOD BICARB 0.9%(10ML)
SYRINGE (ML) INJECTION AS NEEDED
Status: COMPLETED | OUTPATIENT
Start: 2024-06-27 | End: 2024-06-27

## 2024-06-27 RX ORDER — ALBUMIN (HUMAN) 12.5 G/50ML
SOLUTION INTRAVENOUS
Status: COMPLETED | OUTPATIENT
Start: 2024-06-27 | End: 2024-06-27

## 2024-06-27 RX ADMIN — Medication 10 ML: at 10:11

## 2024-06-27 RX ADMIN — ALBUMIN (HUMAN) 50 G: 12.5 SOLUTION INTRAVENOUS at 10:29

## 2024-06-27 NOTE — BRIEF OP NOTE (RAD/CATH)
IR PARACENTESIS Procedure Note    PATIENT NAME: Bimal Lugo  : 1945  MRN: [id number]    Pre-op Diagnosis:   1. 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 Albumin 50 g infused.    Specimens: None     Complications:  None immediate    Anesthesia: local    SUNDAY Worley     Date: 2024  Time: 10:53 AM

## 2024-06-29 ENCOUNTER — HOSPITAL ENCOUNTER (OUTPATIENT)
Dept: CT IMAGING | Facility: HOSPITAL | Age: 79
Discharge: HOME | End: 2024-06-29
Payer: MEDICARE

## 2024-06-29 DIAGNOSIS — K70.31 ALCOHOLIC CIRRHOSIS OF LIVER WITH ASCITES (HCC): ICD-10-CM

## 2024-06-29 DIAGNOSIS — I85.00 ESOPHAGEAL VARICES WITHOUT BLEEDING, UNSPECIFIED ESOPHAGEAL VARICES TYPE (HCC): ICD-10-CM

## 2024-06-29 PROCEDURE — 74176 CT ABD & PELVIS W/O CONTRAST: CPT

## 2024-07-03 ENCOUNTER — HOSPITAL ENCOUNTER (OUTPATIENT)
Dept: NON INVASIVE DIAGNOSTICS | Facility: HOSPITAL | Age: 79
Discharge: HOME | End: 2024-07-03
Payer: MEDICARE

## 2024-07-03 VITALS
HEART RATE: 87 BPM | SYSTOLIC BLOOD PRESSURE: 128 MMHG | RESPIRATION RATE: 18 BRPM | DIASTOLIC BLOOD PRESSURE: 62 MMHG | OXYGEN SATURATION: 97 %

## 2024-07-03 DIAGNOSIS — K70.31 ALCOHOLIC CIRRHOSIS OF LIVER WITH ASCITES (HCC): ICD-10-CM

## 2024-07-03 PROCEDURE — 49083 ABD PARACENTESIS W/IMAGING: CPT | Performed by: NURSE PRACTITIONER

## 2024-07-03 PROCEDURE — 49083 ABD PARACENTESIS W/IMAGING: CPT

## 2024-07-03 RX ORDER — ALBUMIN (HUMAN) 12.5 G/50ML
SOLUTION INTRAVENOUS
Status: COMPLETED | OUTPATIENT
Start: 2024-07-03 | End: 2024-07-03

## 2024-07-03 RX ORDER — LIDOCAINE WITH 8.4% SOD BICARB 0.9%(10ML)
SYRINGE (ML) INJECTION AS NEEDED
Status: COMPLETED | OUTPATIENT
Start: 2024-07-03 | End: 2024-07-03

## 2024-07-03 RX ADMIN — Medication 10 ML: at 10:57

## 2024-07-03 RX ADMIN — ALBUMIN (HUMAN) 25 G: 12.5 SOLUTION INTRAVENOUS at 11:11

## 2024-07-03 NOTE — BRIEF OP NOTE (RAD/CATH)
IR PARACENTESIS Procedure Note    PATIENT NAME: Bimal Lugo  : 1945  MRN: [id number]    Pre-op Diagnosis:   1. 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     Specimens: None     Complications:  None immediate     Anesthesia: local    SUNDAY Worley     Date: 7/3/2024  Time: 11:34 AM

## 2024-07-08 ENCOUNTER — TELEPHONE (OUTPATIENT)
Dept: GASTROENTEROLOGY | Facility: CLINIC | Age: 79
End: 2024-07-08

## 2024-07-08 NOTE — TELEPHONE ENCOUNTER
I spoke with wife,  ( patient) was asleep, I gave her the CT results      Grazyna Mtz PA-C  [facility]  Please let patient know his CT looks good. It shows the cirrhosis but no concerning lesions in the liver.

## 2024-07-11 ENCOUNTER — HOSPITAL ENCOUNTER (OUTPATIENT)
Dept: NON INVASIVE DIAGNOSTICS | Facility: HOSPITAL | Age: 79
Discharge: HOME | End: 2024-07-11
Admitting: RADIOLOGY
Payer: MEDICARE

## 2024-07-11 VITALS
DIASTOLIC BLOOD PRESSURE: 62 MMHG | OXYGEN SATURATION: 98 % | HEART RATE: 76 BPM | SYSTOLIC BLOOD PRESSURE: 125 MMHG | RESPIRATION RATE: 18 BRPM

## 2024-07-11 DIAGNOSIS — K70.31 ALCOHOLIC CIRRHOSIS OF LIVER WITH ASCITES (HCC): ICD-10-CM

## 2024-07-11 PROCEDURE — 49083 ABD PARACENTESIS W/IMAGING: CPT

## 2024-07-11 PROCEDURE — 49083 ABD PARACENTESIS W/IMAGING: CPT | Performed by: NURSE PRACTITIONER

## 2024-07-11 RX ORDER — LIDOCAINE WITH 8.4% SOD BICARB 0.9%(10ML)
SYRINGE (ML) INJECTION AS NEEDED
Status: COMPLETED | OUTPATIENT
Start: 2024-07-11 | End: 2024-07-11

## 2024-07-11 RX ADMIN — Medication 10 ML: at 10:52

## 2024-07-11 NOTE — BRIEF OP NOTE (RAD/CATH)
IR PARACENTESIS Procedure Note    PATIENT NAME: Bimal Lugo  : 1945  MRN: [id number]    Pre-op Diagnosis:   1. 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)      Provider:   SUNDAY Worley    Assistants:   None    Estimated Blood Loss: None     Findings: 3300 ml cloudy yellow ascites aspirated from RIGHT sided ultrasound guided paracentesis.     Specimens: None     Complications:  None immediate    Anesthesia: local    SUNDAY Worley     Date: 2024  Time: 11:20 AM

## 2024-07-18 ENCOUNTER — TELEPHONE (OUTPATIENT)
Dept: GASTROENTEROLOGY | Facility: CLINIC | Age: 79
End: 2024-07-18

## 2024-07-18 ENCOUNTER — HOSPITAL ENCOUNTER (OUTPATIENT)
Dept: NON INVASIVE DIAGNOSTICS | Facility: HOSPITAL | Age: 79
Discharge: HOME | End: 2024-07-18
Payer: MEDICARE

## 2024-07-18 VITALS
OXYGEN SATURATION: 96 % | DIASTOLIC BLOOD PRESSURE: 64 MMHG | RESPIRATION RATE: 19 BRPM | SYSTOLIC BLOOD PRESSURE: 145 MMHG | HEART RATE: 86 BPM

## 2024-07-18 DIAGNOSIS — K70.31 ALCOHOLIC CIRRHOSIS OF LIVER WITH ASCITES (HCC): ICD-10-CM

## 2024-07-18 PROCEDURE — 49083 ABD PARACENTESIS W/IMAGING: CPT

## 2024-07-18 RX ORDER — LIDOCAINE WITH 8.4% SOD BICARB 0.9%(10ML)
SYRINGE (ML) INJECTION AS NEEDED
Status: COMPLETED | OUTPATIENT
Start: 2024-07-18 | End: 2024-07-18

## 2024-07-18 RX ORDER — NIFEDIPINE 30 MG
30 TABLET, EXTENDED RELEASE ORAL 2 TIMES DAILY
Qty: 60 TABLET | Refills: 6 | Status: SHIPPED | OUTPATIENT
Start: 2024-07-18

## 2024-07-18 RX ORDER — SPIRONOLACTONE 50 MG/1
50 TABLET, FILM COATED ORAL DAILY
Qty: 30 TABLET | Refills: 6 | Status: SHIPPED | OUTPATIENT
Start: 2024-07-18

## 2024-07-18 RX ADMIN — Medication 10 ML: at 11:48

## 2024-07-18 NOTE — TELEPHONE ENCOUNTER
Patients GI provider:  Sonja    Number to return call: [phone]    Reason for call: Pt requesting a call back from Sonja. Pt states this is in regards to his medication, no other information was provided.     Scheduled procedure/appointment date if applicable: Apt 9/11

## 2024-07-18 NOTE — TELEPHONE ENCOUNTER
Spoke with pt. He would like a refill of spironolactone 50 mg BID and Nifedipine ER 30 mg by mouth 2 (two) times a day (90 day supply).  He says the medication his improved the swelling in his legs.

## 2024-07-24 ENCOUNTER — HOSPITAL ENCOUNTER (OUTPATIENT)
Dept: NON INVASIVE DIAGNOSTICS | Facility: HOSPITAL | Age: 79
Discharge: HOME | End: 2024-07-24
Payer: MEDICARE

## 2024-07-24 VITALS
RESPIRATION RATE: 18 BRPM | SYSTOLIC BLOOD PRESSURE: 140 MMHG | DIASTOLIC BLOOD PRESSURE: 64 MMHG | HEART RATE: 90 BPM | OXYGEN SATURATION: 98 %

## 2024-07-24 DIAGNOSIS — K70.31 ALCOHOLIC CIRRHOSIS OF LIVER WITH ASCITES (HCC): ICD-10-CM

## 2024-07-24 PROCEDURE — 49083 ABD PARACENTESIS W/IMAGING: CPT | Performed by: NURSE PRACTITIONER

## 2024-07-24 PROCEDURE — 49083 ABD PARACENTESIS W/IMAGING: CPT

## 2024-07-24 RX ORDER — LIDOCAINE WITH 8.4% SOD BICARB 0.9%(10ML)
SYRINGE (ML) INJECTION AS NEEDED
Status: COMPLETED | OUTPATIENT
Start: 2024-07-24 | End: 2024-07-24

## 2024-07-24 RX ADMIN — Medication 10 ML: at 08:38

## 2024-07-24 NOTE — BRIEF OP NOTE (RAD/CATH)
IR PARACENTESIS Procedure Note    PATIENT NAME: Bimal Lugo  : 1945  MRN: [id number]    Pre-op Diagnosis:   1. 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     Specimens: None     Complications:  None immediate     Anesthesia: local    SUNDAY Worley     Date: 2024  Time: 9:25 AM

## 2024-08-01 ENCOUNTER — HOSPITAL ENCOUNTER (OUTPATIENT)
Dept: NON INVASIVE DIAGNOSTICS | Facility: HOSPITAL | Age: 79
Discharge: HOME | End: 2024-08-01
Payer: MEDICARE

## 2024-08-01 VITALS
SYSTOLIC BLOOD PRESSURE: 146 MMHG | HEART RATE: 92 BPM | RESPIRATION RATE: 17 BRPM | OXYGEN SATURATION: 98 % | DIASTOLIC BLOOD PRESSURE: 74 MMHG

## 2024-08-01 DIAGNOSIS — K70.31 ALCOHOLIC CIRRHOSIS OF LIVER WITH ASCITES (HCC): ICD-10-CM

## 2024-08-01 PROCEDURE — 49083 ABD PARACENTESIS W/IMAGING: CPT

## 2024-08-01 PROCEDURE — 49083 ABD PARACENTESIS W/IMAGING: CPT | Performed by: NURSE PRACTITIONER

## 2024-08-01 RX ORDER — LIDOCAINE WITH 8.4% SOD BICARB 0.9%(10ML)
SYRINGE (ML) INJECTION AS NEEDED
Status: COMPLETED | OUTPATIENT
Start: 2024-08-01 | End: 2024-08-01

## 2024-08-01 RX ADMIN — Medication 10 ML: at 10:42

## 2024-08-01 NOTE — BRIEF OP NOTE (RAD/CATH)
IR PARACENTESIS Procedure Note    PATIENT NAME: Bimal Lugo  : 1945  MRN: [id number]    Pre-op Diagnosis:   1. 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)        Provider:   SUNDAY Worley    Assistants:   None    Estimated Blood Loss: None     Findings: 4050 ml of clear yellow ascites aspirated from RIGHT sided ultrasound guided paracentesis.     Specimens: None     Complications:  None immediate    Anesthesia: local    SUNDAY Worley     Date: 2024  Time: 11:08 AM

## 2024-08-06 ENCOUNTER — OFFICE VISIT (OUTPATIENT)
Dept: GASTROENTEROLOGY | Facility: CLINIC | Age: 79
End: 2024-08-06
Payer: MEDICARE

## 2024-08-06 VITALS
SYSTOLIC BLOOD PRESSURE: 128 MMHG | HEART RATE: 102 BPM | BODY MASS INDEX: 32.19 KG/M2 | RESPIRATION RATE: 18 BRPM | TEMPERATURE: 98 F | OXYGEN SATURATION: 97 % | HEIGHT: 65 IN | WEIGHT: 193.2 LBS | DIASTOLIC BLOOD PRESSURE: 76 MMHG

## 2024-08-06 DIAGNOSIS — K76.82 HEPATIC ENCEPHALOPATHY (HCC): ICD-10-CM

## 2024-08-06 DIAGNOSIS — K74.60 CIRRHOSIS OF LIVER WITH ASCITES, UNSPECIFIED HEPATIC CIRRHOSIS TYPE  (HCC): ICD-10-CM

## 2024-08-06 DIAGNOSIS — I85.11 SECONDARY ESOPHAGEAL VARICES WITH BLEEDING (HCC): Primary | ICD-10-CM

## 2024-08-06 DIAGNOSIS — R18.8 CIRRHOSIS OF LIVER WITH ASCITES, UNSPECIFIED HEPATIC CIRRHOSIS TYPE  (HCC): ICD-10-CM

## 2024-08-06 PROCEDURE — 99214 OFFICE O/P EST MOD 30 MIN: CPT | Performed by: FAMILY MEDICINE

## 2024-08-06 RX ORDER — AMMONIUM LACTATE 12 G/100G
LOTION TOPICAL 2 TIMES DAILY
COMMUNITY
Start: 2024-07-19

## 2024-08-06 NOTE — PROGRESS NOTES
[facility] - Outpatient Consultation  Bimal Lugo 78 y.o. male MRN: [id number]  Encounter: [id number]          ASSESSMENT AND PLAN:      1. Cirrhosis of liver with ascites, unspecified hepatic cirrhosis type  (HCC)  2. Secondary esophageal varices with bleeding (HCC)  3. Hepatic encephalopathy (HCC)    Summary: Patient is a 78 y.o. 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 Current MELD-3.0 (13).    Patient was seen to have cirrhotic morphology and evidence of portal hypertension on imaging since 2023 although not formally diagnosed with cirrhosis until a recent hospitalization for symptomatic anemia secondary to UGIB s/p EGDn 4/10/2024) notable for 3 large varices with stigmata of recent bleeding (red paty sign) s/p banding (x 3), grade D esophagitis with a discrete ulcer at the GE junction and moderate PHG. He was also found to have large volume abdominal pelvic ascites on imaging for which she was started on Lasix and Aldactone and requires once weekly paracentesis.    His liver disease is thought to be secondary to MASH/EtOH given multiple medical risk factors and his history of alcohol consumption. Fortunately, he has been sober for many years. We discussed the basic pathophysiology of cirrhosis, natural progression of disease, potential for hepatic decompensation and recommended healthcare maintenance.     He will continue his Lasix and Aldactone at their current dose. If his repeat labs show stable renal function electrolytes would consider increasing his Aldactone to 100 mg twice daily. He is also due for repeat EGD for banding. He is not currently taking a beta-blocker for secondary prophylaxis but is taking nifedipine. Plan to touch base with the prescribing provider to determine if nifedipine can be stopped and he can be started on carvedilol 3.125 mg twice daily. He is also due for imaging and AFP level for  hepatoma screening. Additional management as below.    Ascites   - Continue Lasix 40 mg twice daily  - Continue Aldactone 50 mg twice daily  - Continue once weekly paracentesis   - If repeat labs show stable Cr and lytes would consider increasing Aldactone to 100 mg BID.   - Encouraged to adhere to a low-sodium (<2000 mg daily) diet.     Esophageal Varices   - Hx of esophageal variceal bleed (4/2024)  - EGD (6/2024) with two medium varices s/p banding   - Due for a repeat EGD for banding now, will schedule.   - Currently taking nifedipine but not on a NSBB for secondary ppx.   - Will TBW prescribing provider to stop nifedipine and start carvedilol 3.125 mg twice daily.     Hepatic Encephalopathy   - Grade 1 HE   - Continue lactulose to titrate for 3-4 soft but formed BM daily.   - Patient aware of signs/sxs's of HE and advised to promptly inform provider if experiencing such.    HCC Screening   - CT A/P (6/2024) without focal liver lesion but non-contrasted.   - Plan for an RUQ US now for hepatoma screening.   - AFP level to be drawn with his next set of routine labs.   - Continue imaging q6 months and AFP annually.     Nutritional Status  - Encouraged high-protein diet in addition to a high-protein snack qHS to prevent prolonged fasting.     Vaccines   - Serologies to assess immunity to hep A     Transplant Candidacy   - Defer given advanced age and co-morbidities.     CRC Screening  - Colonoscopy (5/7/2024) notable for 3 subcentimeter polyps, diverticulosis and small hemorrhoids.  - No further colonoscopies necessary for CRC screening given age.     - Ambulatory referral to Gastroenterology  - CBC and differential; Future  - Comprehensive metabolic panel; Future  - Protime-INR; Future  - AFP tumor marker; Future  - Hepatitis A antibody, total; Future  - US right upper quadrant with liver dopplers; Future    Follow-up in 3 months or sooner if necessary.      ______________________________________________________________________    HPI: Patient is a 78 y.o. male with PMH significant for CAD, PVD, COPD, ARACELY, DM2, CKD3 and history of AVR (1/2024) who presents today for a consultation regarding cirrhosis.     Mattson is familiar to [facility] gastroenterology last seen by Grazyna Mtz PA-C. He was also previously following [facility] was noted to have hepatic nodularity on cross-sectional imaging c/f cirrhosis. Per chart review, he was formally diagnosed with cirrhosis in November 2023 after having had a subsequent ultrasound notable for cirrhotic morphology and portal hypertension as evidenced by splenomegaly and a recannulized umbilical vein. Thought to be secondary to MASH vs EtOH but reports sobriety for many years. He does not recall being made aware of this diagnosis until he was hospitalized in April 2024 for symptomatic anemia (hgb 7.1) from a UGIB s/p EGD (4/10/2024) notable for 3 large varices with stigmata of recent bleeding (red paty sign) s/p banding (x 3) and grade D esophagitis with a discrete ulcer at the GE junction. Also notable for moderate PHG. He was also started on Lasix and Aldactone for new onset ascites as evidenced by large volume abdominopelvic ascites on imaging.    Since his discharge, he has had 2 additional EGDs for banding with his last being on 6/19/2024 showing 2 medium varices in the esophagus s/p banding (x 2) and PHG. He has also had a colonoscopy (5/7/2024) notable for 3 subcentimeter polyps, diverticulosis and small hemorrhoids. He continue to take pantoprazole 40 mg once daily for his esophagitis.     He also continues to struggle with ascites despite taking Lasix 40 mg twice daily and Aldactone 50 mg twice daily. He requires once weekly paracentesis. Admits to adhering to a low-sodium diet.     He has family from [place].     MELD 3.0: 13 at 4/13/2024  5:15 AM  MELD-Na: 11 at  4/13/2024  5:15 AM  Calculated from:  Serum Creatinine: 1.20 mg/dL at 4/13/2024  5:15 AM  Serum Sodium: 135 mmol/L at 4/13/2024  5:15 AM  Total Bilirubin: 0.56 mg/dL (Using min of 1 mg/dL) at 4/11/2024  9:13 AM  Serum Albumin: 2.4 g/dL at 4/11/2024  9:13 AM  INR(ratio): 1.25 at 4/11/2024 11:42 AM  Age at listing (hypothetical): 78 years  Sex: Male at 4/13/2024  5:15 AM      REVIEW OF SYSTEMS:    CONSTITUTIONAL: Denies any fever, chills, rigors, and weight loss.  HEENT: No earache or tinnitus. Denies hearing loss or visual disturbances.  CARDIOVASCULAR: No chest pain or palpitations.   RESPIRATORY: Denies any cough, hemoptysis, shortness of breath or dyspnea on exertion.  GASTROINTESTINAL: As noted in the History of Present Illness.   GENITOURINARY: No problems with urination. Denies any hematuria or dysuria.  NEUROLOGIC: No dizziness or vertigo, denies headaches.   MUSCULOSKELETAL: Denies any muscle or joint pain.   SKIN: Denies skin rashes or itching.   ENDOCRINE: Denies excessive thirst. Denies intolerance to heat or cold.  PSYCHOSOCIAL: Denies depression or anxiety. Denies any recent memory loss.       Historical Information   Past Medical History:   Diagnosis Date   • Chronic bronchitis (HCC)    • COPD (chronic obstructive pulmonary disease) (HCC)    • Diabetes mellitus (HCC)    • Esophageal varices (HCC) 11 apr 2024   • Hyperlipidemia    • Hypertension    • Obesity    • ARACELY (obstructive sleep apnea)      Past Surgical History:   Procedure Laterality Date   • AORTIC VALVE REPLACEMENT      Jan 4 2024   • IR PARACENTESIS  04/04/2024   • IR PARACENTESIS  03/07/2024   • IR PARACENTESIS  04/11/2024   • IR PARACENTESIS  04/26/2024   • IR PARACENTESIS  05/09/2024   • IR PARACENTESIS  05/16/2024   • IR PARACENTESIS  05/23/2024   • IR PARACENTESIS  06/06/2024   • IR PARACENTESIS  06/13/2024   • IR PARACENTESIS  06/20/2024   • IR PARACENTESIS  06/27/2024   • IR PARACENTESIS  07/03/2024   • IR PARACENTESIS  07/11/2024   •  IR PARACENTESIS  2024   • IR PARACENTESIS  2024   • IR PARACENTESIS  2024   • IR PARACENTESIS  2024   • UPPER GASTROINTESTINAL ENDOSCOPY  2024     Social History   Social History     Substance and Sexual Activity   Alcohol Use Not Currently   • Alcohol/week: 24.0 standard drinks of alcohol   • Types: 24 Cans of beer per week    Comment: sober- quit      Social History     Substance and Sexual Activity   Drug Use Not Currently     Social History     Tobacco Use   Smoking Status Former   • Current packs/day: 0.00   • Average packs/day: 5.0 packs/day for 35.0 years (175.0 ttl pk-yrs)   • Types: Cigarettes   • Start date:    • Quit date:    • Years since quittin.6   Smokeless Tobacco Never   Tobacco Comments    Quit in  (approx)     History reviewed. No pertinent family history.    Meds/Allergies       Current Outpatient Medications:   •  albuterol (PROVENTIL HFA,VENTOLIN HFA) 90 mcg/act inhaler  •  ammonium lactate (LAC-HYDRIN) 12 % lotion  •  aspirin 81 MG tablet  •  atorvastatin (LIPITOR) 40 mg tablet  •  furosemide (LASIX) 40 mg tablet  •  glucose 4-6 GM-MG  •  glucose blood test strip  •  hydrocortisone (PROCTOSOL HC) 2.5 % rectal cream  •  insulin aspart (NovoLOG FlexPen) 100 UNIT/ML injection pen  •  insulin degludec (Tresiba FlexTouch) 100 units/mL injection pen  •  lactulose (CHRONULAC) 10 g/15 mL solution  •  Lancets (ONETOUCH ULTRASOFT) lancets  •  losartan (COZAAR) 100 MG tablet  •  lovastatin (MEVACOR) 10 MG tablet  •  metFORMIN (GLUCOPHAGE-XR) 500 mg 24 hr tablet  •  Misc. Devices MISC  •  Multiple Vitamin (DAILY VALUE MULTIVITAMIN) TABS  •  NIFEdipine ER (ADALAT CC) 30 MG 24 hr tablet  •  nystatin (MYCOSTATIN) powder  •  Omega-3 Fatty Acids (FISH OIL) 645 MG CAPS  •  pantoprazole (PROTONIX) 40 mg tablet  •  spironolactone (ALDACTONE) 50 mg tablet  •  SYMBICORT 160-4.5 MCG/ACT inhaler    Allergies   Allergen Reactions   • Gemfibrozil Swelling and Rash   •  "Carbamazepine    • Carbamazepine And Analogs      swelling   • Oxycodone Other (See Comments)     Pt states he hallucinates           Objective     Blood pressure 128/76, pulse 102, temperature 98 °F (36.7 °C), temperature source Tympanic, resp. rate 18, height 5' 5\" (1.651 m), weight 87.6 kg (193 lb 3.2 oz), SpO2 97%. Body mass index is 32.15 kg/m².        PHYSICAL EXAM:      General Appearance:   Alert, cooperative, no distress; AAOx3, no asterixis   HEENT:   Normocephalic, atraumatic, anicteric; No scleral icterus     Neck:  Supple, symmetrical, trachea midline   Lungs:   Clear to auscultation bilaterally; no rales, rhonchi or wheezing; respirations unlabored    Heart::   Regular rate and rhythm; no murmur, rub, or gallop.   Abdomen:   +moderate abd distention; Soft, non-tender normal bowel sounds; no masses, no organomegaly    Genitalia:   Deferred    Rectal:   Deferred    Extremities:  No palmar erythema, cyanosis, clubbing or edema    Pulses:  2+ and symmetric    Skin:  No spider angiomas, jaundice, rashes, or lesions    Lymph nodes:  No palpable cervical lymphadenopathy        Lab Results:   No visits with results within 1 Day(s) from this visit.   Latest known visit with results is:   Hospital Outpatient Visit on 06/19/2024   Component Date Value   • POC Glucose 06/19/2024 136          Radiology Results:   IR paracentesis    Result Date: 8/5/2024  Narrative: Ultrasound-guided paracentesis Clinical History: Recurrent Ascites Procedure: Ongoing consent utilized for this procedure. Ultrasound used to localize the right lower quadrant ascites. The overlying skin was prepped and draped in usual sterile fashion and local anesthesia was obtained with the 1% lidocaine solution. A 5  Dutch Yueh needle was advanced until fluid was aspirated under live ultrasound guidance. Approximately 4050 cc' s of clear, yellow fluid was aspirated.     Impression: Impression: Ultrasound-guided paracentesis. Signed, performed, and " dictated by SUNDAY Guillen under the supervision of Dr. Calvillo. Workstation performed: [id number]     IR paracentesis    Result Date: 7/24/2024  Narrative: Ultrasound-guided paracentesis Clinical History: Recurrent symptomatic ascites Procedure: Ongoing consent utilized for this procedure. Ultrasound used to localize the right lower quadrant ascites. The overlying skin was prepped and draped in usual sterile fashion and local anesthesia was obtained with the 1% lidocaine solution. A 5  Italian Yueh needle was advanced until fluid was aspirated under live ultrasound guidance. Approximately 4450 cc' s of clear, yellow fluid was aspirated.     Impression: Impression: Ultrasound-guided paracentesis. Signed, performed, and dictated by SUNDAY Guillen under the supervision of Dr. Howard. Workstation performed: [id number]     IR paracentesis    Result Date: 7/18/2024  Narrative: Ultrasound-guided paracentesis Clinical History: Alcoholic cirrhosis with symptomatic recurrent ascites. Procedure: Ongoing consent was utilized for this procedure. Ultrasound used to localize the right lower quadrant ascites. The overlying skin was prepped and draped in usual sterile fashion and local anesthesia was obtained with the 1% lidocaine solution.  A 5 Italian Yueh needle was advanced until fluid was aspirated under live ultrasound guidance. Approximately 3520 cc' s of clear, yellow fluid was aspirated. The catheter was removed and a Band-Aid applied. The patient tolerated the procedure well and left the department in stable condition.     Impression: Impression: Ultrasound-guided paracentesis. Signed, performed, and dictated by SUNDAY Miller under the supervision of Dr. Stephen Bocanegra. Workstation performed: [id number]     IR paracentesis    Result Date: 7/11/2024  Narrative: Ultrasound-guided paracentesis Clinical History: Recurrent symptomatic ascites Procedure: Ongoing consent utilized for this procedure. Ultrasound used to  localize the right lower quadrant ascites. The overlying skin was prepped and draped in usual sterile fashion and local anesthesia was obtained with the 1% lidocaine solution. A 5  Bulgarian Yueh needle was advanced until fluid was aspirated under live ultrasound guidance. Approximately 3300 cc' s of cloudy yellow fluid was aspirated.     Impression: Impression: Ultrasound-guided paracentesis. Signed, performed, and dictated by SUNDAY Guillen under the supervision of Dr. Dias. Workstation performed: [id number]       Anamaria Hdze PA-C     **Please note: Dictation voice to text software may have been used in the creation of this record. Occasional wrong word or “sound alike” substitutions may have occurred due to the inherent limitations of voice recognition software. Read the chart carefully and recognize, using context, where substitutions have occurred.**

## 2024-08-06 NOTE — Clinical Note
Wesley Geronimo! Was the nifedipine prescribed for secondary ppx of esophageal variceal bleeding or for some other reason? If so, I'll probably switch him to carvedilol. Thanks!

## 2024-08-08 ENCOUNTER — HOSPITAL ENCOUNTER (OUTPATIENT)
Dept: NON INVASIVE DIAGNOSTICS | Facility: HOSPITAL | Age: 79
Discharge: HOME | End: 2024-08-08
Payer: MEDICARE

## 2024-08-08 VITALS
OXYGEN SATURATION: 98 % | DIASTOLIC BLOOD PRESSURE: 65 MMHG | HEART RATE: 98 BPM | RESPIRATION RATE: 20 BRPM | SYSTOLIC BLOOD PRESSURE: 139 MMHG

## 2024-08-08 DIAGNOSIS — K70.31 ALCOHOLIC CIRRHOSIS OF LIVER WITH ASCITES (HCC): ICD-10-CM

## 2024-08-08 PROCEDURE — 49083 ABD PARACENTESIS W/IMAGING: CPT

## 2024-08-08 RX ORDER — LIDOCAINE WITH 8.4% SOD BICARB 0.9%(10ML)
SYRINGE (ML) INJECTION AS NEEDED
Status: COMPLETED | OUTPATIENT
Start: 2024-08-08 | End: 2024-08-08

## 2024-08-08 RX ADMIN — Medication 10 ML: at 11:11

## 2024-08-08 NOTE — BRIEF OP NOTE (RAD/CATH)
IR PARACENTESIS Procedure Note    PATIENT NAME: Bimal Lugo  : 1945  MRN: [id number]    Pre-op Diagnosis:   1. 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    Specimens: none    Complications:  none immediate    Anesthesia: local    SUNDAY Fierro     Date: 2024  Time: 1:23 PM

## 2024-08-12 ENCOUNTER — TELEPHONE (OUTPATIENT)
Dept: GASTROENTEROLOGY | Facility: CLINIC | Age: 79
End: 2024-08-12

## 2024-08-12 DIAGNOSIS — K74.60 CIRRHOSIS OF LIVER WITH ASCITES, UNSPECIFIED HEPATIC CIRRHOSIS TYPE  (HCC): Primary | ICD-10-CM

## 2024-08-12 DIAGNOSIS — R18.8 CIRRHOSIS OF LIVER WITH ASCITES, UNSPECIFIED HEPATIC CIRRHOSIS TYPE  (HCC): Primary | ICD-10-CM

## 2024-08-12 DIAGNOSIS — I85.11 SECONDARY ESOPHAGEAL VARICES WITH BLEEDING (HCC): ICD-10-CM

## 2024-08-12 RX ORDER — CARVEDILOL 3.12 MG/1
3.12 TABLET ORAL 2 TIMES DAILY WITH MEALS
Qty: 60 TABLET | Refills: 11 | Status: SHIPPED | OUTPATIENT
Start: 2024-08-12

## 2024-08-12 NOTE — TELEPHONE ENCOUNTER
Called patient to discuss further plan of care.    Spoke with prescribing provider and it appears that nifedipine was prescribed for secondary prophylaxis of esophageal variceal bleeding.  Therefore, recommended that he stop nifedipine and alternatively prescribed carvedilol 3.125 mg twice daily. Rx sent to his preferred pharmacy. He is aware to monitor his BP and HR for hypotension and bradycardia respectively.

## 2024-08-15 ENCOUNTER — HOSPITAL ENCOUNTER (OUTPATIENT)
Dept: NON INVASIVE DIAGNOSTICS | Facility: HOSPITAL | Age: 79
Discharge: HOME | End: 2024-08-15
Payer: MEDICARE

## 2024-08-15 VITALS
SYSTOLIC BLOOD PRESSURE: 141 MMHG | RESPIRATION RATE: 14 BRPM | HEART RATE: 82 BPM | OXYGEN SATURATION: 98 % | DIASTOLIC BLOOD PRESSURE: 78 MMHG

## 2024-08-15 DIAGNOSIS — K70.31 ALCOHOLIC CIRRHOSIS OF LIVER WITH ASCITES (HCC): ICD-10-CM

## 2024-08-15 PROCEDURE — 49083 ABD PARACENTESIS W/IMAGING: CPT

## 2024-08-15 RX ORDER — LIDOCAINE WITH 8.4% SOD BICARB 0.9%(10ML)
SYRINGE (ML) INJECTION AS NEEDED
Status: COMPLETED | OUTPATIENT
Start: 2024-08-15 | End: 2024-08-15

## 2024-08-15 RX ADMIN — Medication 10 ML: at 10:20

## 2024-08-15 NOTE — BRIEF OP NOTE (RAD/CATH)
IR PARACENTESIS Procedure Note    PATIENT NAME: Bimal Lugo  : 1945  MRN: [id number]    Pre-op Diagnosis:   1. 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)        Surgeon:   SUNDAY Fierro  Assistants:     No qualified resident was available, Resident is only observing    Estimated Blood Loss: minimal  Findings: 4850 ml clear yellow ascites fluid, RLQ.    Specimens: none    Complications:  none immediate    Anesthesia: local    SUNDAY Fierro     Date: 8/15/2024  Time: 11:10 AM

## 2024-08-22 ENCOUNTER — HOSPITAL ENCOUNTER (OUTPATIENT)
Dept: NON INVASIVE DIAGNOSTICS | Facility: HOSPITAL | Age: 79
Discharge: HOME | End: 2024-08-22
Payer: MEDICARE

## 2024-08-22 VITALS — SYSTOLIC BLOOD PRESSURE: 118 MMHG | DIASTOLIC BLOOD PRESSURE: 92 MMHG

## 2024-08-22 DIAGNOSIS — K70.31 ALCOHOLIC CIRRHOSIS OF LIVER WITH ASCITES (HCC): ICD-10-CM

## 2024-08-22 PROCEDURE — 49083 ABD PARACENTESIS W/IMAGING: CPT

## 2024-08-22 RX ORDER — LIDOCAINE WITH 8.4% SOD BICARB 0.9%(10ML)
SYRINGE (ML) INJECTION AS NEEDED
Status: COMPLETED | OUTPATIENT
Start: 2024-08-22 | End: 2024-08-22

## 2024-08-22 RX ADMIN — Medication 10 ML: at 11:01

## 2024-08-29 ENCOUNTER — HOSPITAL ENCOUNTER (OUTPATIENT)
Dept: NON INVASIVE DIAGNOSTICS | Facility: HOSPITAL | Age: 79
Discharge: HOME | End: 2024-08-29
Payer: MEDICARE

## 2024-08-29 ENCOUNTER — HOSPITAL ENCOUNTER (OUTPATIENT)
Dept: ULTRASOUND IMAGING | Facility: HOSPITAL | Age: 79
End: 2024-08-29
Payer: MEDICARE

## 2024-08-29 VITALS
OXYGEN SATURATION: 97 % | SYSTOLIC BLOOD PRESSURE: 146 MMHG | HEART RATE: 77 BPM | RESPIRATION RATE: 16 BRPM | DIASTOLIC BLOOD PRESSURE: 68 MMHG

## 2024-08-29 DIAGNOSIS — K70.31 ALCOHOLIC CIRRHOSIS OF LIVER WITH ASCITES (HCC): ICD-10-CM

## 2024-08-29 DIAGNOSIS — K74.60 CIRRHOSIS OF LIVER WITH ASCITES, UNSPECIFIED HEPATIC CIRRHOSIS TYPE  (HCC): ICD-10-CM

## 2024-08-29 DIAGNOSIS — R18.8 CIRRHOSIS OF LIVER WITH ASCITES, UNSPECIFIED HEPATIC CIRRHOSIS TYPE  (HCC): ICD-10-CM

## 2024-08-29 PROCEDURE — 49083 ABD PARACENTESIS W/IMAGING: CPT | Performed by: NURSE PRACTITIONER

## 2024-08-29 PROCEDURE — 49083 ABD PARACENTESIS W/IMAGING: CPT

## 2024-08-29 PROCEDURE — 76705 ECHO EXAM OF ABDOMEN: CPT

## 2024-08-29 RX ORDER — LIDOCAINE WITH 8.4% SOD BICARB 0.9%(10ML)
SYRINGE (ML) INJECTION AS NEEDED
Status: COMPLETED | OUTPATIENT
Start: 2024-08-29 | End: 2024-08-29

## 2024-08-29 RX ADMIN — Medication 10 ML: at 11:12

## 2024-08-29 NOTE — BRIEF OP NOTE (RAD/CATH)
IR PARACENTESIS Procedure Note    PATIENT NAME: Bimal Lugo  : 1945  MRN: [id number]    Pre-op Diagnosis:   1. 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)      Provider:   SUNDAY Worley    Assistants:   None    Estimated Blood Loss: None     Findings: 3100 ml clear yellow ascites aspirated from RIGHT sided ultrasound guided paracentesis.     Specimens: None     Complications:  None immediate     Anesthesia: local    SUNDAY Worley     Date: 2024  Time: 12:28 PM

## 2024-09-05 ENCOUNTER — HOSPITAL ENCOUNTER (OUTPATIENT)
Dept: NON INVASIVE DIAGNOSTICS | Facility: HOSPITAL | Age: 79
Discharge: HOME | End: 2024-09-05
Payer: MEDICARE

## 2024-09-05 VITALS
DIASTOLIC BLOOD PRESSURE: 68 MMHG | OXYGEN SATURATION: 98 % | HEART RATE: 80 BPM | SYSTOLIC BLOOD PRESSURE: 143 MMHG | RESPIRATION RATE: 20 BRPM

## 2024-09-05 DIAGNOSIS — K70.31 ALCOHOLIC CIRRHOSIS OF LIVER WITH ASCITES (HCC): ICD-10-CM

## 2024-09-05 PROCEDURE — 49083 ABD PARACENTESIS W/IMAGING: CPT

## 2024-09-05 RX ORDER — LIDOCAINE WITH 8.4% SOD BICARB 0.9%(10ML)
SYRINGE (ML) INJECTION AS NEEDED
Status: COMPLETED | OUTPATIENT
Start: 2024-09-05 | End: 2024-09-05

## 2024-09-05 RX ADMIN — Medication 10 ML: at 09:40

## 2024-09-05 NOTE — BRIEF OP NOTE (RAD/CATH)
IR PARACENTESIS Procedure Note    PATIENT NAME: Bimal Lugo  : 1945  MRN: [id number]    Pre-op Diagnosis:   1. 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)        Surgeon:   SUNDAY Fierro  Assistants:     No qualified resident was available, Resident is only observing    Estimated Blood Loss: minimal  Findings: 2450 ml clear yellow ascites fluid, LLQ.    Specimens: none    Complications:  none immediate    Anesthesia: local    SUNDAY Fierro     Date: 2024  Time: 10:16 AM

## 2024-09-11 ENCOUNTER — OFFICE VISIT (OUTPATIENT)
Dept: GASTROENTEROLOGY | Facility: CLINIC | Age: 79
End: 2024-09-11
Payer: MEDICARE

## 2024-09-11 VITALS
HEIGHT: 65 IN | DIASTOLIC BLOOD PRESSURE: 68 MMHG | WEIGHT: 202 LBS | HEART RATE: 63 BPM | SYSTOLIC BLOOD PRESSURE: 116 MMHG | BODY MASS INDEX: 33.66 KG/M2 | TEMPERATURE: 97.5 F | OXYGEN SATURATION: 98 %

## 2024-09-11 DIAGNOSIS — I85.11 SECONDARY ESOPHAGEAL VARICES WITH BLEEDING (HCC): ICD-10-CM

## 2024-09-11 DIAGNOSIS — E53.8 DEFICIENCY OF OTHER SPECIFIED B GROUP VITAMINS: ICD-10-CM

## 2024-09-11 DIAGNOSIS — K70.31 ALCOHOLIC CIRRHOSIS OF LIVER WITH ASCITES (HCC): Primary | ICD-10-CM

## 2024-09-11 DIAGNOSIS — K70.31 ALCOHOLIC CIRRHOSIS OF LIVER WITH ASCITES (HCC): ICD-10-CM

## 2024-09-11 DIAGNOSIS — G62.9 NEUROPATHY: Primary | ICD-10-CM

## 2024-09-11 PROCEDURE — 99213 OFFICE O/P EST LOW 20 MIN: CPT | Performed by: PHYSICIAN ASSISTANT

## 2024-09-11 RX ORDER — MONTELUKAST SODIUM 10 MG/1
10 TABLET ORAL DAILY
COMMUNITY
Start: 2024-09-09

## 2024-09-11 RX ORDER — ISOSORBIDE MONONITRATE 30 MG/1
TABLET, EXTENDED RELEASE ORAL
COMMUNITY
Start: 2024-08-12

## 2024-09-11 NOTE — PATIENT INSTRUCTIONS
Scheduled date of EGD(as of today):9/24/24  Physician performing EGD:Kirill  Location of EGD:[place]  Instructions reviewed with patient by:Salma PIZARRO  Clearances:  none

## 2024-09-11 NOTE — PROGRESS NOTES
[facility] - Outpatient Follow-up Note  Bimal Lugo 79 y.o. male MRN: [id number]  Encounter: [id number]          ASSESSMENT AND PLAN:      1. 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 Secondary esophageal varices with bleeding (HCC)  MASLD/ETOH    MELD 3.0 from 8/16/2024 is 17  Based upon Age 79  Cr 2.1  Bilirubin 0.5  INR 1.2  Sodium 136  Albumin 3.0    HE: Grade 0.  Has been prescribed Lactulose but does not want to take it due to concerns about diarrhea. No asterixis on exam  Varices:  EGD 4/10/24, 5/7/24, 6/10/24 with banding.  Will repeat as per last procedure note.  On Carvedilol 3.125 BID with pulse of 63  Ascites:  Has weekly LVP although amount of fluid is decreasing. On Furosemide 40mg BID and Spironolactone 50mg BID.  Creatinine bumped recently.  Will repeat level.  Continue low Na diet <2000mg per day  HCC: Negative AFP and doppler US in August of this year  Transplant: not a candidate due to age and comorbidities    Continue to completely avoid alcohol  Continue pursuit of weight loss and muscle building   Strict blood sugar control        ______________________________________________________________________    SUBJECTIVE: 79-year-old male with alcohol and MASH cirrhosis complicated by ascites and esophageal varices who presents for routine follow-up.  Overall he feels well.  He continues to require weekly large-volume paracentesis.  It seems that the amount of fluid be removed every week is lessening.  He denies any significant abdominal pain.  He did have an episode of nausea and vomiting last week but this quickly resolved.  He denies any fevers or chills.  He is trying to be cautious with his salt intake.  He is being careful to read ingredient labels and is not putting salt on his food.  He maintains on Aldactone and lactulose.  He denies any issues with hematemesis or melena.  He is due to have an upper endoscopy done.  His last EGD in June noted small  esophageal varices status post banding.  He was advised to have repeat banding in 4 weeks.  He denies any issues with his bowel movements at present.  He is up-to-date on his colorectal cancer screening.  He had a colonoscopy in May with 3 polyps removed.  No further follow-ups recommended due to age.        REVIEW OF SYSTEMS IS OTHERWISE NEGATIVE.      Historical Information   Past Medical History:   Diagnosis Date    Chronic bronchitis (HCC)     COPD (chronic obstructive pulmonary disease) (HCC)     Diabetes mellitus (HCC)     Esophageal varices (HCC) 11 apr 2024    Hyperlipidemia     Hypertension     Obesity     ARACELY (obstructive sleep apnea)      Past Surgical History:   Procedure Laterality Date    AORTIC VALVE REPLACEMENT      Jan 4 2024    IR PARACENTESIS  04/04/2024    IR PARACENTESIS  03/07/2024    IR PARACENTESIS  04/11/2024    IR PARACENTESIS  04/26/2024    IR PARACENTESIS  05/09/2024    IR PARACENTESIS  05/16/2024    IR PARACENTESIS  05/23/2024    IR PARACENTESIS  06/06/2024    IR PARACENTESIS  06/13/2024    IR PARACENTESIS  06/20/2024    IR PARACENTESIS  06/27/2024    IR PARACENTESIS  07/03/2024    IR PARACENTESIS  07/11/2024    IR PARACENTESIS  07/18/2024    IR PARACENTESIS  07/24/2024    IR PARACENTESIS  08/01/2024    IR PARACENTESIS  8/8/2024    IR PARACENTESIS  8/15/2024    IR PARACENTESIS  8/22/2024    IR PARACENTESIS  8/29/2024    IR PARACENTESIS  9/5/2024    UPPER GASTROINTESTINAL ENDOSCOPY  11 apr 2024     Social History   Social History     Substance and Sexual Activity   Alcohol Use Not Currently    Alcohol/week: 24.0 standard drinks of alcohol    Types: 24 Cans of beer per week    Comment: sober- quit 1987     Social History     Substance and Sexual Activity   Drug Use Not Currently     Social History     Tobacco Use   Smoking Status Former    Current packs/day: 0.00    Average packs/day: 5.0 packs/day for 35.0 years (175.0 ttl pk-yrs)    Types: Cigarettes    Start date: 1952    Quit date:  "1987    Years since quittin.7   Smokeless Tobacco Never   Tobacco Comments    Quit in  (approx)     History reviewed. No pertinent family history.    Meds/Allergies       Current Outpatient Medications:     albuterol (PROVENTIL HFA,VENTOLIN HFA) 90 mcg/act inhaler    ammonium lactate (LAC-HYDRIN) 12 % lotion    aspirin 81 MG tablet    atorvastatin (LIPITOR) 40 mg tablet    carvedilol (COREG) 3.125 mg tablet    furosemide (LASIX) 40 mg tablet    glucose 4-6 GM-MG    glucose blood test strip    hydrocortisone (PROCTOSOL HC) 2.5 % rectal cream    insulin aspart (NovoLOG FlexPen) 100 UNIT/ML injection pen    insulin degludec (Tresiba FlexTouch) 100 units/mL injection pen    isosorbide mononitrate (IMDUR) 30 mg 24 hr tablet    lactulose (CHRONULAC) 10 g/15 mL solution    Lancets (ONETOUCH ULTRASOFT) lancets    losartan (COZAAR) 100 MG tablet    metFORMIN (GLUCOPHAGE-XR) 500 mg 24 hr tablet    Misc. Devices MISC    montelukast (SINGULAIR) 10 mg tablet    Multiple Vitamin (DAILY VALUE MULTIVITAMIN) TABS    NIFEdipine ER (ADALAT CC) 30 MG 24 hr tablet    nystatin (MYCOSTATIN) powder    Omega-3 Fatty Acids (FISH OIL) 645 MG CAPS    pantoprazole (PROTONIX) 40 mg tablet    spironolactone (ALDACTONE) 50 mg tablet    SYMBICORT 160-4.5 MCG/ACT inhaler    lovastatin (MEVACOR) 10 MG tablet    Allergies   Allergen Reactions    Gemfibrozil Swelling and Rash    Carbamazepine     Carbamazepine And Analogs      swelling    Oxycodone Other (See Comments)     Pt states he hallucinates           Objective     Blood pressure 116/68, pulse 63, temperature 97.5 °F (36.4 °C), temperature source Temporal, height 5' 5\" (1.651 m), weight 91.6 kg (202 lb), SpO2 98%. Body mass index is 33.61 kg/m².      PHYSICAL EXAM:      General Appearance:   Alert, cooperative, no distress   HEENT:   Normocephalic, atraumatic, anicteric.     Neck:  Supple, symmetrical, trachea midline   Lungs:   Clear to auscultation bilaterally; no rales, rhonchi or " wheezing; respirations unlabored    Heart::   Regular rate and rhythm; no murmur, rub, or gallop.   Abdomen:   Soft, non-tender, non-distended; normal bowel sounds; no masses, no organomegaly    Genitalia:   Deferred    Rectal:   Deferred    Extremities:  No cyanosis, clubbing or edema    Pulses:  2+ and symmetric    Skin:  No jaundice, rashes, or lesions    Lymph nodes:  No palpable cervical lymphadenopathy        Lab Results:   No visits with results within 1 Day(s) from this visit.   Latest known visit with results is:   Hospital Outpatient Visit on 06/19/2024   Component Date Value    POC Glucose 06/19/2024 136          Radiology Results:   IR paracentesis    Result Date: 9/6/2024  Narrative: Ultrasound-guided paracentesis Clinical History: Alcoholic cirrhosis with symptomatic recurrent ascites Procedure: Ultrasound used to localize the left lower quadrant ascites. The overlying skin was prepped and draped in usual sterile fashion and local anesthesia was obtained with the 1% lidocaine solution. A 5 Solomon Islander Yueh needle was advanced until fluid was aspirated under live ultrasound guidance. Approximately 2450 cc' s of clear, yellow fluid was aspirated. The catheter was removed and a Band-Aid applied. The patient tolerated the procedure well and left the department in stable condition.     Impression: Impression: Ultrasound-guided paracentesis. Signed, performed, and dictated by SUNDAY Miller under the supervision of Dr. Olivia Valadez. Workstation performed: [id number]     US right upper quadrant with liver dopplers    Result Date: 9/3/2024  Narrative: RIGHT UPPER QUADRANT ULTRASOUND WITH LIVER DOPPLER INDICATION: K74.60: Unspecified cirrhosis of liver R18.8: Other ascites. Paracentesis performed earlier today COMPARISON: CT abdomen and pelvis 6/29/2024 TECHNIQUE: Real-time ultrasound of the right upper quadrant was performed with a curvilinear transducer with both volumetric sweeps and still imaging  techniques. FINDINGS: PANCREAS: Poorly visualized secondary to echogenic liver and bowel gas AORTA AND IVC: Visualized portions are normal for patient age. LIVER: Size: Within normal range. The liver measures 15.2 cm in the midclavicular line. Contour: Surface contour is nodular. Parenchyma: There is diffuse coarsened heterogeneous echotexture suggesting underlying cirrhotic changes. No liver mass identified. LIVER DOPPLER: The main portal vein and primary branch segments are patent and hepatopetal with normal spectral waveform. Hepatic veins are patent. Spectral waveforms within normal limits. Main hepatic artery appears normal size, patent with normal spectral waveform. BILIARY: Patient has undergone cholecystectomy. No intrahepatic biliary dilatation. CBD measures 5.0 mm. No choledocholithiasis. KIDNEY: Right kidney measures 9.4 x 6.1 x 5.7 cm. Volume 170.6 mL Simple lower pole 2.9 cm cyst noted. Otherwise unremarkable ASCITES: Small amount of residual ascites noted     Impression: 1. Hepatic cirrhosis. No focal liver abnormality appreciated 2. Hepatopetal blood flow within the main portal vein. 3. Status post cholecystectomy. No biliary ductal dilatation 4. Small amount of ascites Workstation performed: [id number]     IR paracentesis    Result Date: 8/29/2024  Narrative: Ultrasound-guided paracentesis Clinical History: Recurrent symptomatic ascites Procedure: Ongoing consent utilized for this procedure.  Ultrasound used to localize the right lower quadrant ascites. The overlying skin was prepped and draped in usual sterile fashion and local anesthesia was obtained with the 1% lidocaine solution. A 5 Nepali Yueh needle was advanced until fluid was aspirated under live ultrasound guidance. Approximately 3100 cc' s of clear, yellow fluid was aspirated.     Impression: Impression: Ultrasound-guided paracentesis. Signed, performed, and dictated by SUNDAY Guillen under the supervision of Dr. Oates. Workstation  performed: [id number]     IR paracentesis    Result Date: 8/23/2024  Narrative: Ultrasound-guided paracentesis Clinical History: Alcoholic cirrhosis with symptomatic recurrent ascites. Procedure:  Ultrasound used to localize the right lower quadrant ascites. The overlying skin was prepped and draped in usual sterile fashion and local anesthesia was obtained with the 1% lidocaine solution. A 5 Turkmen Yueh needle was advanced until fluid  was aspirated under live ultrasound guidance. Approximately 2610 cc' s of clear, yellow fluid was aspirated. The catheter was removed and a Band-Aid applied. The patient tolerated the procedure well and left the department in stable condition.     Impression: Impression: Ultrasound-guided paracentesis. Signed, performed, and dictated by SUNDAY Miller under the supervision of Dr. Olivia Valadez. Workstation performed: [id number]     IR paracentesis    Result Date: 8/16/2024  Narrative: Ultrasound-guided paracentesis Clinical History: Alcoholic cirrhosis with symptomatic recurrent ascites. Procedure:  Ultrasound used to localize the right lower quadrant ascites. The overlying skin was prepped and draped in usual sterile fashion and local anesthesia was obtained with the 1% lidocaine solution. A 5 Turkmen Yueh needle was advanced until fluid  was aspirated under live ultrasound guidance. Approximately 4850 cc' s of clear, yellow fluid was aspirated. The catheter was removed and a Band-Aid applied. The patient tolerated the procedure well and left the department in stable condition.     Impression: Impression: Ultrasound-guided paracentesis. Signed, performed, and dictated by SUNDAY Miller under the supervision of Dr. Olivia Valadez. Workstation performed: [id number]   Answers submitted by the patient for this visit:  Abdominal Pain Questionnaire (Submitted on 9/9/2024)  Chief Complaint: Abdominal pain  Onset: more than 1 year ago  Onset quality: gradual  Frequency: 2 to 4  times per day  Progression since onset: waxing and waning  Pain location: generalized abdominal region  Pain - numeric: 8/10  Pain quality: aching  Radiates to: LLQ, RLQ  anorexia: Yes  arthralgias: No  belching: No  constipation: No  diarrhea: Yes  dysuria: No  fever: No  flatus: Yes  headaches: Yes  hematochezia: Yes  hematuria: No  melena: Yes  myalgias: No  nausea: Yes  weight loss: No  vomiting: Yes  Aggravated by: nothing  Relieved by: recumbency  Diagnostic workup: CT scan, lower endoscopy, ultrasound

## 2024-09-11 NOTE — H&P (VIEW-ONLY)
[facility] - Outpatient Follow-up Note  Bimal Lugo 79 y.o. male MRN: [id number]  Encounter: [id number]          ASSESSMENT AND PLAN:      1. 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 Secondary esophageal varices with bleeding (HCC)  MASLD/ETOH    MELD 3.0 from 8/16/2024 is 17  Based upon Age 79  Cr 2.1  Bilirubin 0.5  INR 1.2  Sodium 136  Albumin 3.0    HE: Grade 0.  Has been prescribed Lactulose but does not want to take it due to concerns about diarrhea. No asterixis on exam  Varices:  EGD 4/10/24, 5/7/24, 6/10/24 with banding.  Will repeat as per last procedure note.  On Carvedilol 3.125 BID with pulse of 63  Ascites:  Has weekly LVP although amount of fluid is decreasing. On Furosemide 40mg BID and Spironolactone 50mg BID.  Creatinine bumped recently.  Will repeat level.  Continue low Na diet <2000mg per day  HCC: Negative AFP and doppler US in August of this year  Transplant: not a candidate due to age and comorbidities    Continue to completely avoid alcohol  Continue pursuit of weight loss and muscle building   Strict blood sugar control        ______________________________________________________________________    SUBJECTIVE: 79-year-old male with alcohol and MASH cirrhosis complicated by ascites and esophageal varices who presents for routine follow-up.  Overall he feels well.  He continues to require weekly large-volume paracentesis.  It seems that the amount of fluid be removed every week is lessening.  He denies any significant abdominal pain.  He did have an episode of nausea and vomiting last week but this quickly resolved.  He denies any fevers or chills.  He is trying to be cautious with his salt intake.  He is being careful to read ingredient labels and is not putting salt on his food.  He maintains on Aldactone and lactulose.  He denies any issues with hematemesis or melena.  He is due to have an upper endoscopy done.  His last EGD in June noted small  esophageal varices status post banding.  He was advised to have repeat banding in 4 weeks.  He denies any issues with his bowel movements at present.  He is up-to-date on his colorectal cancer screening.  He had a colonoscopy in May with 3 polyps removed.  No further follow-ups recommended due to age.        REVIEW OF SYSTEMS IS OTHERWISE NEGATIVE.      Historical Information   Past Medical History:   Diagnosis Date    Chronic bronchitis (HCC)     COPD (chronic obstructive pulmonary disease) (HCC)     Diabetes mellitus (HCC)     Esophageal varices (HCC) 11 apr 2024    Hyperlipidemia     Hypertension     Obesity     ARACELY (obstructive sleep apnea)      Past Surgical History:   Procedure Laterality Date    AORTIC VALVE REPLACEMENT      Jan 4 2024    IR PARACENTESIS  04/04/2024    IR PARACENTESIS  03/07/2024    IR PARACENTESIS  04/11/2024    IR PARACENTESIS  04/26/2024    IR PARACENTESIS  05/09/2024    IR PARACENTESIS  05/16/2024    IR PARACENTESIS  05/23/2024    IR PARACENTESIS  06/06/2024    IR PARACENTESIS  06/13/2024    IR PARACENTESIS  06/20/2024    IR PARACENTESIS  06/27/2024    IR PARACENTESIS  07/03/2024    IR PARACENTESIS  07/11/2024    IR PARACENTESIS  07/18/2024    IR PARACENTESIS  07/24/2024    IR PARACENTESIS  08/01/2024    IR PARACENTESIS  8/8/2024    IR PARACENTESIS  8/15/2024    IR PARACENTESIS  8/22/2024    IR PARACENTESIS  8/29/2024    IR PARACENTESIS  9/5/2024    UPPER GASTROINTESTINAL ENDOSCOPY  11 apr 2024     Social History   Social History     Substance and Sexual Activity   Alcohol Use Not Currently    Alcohol/week: 24.0 standard drinks of alcohol    Types: 24 Cans of beer per week    Comment: sober- quit 1987     Social History     Substance and Sexual Activity   Drug Use Not Currently     Social History     Tobacco Use   Smoking Status Former    Current packs/day: 0.00    Average packs/day: 5.0 packs/day for 35.0 years (175.0 ttl pk-yrs)    Types: Cigarettes    Start date: 1952    Quit date:  "1987    Years since quittin.7   Smokeless Tobacco Never   Tobacco Comments    Quit in  (approx)     History reviewed. No pertinent family history.    Meds/Allergies       Current Outpatient Medications:     albuterol (PROVENTIL HFA,VENTOLIN HFA) 90 mcg/act inhaler    ammonium lactate (LAC-HYDRIN) 12 % lotion    aspirin 81 MG tablet    atorvastatin (LIPITOR) 40 mg tablet    carvedilol (COREG) 3.125 mg tablet    furosemide (LASIX) 40 mg tablet    glucose 4-6 GM-MG    glucose blood test strip    hydrocortisone (PROCTOSOL HC) 2.5 % rectal cream    insulin aspart (NovoLOG FlexPen) 100 UNIT/ML injection pen    insulin degludec (Tresiba FlexTouch) 100 units/mL injection pen    isosorbide mononitrate (IMDUR) 30 mg 24 hr tablet    lactulose (CHRONULAC) 10 g/15 mL solution    Lancets (ONETOUCH ULTRASOFT) lancets    losartan (COZAAR) 100 MG tablet    metFORMIN (GLUCOPHAGE-XR) 500 mg 24 hr tablet    Misc. Devices MISC    montelukast (SINGULAIR) 10 mg tablet    Multiple Vitamin (DAILY VALUE MULTIVITAMIN) TABS    NIFEdipine ER (ADALAT CC) 30 MG 24 hr tablet    nystatin (MYCOSTATIN) powder    Omega-3 Fatty Acids (FISH OIL) 645 MG CAPS    pantoprazole (PROTONIX) 40 mg tablet    spironolactone (ALDACTONE) 50 mg tablet    SYMBICORT 160-4.5 MCG/ACT inhaler    lovastatin (MEVACOR) 10 MG tablet    Allergies   Allergen Reactions    Gemfibrozil Swelling and Rash    Carbamazepine     Carbamazepine And Analogs      swelling    Oxycodone Other (See Comments)     Pt states he hallucinates           Objective     Blood pressure 116/68, pulse 63, temperature 97.5 °F (36.4 °C), temperature source Temporal, height 5' 5\" (1.651 m), weight 91.6 kg (202 lb), SpO2 98%. Body mass index is 33.61 kg/m².      PHYSICAL EXAM:      General Appearance:   Alert, cooperative, no distress   HEENT:   Normocephalic, atraumatic, anicteric.     Neck:  Supple, symmetrical, trachea midline   Lungs:   Clear to auscultation bilaterally; no rales, rhonchi or " wheezing; respirations unlabored    Heart::   Regular rate and rhythm; no murmur, rub, or gallop.   Abdomen:   Soft, non-tender, non-distended; normal bowel sounds; no masses, no organomegaly    Genitalia:   Deferred    Rectal:   Deferred    Extremities:  No cyanosis, clubbing or edema    Pulses:  2+ and symmetric    Skin:  No jaundice, rashes, or lesions    Lymph nodes:  No palpable cervical lymphadenopathy        Lab Results:   No visits with results within 1 Day(s) from this visit.   Latest known visit with results is:   Hospital Outpatient Visit on 06/19/2024   Component Date Value    POC Glucose 06/19/2024 136          Radiology Results:   IR paracentesis    Result Date: 9/6/2024  Narrative: Ultrasound-guided paracentesis Clinical History: Alcoholic cirrhosis with symptomatic recurrent ascites Procedure: Ultrasound used to localize the left lower quadrant ascites. The overlying skin was prepped and draped in usual sterile fashion and local anesthesia was obtained with the 1% lidocaine solution. A 5 Cameroonian Yueh needle was advanced until fluid was aspirated under live ultrasound guidance. Approximately 2450 cc' s of clear, yellow fluid was aspirated. The catheter was removed and a Band-Aid applied. The patient tolerated the procedure well and left the department in stable condition.     Impression: Impression: Ultrasound-guided paracentesis. Signed, performed, and dictated by SUNDAY Miller under the supervision of Dr. Olivia Valadez. Workstation performed: [id number]     US right upper quadrant with liver dopplers    Result Date: 9/3/2024  Narrative: RIGHT UPPER QUADRANT ULTRASOUND WITH LIVER DOPPLER INDICATION: K74.60: Unspecified cirrhosis of liver R18.8: Other ascites. Paracentesis performed earlier today COMPARISON: CT abdomen and pelvis 6/29/2024 TECHNIQUE: Real-time ultrasound of the right upper quadrant was performed with a curvilinear transducer with both volumetric sweeps and still imaging  techniques. FINDINGS: PANCREAS: Poorly visualized secondary to echogenic liver and bowel gas AORTA AND IVC: Visualized portions are normal for patient age. LIVER: Size: Within normal range. The liver measures 15.2 cm in the midclavicular line. Contour: Surface contour is nodular. Parenchyma: There is diffuse coarsened heterogeneous echotexture suggesting underlying cirrhotic changes. No liver mass identified. LIVER DOPPLER: The main portal vein and primary branch segments are patent and hepatopetal with normal spectral waveform. Hepatic veins are patent. Spectral waveforms within normal limits. Main hepatic artery appears normal size, patent with normal spectral waveform. BILIARY: Patient has undergone cholecystectomy. No intrahepatic biliary dilatation. CBD measures 5.0 mm. No choledocholithiasis. KIDNEY: Right kidney measures 9.4 x 6.1 x 5.7 cm. Volume 170.6 mL Simple lower pole 2.9 cm cyst noted. Otherwise unremarkable ASCITES: Small amount of residual ascites noted     Impression: 1. Hepatic cirrhosis. No focal liver abnormality appreciated 2. Hepatopetal blood flow within the main portal vein. 3. Status post cholecystectomy. No biliary ductal dilatation 4. Small amount of ascites Workstation performed: [id number]     IR paracentesis    Result Date: 8/29/2024  Narrative: Ultrasound-guided paracentesis Clinical History: Recurrent symptomatic ascites Procedure: Ongoing consent utilized for this procedure.  Ultrasound used to localize the right lower quadrant ascites. The overlying skin was prepped and draped in usual sterile fashion and local anesthesia was obtained with the 1% lidocaine solution. A 5 Welsh Yueh needle was advanced until fluid was aspirated under live ultrasound guidance. Approximately 3100 cc' s of clear, yellow fluid was aspirated.     Impression: Impression: Ultrasound-guided paracentesis. Signed, performed, and dictated by SUNDAY Guillen under the supervision of Dr. Oates. Workstation  performed: [id number]     IR paracentesis    Result Date: 8/23/2024  Narrative: Ultrasound-guided paracentesis Clinical History: Alcoholic cirrhosis with symptomatic recurrent ascites. Procedure:  Ultrasound used to localize the right lower quadrant ascites. The overlying skin was prepped and draped in usual sterile fashion and local anesthesia was obtained with the 1% lidocaine solution. A 5 Greenlandic Yueh needle was advanced until fluid  was aspirated under live ultrasound guidance. Approximately 2610 cc' s of clear, yellow fluid was aspirated. The catheter was removed and a Band-Aid applied. The patient tolerated the procedure well and left the department in stable condition.     Impression: Impression: Ultrasound-guided paracentesis. Signed, performed, and dictated by SUNDAY Miller under the supervision of Dr. Olivia Valadez. Workstation performed: [id number]     IR paracentesis    Result Date: 8/16/2024  Narrative: Ultrasound-guided paracentesis Clinical History: Alcoholic cirrhosis with symptomatic recurrent ascites. Procedure:  Ultrasound used to localize the right lower quadrant ascites. The overlying skin was prepped and draped in usual sterile fashion and local anesthesia was obtained with the 1% lidocaine solution. A 5 Greenlandic Yueh needle was advanced until fluid  was aspirated under live ultrasound guidance. Approximately 4850 cc' s of clear, yellow fluid was aspirated. The catheter was removed and a Band-Aid applied. The patient tolerated the procedure well and left the department in stable condition.     Impression: Impression: Ultrasound-guided paracentesis. Signed, performed, and dictated by SUNDAY Miller under the supervision of Dr. Olivia Valadez. Workstation performed: [id number]   Answers submitted by the patient for this visit:  Abdominal Pain Questionnaire (Submitted on 9/9/2024)  Chief Complaint: Abdominal pain  Onset: more than 1 year ago  Onset quality: gradual  Frequency: 2 to 4  times per day  Progression since onset: waxing and waning  Pain location: generalized abdominal region  Pain - numeric: 8/10  Pain quality: aching  Radiates to: LLQ, RLQ  anorexia: Yes  arthralgias: No  belching: No  constipation: No  diarrhea: Yes  dysuria: No  fever: No  flatus: Yes  headaches: Yes  hematochezia: Yes  hematuria: No  melena: Yes  myalgias: No  nausea: Yes  weight loss: No  vomiting: Yes  Aggravated by: nothing  Relieved by: recumbency  Diagnostic workup: CT scan, lower endoscopy, ultrasound

## 2024-09-12 ENCOUNTER — TELEPHONE (OUTPATIENT)
Dept: GASTROENTEROLOGY | Facility: CLINIC | Age: 79
End: 2024-09-12

## 2024-09-12 ENCOUNTER — APPOINTMENT (OUTPATIENT)
Dept: LAB | Facility: HOSPITAL | Age: 79
End: 2024-09-12
Payer: MEDICARE

## 2024-09-12 ENCOUNTER — HOSPITAL ENCOUNTER (OUTPATIENT)
Dept: NON INVASIVE DIAGNOSTICS | Facility: HOSPITAL | Age: 79
Discharge: HOME | End: 2024-09-12
Payer: MEDICARE

## 2024-09-12 VITALS
HEART RATE: 74 BPM | SYSTOLIC BLOOD PRESSURE: 124 MMHG | OXYGEN SATURATION: 98 % | DIASTOLIC BLOOD PRESSURE: 61 MMHG | RESPIRATION RATE: 18 BRPM

## 2024-09-12 DIAGNOSIS — G62.9 NEUROPATHY: ICD-10-CM

## 2024-09-12 DIAGNOSIS — K70.31 ALCOHOLIC CIRRHOSIS OF LIVER WITH ASCITES (HCC): ICD-10-CM

## 2024-09-12 DIAGNOSIS — E53.8 DEFICIENCY OF OTHER SPECIFIED B GROUP VITAMINS: ICD-10-CM

## 2024-09-12 LAB
25(OH)D3 SERPL-MCNC: 19.3 NG/ML (ref 30–100)
ANION GAP SERPL CALCULATED.3IONS-SCNC: 9 MMOL/L (ref 4–13)
BUN SERPL-MCNC: 25 MG/DL (ref 5–25)
CALCIUM SERPL-MCNC: 8.5 MG/DL (ref 8.4–10.2)
CHLORIDE SERPL-SCNC: 103 MMOL/L (ref 96–108)
CO2 SERPL-SCNC: 22 MMOL/L (ref 21–32)
CREAT SERPL-MCNC: 1.61 MG/DL (ref 0.6–1.3)
GFR SERPL CREATININE-BSD FRML MDRD: 39 ML/MIN/1.73SQ M
GLUCOSE P FAST SERPL-MCNC: 121 MG/DL (ref 65–99)
POTASSIUM SERPL-SCNC: 4.3 MMOL/L (ref 3.5–5.3)
SODIUM SERPL-SCNC: 134 MMOL/L (ref 135–147)
VIT B12 SERPL-MCNC: 391 PG/ML (ref 180–914)

## 2024-09-12 PROCEDURE — 80048 BASIC METABOLIC PNL TOTAL CA: CPT

## 2024-09-12 PROCEDURE — 36415 COLL VENOUS BLD VENIPUNCTURE: CPT

## 2024-09-12 PROCEDURE — 82607 VITAMIN B-12: CPT

## 2024-09-12 PROCEDURE — 49083 ABD PARACENTESIS W/IMAGING: CPT

## 2024-09-12 PROCEDURE — 82306 VITAMIN D 25 HYDROXY: CPT

## 2024-09-12 RX ORDER — LIDOCAINE WITH 8.4% SOD BICARB 0.9%(10ML)
SYRINGE (ML) INJECTION AS NEEDED
Status: COMPLETED | OUTPATIENT
Start: 2024-09-12 | End: 2024-09-12

## 2024-09-12 RX ADMIN — Medication 10 ML: at 08:36

## 2024-09-12 NOTE — TELEPHONE ENCOUNTER
----- Message from Grazyna Mtz PA-C sent at 9/12/2024 10:32 AM EDT -----  Please advise patient his kidney function is slightly improved. We will continue the current doses of water pills

## 2024-09-12 NOTE — BRIEF OP NOTE (RAD/CATH)
IR PARACENTESIS Procedure Note    PATIENT NAME: Bimal Lugo  : 1945  MRN: [id number]    Pre-op Diagnosis:   1. 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)        Surgeon:   SUNDAY Fierro  Assistants:     No qualified resident was available, Resident is only observing    Estimated Blood Loss: minimal  Findings: 4100 ml clear yellow ascites fluid, RLQ.    Specimens: none    Complications:  none immediate    Anesthesia: local    SUNDAY Fierro     Date: 2024  Time: 9:29 AM

## 2024-09-19 ENCOUNTER — HOSPITAL ENCOUNTER (OUTPATIENT)
Dept: NON INVASIVE DIAGNOSTICS | Facility: HOSPITAL | Age: 79
Discharge: HOME | End: 2024-09-19
Payer: MEDICARE

## 2024-09-19 VITALS
RESPIRATION RATE: 18 BRPM | HEART RATE: 72 BPM | SYSTOLIC BLOOD PRESSURE: 142 MMHG | DIASTOLIC BLOOD PRESSURE: 63 MMHG | OXYGEN SATURATION: 97 %

## 2024-09-19 DIAGNOSIS — K70.31 ALCOHOLIC CIRRHOSIS OF LIVER WITH ASCITES (HCC): ICD-10-CM

## 2024-09-19 PROCEDURE — 49083 ABD PARACENTESIS W/IMAGING: CPT

## 2024-09-19 RX ORDER — LIDOCAINE WITH 8.4% SOD BICARB 0.9%(10ML)
SYRINGE (ML) INJECTION AS NEEDED
Status: COMPLETED | OUTPATIENT
Start: 2024-09-19 | End: 2024-09-19

## 2024-09-19 RX ADMIN — Medication 8 ML: at 08:31

## 2024-09-19 NOTE — BRIEF OP NOTE (RAD/CATH)
IR PARACENTESIS Procedure Note    PATIENT NAME: Bimal Lugo  : 1945  MRN: [id number]    Pre-op Diagnosis:   1. 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)        Surgeon:   SUNDAY Fierro  Assistants:     No qualified resident was available, Resident is only observing    Estimated Blood Loss: minimal  Findings: 4250 ml clear yellow ascites fluid, RLQ.     Specimens: none    Complications:  none immediate    Anesthesia: local    SUNDAY Fierro     Date: 2024  Time: 9:09 AM

## 2024-09-24 ENCOUNTER — HOSPITAL ENCOUNTER (OUTPATIENT)
Dept: GASTROENTEROLOGY | Facility: HOSPITAL | Age: 79
Setting detail: OUTPATIENT SURGERY
Discharge: HOME | End: 2024-09-24
Payer: MEDICARE

## 2024-09-24 ENCOUNTER — ANESTHESIA EVENT (OUTPATIENT)
Dept: GASTROENTEROLOGY | Facility: HOSPITAL | Age: 79
End: 2024-09-24
Payer: MEDICARE

## 2024-09-24 ENCOUNTER — ANESTHESIA (OUTPATIENT)
Dept: GASTROENTEROLOGY | Facility: HOSPITAL | Age: 79
End: 2024-09-24
Payer: MEDICARE

## 2024-09-24 VITALS
HEART RATE: 74 BPM | WEIGHT: 196.87 LBS | TEMPERATURE: 97.2 F | OXYGEN SATURATION: 97 % | SYSTOLIC BLOOD PRESSURE: 130 MMHG | BODY MASS INDEX: 32.8 KG/M2 | HEIGHT: 65 IN | RESPIRATION RATE: 22 BRPM | DIASTOLIC BLOOD PRESSURE: 62 MMHG

## 2024-09-24 DIAGNOSIS — K70.31 ALCOHOLIC CIRRHOSIS OF LIVER WITH ASCITES (HCC): ICD-10-CM

## 2024-09-24 PROBLEM — E11.9 DIABETES MELLITUS, TYPE 2 (HCC): Status: ACTIVE | Noted: 2024-09-24

## 2024-09-24 LAB — GLUCOSE SERPL-MCNC: 136 MG/DL (ref 65–140)

## 2024-09-24 PROCEDURE — 88341 IMHCHEM/IMCYTCHM EA ADD ANTB: CPT | Performed by: PATHOLOGY

## 2024-09-24 PROCEDURE — 88313 SPECIAL STAINS GROUP 2: CPT | Performed by: PATHOLOGY

## 2024-09-24 PROCEDURE — 88305 TISSUE EXAM BY PATHOLOGIST: CPT | Performed by: PATHOLOGY

## 2024-09-24 PROCEDURE — 88342 IMHCHEM/IMCYTCHM 1ST ANTB: CPT | Performed by: PATHOLOGY

## 2024-09-24 PROCEDURE — 82948 REAGENT STRIP/BLOOD GLUCOSE: CPT

## 2024-09-24 PROCEDURE — 43239 EGD BIOPSY SINGLE/MULTIPLE: CPT | Performed by: INTERNAL MEDICINE

## 2024-09-24 RX ORDER — PROPOFOL 10 MG/ML
INJECTION, EMULSION INTRAVENOUS AS NEEDED
Status: DISCONTINUED | OUTPATIENT
Start: 2024-09-24 | End: 2024-09-24

## 2024-09-24 RX ORDER — LIDOCAINE HYDROCHLORIDE 20 MG/ML
INJECTION, SOLUTION EPIDURAL; INFILTRATION; INTRACAUDAL; PERINEURAL AS NEEDED
Status: DISCONTINUED | OUTPATIENT
Start: 2024-09-24 | End: 2024-09-24

## 2024-09-24 RX ORDER — SODIUM CHLORIDE, SODIUM LACTATE, POTASSIUM CHLORIDE, CALCIUM CHLORIDE 600; 310; 30; 20 MG/100ML; MG/100ML; MG/100ML; MG/100ML
75 INJECTION, SOLUTION INTRAVENOUS CONTINUOUS
Status: DISCONTINUED | OUTPATIENT
Start: 2024-09-24 | End: 2024-09-28 | Stop reason: HOSPADM

## 2024-09-24 RX ADMIN — LIDOCAINE HYDROCHLORIDE 100 MG: 20 INJECTION, SOLUTION EPIDURAL; INFILTRATION; INTRACAUDAL; PERINEURAL at 07:18

## 2024-09-24 RX ADMIN — PHENYLEPHRINE HYDROCHLORIDE 100 MCG: 10 INJECTION INTRAVENOUS at 07:24

## 2024-09-24 RX ADMIN — PROPOFOL 100 MG: 10 INJECTION, EMULSION INTRAVENOUS at 07:18

## 2024-09-24 RX ADMIN — SODIUM CHLORIDE, SODIUM LACTATE, POTASSIUM CHLORIDE, AND CALCIUM CHLORIDE 75 ML/HR: .6; .31; .03; .02 INJECTION, SOLUTION INTRAVENOUS at 07:02

## 2024-09-24 RX ADMIN — PROPOFOL 30 MG: 10 INJECTION, EMULSION INTRAVENOUS at 07:21

## 2024-09-24 RX ADMIN — PHENYLEPHRINE HYDROCHLORIDE 100 MCG: 10 INJECTION INTRAVENOUS at 07:23

## 2024-09-24 NOTE — ANESTHESIA POSTPROCEDURE EVALUATION
Post-Op Assessment Note    CV Status:  Stable  Pain Score: 0    Pain management: adequate       Mental Status:  Sleepy   Hydration Status:  Euvolemic   PONV Controlled:  Controlled   Airway Patency:  Patent     Post Op Vitals Reviewed: Yes    No anethesia notable event occurred.    Staff: CRNA               /60 (09/24/24 0728)    Temp (!) 97.2 °F (36.2 °C) (09/24/24 0728)    Pulse 68 (09/24/24 0728)   Resp 17 (09/24/24 0728)    SpO2 97 % (09/24/24 0728)       Statement Selected

## 2024-09-24 NOTE — ANESTHESIA PREPROCEDURE EVALUATION
Procedure:  EGD    Relevant Problems   ANESTHESIA (within normal limits)      CARDIO   (+) CAD (coronary artery disease)   (+) History of aortic valve replacement      ENDO   (+) Diabetes mellitus, type 2 (HCC)      GI/HEPATIC   (+) Cirrhosis (HCC)      /RENAL   (+) Chronic kidney disease, stage 3a (HCC)      GYN (within normal limits)      HEMATOLOGY   (+) Symptomatic anemia   (+) Thrombocytopenia (HCC)      MUSCULOSKELETAL   (+) Back pain      NEURO/PSYCH (within normal limits)      PULMONARY   (+) Chronic obstructive pulmonary disease (HCC)   (+) Sleep apnea        Physical Exam    Airway    Mallampati score: II  TM Distance: >3 FB  Neck ROM: full     Dental   Comment: Edentulous     Cardiovascular  Cardiovascular exam normal    Pulmonary  Pulmonary exam normal     Other Findings        Anesthesia Plan  ASA Score- 4     Anesthesia Type- IV sedation with anesthesia with ASA Monitors.         Additional Monitors:     Airway Plan:            Plan Factors-Exercise tolerance (METS): <4 METS.    Chart reviewed. EKG reviewed. Imaging results reviewed. Existing labs reviewed. Patient summary reviewed.    Patient is not a current smoker.      Obstructive sleep apnea risk education given perioperatively.        Induction-     Postoperative Plan-     Perioperative Resuscitation Plan - Level 1 - Full Code.       Informed Consent- Anesthetic plan and risks discussed with patient.  I personally reviewed this patient with the CRNA. Discussed and agreed on the Anesthesia Plan with the CRNA..      Discussed IV Sedation with General Anesthesia as backup with patient including but not limited to risk of cardiac insult, pulmonary complication, stroke, reaction to medications and death. All questions answered and consent was obtained.      NPO appropriate    Last Paracentesis on Thursday.

## 2024-09-24 NOTE — INTERVAL H&P NOTE
H&P reviewed. After examining the patient I find no changes in the patients condition since the H&P had been written.    Vitals:    09/24/24 0646   BP: 148/68   Pulse: 80   Resp: 17   Temp: 97.6 °F (36.4 °C)   SpO2: 99%

## 2024-09-26 ENCOUNTER — HOSPITAL ENCOUNTER (OUTPATIENT)
Dept: NON INVASIVE DIAGNOSTICS | Facility: HOSPITAL | Age: 79
Discharge: HOME | End: 2024-09-26
Payer: MEDICARE

## 2024-09-26 VITALS
DIASTOLIC BLOOD PRESSURE: 58 MMHG | RESPIRATION RATE: 20 BRPM | HEART RATE: 63 BPM | SYSTOLIC BLOOD PRESSURE: 111 MMHG | OXYGEN SATURATION: 98 %

## 2024-09-26 DIAGNOSIS — K70.31 ALCOHOLIC CIRRHOSIS OF LIVER WITH ASCITES (HCC): ICD-10-CM

## 2024-09-26 PROCEDURE — 49083 ABD PARACENTESIS W/IMAGING: CPT

## 2024-09-26 RX ORDER — LIDOCAINE WITH 8.4% SOD BICARB 0.9%(10ML)
SYRINGE (ML) INJECTION AS NEEDED
Status: COMPLETED | OUTPATIENT
Start: 2024-09-26 | End: 2024-09-26

## 2024-09-26 RX ADMIN — Medication 10 ML: at 08:35

## 2024-09-26 NOTE — BRIEF OP NOTE (RAD/CATH)
IR PARACENTESIS Procedure Note    PATIENT NAME: Bimal Lugo  : 1945  MRN: [id number]    Pre-op Diagnosis:   1. 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)        Surgeon:   SUNDAY Fierro  Assistants:     No qualified resident was available, Resident is only observing    Estimated Blood Loss: minimal  Findings: 4250 ml clear yellow ascites fluid, LLQ.    Specimens: none    Complications:  none immediate    Anesthesia: local    SUNDAY Fierro     Date: 2024  Time: 9:10 AM

## 2024-09-30 ENCOUNTER — HOSPITAL ENCOUNTER (EMERGENCY)
Facility: HOSPITAL | Age: 79
Discharge: HOME | End: 2024-09-30
Attending: EMERGENCY MEDICINE
Payer: MEDICARE

## 2024-09-30 VITALS
OXYGEN SATURATION: 99 % | DIASTOLIC BLOOD PRESSURE: 59 MMHG | BODY MASS INDEX: 32.76 KG/M2 | TEMPERATURE: 97.8 F | HEART RATE: 71 BPM | RESPIRATION RATE: 23 BRPM | HEIGHT: 65 IN | SYSTOLIC BLOOD PRESSURE: 122 MMHG

## 2024-09-30 DIAGNOSIS — R07.9 CHEST PAIN: Primary | ICD-10-CM

## 2024-09-30 LAB
2HR DELTA HS TROPONIN: 2 NG/L
ALBUMIN SERPL BCG-MCNC: 2.9 G/DL (ref 3.5–5)
ALP SERPL-CCNC: 99 U/L (ref 34–104)
ALT SERPL W P-5'-P-CCNC: 15 U/L (ref 7–52)
ANION GAP SERPL CALCULATED.3IONS-SCNC: 10 MMOL/L (ref 4–13)
APTT PPP: 37 SECONDS (ref 23–34)
AST SERPL W P-5'-P-CCNC: 32 U/L (ref 13–39)
BASOPHILS # BLD AUTO: 0.08 THOUSANDS/ÂΜL (ref 0–0.1)
BASOPHILS NFR BLD AUTO: 1 % (ref 0–1)
BILIRUB DIRECT SERPL-MCNC: 0.14 MG/DL (ref 0–0.2)
BILIRUB SERPL-MCNC: 0.86 MG/DL (ref 0.2–1)
BUN SERPL-MCNC: 39 MG/DL (ref 5–25)
CALCIUM SERPL-MCNC: 8.5 MG/DL (ref 8.4–10.2)
CARDIAC TROPONIN I PNL SERPL HS: 11 NG/L
CARDIAC TROPONIN I PNL SERPL HS: 9 NG/L
CHLORIDE SERPL-SCNC: 106 MMOL/L (ref 96–108)
CO2 SERPL-SCNC: 19 MMOL/L (ref 21–32)
CREAT SERPL-MCNC: 2.06 MG/DL (ref 0.6–1.3)
EOSINOPHIL # BLD AUTO: 0.4 THOUSAND/ÂΜL (ref 0–0.61)
EOSINOPHIL NFR BLD AUTO: 7 % (ref 0–6)
ERYTHROCYTE [DISTWIDTH] IN BLOOD BY AUTOMATED COUNT: 16.2 % (ref 11.6–15.1)
GFR SERPL CREATININE-BSD FRML MDRD: 29 ML/MIN/1.73SQ M
GLUCOSE SERPL-MCNC: 194 MG/DL (ref 65–140)
HCT VFR BLD AUTO: 32.9 % (ref 36.5–49.3)
HGB BLD-MCNC: 11 G/DL (ref 12–17)
IMM GRANULOCYTES # BLD AUTO: 0.03 THOUSAND/UL (ref 0–0.2)
IMM GRANULOCYTES NFR BLD AUTO: 1 % (ref 0–2)
INR PPP: 1.07 (ref 0.85–1.19)
LYMPHOCYTES # BLD AUTO: 0.76 THOUSANDS/ÂΜL (ref 0.6–4.47)
LYMPHOCYTES NFR BLD AUTO: 13 % (ref 14–44)
MCH RBC QN AUTO: 30.9 PG (ref 26.8–34.3)
MCHC RBC AUTO-ENTMCNC: 33.4 G/DL (ref 31.4–37.4)
MCV RBC AUTO: 92 FL (ref 82–98)
MONOCYTES # BLD AUTO: 0.68 THOUSAND/ÂΜL (ref 0.17–1.22)
MONOCYTES NFR BLD AUTO: 11 % (ref 4–12)
NEUTROPHILS # BLD AUTO: 4.05 THOUSANDS/ÂΜL (ref 1.85–7.62)
NEUTS SEG NFR BLD AUTO: 67 % (ref 43–75)
NRBC BLD AUTO-RTO: 0 /100 WBCS
PLATELET # BLD AUTO: 98 THOUSANDS/UL (ref 149–390)
PMV BLD AUTO: 11 FL (ref 8.9–12.7)
POTASSIUM SERPL-SCNC: 4.2 MMOL/L (ref 3.5–5.3)
PROT SERPL-MCNC: 5.9 G/DL (ref 6.4–8.4)
PROTHROMBIN TIME: 14.7 SECONDS (ref 12.3–15)
RBC # BLD AUTO: 3.56 MILLION/UL (ref 3.88–5.62)
SODIUM SERPL-SCNC: 135 MMOL/L (ref 135–147)
WBC # BLD AUTO: 6 THOUSAND/UL (ref 4.31–10.16)

## 2024-09-30 PROCEDURE — 99285 EMERGENCY DEPT VISIT HI MDM: CPT

## 2024-09-30 PROCEDURE — 85025 COMPLETE CBC W/AUTO DIFF WBC: CPT | Performed by: EMERGENCY MEDICINE

## 2024-09-30 PROCEDURE — 85610 PROTHROMBIN TIME: CPT | Performed by: EMERGENCY MEDICINE

## 2024-09-30 PROCEDURE — 85730 THROMBOPLASTIN TIME PARTIAL: CPT | Performed by: EMERGENCY MEDICINE

## 2024-09-30 PROCEDURE — 80076 HEPATIC FUNCTION PANEL: CPT | Performed by: EMERGENCY MEDICINE

## 2024-09-30 PROCEDURE — 80048 BASIC METABOLIC PNL TOTAL CA: CPT | Performed by: EMERGENCY MEDICINE

## 2024-09-30 PROCEDURE — 36415 COLL VENOUS BLD VENIPUNCTURE: CPT | Performed by: EMERGENCY MEDICINE

## 2024-09-30 PROCEDURE — 84484 ASSAY OF TROPONIN QUANT: CPT | Performed by: EMERGENCY MEDICINE

## 2024-09-30 PROCEDURE — 99285 EMERGENCY DEPT VISIT HI MDM: CPT | Performed by: EMERGENCY MEDICINE

## 2024-09-30 NOTE — ED PROVIDER NOTES
Final diagnoses:   Chest pain     ED Disposition       ED Disposition   Discharge    Condition   Stable    Date/Time   Mon Sep 30, 2024  6:09 PM    Comment   Bimal Lugo discharge to home/self care.                   Assessment & Plan       Medical Decision Making  Problems Addressed:  Chest pain: acute illness or injury that poses a threat to life or bodily functions     Details: Ddx includes acute coronary syndrome.   Pt does not wish to be admitted. His pain is resolved. Outpt cards referral placed per institutional protocol for cp patient with elevated heart score.     Amount and/or Complexity of Data Reviewed  Labs: ordered.        ED Course as of 09/30/24 2307   Mon Sep 30, 2024   1541 Creatinine similar to value from one month ago       Medications - No data to display    ED Risk Strat Scores   HEART Risk Score      Flowsheet Row Most Recent Value   Heart Score Risk Calculator    History 0 Filed at: 09/30/2024 1810   ECG 1 Filed at: 09/30/2024 1810   Age 2 Filed at: 09/30/2024 1810   Risk Factors 2 Filed at: 09/30/2024 1810   Troponin 0 Filed at: 09/30/2024 1810   HEART Score 5 Filed at: 09/30/2024 1810                       Identification of Seniors at Risk      Flowsheet Row Most Recent Value   (ISAR) Identification of Seniors at Risk    Before the illness or injury that brought you to the Emergency, did you need someone to help you on a regular basis? 0 Filed at: 09/30/2024 1454   In the last 24 hours, have you needed more help than usual? 0 Filed at: 09/30/2024 1454   Have you been hospitalized for one or more nights during the past 6 months? 1 Filed at: 09/30/2024 1454   In general, do you see well? 0 Filed at: 09/30/2024 1454   In general, do you have serious problems with your memory? 0 Filed at: 09/30/2024 1454   Do you take more than three different medications every day? 1 Filed at: 09/30/2024 1454   ISAR Score 2 Filed at: 09/30/2024 1454                SBIRT 22yo+      Flowsheet Row Most Recent  Value   Initial Alcohol Screen: US AUDIT-C     1. How often do you have a drink containing alcohol? 0 Filed at: 09/30/2024 1457   2. How many drinks containing alcohol do you have on a typical day you are drinking?  0 Filed at: 09/30/2024 1459   3b. FEMALE Any Age, or MALE 65+: How often do you have 4 or more drinks on one occassion? 0 Filed at: 09/30/2024 1454   Audit-C Score 0 Filed at: 09/30/2024 1453   GRAHAM: How many times in the past year have you...    Used an illegal drug or used a prescription medication for non-medical reasons? Never Filed at: 09/30/2024 1452                            History of Present Illness       Chief Complaint   Patient presents with    Abnormal ECG     Via EMS/ALS from [facility] w/report of chest tightness @1030 today & EKG changes; history of Afib; denies SOB, dizziness; EMS states heart rate  bpm; pt offers no complaints; states paracentesis weekly on Thursday w/removal last week of 2450 ml    Chest Pain       Past Medical History:   Diagnosis Date    Chronic bronchitis (HCC)     COPD (chronic obstructive pulmonary disease) (HCC)     Diabetes mellitus (HCC)     Esophageal varices (HCC) 11 apr 2024    Hyperlipidemia     Hypertension     Obesity     ARACELY (obstructive sleep apnea)       Past Surgical History:   Procedure Laterality Date    AORTIC VALVE REPLACEMENT      Jan 4 2024    IR PARACENTESIS  04/04/2024    IR PARACENTESIS  03/07/2024    IR PARACENTESIS  04/11/2024    IR PARACENTESIS  04/26/2024    IR PARACENTESIS  05/09/2024    IR PARACENTESIS  05/16/2024    IR PARACENTESIS  05/23/2024    IR PARACENTESIS  06/06/2024    IR PARACENTESIS  06/13/2024    IR PARACENTESIS  06/20/2024    IR PARACENTESIS  06/27/2024    IR PARACENTESIS  07/03/2024    IR PARACENTESIS  07/11/2024    IR PARACENTESIS  07/18/2024    IR PARACENTESIS  07/24/2024    IR PARACENTESIS  08/01/2024    IR PARACENTESIS  8/8/2024    IR PARACENTESIS  8/15/2024    IR PARACENTESIS  8/22/2024    IR PARACENTESIS   2024    IR PARACENTESIS  2024    IR PARACENTESIS  2024    IR PARACENTESIS  2024    UPPER GASTROINTESTINAL ENDOSCOPY  2024      No family history on file.   Social History     Tobacco Use    Smoking status: Former     Current packs/day: 0.00     Average packs/day: 5.0 packs/day for 35.0 years (175.0 ttl pk-yrs)     Types: Cigarettes     Start date:      Quit date:      Years since quittin.7    Smokeless tobacco: Never    Tobacco comments:     Quit in  (approx)   Vaping Use    Vaping status: Never Used   Substance Use Topics    Alcohol use: Not Currently     Alcohol/week: 24.0 standard drinks of alcohol     Types: 24 Cans of beer per week     Comment: sober- quit     Drug use: Not Currently      E-Cigarette/Vaping    E-Cigarette Use Never User       E-Cigarette/Vaping Substances    Nicotine No     THC No     CBD No     Flavoring No     Other No     Unknown No       I have reviewed and agree with the history as documented.     80 yo male with about one hour of pain in the upper chest described as a discomfort. No clear precipitant. No aggravating or alleviating factors. No h/o similar pain. No diaphoresis, vomiting, nausea, syncope, near syncope, cough, sob or exertional worsening. No unilateral leg pain or swelling. Asymptomatic at this time. Pain was around 1030 this morning. EMS provides additional history, they administered ASA during transport.     Additional history from review of today's office visit with PCP for CP, pt noted to have bp at that time of:  BP 72/40  Pulse 90  Temp 36.5 °C (97.7 °F)  Resp 16  SpO2 98%           Review of Systems   Respiratory:  Positive for chest tightness.            Objective       ED Triage Vitals   Temperature Pulse Blood Pressure Respirations SpO2 Patient Position - Orthostatic VS   24 1501 24 1452 24 1455 24 1452 24 1452 24 1452   97.8 °F (36.6 °C) 81 114/58 18 99 % Lying      Temp Source  Heart Rate Source BP Location FiO2 (%) Pain Score    09/30/24 1501 09/30/24 1452 09/30/24 1452 -- 09/30/24 1452    Oral Monitor Right arm  No Pain      Vitals      Date and Time Temp Pulse SpO2 Resp BP Pain Score FACES Pain Rating User   09/30/24 1800 -- 71 99 % 23 122/59 -- -- LM   09/30/24 1600 -- 75 100 % 16 114/57 -- -- LM   09/30/24 1501 97.8 °F (36.6 °C) -- -- -- -- -- -- TB   09/30/24 1455 -- -- -- -- 114/58 -- -- TB   09/30/24 1452 -- 81 99 % 18 -- No Pain -- TB            Physical Exam  Vitals and nursing note reviewed.   Constitutional:       General: He is not in acute distress.     Appearance: He is well-developed. He is not ill-appearing, toxic-appearing or diaphoretic.   HENT:      Head: Normocephalic and atraumatic.      Mouth/Throat:      Mouth: Mucous membranes are moist.      Pharynx: Oropharynx is clear.   Eyes:      Conjunctiva/sclera: Conjunctivae normal.      Pupils: Pupils are equal, round, and reactive to light.   Neck:      Vascular: No JVD.   Cardiovascular:      Rate and Rhythm: Normal rate and regular rhythm.      Pulses: Normal pulses.      Heart sounds: Normal heart sounds. No murmur heard.     No friction rub. No gallop.   Pulmonary:      Effort: Pulmonary effort is normal. No respiratory distress.      Breath sounds: Normal breath sounds. No stridor. No wheezing or rales.   Abdominal:      General: There is distension.      Palpations: Abdomen is soft.      Tenderness: There is no abdominal tenderness. There is no guarding or rebound.   Musculoskeletal:         General: No swelling, tenderness, deformity or signs of injury. Normal range of motion.      Cervical back: Normal range of motion and neck supple. No rigidity.   Skin:     General: Skin is warm and dry.      Capillary Refill: Capillary refill takes less than 2 seconds.      Coloration: Skin is not jaundiced or pale.      Findings: No bruising or erythema.   Neurological:      General: No focal deficit present.      Mental  Status: He is alert and oriented to person, place, and time.      Cranial Nerves: No cranial nerve deficit.      Sensory: No sensory deficit.      Motor: No weakness or abnormal muscle tone.      Coordination: Coordination normal.      Gait: Gait normal.         Results Reviewed       Procedure Component Value Units Date/Time    HS Troponin I 2hr [[id number]]  (Normal) Collected: 09/30/24 1725    Lab Status: Final result Specimen: Blood from Hand, Left Updated: 09/30/24 1801     hs TnI 2hr 11 ng/L      Delta 2hr hsTnI 2 ng/L     HS Troponin 0hr (reflex protocol) [[id number]]  (Normal) Collected: 09/30/24 1459    Lab Status: Final result Specimen: Blood from Arm, Left Updated: 09/30/24 1535     hs TnI 0hr 9 ng/L     Basic metabolic panel [[id number]]  (Abnormal) Collected: 09/30/24 1459    Lab Status: Final result Specimen: Blood from Arm, Left Updated: 09/30/24 1528     Sodium 135 mmol/L      Potassium 4.2 mmol/L      Chloride 106 mmol/L      CO2 19 mmol/L      ANION GAP 10 mmol/L      BUN 39 mg/dL      Creatinine 2.06 mg/dL      Glucose 194 mg/dL      Calcium 8.5 mg/dL      eGFR 29 ml/min/1.73sq m     Narrative:      National Kid[employer] Disease Foundation guidelines for Chronic Kidney Disease (CKD):     Stage 1 with normal or high GFR (GFR > 90 mL/min/1.73 square meters)    Stage 2 Mild CKD (GFR = 60-89 mL/min/1.73 square meters)    Stage 3A Moderate CKD (GFR = 45-59 mL/min/1.73 square meters)    Stage 3B Moderate CKD (GFR = 30-44 mL/min/1.73 square meters)    Stage 4 Severe CKD (GFR = 15-29 mL/min/1.73 square meters)    Stage 5 End Stage CKD (GFR <15 mL/min/1.73 square meters)  Note: GFR calculation is accurate only with a steady state creatinine    Hepatic function panel [[id number]]  (Abnormal) Collected: 09/30/24 1459    Lab Status: Final result Specimen: Blood from Arm, Left Updated: 09/30/24 1528     Total Bilirubin 0.86 mg/dL      Bilirubin, Direct 0.14 mg/dL      Alkaline Phosphatase 99 U/L      AST 32 U/L      ALT  15 U/L      Total Protein 5.9 g/dL      Albumin 2.9 g/dL     Protime-INR [[id number]]  (Normal) Collected: 09/30/24 1459    Lab Status: Final result Specimen: Blood from Arm, Left Updated: 09/30/24 1525     Protime 14.7 seconds      INR 1.07    Narrative:      INR Therapeutic Range    Indication                                             INR Range      Atrial Fibrillation                                               2.0-3.0  Hypercoagulable State                                    2.0.2.3  Left Ventricular Asist Device                            2.0-3.0  Mechanical Heart Valve                                  -    Aortic(with afib, MI, embolism, HF, LA enlargement,    and/or coagulopathy)                                     2.0-3.0 (2.5-3.5)     Mitral                                                             2.5-3.5  Prosthetic/Bioprosthetic Heart Valve               2.0-3.0  Venous thromboembolism (VTE: VT, PE        2.0-3.0    APTT [[id number]]  (Abnormal) Collected: 09/30/24 1459    Lab Status: Final result Specimen: Blood from Arm, Left Updated: 09/30/24 1525     PTT 37 seconds     CBC and differential [[id number]]  (Abnormal) Collected: 09/30/24 1459    Lab Status: Final result Specimen: Blood from Arm, Left Updated: 09/30/24 1512     WBC 6.00 Thousand/uL      RBC 3.56 Million/uL      Hemoglobin 11.0 g/dL      Hematocrit 32.9 %      MCV 92 fL      MCH 30.9 pg      MCHC 33.4 g/dL      RDW 16.2 %      MPV 11.0 fL      Platelets 98 Thousands/uL      nRBC 0 /100 WBCs      Segmented % 67 %      Immature Grans % 1 %      Lymphocytes % 13 %      Monocytes % 11 %      Eosinophils Relative 7 %      Basophils Relative 1 %      Absolute Neutrophils 4.05 Thousands/µL      Absolute Immature Grans 0.03 Thousand/uL      Absolute Lymphocytes 0.76 Thousands/µL      Absolute Monocytes 0.68 Thousand/µL      Eosinophils Absolute 0.40 Thousand/µL      Basophils Absolute 0.08 Thousands/µL             No orders to display       ECG  12 Lead Documentation Only    Date/Time: 9/30/2024 2:55 PM    Performed by: Brady Wilson MD  Authorized by: Brady Wilson MD    Indications / Diagnosis:  Cp  ECG reviewed by me, the ED Provider: yes    Patient location:  ED  Previous ECG:     Previous ECG:  Unavailable  Interpretation:     Interpretation: abnormal    Rate:     ECG rate:  79    ECG rate assessment: normal    Rhythm:     Rhythm: sinus rhythm    Comments:      Lbbb. No sgarbossa criteria.       ED Medication and Procedure Management   Prior to Admission Medications   Prescriptions Last Dose Informant Patient Reported? Taking?   Lancets (ONETOUCH ULTRASOFT) lancets  Self Yes No   Sig: Check twice a day and as needed, dx E11.9,   Misc. Devices MISC  Self Yes No   Sig: by Does not apply route   Multiple Vitamin (DAILY VALUE MULTIVITAMIN) TABS  Self Yes No   Sig: Take by mouth   Omega-3 Fatty Acids (FISH OIL) 645 MG CAPS  Self Yes No   Sig: Take by mouth   SYMBICORT 160-4.5 MCG/ACT inhaler  Self Yes No   Sig: Inhale 2 puffs 2 (two) times a day   albuterol (PROVENTIL HFA,VENTOLIN HFA) 90 mcg/act inhaler  Self Yes No   Sig: Inhale 2 puffs every 4 (four) hours as needed   ammonium lactate (LAC-HYDRIN) 12 % lotion  Self Yes No   Sig: Apply topically 2 (two) times a day   aspirin 81 MG tablet  Self Yes No   Sig: Take 81 mg by mouth daily in the early morning   atorvastatin (LIPITOR) 40 mg tablet  Self Yes No   Sig: Take 40 mg by mouth daily in the early morning   carvedilol (COREG) 3.125 mg tablet  Self No No   Sig: Take 1 tablet (3.125 mg total) by mouth 2 (two) times a day with meals   furosemide (LASIX) 40 mg tablet  Self No No   Sig: Take 1 tablet (40 mg total) by mouth 2 (two) times a day   glucose 4-6 GM-MG  Self Yes No   Sig: Chew 16 g daily as needed   glucose blood test strip  Self Yes No   Sig: Check twice a day and as needed, dx E11.9,   hydrocortisone (PROCTOSOL HC) 2.5 % rectal cream  Self Yes No   Sig: Insert into the rectum   insulin aspart  (NovoLOG FlexPen) 100 UNIT/ML injection pen  Self Yes No   Sig: Inject 19 Units under the skin Three times a day 19 in a.m , 6 units at lunchtime if eats, 10units in p.m.   insulin degludec (Tresiba FlexTouch) 100 units/mL injection pen  Self Yes No   Sig: Inject 10 Units under the skin daily at bedtime   isosorbide mononitrate (IMDUR) 30 mg 24 hr tablet  Self Yes No   lactulose (CHRONULAC) 10 g/15 mL solution  Self No No   Sig: 10mL PO BID   losartan (COZAAR) 100 MG tablet  Self Yes No   Sig: Take 100 mg by mouth daily in the early morning   lovastatin (MEVACOR) 10 MG tablet  Self Yes No   metFORMIN (GLUCOPHAGE-XR) 500 mg 24 hr tablet  Self Yes No   Sig: Take 1,000 mg by mouth 2 (two) times a day   montelukast (SINGULAIR) 10 mg tablet  Self Yes No   Sig: Take 10 mg by mouth daily   nystatin (MYCOSTATIN) powder  Self Yes No   Sig: Apply topically   pantoprazole (PROTONIX) 40 mg tablet  Self No No   Sig: Take 1 tablet (40 mg total) by mouth daily   Patient taking differently: Take 40 mg by mouth daily in the early morning   spironolactone (ALDACTONE) 50 mg tablet  Self No No   Sig: Take 1 tablet (50 mg total) by mouth daily      Facility-Administered Medications: None     Discharge Medication List as of 9/30/2024  6:10 PM        CONTINUE these medications which have NOT CHANGED    Details   albuterol (PROVENTIL HFA,VENTOLIN HFA) 90 mcg/act inhaler Inhale 2 puffs every 4 (four) hours as needed, Starting Tue 6/16/2015, Historical Med      ammonium lactate (LAC-HYDRIN) 12 % lotion Apply topically 2 (two) times a day, Starting Fri 7/19/2024, Historical Med      aspirin 81 MG tablet Take 81 mg by mouth daily in the early morning, Historical Med      atorvastatin (LIPITOR) 40 mg tablet Take 40 mg by mouth daily in the early morning, Historical Med      carvedilol (COREG) 3.125 mg tablet Take 1 tablet (3.125 mg total) by mouth 2 (two) times a day with meals, Starting Mon 8/12/2024, Normal      furosemide (LASIX) 40 mg  tablet Take 1 tablet (40 mg total) by mouth 2 (two) times a day, Starting Mon 6/10/2024, Normal      glucose 4-6 GM-MG Chew 16 g daily as needed, Starting Fri 2/16/2024, Historical Med      glucose blood test strip Check twice a day and as needed, dx E11.9,, Historical Med      hydrocortisone (PROCTOSOL HC) 2.5 % rectal cream Insert into the rectum, Historical Med      insulin aspart (NovoLOG FlexPen) 100 UNIT/ML injection pen Inject 19 Units under the skin Three times a day 19 in a.m , 6 units at lunchtime if eats, 10units in p.m., Starting Sat 1/6/2024, Historical Med      insulin degludec (Tresiba FlexTouch) 100 units/mL injection pen Inject 10 Units under the skin daily at bedtime, Historical Med      isosorbide mononitrate (IMDUR) 30 mg 24 hr tablet Historical Med      lactulose (CHRONULAC) 10 g/15 mL solution 10mL PO BID, Normal      Lancets (ONETOUCH ULTRASOFT) lancets Check twice a day and as needed, dx E11.9,, Historical Med      losartan (COZAAR) 100 MG tablet Take 100 mg by mouth daily in the early morning, Historical Med      lovastatin (MEVACOR) 10 MG tablet Starting Fri 2/2/2018, Historical Med      metFORMIN (GLUCOPHAGE-XR) 500 mg 24 hr tablet Take 1,000 mg by mouth 2 (two) times a day, Starting Thu 6/29/2017, Historical Med      Misc. Devices MISC by Does not apply route, Historical Med      montelukast (SINGULAIR) 10 mg tablet Take 10 mg by mouth daily, Starting Mon 9/9/2024, Historical Med      Multiple Vitamin (DAILY VALUE MULTIVITAMIN) TABS Take by mouth, Historical Med      nystatin (MYCOSTATIN) powder Apply topically, Starting Tue 1/2/2024, Historical Med      Omega-3 Fatty Acids (FISH OIL) 645 MG CAPS Take by mouth, Historical Med      pantoprazole (PROTONIX) 40 mg tablet Take 1 tablet (40 mg total) by mouth daily, Starting Sat 4/13/2024, Normal      spironolactone (ALDACTONE) 50 mg tablet Take 1 tablet (50 mg total) by mouth daily, Starting Thu 7/18/2024, Normal      SYMBICORT 160-4.5 MCG/ACT  inhaler Inhale 2 puffs 2 (two) times a day, Starting Fri 12/22/2017, Historical Med             ED SEPSIS DOCUMENTATION   Time reflects when diagnosis was documented in both MDM as applicable and the Disposition within this note       Time User Action Codes Description Comment    9/30/2024  6:09 PM Brady Wilson Add [R07.9] Chest pain                  Brady Wilson MD  09/30/24 2622

## 2024-09-30 NOTE — ED TRIAGE NOTES
Via EMS/ALS from [facility] w/report of chest tightness @1030 today & EKG changes; history of Afib; denies SOB, dizziness; EMS states heart rate  bpm; pt offers no complaints; states paracentesis weekly on Thursday w/removal last week of 2450 ml

## 2024-10-02 PROCEDURE — 88313 SPECIAL STAINS GROUP 2: CPT | Performed by: PATHOLOGY

## 2024-10-02 PROCEDURE — 88341 IMHCHEM/IMCYTCHM EA ADD ANTB: CPT | Performed by: PATHOLOGY

## 2024-10-02 PROCEDURE — 88305 TISSUE EXAM BY PATHOLOGIST: CPT | Performed by: PATHOLOGY

## 2024-10-02 PROCEDURE — 88342 IMHCHEM/IMCYTCHM 1ST ANTB: CPT | Performed by: PATHOLOGY

## 2024-10-03 ENCOUNTER — HOSPITAL ENCOUNTER (OUTPATIENT)
Dept: NON INVASIVE DIAGNOSTICS | Facility: HOSPITAL | Age: 79
Discharge: HOME | End: 2024-10-03
Payer: MEDICARE

## 2024-10-03 VITALS
SYSTOLIC BLOOD PRESSURE: 107 MMHG | HEART RATE: 69 BPM | DIASTOLIC BLOOD PRESSURE: 58 MMHG | RESPIRATION RATE: 18 BRPM | OXYGEN SATURATION: 95 %

## 2024-10-03 DIAGNOSIS — K70.31 ALCOHOLIC CIRRHOSIS OF LIVER WITH ASCITES (HCC): ICD-10-CM

## 2024-10-03 PROCEDURE — 49083 ABD PARACENTESIS W/IMAGING: CPT | Performed by: NURSE PRACTITIONER

## 2024-10-03 PROCEDURE — 49083 ABD PARACENTESIS W/IMAGING: CPT

## 2024-10-03 RX ORDER — LIDOCAINE WITH 8.4% SOD BICARB 0.9%(10ML)
SYRINGE (ML) INJECTION AS NEEDED
Status: COMPLETED | OUTPATIENT
Start: 2024-10-03 | End: 2024-10-03

## 2024-10-03 RX ADMIN — Medication 10 ML: at 10:08

## 2024-10-03 NOTE — BRIEF OP NOTE (RAD/CATH)
IR PARACENTESIS Procedure Note    PATIENT NAME: Bimal Lugo  : 1945  MRN: [id number]    Pre-op Diagnosis:   1. 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)      Provider:   SUNDAY Worley    Assistants:   None    Estimated Blood Loss: None     Findings: 3200 ml clear yellow ascites aspirated from RIGHT sided ultrasound guided paracentesis.     Specimens: None     Complications:  None immediate     Anesthesia: local    SUNDAY Worley     Date: 10/3/2024  Time: 11:17 AM

## 2024-10-10 ENCOUNTER — HOSPITAL ENCOUNTER (OUTPATIENT)
Dept: NON INVASIVE DIAGNOSTICS | Facility: HOSPITAL | Age: 79
Discharge: HOME | End: 2024-10-10
Payer: MEDICARE

## 2024-10-10 VITALS
SYSTOLIC BLOOD PRESSURE: 145 MMHG | DIASTOLIC BLOOD PRESSURE: 75 MMHG | RESPIRATION RATE: 20 BRPM | OXYGEN SATURATION: 98 % | HEART RATE: 87 BPM

## 2024-10-10 DIAGNOSIS — K70.31 ALCOHOLIC CIRRHOSIS OF LIVER WITH ASCITES (HCC): ICD-10-CM

## 2024-10-10 PROCEDURE — 49083 ABD PARACENTESIS W/IMAGING: CPT

## 2024-10-10 RX ORDER — LIDOCAINE WITH 8.4% SOD BICARB 0.9%(10ML)
SYRINGE (ML) INJECTION AS NEEDED
Status: COMPLETED | OUTPATIENT
Start: 2024-10-10 | End: 2024-10-10

## 2024-10-10 RX ADMIN — Medication 10 ML: at 08:51

## 2024-10-10 NOTE — BRIEF OP NOTE (RAD/CATH)
IR PARACENTESIS Procedure Note    PATIENT NAME: Bimal Lugo  : 1945  MRN: [id number]    Pre-op Diagnosis:   1. 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)        Surgeon:   SUNDAY Fierro  Assistants:     No qualified resident was available, Resident is only observing    Estimated Blood Loss: minimal  Findings: 5300 ml clear yellow ascites fluid, RLQ.    Specimens: none    Complications:  none immediate    Anesthesia: local    SUNDAY Fierro     Date: 10/10/2024  Time: 9:50 AM

## 2024-10-17 ENCOUNTER — HOSPITAL ENCOUNTER (OUTPATIENT)
Dept: NON INVASIVE DIAGNOSTICS | Facility: HOSPITAL | Age: 79
Discharge: HOME | End: 2024-10-17
Payer: MEDICARE

## 2024-10-17 VITALS
RESPIRATION RATE: 18 BRPM | OXYGEN SATURATION: 99 % | HEART RATE: 69 BPM | DIASTOLIC BLOOD PRESSURE: 58 MMHG | SYSTOLIC BLOOD PRESSURE: 110 MMHG

## 2024-10-17 DIAGNOSIS — K70.31 ALCOHOLIC CIRRHOSIS OF LIVER WITH ASCITES (HCC): ICD-10-CM

## 2024-10-17 PROCEDURE — 49083 ABD PARACENTESIS W/IMAGING: CPT | Performed by: NURSE PRACTITIONER

## 2024-10-17 PROCEDURE — 49083 ABD PARACENTESIS W/IMAGING: CPT

## 2024-10-17 RX ORDER — LIDOCAINE WITH 8.4% SOD BICARB 0.9%(10ML)
SYRINGE (ML) INJECTION AS NEEDED
Status: COMPLETED | OUTPATIENT
Start: 2024-10-17 | End: 2024-10-17

## 2024-10-17 RX ADMIN — Medication 10 ML: at 10:07

## 2024-10-17 NOTE — BRIEF OP NOTE (RAD/CATH)
IR PARACENTESIS Procedure Note    PATIENT NAME: Bimal Lugo  : 1945  MRN: [id number]    Pre-op Diagnosis:   1. 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)        Provider:   SUNDAY Worley    Assistants:   None     Estimated Blood Loss: None     Findings: 4750 ml clear yellow ascites aspirated from RIGHT sided ultrasound guided paracentesis.     Specimens: None     Complications:  None immediate     Anesthesia: local    SUNDAY Worley     Date: 10/17/2024  Time: 11:26 AM

## 2024-10-24 ENCOUNTER — HOSPITAL ENCOUNTER (OUTPATIENT)
Dept: NON INVASIVE DIAGNOSTICS | Facility: HOSPITAL | Age: 79
Discharge: HOME | End: 2024-10-24
Payer: MEDICARE

## 2024-10-24 VITALS
OXYGEN SATURATION: 100 % | RESPIRATION RATE: 20 BRPM | DIASTOLIC BLOOD PRESSURE: 63 MMHG | HEART RATE: 70 BPM | SYSTOLIC BLOOD PRESSURE: 134 MMHG

## 2024-10-24 DIAGNOSIS — K70.31 ALCOHOLIC CIRRHOSIS OF LIVER WITH ASCITES (HCC): ICD-10-CM

## 2024-10-24 PROCEDURE — 49083 ABD PARACENTESIS W/IMAGING: CPT

## 2024-10-24 RX ORDER — LIDOCAINE WITH 8.4% SOD BICARB 0.9%(10ML)
SYRINGE (ML) INJECTION AS NEEDED
Status: COMPLETED | OUTPATIENT
Start: 2024-10-24 | End: 2024-10-24

## 2024-10-24 RX ADMIN — Medication 10 ML: at 10:06

## 2024-10-24 NOTE — BRIEF OP NOTE (RAD/CATH)
IR PARACENTESIS Procedure Note    PATIENT NAME: Bimal Lugo  : 1945  MRN: [id number]    Pre-op Diagnosis:   1. 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)        Surgeon:   SUNDAY Fierro  Assistants:     No qualified resident was available, Resident is only observing    Estimated Blood Loss: minimal  Findings: 5150 ml clear yellow ascites.  Patient declined Albumin infusion.    Specimens: none    Complications:  none immediate    Anesthesia: local    SUNDAY Fierro     Date: 10/24/2024  Time: 12:00 PM

## 2024-10-31 ENCOUNTER — HOSPITAL ENCOUNTER (OUTPATIENT)
Dept: NON INVASIVE DIAGNOSTICS | Facility: HOSPITAL | Age: 79
Discharge: HOME | End: 2024-10-31
Payer: MEDICARE

## 2024-10-31 VITALS — SYSTOLIC BLOOD PRESSURE: 135 MMHG | DIASTOLIC BLOOD PRESSURE: 69 MMHG

## 2024-10-31 DIAGNOSIS — K70.31 ALCOHOLIC CIRRHOSIS OF LIVER WITH ASCITES (HCC): ICD-10-CM

## 2024-10-31 PROCEDURE — 49083 ABD PARACENTESIS W/IMAGING: CPT

## 2024-10-31 PROCEDURE — 49083 ABD PARACENTESIS W/IMAGING: CPT | Performed by: NURSE PRACTITIONER

## 2024-10-31 RX ORDER — LIDOCAINE WITH 8.4% SOD BICARB 0.9%(10ML)
SYRINGE (ML) INJECTION AS NEEDED
Status: COMPLETED | OUTPATIENT
Start: 2024-10-31 | End: 2024-10-31

## 2024-10-31 RX ADMIN — Medication 10 ML: at 09:30

## 2024-10-31 NOTE — BRIEF OP NOTE (RAD/CATH)
IR PARACENTESIS Procedure Note    PATIENT NAME: Bimal Lugo  : 1945  MRN: [id number]    Pre-op Diagnosis:   1. 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)        Provider:   SUNDAY Worley    Assistants:   None     Estimated Blood Loss: None    Findings: 4300 ml clear yellow ascites aspirated from LEFT sided ultrasound guided paracentesis.     Specimens: None     Complications:  None immediate     Anesthesia: local    SUNDAY Worley     Date: 10/31/2024  Time: 1:22 PM

## 2024-11-07 ENCOUNTER — HOSPITAL ENCOUNTER (OUTPATIENT)
Dept: NON INVASIVE DIAGNOSTICS | Facility: HOSPITAL | Age: 79
Discharge: HOME | End: 2024-11-07
Payer: MEDICARE

## 2024-11-07 VITALS
SYSTOLIC BLOOD PRESSURE: 144 MMHG | OXYGEN SATURATION: 99 % | HEART RATE: 85 BPM | RESPIRATION RATE: 16 BRPM | DIASTOLIC BLOOD PRESSURE: 78 MMHG

## 2024-11-07 DIAGNOSIS — K70.31 ALCOHOLIC CIRRHOSIS OF LIVER WITH ASCITES (HCC): ICD-10-CM

## 2024-11-07 PROCEDURE — 49083 ABD PARACENTESIS W/IMAGING: CPT

## 2024-11-07 RX ORDER — LIDOCAINE WITH 8.4% SOD BICARB 0.9%(10ML)
SYRINGE (ML) INJECTION AS NEEDED
Status: COMPLETED | OUTPATIENT
Start: 2024-11-07 | End: 2024-11-07

## 2024-11-07 RX ADMIN — Medication 10 ML: at 09:54

## 2024-11-14 ENCOUNTER — HOSPITAL ENCOUNTER (OUTPATIENT)
Dept: NON INVASIVE DIAGNOSTICS | Facility: HOSPITAL | Age: 79
Discharge: HOME | End: 2024-11-14
Payer: MEDICARE

## 2024-11-14 VITALS
DIASTOLIC BLOOD PRESSURE: 63 MMHG | RESPIRATION RATE: 20 BRPM | HEART RATE: 70 BPM | SYSTOLIC BLOOD PRESSURE: 133 MMHG | OXYGEN SATURATION: 98 %

## 2024-11-14 DIAGNOSIS — K70.31 ALCOHOLIC CIRRHOSIS OF LIVER WITH ASCITES (HCC): ICD-10-CM

## 2024-11-14 PROCEDURE — 49083 ABD PARACENTESIS W/IMAGING: CPT | Performed by: NURSE PRACTITIONER

## 2024-11-14 PROCEDURE — 49083 ABD PARACENTESIS W/IMAGING: CPT

## 2024-11-14 RX ORDER — LIDOCAINE WITH 8.4% SOD BICARB 0.9%(10ML)
SYRINGE (ML) INJECTION AS NEEDED
Status: COMPLETED | OUTPATIENT
Start: 2024-11-14 | End: 2024-11-14

## 2024-11-14 RX ADMIN — Medication 10 ML: at 08:27

## 2024-11-14 NOTE — BRIEF OP NOTE (RAD/CATH)
IR PARACENTESIS Procedure Note    PATIENT NAME: Bimal Lugo  : 1945  MRN: [id number]    Pre-op Diagnosis:   1. 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)      Provider:   SUNDAY Worley    Assistants:   None     Estimated Blood Loss: None     Findings: 3900 ml clear yellow ascites aspirated from RIGHT sided ultrasound guided paracentesis.     Specimens: None     Complications:  None immediate     Anesthesia: local    SUNDAY Worley     Date: 2024  Time: 2:34 PM

## 2024-11-18 ENCOUNTER — TELEPHONE (OUTPATIENT)
Age: 79
End: 2024-11-18

## 2024-11-18 DIAGNOSIS — K70.31 ALCOHOLIC CIRRHOSIS OF LIVER WITH ASCITES (HCC): ICD-10-CM

## 2024-11-18 RX ORDER — FUROSEMIDE 40 MG/1
40 TABLET ORAL 2 TIMES DAILY
Qty: 60 TABLET | Refills: 6 | Status: ON HOLD | OUTPATIENT
Start: 2024-11-18

## 2024-11-18 RX ORDER — SPIRONOLACTONE 50 MG/1
50 TABLET, FILM COATED ORAL 2 TIMES DAILY
Qty: 60 TABLET | Refills: 6 | Status: ON HOLD | OUTPATIENT
Start: 2024-11-18

## 2024-11-18 NOTE — TELEPHONE ENCOUNTER
Patients GI provider:  Smith    Number to return call: [phone]    Reason for call: Pt calling to speak with RALPH Mtz about changing his medications. Please reach back out to discuss    Scheduled procedure/appointment date if applicable: Apt/ 4/9/25

## 2024-11-21 ENCOUNTER — HOSPITAL ENCOUNTER (OUTPATIENT)
Dept: NON INVASIVE DIAGNOSTICS | Facility: HOSPITAL | Age: 79
Discharge: HOME | End: 2024-11-21
Payer: MEDICARE

## 2024-11-21 VITALS
SYSTOLIC BLOOD PRESSURE: 135 MMHG | DIASTOLIC BLOOD PRESSURE: 61 MMHG | RESPIRATION RATE: 18 BRPM | OXYGEN SATURATION: 98 % | HEART RATE: 85 BPM

## 2024-11-21 DIAGNOSIS — K70.31 ALCOHOLIC CIRRHOSIS OF LIVER WITH ASCITES (HCC): ICD-10-CM

## 2024-11-21 PROCEDURE — 49083 ABD PARACENTESIS W/IMAGING: CPT

## 2024-11-21 RX ORDER — LIDOCAINE WITH 8.4% SOD BICARB 0.9%(10ML)
SYRINGE (ML) INJECTION AS NEEDED
Status: COMPLETED | OUTPATIENT
Start: 2024-11-21 | End: 2024-11-21

## 2024-11-21 RX ADMIN — Medication 10 ML: at 08:27

## 2024-11-21 NOTE — BRIEF OP NOTE (RAD/CATH)
IR PARACENTESIS Procedure Note    PATIENT NAME: Bimal Lugo  : 1945  MRN: [id number]    Pre-op Diagnosis:   1. 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)        Surgeon:   SUNDAY Fierro  Assistants:     No qualified resident was available, Resident is only observing    Estimated Blood Loss: minimal  Findings: 2500 ml clear yellow ascites fluid, LLQ.    Specimens: none    Complications:  none immediate    Anesthesia: local    SUNDAY Fierro     Date: 2024  Time: 9:22 AM

## 2024-11-24 ENCOUNTER — APPOINTMENT (EMERGENCY)
Dept: RADIOLOGY | Facility: HOSPITAL | Age: 79
DRG: 368 | End: 2024-11-24
Payer: MEDICARE

## 2024-11-24 ENCOUNTER — HOSPITAL ENCOUNTER (INPATIENT)
Facility: HOSPITAL | Age: 79
LOS: 5 days | Discharge: HOME | DRG: 368 | End: 2024-11-29
Attending: EMERGENCY MEDICINE | Admitting: ANESTHESIOLOGY
Payer: MEDICARE

## 2024-11-24 ENCOUNTER — APPOINTMENT (EMERGENCY)
Dept: CT IMAGING | Facility: HOSPITAL | Age: 79
DRG: 368 | End: 2024-11-24
Payer: MEDICARE

## 2024-11-24 DIAGNOSIS — K92.2 GI BLEED: Primary | ICD-10-CM

## 2024-11-24 DIAGNOSIS — E11.9 DIABETES MELLITUS, TYPE 2 (HCC): ICD-10-CM

## 2024-11-24 DIAGNOSIS — N17.9 ACUTE KIDNEY INJURY SUPERIMPOSED ON STAGE 3A CHRONIC KIDNEY DISEASE (HCC): ICD-10-CM

## 2024-11-24 DIAGNOSIS — E87.1 HYPONATREMIA: ICD-10-CM

## 2024-11-24 DIAGNOSIS — K70.31 ALCOHOLIC CIRRHOSIS OF LIVER WITH ASCITES (HCC): ICD-10-CM

## 2024-11-24 DIAGNOSIS — N18.31 ACUTE KIDNEY INJURY SUPERIMPOSED ON STAGE 3A CHRONIC KIDNEY DISEASE (HCC): ICD-10-CM

## 2024-11-24 DIAGNOSIS — D62 ACUTE BLOOD LOSS ANEMIA: ICD-10-CM

## 2024-11-24 DIAGNOSIS — K92.0 HEMATEMESIS, UNSPECIFIED WHETHER NAUSEA PRESENT: ICD-10-CM

## 2024-11-24 PROBLEM — I10 HYPERTENSION: Status: ACTIVE | Noted: 2024-11-24

## 2024-11-24 PROBLEM — K92.1 MELENA: Status: ACTIVE | Noted: 2024-11-24

## 2024-11-24 PROBLEM — E87.29 HIGH ANION GAP METABOLIC ACIDOSIS: Status: ACTIVE | Noted: 2024-11-24

## 2024-11-24 PROBLEM — E87.8 ELECTROLYTE ABNORMALITY: Status: ACTIVE | Noted: 2024-11-24

## 2024-11-24 LAB
ABO GROUP BLD: NORMAL
ALBUMIN SERPL BCG-MCNC: 2.8 G/DL (ref 3.5–5)
ALP SERPL-CCNC: 56 U/L (ref 34–104)
ALT SERPL W P-5'-P-CCNC: 16 U/L (ref 7–52)
ANION GAP SERPL CALCULATED.3IONS-SCNC: 17 MMOL/L (ref 4–13)
ANION GAP SERPL CALCULATED.3IONS-SCNC: 9 MMOL/L (ref 4–13)
APTT PPP: 37 SECONDS (ref 23–34)
AST SERPL W P-5'-P-CCNC: 32 U/L (ref 13–39)
ATRIAL RATE: 119 BPM
BASOPHILS # BLD AUTO: 0.04 THOUSANDS/ÂΜL (ref 0–0.1)
BASOPHILS NFR BLD AUTO: 0 % (ref 0–1)
BILIRUB SERPL-MCNC: 0.71 MG/DL (ref 0.2–1)
BLD GP AB SCN SERPL QL: NEGATIVE
BUN SERPL-MCNC: 66 MG/DL (ref 5–25)
BUN SERPL-MCNC: 70 MG/DL (ref 5–25)
CALCIUM ALBUM COR SERPL-MCNC: 9.5 MG/DL (ref 8.3–10.1)
CALCIUM SERPL-MCNC: 8.3 MG/DL (ref 8.4–10.2)
CALCIUM SERPL-MCNC: 8.5 MG/DL (ref 8.4–10.2)
CHLORIDE SERPL-SCNC: 100 MMOL/L (ref 96–108)
CHLORIDE SERPL-SCNC: 102 MMOL/L (ref 96–108)
CO2 SERPL-SCNC: 11 MMOL/L (ref 21–32)
CO2 SERPL-SCNC: 16 MMOL/L (ref 21–32)
CREAT SERPL-MCNC: 3.1 MG/DL (ref 0.6–1.3)
CREAT SERPL-MCNC: 3.42 MG/DL (ref 0.6–1.3)
EOSINOPHIL # BLD AUTO: 0.01 THOUSAND/ÂΜL (ref 0–0.61)
EOSINOPHIL NFR BLD AUTO: 0 % (ref 0–6)
ERYTHROCYTE [DISTWIDTH] IN BLOOD BY AUTOMATED COUNT: 16.2 % (ref 11.6–15.1)
GFR SERPL CREATININE-BSD FRML MDRD: 16 ML/MIN/1.73SQ M
GFR SERPL CREATININE-BSD FRML MDRD: 18 ML/MIN/1.73SQ M
GLUCOSE SERPL-MCNC: 115 MG/DL (ref 65–140)
GLUCOSE SERPL-MCNC: 120 MG/DL (ref 65–140)
GLUCOSE SERPL-MCNC: 218 MG/DL (ref 65–140)
GLUCOSE SERPL-MCNC: 80 MG/DL (ref 65–140)
HCT VFR BLD AUTO: 19.7 % (ref 36.5–49.3)
HCT VFR BLD AUTO: 22.8 % (ref 36.5–49.3)
HGB BLD-MCNC: 6.6 G/DL (ref 12–17)
HGB BLD-MCNC: 7.5 G/DL (ref 12–17)
IMM GRANULOCYTES # BLD AUTO: 0.25 THOUSAND/UL (ref 0–0.2)
IMM GRANULOCYTES NFR BLD AUTO: 2 % (ref 0–2)
INR PPP: 1.38 (ref 0.85–1.19)
LYMPHOCYTES # BLD AUTO: 1.1 THOUSANDS/ÂΜL (ref 0.6–4.47)
LYMPHOCYTES NFR BLD AUTO: 7 % (ref 14–44)
MCH RBC QN AUTO: 32.3 PG (ref 26.8–34.3)
MCHC RBC AUTO-ENTMCNC: 32.9 G/DL (ref 31.4–37.4)
MCV RBC AUTO: 98 FL (ref 82–98)
MONOCYTES # BLD AUTO: 0.98 THOUSAND/ÂΜL (ref 0.17–1.22)
MONOCYTES NFR BLD AUTO: 6 % (ref 4–12)
NEUTROPHILS # BLD AUTO: 14.22 THOUSANDS/ÂΜL (ref 1.85–7.62)
NEUTS SEG NFR BLD AUTO: 85 % (ref 43–75)
NRBC BLD AUTO-RTO: 0 /100 WBCS
PLATELET # BLD AUTO: 154 THOUSANDS/UL (ref 149–390)
PMV BLD AUTO: 10.3 FL (ref 8.9–12.7)
POTASSIUM SERPL-SCNC: 5.8 MMOL/L (ref 3.5–5.3)
POTASSIUM SERPL-SCNC: 5.9 MMOL/L (ref 3.5–5.3)
PROT SERPL-MCNC: 5.5 G/DL (ref 6.4–8.4)
PROTHROMBIN TIME: 17.7 SECONDS (ref 12.3–15)
QRS AXIS: 61 DEGREES
QRSD INTERVAL: 142 MS
QT INTERVAL: 358 MS
QTC INTERVAL: 503 MS
RBC # BLD AUTO: 2.32 MILLION/UL (ref 3.88–5.62)
RH BLD: POSITIVE
SODIUM SERPL-SCNC: 127 MMOL/L (ref 135–147)
SODIUM SERPL-SCNC: 128 MMOL/L (ref 135–147)
SPECIMEN EXPIRATION DATE: NORMAL
T WAVE AXIS: -1 DEGREES
VENTRICULAR RATE: 119 BPM
WBC # BLD AUTO: 16.6 THOUSAND/UL (ref 4.31–10.16)

## 2024-11-24 PROCEDURE — 85018 HEMOGLOBIN: CPT | Performed by: ANESTHESIOLOGY

## 2024-11-24 PROCEDURE — 86850 RBC ANTIBODY SCREEN: CPT | Performed by: EMERGENCY MEDICINE

## 2024-11-24 PROCEDURE — 85014 HEMATOCRIT: CPT | Performed by: ANESTHESIOLOGY

## 2024-11-24 PROCEDURE — 36415 COLL VENOUS BLD VENIPUNCTURE: CPT | Performed by: EMERGENCY MEDICINE

## 2024-11-24 PROCEDURE — 96361 HYDRATE IV INFUSION ADD-ON: CPT

## 2024-11-24 PROCEDURE — P9016 RBC LEUKOCYTES REDUCED: HCPCS

## 2024-11-24 PROCEDURE — 99223 1ST HOSP IP/OBS HIGH 75: CPT | Performed by: ANESTHESIOLOGY

## 2024-11-24 PROCEDURE — 93010 ELECTROCARDIOGRAM REPORT: CPT | Performed by: INTERNAL MEDICINE

## 2024-11-24 PROCEDURE — 96375 TX/PRO/DX INJ NEW DRUG ADDON: CPT

## 2024-11-24 PROCEDURE — 82948 REAGENT STRIP/BLOOD GLUCOSE: CPT

## 2024-11-24 PROCEDURE — 86901 BLOOD TYPING SEROLOGIC RH(D): CPT | Performed by: EMERGENCY MEDICINE

## 2024-11-24 PROCEDURE — 30233N1 TRANSFUSION OF NONAUTOLOGOUS RED BLOOD CELLS INTO PERIPHERAL VEIN, PERCUTANEOUS APPROACH: ICD-10-PCS | Performed by: FAMILY MEDICINE

## 2024-11-24 PROCEDURE — 36430 TRANSFUSION BLD/BLD COMPNT: CPT

## 2024-11-24 PROCEDURE — 99285 EMERGENCY DEPT VISIT HI MDM: CPT

## 2024-11-24 PROCEDURE — 71045 X-RAY EXAM CHEST 1 VIEW: CPT

## 2024-11-24 PROCEDURE — 86923 COMPATIBILITY TEST ELECTRIC: CPT

## 2024-11-24 PROCEDURE — 93005 ELECTROCARDIOGRAM TRACING: CPT

## 2024-11-24 PROCEDURE — 80048 BASIC METABOLIC PNL TOTAL CA: CPT | Performed by: ANESTHESIOLOGY

## 2024-11-24 PROCEDURE — 86900 BLOOD TYPING SEROLOGIC ABO: CPT | Performed by: EMERGENCY MEDICINE

## 2024-11-24 PROCEDURE — 85610 PROTHROMBIN TIME: CPT | Performed by: EMERGENCY MEDICINE

## 2024-11-24 PROCEDURE — 80053 COMPREHEN METABOLIC PANEL: CPT | Performed by: EMERGENCY MEDICINE

## 2024-11-24 PROCEDURE — 99291 CRITICAL CARE FIRST HOUR: CPT | Performed by: EMERGENCY MEDICINE

## 2024-11-24 PROCEDURE — 85025 COMPLETE CBC W/AUTO DIFF WBC: CPT | Performed by: EMERGENCY MEDICINE

## 2024-11-24 PROCEDURE — 85730 THROMBOPLASTIN TIME PARTIAL: CPT | Performed by: EMERGENCY MEDICINE

## 2024-11-24 PROCEDURE — 99222 1ST HOSP IP/OBS MODERATE 55: CPT | Performed by: INTERNAL MEDICINE

## 2024-11-24 PROCEDURE — 96374 THER/PROPH/DIAG INJ IV PUSH: CPT

## 2024-11-24 PROCEDURE — 74176 CT ABD & PELVIS W/O CONTRAST: CPT

## 2024-11-24 RX ORDER — LABETALOL HYDROCHLORIDE 5 MG/ML
10 INJECTION, SOLUTION INTRAVENOUS EVERY 4 HOURS PRN
Status: DISCONTINUED | OUTPATIENT
Start: 2024-11-24 | End: 2024-11-29 | Stop reason: HOSPADM

## 2024-11-24 RX ORDER — SODIUM CHLORIDE, SODIUM LACTATE, POTASSIUM CHLORIDE, CALCIUM CHLORIDE 600; 310; 30; 20 MG/100ML; MG/100ML; MG/100ML; MG/100ML
125 INJECTION, SOLUTION INTRAVENOUS CONTINUOUS
Status: DISCONTINUED | OUTPATIENT
Start: 2024-11-24 | End: 2024-11-24

## 2024-11-24 RX ORDER — DEXTROSE MONOHYDRATE 25 G/50ML
50 INJECTION, SOLUTION INTRAVENOUS ONCE
Status: COMPLETED | OUTPATIENT
Start: 2024-11-24 | End: 2024-11-24

## 2024-11-24 RX ORDER — PANTOPRAZOLE SODIUM 40 MG/10ML
40 INJECTION, POWDER, LYOPHILIZED, FOR SOLUTION INTRAVENOUS EVERY 12 HOURS
Status: DISCONTINUED | OUTPATIENT
Start: 2024-11-24 | End: 2024-11-25

## 2024-11-24 RX ORDER — INSULIN LISPRO 100 [IU]/ML
1-6 INJECTION, SOLUTION INTRAVENOUS; SUBCUTANEOUS EVERY 6 HOURS SCHEDULED
Status: DISCONTINUED | OUTPATIENT
Start: 2024-11-24 | End: 2024-11-25

## 2024-11-24 RX ORDER — ONDANSETRON 2 MG/ML
4 INJECTION INTRAMUSCULAR; INTRAVENOUS ONCE AS NEEDED
Status: COMPLETED | OUTPATIENT
Start: 2024-11-24 | End: 2024-11-24

## 2024-11-24 RX ORDER — BUDESONIDE AND FORMOTEROL FUMARATE DIHYDRATE 160; 4.5 UG/1; UG/1
2 AEROSOL RESPIRATORY (INHALATION) 2 TIMES DAILY
Status: DISCONTINUED | OUTPATIENT
Start: 2024-11-24 | End: 2024-11-29 | Stop reason: HOSPADM

## 2024-11-24 RX ORDER — ALBUMIN (HUMAN) 12.5 G/50ML
25 SOLUTION INTRAVENOUS EVERY 6 HOURS
Status: DISCONTINUED | OUTPATIENT
Start: 2024-11-24 | End: 2024-11-26

## 2024-11-24 RX ORDER — CALCIUM GLUCONATE 20 MG/ML
1 INJECTION, SOLUTION INTRAVENOUS ONCE
Status: COMPLETED | OUTPATIENT
Start: 2024-11-24 | End: 2024-11-24

## 2024-11-24 RX ORDER — CHLORHEXIDINE GLUCONATE ORAL RINSE 1.2 MG/ML
15 SOLUTION DENTAL EVERY 12 HOURS SCHEDULED
Status: DISCONTINUED | OUTPATIENT
Start: 2024-11-24 | End: 2024-11-29 | Stop reason: HOSPADM

## 2024-11-24 RX ORDER — ACETAMINOPHEN 325 MG/1
650 TABLET ORAL EVERY 6 HOURS PRN
Status: DISCONTINUED | OUTPATIENT
Start: 2024-11-24 | End: 2024-11-29 | Stop reason: HOSPADM

## 2024-11-24 RX ORDER — PANTOPRAZOLE SODIUM 40 MG/10ML
40 INJECTION, POWDER, LYOPHILIZED, FOR SOLUTION INTRAVENOUS ONCE
Status: COMPLETED | OUTPATIENT
Start: 2024-11-24 | End: 2024-11-24

## 2024-11-24 RX ADMIN — ALBUMIN (HUMAN) 25 G: 0.25 INJECTION, SOLUTION INTRAVENOUS at 14:13

## 2024-11-24 RX ADMIN — OCTREOTIDE ACETATE 50 MCG/HR: 500 INJECTION, SOLUTION INTRAVENOUS; SUBCUTANEOUS at 22:16

## 2024-11-24 RX ADMIN — INSULIN HUMAN 10 UNITS: 100 INJECTION, SOLUTION PARENTERAL at 22:09

## 2024-11-24 RX ADMIN — CALCIUM GLUCONATE 1 G: 20 INJECTION, SOLUTION INTRAVENOUS at 22:00

## 2024-11-24 RX ADMIN — PANTOPRAZOLE SODIUM 40 MG: 40 INJECTION, POWDER, FOR SOLUTION INTRAVENOUS at 12:58

## 2024-11-24 RX ADMIN — BUDESONIDE AND FORMOTEROL FUMARATE DIHYDRATE 2 PUFF: 160; 4.5 AEROSOL RESPIRATORY (INHALATION) at 18:36

## 2024-11-24 RX ADMIN — PANTOPRAZOLE SODIUM 40 MG: 40 INJECTION, POWDER, FOR SOLUTION INTRAVENOUS at 10:24

## 2024-11-24 RX ADMIN — CHLORHEXIDINE GLUCONATE 0.12% ORAL RINSE 15 ML: 1.2 LIQUID ORAL at 12:56

## 2024-11-24 RX ADMIN — SODIUM CHLORIDE 1000 ML: 0.9 INJECTION, SOLUTION INTRAVENOUS at 10:24

## 2024-11-24 RX ADMIN — DEXTROSE MONOHYDRATE 50 ML: 25 INJECTION, SOLUTION INTRAVENOUS at 22:09

## 2024-11-24 RX ADMIN — ACETAMINOPHEN 650 MG: 325 TABLET, FILM COATED ORAL at 21:48

## 2024-11-24 RX ADMIN — BUDESONIDE AND FORMOTEROL FUMARATE DIHYDRATE 2 PUFF: 160; 4.5 AEROSOL RESPIRATORY (INHALATION) at 14:12

## 2024-11-24 RX ADMIN — OCTREOTIDE ACETATE 50 MCG/HR: 500 INJECTION, SOLUTION INTRAVENOUS; SUBCUTANEOUS at 12:56

## 2024-11-24 RX ADMIN — CEFTRIAXONE SODIUM 1000 MG: 10 INJECTION, POWDER, FOR SOLUTION INTRAVENOUS at 14:13

## 2024-11-24 RX ADMIN — SODIUM CHLORIDE, SODIUM LACTATE, POTASSIUM CHLORIDE, AND CALCIUM CHLORIDE 125 ML/HR: .6; .31; .03; .02 INJECTION, SOLUTION INTRAVENOUS at 12:56

## 2024-11-24 RX ADMIN — SODIUM CHLORIDE, SODIUM LACTATE, POTASSIUM CHLORIDE, AND CALCIUM CHLORIDE 125 ML/HR: .6; .31; .03; .02 INJECTION, SOLUTION INTRAVENOUS at 20:59

## 2024-11-24 RX ADMIN — ALBUMIN (HUMAN) 25 G: 0.25 INJECTION, SOLUTION INTRAVENOUS at 19:51

## 2024-11-24 RX ADMIN — ONDANSETRON 4 MG: 2 INJECTION INTRAMUSCULAR; INTRAVENOUS at 10:24

## 2024-11-24 RX ADMIN — CHLORHEXIDINE GLUCONATE 0.12% ORAL RINSE 15 ML: 1.2 LIQUID ORAL at 22:10

## 2024-11-24 NOTE — ASSESSMENT & PLAN NOTE
Likely secondary to upper GI bleeding  Receiving 2 units PRBCs for Hgb 7.5  Will trend h/h q 6 hours and transfuse as necessary   Baseline hgb 10   BID PPI and octreotide gtt given history of varices   Consult to GI for evaluation for endoscopy

## 2024-11-24 NOTE — ASSESSMENT & PLAN NOTE
Lab Results   Component Value Date    EGFR 18 11/24/2024    EGFR 16 11/24/2024    EGFR 32 (L) 10/15/2024    CREATININE 3.10 (H) 11/24/2024    CREATININE 3.42 (H) 11/24/2024    CREATININE 2.1 (H) 10/15/2024     Gentle volume resuscitation  Hold home diuretics  Close intake and output  Daily weights  If no imprvoement this AM, consider nephrology consult

## 2024-11-24 NOTE — ASSESSMENT & PLAN NOTE
Concern for recurrent variceal bleeding (history of banding)   Transfused 2 units PRBCs for hgb 7.5 from 10 in the previous 2 months   BID PPI and octrotide gtt   Pending EGD  Appreciate GI recommendations

## 2024-11-24 NOTE — ASSESSMENT & PLAN NOTE
Concern for recurrent variceal bleeding (history of banding)   Transfuse 2 units PRBCs for hgb 7.5 from 10 in the previous 2 months   BID PPI and octrotide gtt   Consult to GI for evaluation for endoscopy

## 2024-11-24 NOTE — ASSESSMENT & PLAN NOTE
Likely secondary to upper GI bleeding  Received 2u PRBC on admission  Will trend h/h q 6 hours and transfuse as necessary   Baseline hgb 10   BID PPI and octreotide gtt given history of varices   Pending EGD  Appreciate GI recommendations

## 2024-11-24 NOTE — ASSESSMENT & PLAN NOTE
Likely upper GI bleed   Will continue to monitor hgb and transfuse as necessary   Please see plan for hematemesis   Blood count: 4u PRBC, 2 FFP

## 2024-11-24 NOTE — ASSESSMENT & PLAN NOTE
Gentle volume resuscitation  Close intake and output  Daily weights  Follow serum chemistries closely

## 2024-11-24 NOTE — H&P
H&P - Critical Care/ICU   Name: Bimal Lugo 79 y.o. male I MRN: [id number]  Unit/Bed#: ICU 05 I Date of Admission: 11/24/2024   Date of Service: 11/24/2024 I Hospital Day: 0       Assessment & Plan  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)  Patient has abstained from alcohol for several years  Has history of ascites requiring weekly paracentesis (last para 11/21) and esophageal varices requiring banding   MELD 28 on presentation    Presents with hematemesis and maroon stool   Consult to GI for evaluation for endoscopy   Ceftriaxone for SBP prophylaxis   Melena  Likely secondary to upper GI bleeding  Receiving 2 units PRBCs for Hgb 7.5  Will trend h/h q 6 hours and transfuse as necessary   Baseline hgb 10   BID PPI and octreotide gtt given history of varices   Consult to GI for evaluation for endoscopy   Hematemesis  Concern for recurrent variceal bleeding (history of banding)   Transfuse 2 units PRBCs for hgb 7.5 from 10 in the previous 2 months   BID PPI and octrotide gtt   Consult to GI for evaluation for endoscopy   Acute blood loss anemia  Likely upper GI bleed   Will continue to monitor hgb and transfuse as necessary   Please see plan for hematemesis   Disposition: Stepdown Level 1    History of Present Illness   Bimal Lugo is a 79 y.o. who presents with a past medical history of alcoholic liver cirrhosis complicated by esophageal varices s/p banding and ascites, GAVE, DM, COPD, ARACELY, HLD, HTN.  He presents to the emergency department for evaluation of hematemesis and melena.  He explains that he has two 6 ounce cans of tomato sauce with his dinner last evening.  Later in the evening, he developed dizziness, weakness and fatigue.  He had several episodes of vomiting that were bright red.  However, he thought it might be the tomato sauce that he had for dinner.  This morning he had melena which prompted evaluation.  Hgb in the emergency department was noted to be 7.5 which in down from his baseline of  10.  He is receiving 2 units of PRBCs and is referred to the step down unit for further management and care.       History obtained from chart review and the patient.  Review of Systems: Review of Systems   Constitutional:  Positive for appetite change and fatigue.   HENT: Negative.     Eyes: Negative.    Respiratory:  Negative for chest tightness and shortness of breath.    Cardiovascular:  Positive for leg swelling. Negative for chest pain.   Gastrointestinal:  Positive for abdominal distention, abdominal pain, blood in stool, diarrhea, nausea and vomiting.   Endocrine: Negative.    Genitourinary: Negative.    Musculoskeletal: Negative.    Skin: Negative.    Allergic/Immunologic: Negative.    Neurological: Negative.    Hematological: Negative.    Psychiatric/Behavioral: Negative.         Historical Information   Past Medical History:  No date: Chronic bronchitis (HCC)  No date: COPD (chronic obstructive pulmonary disease) (HCC)  No date: Diabetes mellitus (HCC)  11 apr 2024: Esophageal varices (HCC)  No date: Hyperlipidemia  No date: Hypertension  No date: Obesity  No date: ARACELY (obstructive sleep apnea) Past Surgical History:  No date: AORTIC VALVE REPLACEMENT      Comment:  Jan 4 2024 04/04/2024: IR PARACENTESIS  03/07/2024: IR PARACENTESIS  04/11/2024: IR PARACENTESIS  04/26/2024: IR PARACENTESIS  05/09/2024: IR PARACENTESIS  05/16/2024: IR PARACENTESIS  05/23/2024: IR PARACENTESIS  06/06/2024: IR PARACENTESIS  06/13/2024: IR PARACENTESIS  06/20/2024: IR PARACENTESIS  06/27/2024: IR PARACENTESIS  07/03/2024: IR PARACENTESIS  07/11/2024: IR PARACENTESIS  07/18/2024: IR PARACENTESIS  07/24/2024: IR PARACENTESIS  08/01/2024: IR PARACENTESIS  8/8/2024: IR PARACENTESIS  8/15/2024: IR PARACENTESIS  8/22/2024: IR PARACENTESIS  8/29/2024: IR PARACENTESIS  9/5/2024: IR PARACENTESIS  9/12/2024: IR PARACENTESIS  9/19/2024: IR PARACENTESIS  10/3/2024: IR PARACENTESIS  9/26/2024: IR PARACENTESIS  10/10/2024: IR  PARACENTESIS  10/17/2024: IR PARACENTESIS  10/24/2024: IR PARACENTESIS  10/31/2024: IR PARACENTESIS  11/7/2024: IR PARACENTESIS  11/14/2024: IR PARACENTESIS  11 apr 2024: UPPER GASTROINTESTINAL ENDOSCOPY   Current Outpatient Medications   Medication Instructions    albuterol (PROVENTIL HFA,VENTOLIN HFA) 90 mcg/act inhaler 2 puffs, Inhalation, Every 4 hours PRN    ammonium lactate (LAC-HYDRIN) 12 % lotion Apply externally, 2 times daily    aspirin 81 mg, Oral, Daily (early morning)    atorvastatin (LIPITOR) 40 mg, Oral, Daily (early morning)    carvedilol (COREG) 3.125 mg, Oral, 2 times daily with meals    furosemide (LASIX) 40 mg, Oral, 2 times daily    glucose 4-6 GM-MG 16 g, Oral, Daily PRN    glucose blood test strip Check twice a day and as needed, dx E11.9,    hydrocortisone (PROCTOSOL HC) 2.5 % rectal cream Rectal    insulin aspart (NOVOLOG FLEXPEN) 19 Units, Subcutaneous, 3 times daily, 19 in a.m , 6 units at lunchtime if eats, 10units in p.m.    isosorbide mononitrate (IMDUR) 30 mg 24 hr tablet     lactulose (CHRONULAC) 10 g/15 mL solution 10mL PO BID    Lancets (ONETOUCH ULTRASOFT) lancets Check twice a day and as needed, dx E11.9,    losartan (COZAAR) 100 mg, Oral, Daily (early morning)    lovastatin (MEVACOR) 10 MG tablet No dose, route, or frequency recorded.    metFORMIN (GLUCOPHAGE-XR) 1,000 mg, Oral, 2 times daily    Misc. Devices MISC Does not apply    montelukast (SINGULAIR) 10 mg, Oral, Daily    Multiple Vitamin (DAILY VALUE MULTIVITAMIN) TABS Oral    nystatin (MYCOSTATIN) powder Apply externally    Omega-3 Fatty Acids (FISH OIL) 645 MG CAPS Oral    pantoprazole (PROTONIX) 40 mg, Oral, Daily    spironolactone (ALDACTONE) 50 mg, Oral, 2 times daily    SYMBICORT 160-4.5 MCG/ACT inhaler 2 puffs, Inhalation, 2 times daily    Tresiba FlexTouch 10 Units, Subcutaneous, Daily at bedtime    Allergies   Allergen Reactions    Gemfibrozil Swelling and Rash    Carbamazepine     Carbamazepine And Analogs       swelling    Oxycodone Other (See Comments)     Pt states he hallucinates      Social History     Tobacco Use    Smoking status: Former     Current packs/day: 0.00     Average packs/day: 5.0 packs/day for 35.0 years (175.0 ttl pk-yrs)     Types: Cigarettes     Start date:      Quit date:      Years since quittin.9    Smokeless tobacco: Never    Tobacco comments:     Quit in  (approx)   Vaping Use    Vaping status: Never Used   Substance Use Topics    Alcohol use: Not Currently     Alcohol/week: 24.0 standard drinks of alcohol     Types: 24 Cans of beer per week     Comment: sober- quit     Drug use: Not Currently    No family history on file.       Objective :                   Vitals I/O      Most Recent Min/Max in 24hrs   Temp 97.5 °F (36.4 °C) Temp  Min: 97.4 °F (36.3 °C)  Max: 97.8 °F (36.6 °C)   Pulse 98 Pulse  Min: 95  Max: 133   Resp 16 Resp  Min: 16  Max: 18   /100 BP  Min: 129/100  Max: 171/76   O2 Sat 100 % SpO2  Min: 99 %  Max: 100 %      Intake/Output Summary (Last 24 hours) at 2024 1356  Last data filed at 2024 1124  Gross per 24 hour   Intake 1000 ml   Output --   Net 1000 ml       Diet NPO  Diet NPO; Sips with meds    Invasive Monitoring           Physical Exam   Physical Exam  Eyes:      General: Lids are normal.      Extraocular Movements: Extraocular movements intact.   Skin:     General: Skin is warm and dry.   HENT:      Head: Normocephalic and atraumatic.   Neck:      Trachea: Trachea normal.   Cardiovascular:      Rate and Rhythm: Normal rate.      Pulses: Normal pulses.   Musculoskeletal:      Cervical back: Normal range of motion.      Right lower leg: Edema present.      Left lower leg: Edema present.   Abdominal: General: There is distension.     Palpations: Abdomen is soft. There is fluid wave.      Tenderness: There is no abdominal tenderness.   Constitutional:       General: He is awake.      Appearance: He is overweight. He is ill-appearing.       Interventions: Nasal cannula in place.   Pulmonary:      Effort: Pulmonary effort is normal.      Breath sounds: Normal breath sounds.   Psychiatric:         Behavior: Behavior is cooperative.   Neurological:      General: No focal deficit present.      Mental Status: He is alert.      GCS: GCS eye subscore is 4. GCS verbal subscore is 5. GCS motor subscore is 6.      Sensory: Sensation is intact.          Diagnostic Studies        Lab Results: I have reviewed the following results:     Medications:  Scheduled PRN   Albumin 25%, 25 g, Q6H  budesonide-formoterol, 2 puff, BID  cefTRIAXone, 1,000 mg, Q24H  chlorhexidine, 15 mL, Q12H LUBA  pantoprazole, 40 mg, Q12H          Continuous    lactated ringers, 125 mL/hr, Last Rate: 125 mL/hr (11/24/24 1256)  octreotide, 50 mcg/hr, Last Rate: 50 mcg/hr (11/24/24 1256)         Labs:   CBC    Recent Labs     11/24/24  1024   WBC 16.60*   HGB 7.5*   HCT 22.8*        BMP    Recent Labs     11/24/24  1024   SODIUM 128*   K 5.8*      CO2 11*   AGAP 17*   BUN 70*   CREATININE 3.42*   CALCIUM 8.5       Coags    Recent Labs     11/24/24  1024   INR 1.38*   PTT 37*        Additional Electrolytes  No recent results       Blood Gas    No recent results  No recent results LFTs  Recent Labs     11/24/24  1024   ALT 16   AST 32   ALKPHOS 56   ALB 2.8*   TBILI 0.71       Infectious  No recent results  Glucose  Recent Labs     11/24/24  1024   GLUC 218*

## 2024-11-24 NOTE — CONSULTS
Consultation - Gastroenterology   Name: Bimal Lugo 79 y.o. male I MRN: [id number]  Unit/Bed#: ED 12 I Date of Admission: 11/24/2024   Date of Service: 11/24/2024 I Hospital Day: 0     Physician Requesting Evaluation: Matt Rowland MD   Reason for Evaluation / Principal Problem:     Assessment & Plan  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)  MELD 3.0: 28 at 11/24/2024 10:24 AM  MELD-Na: 27 at 11/24/2024 10:24 AM  Calculated from:  Serum Creatinine: 3.42 mg/dL (Using max of 3 mg/dL) at 11/24/2024 10:24 AM  Serum Sodium: 128 mmol/L at 11/24/2024 10:24 AM  Total Bilirubin: 0.71 mg/dL (Using min of 1 mg/dL) at 11/24/2024 10:24 AM  Serum Albumin: 2.8 g/dL at 11/24/2024 10:24 AM  INR(ratio): 1.38 at 11/24/2024 10:24 AM  Age at listing (hypothetical): 79 years  Sex: Male at 11/24/2024 10:24 AM      Melena    Hematemesis    Acute blood loss anemia  -GI consulted today on patient due to hematemesis and maroon stool.  Patient does have a history of portal hypertensive gastropathy and esophageal varices.  -Hemoglobin 7.5 down from 11.02 months ago.  -Would recommend continuing to trend CBC and transfuse as necessary.  -Patient should be started on octreotide, pantoprazole, ceftriaxone.  -Patient is currently n.p.o.  -We will decide whether endoscopic evaluation will be performed today or tomorrow; will discuss with ICU team and GI attending.  -Follow-up CMP.  -Follow-up CT abdomen pelvis.  -Continue to monitor vital signs.  -IV fluids and supportive care.  -Serial abdominal exams.  Patient will be seen and examined by .  All rodriguez medical decisions be made by .   I have discussed the above management plan in detail with the primary service.   Gastroenterology service will follow.    History of Present Illness   HPI:  Bimal Lugo is a 79 y.o. male who presents with a past medical history significant for alcohol cirrhosis, diabetes mellitus, history of esophageal varices, portal hypertensive gastropathy,  obesity, hyperlipidemia, hypertension, COPD, sleep apnea.    Patient is very well-known to the GI service.  Patient reports that yesterday he did eat 2 jars of tomato sauce for dinner.  He reports that in the middle of the night he started to feel fatigued and weak as well as lightheaded.  He did have several episodes of vomiting which he believes were bright red but he again did eat tomato sauce for dinner.  Unfortunately this morning he did have a loose maroon stool that his wife did visualize.  Patient does have a history of esophageal varices as well as portal hypertensive gastropathy.  Patient has undergone several endoscopies in the past several months requiring banding.  Patient's last EGD from 9/24/2024 showed 1 small varix in the esophagus, portal hypertensive gastropathy, and GAVE.    Patient CT abdomen pelvis is pending.  Hemoglobin level in the ER is 7.5 down from 11.02 months ago.  Patient does have an elevated white blood cell count of 16.6.    Review of Systems   Constitutional:  Negative for chills and fever.   HENT:  Negative for ear pain and sore throat.    Eyes:  Negative for pain and visual disturbance.   Respiratory:  Negative for cough and shortness of breath.    Cardiovascular:  Negative for chest pain and palpitations.   Gastrointestinal:  Positive for blood in stool, nausea and vomiting. Negative for abdominal pain.   Genitourinary:  Negative for dysuria and hematuria.   Musculoskeletal:  Negative for arthralgias and back pain.   Skin:  Negative for color change and rash.   Neurological:  Negative for seizures and syncope.   All other systems reviewed and are negative.    I have reviewed the patient's PMH, PSH, Social History, Family History, Meds, and Allergies    Objective :  Temp:  [97.8 °F (36.6 °C)] 97.8 °F (36.6 °C)  HR:  [103-133] 103  BP: (136)/(84) 136/84  Resp:  [18] 18  SpO2:  [99 %] 99 %  O2 Device: None (Room air)    Physical Exam  Cardiovascular:      Rate and Rhythm:  Tachycardia present.   Abdominal:      General: There is distension.      Palpations: Abdomen is soft.      Tenderness: There is no abdominal tenderness.   Musculoskeletal:         General: Swelling present.           Lab Results: I have reviewed the following results:CBC/BMP:   .     11/24/24  1024   WBC 16.60*   HGB 7.5*   HCT 22.8*      SODIUM 128*   K 5.8*      CO2 11*   BUN 70*   CREATININE 3.42*   GLUC 218*    , LFTs:   .     11/24/24  1024   AST 32   ALT 16   ALB 2.8*   TBILI 0.71   ALKPHOS 56    , PTT/INR:  .     11/24/24  1024   PTT 37*   INR 1.38*        Imaging Results Review: No pertinent imaging studies reviewed.  Other Study Results Review: No additional pertinent studies reviewed.

## 2024-11-24 NOTE — ASSESSMENT & PLAN NOTE
-GI consulted today on patient due to hematemesis and maroon stool.  Patient does have a history of portal hypertensive gastropathy and esophageal varices.  -Hemoglobin 7.5 down from 11.02 months ago.  -Would recommend continuing to trend CBC and transfuse as necessary.  -Patient should be started on octreotide, pantoprazole, ceftriaxone.  -Patient is currently n.p.o.  -We will decide whether endoscopic evaluation will be performed today or tomorrow; will discuss with ICU team and GI attending.  -Follow-up CMP.  -Follow-up CT abdomen pelvis.  -Continue to monitor vital signs.  -IV fluids and supportive care.  -Serial abdominal exams.  Patient will be seen and examined by .  All rodriguez medical decisions be made by .

## 2024-11-24 NOTE — ASSESSMENT & PLAN NOTE
Patient has abstained from alcohol for several years  Has history of ascites requiring weekly paracentesis (last para 11/21) and esophageal varices requiring banding   MELD 28 on presentation    Presents with hematemesis and maroon stool   Resume home lactulose when appropriate  Ceftriaxone for SBP prophylaxis   Plan for EGD today  Appreciate GI recommendations

## 2024-11-24 NOTE — ASSESSMENT & PLAN NOTE
Lab Results   Component Value Date    HGBA1C 6.0 (H) 10/15/2024       Recent Labs     11/24/24  1724 11/24/24  2357   POCGLU 120 80       Blood Sugar Average: Last 72 hrs:  (P) 100  Continue sliding scale insulin

## 2024-11-24 NOTE — CASE MANAGEMENT
Case Management Assessment & Discharge Planning Note    Patient name Bimal Lugo  Location ICU 05/ICU 05 MRN [id number]  : 1945 Date 2024       Current Admission Date: 2024  Current Admission Diagnosis: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)   Patient Active Problem List    Diagnosis Date Noted Date Diagnosed    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) 2024     Melena 2024     Hematemesis 2024     Acute blood loss anemia 2024     Diabetes mellitus, type 2 (HCC) 2024     Sleep apnea 2024     Thrombocytopenia (HCC) 2024     Chronic obstructive pulmonary disease (HCC) 04/10/2024     History of aortic valve replacement 04/10/2024     Back pain 04/10/2024     CAD (coronary artery disease) 2024     Cirrhosis (HCC) 2024     Symptomatic anemia 2024     Chronic kidney disease, stage 3a (HCC) 10/11/2021     PVD (peripheral vascular disease) (HCC) 2018     Atherosclerosis of lower extremity with intermittent claudication (HCC) 2018     Chronic bronchitis with COPD (chronic obstructive pulmonary disease) (HCC) 2017       LOS (days): 0  Geometric Mean LOS (GMLOS) (days):   Days to GMLOS:     OBJECTIVE:    Risk of Unplanned Readmission Score: 19.56         Current admission status: Inpatient       Preferred Pharmacy:   [facility] [place], [place] - [address]  [address] [address]  Phone: [phone] Fax: [phone]    Primary Care Provider: Aly Carter MD    Primary Insurance: MEDICARE  Secondary Insurance: BLUE CROSS    ASSESSMENT:  Active Health Care Proxies       Viki Lugo Care Representative - Spouse   Primary Phone: [phone] (Mobile)                 Advance Directives  Does patient have a Health Care POA?: No  Was patient offered paperwork?: Yes (declined)  Does patient currently have a Health Care decision maker?: Yes, please see  Health Care Proxy section  Does patient have Advance Directives?: No  Was patient offered paperwork?: Yes (declined)  Primary Contact: wife Nighat         Readmission Root [facility]  30 Day Readmission: No    Patient Information  Admitted from:: Home  Mental Status: Alert  During Assessment patient was accompanied by: Spouse (wife Nighat)  Assessment information provided by:: Patient  Primary Caregiver: Self  Support Systems: Spouse/significant other  County of Residence: [place]  What city do you live in?: [place]  Home entry access options. Select all that apply.: Stairs  Number of steps to enter home.: 3  Do the steps have railings?: Yes  Type of Current Residence: [facility]  Living Arrangements: Lives w/ Spouse/significant other  Is patient a ?: No    Activities of Daily Living Prior to Admission  Functional Status: Independent  Completes ADLs independently?: Yes  Ambulates independently?: Yes  Does patient use assisted devices?: Yes  Assisted Devices (DME) used: Straight Cane  Does patient currently own DME?: Yes  What DME does the patient currently own?: Straight Cane  Does patient have a history of Outpatient Therapy (PT/OT)?: Yes (cardiac rehab)  Does the patient have a history of Short-Term Rehab?: No  Does patient have a history of HHC?: No  Does patient currently have HHC?: No         Patient Information Continued  Income Source: Pension/shelter  Does patient have prescription coverage?: Yes  Does patient receive dialysis treatments?: No  Does patient have a history of substance abuse?: Yes  Historical substance use preference: Alcohol/ETOH  Is patient currently in treatment for substance abuse?: N/A - sober (Pt states he has been sober x 40 years)  Does patient have a history of Mental Health Diagnosis?: No         Means of Transportation  [facility] Self          DISCHARGE DETAILS:    Discharge planning discussed with:: pt and wife Nighat at bedside  Freedom of Choice:  Yes  Comments - Freedom of Choice: CM discussed d/c needs including HHC, but pt declines this offer.  States that his wife can provide any needed assistance.  IPTA  Drives  CM contacted family/caregiver?: Yes  Were Treatment Team discharge recommendations reviewed with patient/caregiver?: Yes  Did patient/caregiver verbalize understanding of patient care needs?: Yes  Were patient/caregiver advised of the risks associated with not following Treatment Team discharge recommendations?: Yes    Contacts  Patient Contacts: Nighat  Relationship to Patient:: Family  Contact Method: In Person  Reason/Outcome: Continuity of Care, Discharge Planning    Requested Home Health Care         Is the patient interested in HHC at discharge?: No    DME Referral Provided  Referral made for DME?: No    Other Referral/Resources/Interventions Provided:  Referral Comments: No anticipated d/c needs-declining HHC    Would you like to participate in our [facility] service program?  : No - Declined    Treatment Team Recommendation: Home  Discharge Destination Plan:: Home  Transport at Discharge : Family

## 2024-11-24 NOTE — ASSESSMENT & PLAN NOTE
Patient has abstained from alcohol for several years  Has history of ascites requiring weekly paracentesis (last para 11/21) and esophageal varices requiring banding   MELD 28 on presentation    Presents with hematemesis and maroon stool   Consult to GI for evaluation for endoscopy   Ceftriaxone for SBP prophylaxis

## 2024-11-24 NOTE — ED PROVIDER NOTES
Time reflects when diagnosis was documented in both MDM as applicable and the Disposition within this note       Time User Action Codes Description Comment    11/24/2024 11:00 AM Matt Rowland [K92.2] GI bleed     11/24/2024 11:01 AM Matt Rowland [E87.1] Hyponatremia     11/24/2024 11:31 AM Мария Braswell [D62] Acute blood loss anemia     11/24/2024 11:31 AM Мария Braswell [K92.0] Hematemesis, unspecified whether nausea present           ED Disposition       ED Disposition   Admit    Condition   Stable    Date/Time   Sun Nov 24, 2024 11:48 AM    Comment   Case was discussed with critical care and the patient's admission status was agreed to be Admission Status: inpatient status to the service of Dr. Troy .               Assessment & Plan       Medical Decision Making    Medical Decision Making 79-year-old male presents emergency department history of cirrhosis with varices.  Reports bloody stool and black stool over the last 24 hours.  Patient does have heme positive stool on exam, initial hemoglobin was just over 7.  Discussed with patient advised transfusion due to his history of varices and active bleeding currently now.  Patient was evaluated by GI they felt the patient could wait till the morning for endoscopy.  Patient evaluated by critical care and will admit the patient through the critical care service.  I did have the patient signed consent for blood.  Critical care time for me 60 minutes.  Discussed with critical care attending discussed with critical care events practitioner.  Discussed with the gastroenterology service.  Patient consented to admission.  Discussed with the wife at length       Medications   ceftriaxone (ROCEPHIN) 1 g/50 mL in dextrose IVPB (has no administration in time range)   octreotide (SandoSTATIN) 500 mcg in sodium chloride 0.9 % 250 mL infusion (has no administration in time range)   pantoprazole (PROTONIX) injection 40 mg (has no administration in time range)    lactated ringers infusion (has no administration in time range)   budesonide-formoterol (SYMBICORT) 160-4.5 mcg/act inhaler 2 puff (has no administration in time range)   chlorhexidine (PERIDEX) 0.12 % oral rinse 15 mL (has no administration in time range)   pantoprazole (PROTONIX) injection 40 mg (40 mg Intravenous Given 11/24/24 1024)   ondansetron (ZOFRAN) injection 4 mg (4 mg Intravenous Given 11/24/24 1024)   sodium chloride 0.9 % bolus 1,000 mL (0 mL Intravenous Stopped 11/24/24 1124)       ED Risk Strat Scores           Diagnostic testing  Serum sodium was 128 consistent with hyponatremia, BUN and creatinine were 70 and 3.4 consistent with an NICOLE  White count was minimally elevated 16,000 possible stress response hemoglobin was 7.5.  Patient did have bright red blood per rectum, hemoglobin 7.5 so we will transfuse the patient 2 units.  Discussed with the patient got consent.  Chest x-ray showed no focal pathology no infiltrates no pneumothorax.  CT scan abdomen pelvis showed cirrhosis, ascites, no acute pathology.                SBIRT 22yo+      Flowsheet Row Most Recent Value   Initial Alcohol Screen: US AUDIT-C     1. How often do you have a drink containing alcohol? 0 Filed at: 11/24/2024 1033   2. How many drinks containing alcohol do you have on a typical day you are drinking?  0 Filed at: 11/24/2024 1033   3b. FEMALE Any Age, or MALE 65+: How often do you have 4 or more drinks on one occassion? 0 Filed at: 11/24/2024 1033   Audit-C Score 0 Filed at: 11/24/2024 1033   GRAHAM: How many times in the past year have you...    Used an illegal drug or used a prescription medication for non-medical reasons? Never Filed at: 11/24/2024 1033                            History of Present Illness       Chief Complaint   Patient presents with    Rectal Bleeding     Pt brought in by EMS with c/o dark tarry stools since yesterday, some abd discomfort, N/D       Past Medical History:   Diagnosis Date    Chronic  bronchitis (HCC)     COPD (chronic obstructive pulmonary disease) (HCC)     Diabetes mellitus (HCC)     Esophageal varices (HCC) 2024    Hyperlipidemia     Hypertension     Obesity     ARACELY (obstructive sleep apnea)       Past Surgical History:   Procedure Laterality Date    AORTIC VALVE REPLACEMENT      2024    IR PARACENTESIS  2024    IR PARACENTESIS  2024    IR PARACENTESIS  2024    IR PARACENTESIS  2024    IR PARACENTESIS  2024    IR PARACENTESIS  2024    IR PARACENTESIS  2024    IR PARACENTESIS  2024    IR PARACENTESIS  2024    IR PARACENTESIS  2024    IR PARACENTESIS  2024    IR PARACENTESIS  2024    IR PARACENTESIS  2024    IR PARACENTESIS  2024    IR PARACENTESIS  2024    IR PARACENTESIS  2024    IR PARACENTESIS  2024    IR PARACENTESIS  8/15/2024    IR PARACENTESIS  2024    IR PARACENTESIS  2024    IR PARACENTESIS  2024    IR PARACENTESIS  2024    IR PARACENTESIS  2024    IR PARACENTESIS  10/3/2024    IR PARACENTESIS  2024    IR PARACENTESIS  10/10/2024    IR PARACENTESIS  10/17/2024    IR PARACENTESIS  10/24/2024    IR PARACENTESIS  10/31/2024    IR PARACENTESIS  2024    IR PARACENTESIS  2024    UPPER GASTROINTESTINAL ENDOSCOPY  2024      No family history on file.   Social History     Tobacco Use    Smoking status: Former     Current packs/day: 0.00     Average packs/day: 5.0 packs/day for 35.0 years (175.0 ttl pk-yrs)     Types: Cigarettes     Start date:      Quit date:      Years since quittin.9    Smokeless tobacco: Never    Tobacco comments:     Quit in  (approx)   Vaping Use    Vaping status: Never Used   Substance Use Topics    Alcohol use: Not Currently     Alcohol/week: 24.0 standard drinks of alcohol     Types: 24 Cans of beer per week     Comment: sober- quit     Drug use: Not Currently      E-Cigarette/Vaping     E-Cigarette Use Never User       E-Cigarette/Vaping Substances    Nicotine No     THC No     CBD No     Flavoring No     Other No     Unknown No       I have reviewed and agree with the history as documented.     HPI patient is a 79-year-old male with a history of alcoholic cirrhosis and esophageal varices.  Previous peritoneal tap recently.  Reports started with some tarry stool and had bright red blood per rectum yesterday.  Patient reports some diffuse abdominal pain with some nausea.  He denies vomiting any blood.  Denies any weakness.  Denies any respiratory symptoms.  Patient reports he has had his varices treated by endoscopy in the past and was concerned that he might have active bleeding.  Past medical history of cirrhosis secondary alcoholism with ascites, varices  Family history noncontributory  Social history: Former smoker former alcoholic    Review of Systems   Constitutional:  Negative for diaphoresis, fatigue and fever.   HENT:  Negative for congestion, ear pain, nosebleeds and sore throat.    Eyes:  Negative for photophobia, pain, discharge and visual disturbance.   Respiratory:  Negative for cough, choking, chest tightness, shortness of breath and wheezing.    Cardiovascular:  Negative for chest pain and palpitations.   Gastrointestinal:  Positive for abdominal pain and blood in stool. Negative for abdominal distention, diarrhea, rectal pain and vomiting.   Genitourinary:  Negative for dysuria, flank pain and frequency.   Musculoskeletal:  Negative for back pain, gait problem and joint swelling.   Skin:  Negative for color change and rash.   Neurological:  Negative for dizziness, syncope and headaches.   Psychiatric/Behavioral:  Negative for behavioral problems and confusion. The patient is not nervous/anxious.    All other systems reviewed and are negative.          Objective       ED Triage Vitals   Temperature Pulse Blood Pressure Respirations SpO2 Patient Position - Orthostatic VS   11/24/24  1034 11/24/24 1000 11/24/24 1000 11/24/24 1000 11/24/24 1000 11/24/24 1000   97.8 °F (36.6 °C) (!) 133 136/84 18 99 % Lying      Temp Source Heart Rate Source BP Location FiO2 (%) Pain Score    11/24/24 1034 11/24/24 1000 11/24/24 1000 -- --    Oral Monitor Right arm        Vitals      Date and Time Temp Pulse SpO2 Resp BP Pain Score FACES Pain Rating User   11/24/24 1645 99 °F (37.2 °C) 97 100 % 16 145/67 -- -- TC   11/24/24 1630 -- 99 100 % 20 160/69 -- -- TC   11/24/24 1615 -- 92 100 % 27 143/67 -- -- TC   11/24/24 1500 98 °F (36.7 °C) 92 100 % 15 157/66 -- -- TC   11/24/24 1453 -- 94 99 % 32 140/99 -- -- TC   11/24/24 1447 98 °F (36.7 °C) 90 100 % 16 140/99 -- -- TC   11/24/24 1400 -- 87 100 % 19 158/70 -- -- TC   11/24/24 1300 -- 94 100 % 22 97/52 -- -- TC   11/24/24 1215 97.5 °F (36.4 °C) 98 100 % 16 129/100 -- -- TO   11/24/24 1145 97.4 °F (36.3 °C) 95 99 % 16 171/76 -- -- [facility]   11/24/24 1035 -- 103 -- -- -- -- -- [facility]   11/24/24 1034 97.8 °F (36.6 °C) -- -- -- -- -- -- [facility]   11/24/24 1000 -- 133 99 % 18 136/84 -- -- [facility]            Physical Exam  Vitals and nursing note reviewed.   Constitutional:       Appearance: He is well-developed.   HENT:      Head: Normocephalic.      Right Ear: External ear normal.      Left Ear: External ear normal.      Nose: Nose normal.      Mouth/Throat:      Mouth: Mucous membranes are moist.      Pharynx: Oropharynx is clear.   Eyes:      General: Lids are normal.      Extraocular Movements: Extraocular movements intact.      Pupils: Pupils are equal, round, and reactive to light.   Cardiovascular:      Rate and Rhythm: Tachycardia present.      Pulses: Normal pulses.      Heart sounds: Normal heart sounds.   Pulmonary:      Effort: Pulmonary effort is normal. No respiratory distress.      Breath sounds: Normal breath sounds.   Abdominal:      General: Abdomen is flat. Bowel sounds are normal.      Tenderness: There is abdominal tenderness.      Comments: Mild diffuse  tenderness.  Rectal exam shows lack stool which is heme positive interpreted by me appositional .   Musculoskeletal:         General: No deformity. Normal range of motion.      Cervical back: Normal range of motion and neck supple.   Skin:     General: Skin is warm and dry.   Neurological:      Mental Status: He is alert and oriented to person, place, and time.   Psychiatric:         Mood and Affect: Mood normal.         Results Reviewed       Procedure Component Value Units Date/Time    Protime-INR [[id number]]  (Abnormal) Collected: 11/24/24 1024    Lab Status: Final result Specimen: Blood from Arm, Left Updated: 11/24/24 1108     Protime 17.7 seconds      INR 1.38    Narrative:      INR Therapeutic Range    Indication                                             INR Range      Atrial Fibrillation                                               2.0-3.0  Hypercoagulable State                                    2.0.2.3  Left Ventricular Asist Device                            2.0-3.0  Mechanical Heart Valve                                  -    Aortic(with afib, MI, embolism, HF, LA enlargement,    and/or coagulopathy)                                     2.0-3.0 (2.5-3.5)     Mitral                                                             2.5-3.5  Prosthetic/Bioprosthetic Heart Valve               2.0-3.0  Venous thromboembolism (VTE: VT, PE        2.0-3.0    APTT [[id number]]  (Abnormal) Collected: 11/24/24 1024    Lab Status: Final result Specimen: Blood from Arm, Left Updated: 11/24/24 1108     PTT 37 seconds     Comprehensive metabolic panel [[id number]]  (Abnormal) Collected: 11/24/24 1024    Lab Status: Final result Specimen: Blood from Arm, Left Updated: 11/24/24 1050     Sodium 128 mmol/L      Potassium 5.8 mmol/L      Chloride 100 mmol/L      CO2 11 mmol/L      ANION GAP 17 mmol/L      BUN 70 mg/dL      Creatinine 3.42 mg/dL      Glucose 218 mg/dL      Calcium 8.5 mg/dL      Corrected Calcium 9.5  mg/dL      AST 32 U/L      ALT 16 U/L      Alkaline Phosphatase 56 U/L      Total Protein 5.5 g/dL      Albumin 2.8 g/dL      Total Bilirubin 0.71 mg/dL      eGFR 16 ml/min/1.73sq m     Narrative:      [employer] guidelines for Chronic Kidney Disease (CKD):     Stage 1 with normal or high GFR (GFR > 90 mL/min/1.73 square meters)    Stage 2 Mild CKD (GFR = 60-89 mL/min/1.73 square meters)    Stage 3A Moderate CKD (GFR = 45-59 mL/min/1.73 square meters)    Stage 3B Moderate CKD (GFR = 30-44 mL/min/1.73 square meters)    Stage 4 Severe CKD (GFR = 15-29 mL/min/1.73 square meters)    Stage 5 End Stage CKD (GFR <15 mL/min/1.73 square meters)  Note: GFR calculation is accurate only with a steady state creatinine    CBC and differential [[id number]]  (Abnormal) Collected: 11/24/24 1024    Lab Status: Final result Specimen: Blood from Arm, Left Updated: 11/24/24 1033     WBC 16.60 Thousand/uL      RBC 2.32 Million/uL      Hemoglobin 7.5 g/dL      Hematocrit 22.8 %      MCV 98 fL      MCH 32.3 pg      MCHC 32.9 g/dL      RDW 16.2 %      MPV 10.3 fL      Platelets 154 Thousands/uL      nRBC 0 /100 WBCs      Segmented % 85 %      Immature Grans % 2 %      Lymphocytes % 7 %      Monocytes % 6 %      Eosinophils Relative 0 %      Basophils Relative 0 %      Absolute Neutrophils 14.22 Thousands/µL      Absolute Immature Grans 0.25 Thousand/uL      Absolute Lymphocytes 1.10 Thousands/µL      Absolute Monocytes 0.98 Thousand/µL      Eosinophils Absolute 0.01 Thousand/µL      Basophils Absolute 0.04 Thousands/µL             CT abdomen pelvis wo contrast   Final Interpretation by Nayan Biswas MD (11/24 1202)      1.  Hepatic cirrhosis and findings of portal hypertension without significant interval change since 6/29/2024.      2.  Mild to moderate volume abdominopelvic ascites, similar to prior examination. No encapsulated collections.      Resident: SALINAS Funez I, the attending radiologist, have  reviewed the images and agree with the final report above.      Workstation performed: [id number]         XR chest 1 view portable   Final Interpretation by Lorie Ozuna MD (11/24 1251)      No acute cardiopulmonary disease.            Workstation performed: [id number]             ECG 12 Lead Documentation Only    Date/Time: 11/24/2024 10:17 AM    Performed by: Matt Rowland MD  Authorized by: Matt Rowland MD    Indications / Diagnosis:  GI bleed  ECG reviewed by me, the ED Provider: yes    Patient location:  ED  Previous ECG:     Previous ECG:  Compared to current    Comparison ECG info:  April 9, 2024    Similarity:  Changes noted  Interpretation:     Interpretation: abnormal    Rate:     ECG rate:  119    ECG rate assessment: tachycardic    Rhythm:     Rhythm: atrial fibrillation    Comments:      Atrial fibrillation with tachycardia, left bundle branch block, nonspecific ST-T wave changes no ST elevations  CriticalCare Time    Date/Time: 11/24/2024 4:51 PM    Performed by: Matt Rowland MD  Authorized by: Matt Rowland MD    Critical care provider statement:     Critical care time (minutes):  60    Critical care start time:  11/24/2024 10:00 AM    Critical care end time:  11/24/2024 11:00 AM    Critical care time was exclusive of:  Separately billable procedures and treating other patients and teaching time    Critical care was necessary to treat or prevent imminent or life-threatening deterioration of the following conditions:  Metabolic crisis    Critical care was time spent personally by me on the following activities:  Obtaining history from patient or surrogate, development of treatment plan with patient or surrogate, discussions with consultants, evaluation of patient's response to treatment, examination of patient, review of old charts, re-evaluation of patient's condition, ordering and review of laboratory studies, ordering and review of radiographic studies and ordering and performing  treatments and interventions      ED Medication and Procedure Management   Prior to Admission Medications   Prescriptions Last Dose Informant Patient Reported? Taking?   Lancets (ONETOUCH ULTRASOFT) lancets  Self Yes No   Sig: Check twice a day and as needed, dx E11.9,   Misc. Devices MISC  Self Yes No   Sig: by Does not apply route   Multiple Vitamin (DAILY VALUE MULTIVITAMIN) TABS  Self Yes No   Sig: Take by mouth   Omega-3 Fatty Acids (FISH OIL) 645 MG CAPS  Self Yes No   Sig: Take by mouth   SYMBICORT 160-4.5 MCG/ACT inhaler  Self Yes No   Sig: Inhale 2 puffs 2 (two) times a day   albuterol (PROVENTIL HFA,VENTOLIN HFA) 90 mcg/act inhaler  Self Yes No   Sig: Inhale 2 puffs every 4 (four) hours as needed   ammonium lactate (LAC-HYDRIN) 12 % lotion  Self Yes No   Sig: Apply topically 2 (two) times a day   aspirin 81 MG tablet  Self Yes No   Sig: Take 81 mg by mouth daily in the early morning   atorvastatin (LIPITOR) 40 mg tablet  Self Yes No   Sig: Take 40 mg by mouth daily in the early morning   carvedilol (COREG) 3.125 mg tablet  Self No No   Sig: Take 1 tablet (3.125 mg total) by mouth 2 (two) times a day with meals   furosemide (LASIX) 40 mg tablet   No No   Sig: Take 1 tablet (40 mg total) by mouth 2 (two) times a day   glucose 4-6 GM-MG  Self Yes No   Sig: Chew 16 g daily as needed   glucose blood test strip  Self Yes No   Sig: Check twice a day and as needed, dx E11.9,   hydrocortisone (PROCTOSOL HC) 2.5 % rectal cream  Self Yes No   Sig: Insert into the rectum   insulin aspart (NovoLOG FlexPen) 100 UNIT/ML injection pen  Self Yes No   Sig: Inject 19 Units under the skin Three times a day 19 in a.m , 6 units at lunchtime if eats, 10units in p.m.   insulin degludec (Tresiba FlexTouch) 100 units/mL injection pen  Self Yes No   Sig: Inject 10 Units under the skin daily at bedtime   isosorbide mononitrate (IMDUR) 30 mg 24 hr tablet  Self Yes No   lactulose (CHRONULAC) 10 g/15 mL solution  Self No No   Sig: 10mL  PO BID   losartan (COZAAR) 100 MG tablet  Self Yes No   Sig: Take 100 mg by mouth daily in the early morning   lovastatin (MEVACOR) 10 MG tablet  Self Yes No   metFORMIN (GLUCOPHAGE-XR) 500 mg 24 hr tablet  Self Yes No   Sig: Take 1,000 mg by mouth 2 (two) times a day   montelukast (SINGULAIR) 10 mg tablet  Self Yes No   Sig: Take 10 mg by mouth daily   nystatin (MYCOSTATIN) powder  Self Yes No   Sig: Apply topically   pantoprazole (PROTONIX) 40 mg tablet  Self No No   Sig: Take 1 tablet (40 mg total) by mouth daily   Patient taking differently: Take 40 mg by mouth daily in the early morning   spironolactone (ALDACTONE) 50 mg tablet   No No   Sig: Take 1 tablet (50 mg total) by mouth 2 (two) times a day      Facility-Administered Medications: None     Current Discharge Medication List        CONTINUE these medications which have NOT CHANGED    Details   albuterol (PROVENTIL HFA,VENTOLIN HFA) 90 mcg/act inhaler Inhale 2 puffs every 4 (four) hours as needed      ammonium lactate (LAC-HYDRIN) 12 % lotion Apply topically 2 (two) times a day      aspirin 81 MG tablet Take 81 mg by mouth daily in the early morning      atorvastatin (LIPITOR) 40 mg tablet Take 40 mg by mouth daily in the early morning      carvedilol (COREG) 3.125 mg tablet Take 1 tablet (3.125 mg total) by mouth 2 (two) times a day with meals  Qty: 60 tablet, Refills: 11    Associated Diagnoses: Cirrhosis of liver with ascites, unspecified hepatic cirrhosis type  (HCC); Secondary esophageal varices with bleeding (HCC)      furosemide (LASIX) 40 mg tablet Take 1 tablet (40 mg total) by mouth 2 (two) times a day  Qty: 60 tablet, Refills: 6    Associated Diagnoses: 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)      glucose 4-6 GM-MG Chew 16 g daily as needed      glucose blood test strip Check twice a day and as needed, dx E11.9,      hydrocortisone (PROCTOSOL HC) 2.5 % rectal cream Insert into the rectum      insulin aspart (NovoLOG FlexPen) 100 UNIT/ML  injection pen Inject 19 Units under the skin Three times a day 19 in a.m , 6 units at lunchtime if eats, 10units in p.m.      insulin degludec (Tresiba FlexTouch) 100 units/mL injection pen Inject 10 Units under the skin daily at bedtime      isosorbide mononitrate (IMDUR) 30 mg 24 hr tablet       lactulose (CHRONULAC) 10 g/15 mL solution 10mL PO BID  Qty: 240 mL, Refills: 0    Associated Diagnoses: 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); Hepatic encephalopathy (HCC)      Lancets (ONETOUCH ULTRASOFT) lancets Check twice a day and as needed, dx E11.9,      losartan (COZAAR) 100 MG tablet Take 100 mg by mouth daily in the early morning      lovastatin (MEVACOR) 10 MG tablet       metFORMIN (GLUCOPHAGE-XR) 500 mg 24 hr tablet Take 1,000 mg by mouth 2 (two) times a day      Misc. Devices MISC by Does not apply route      montelukast (SINGULAIR) 10 mg tablet Take 10 mg by mouth daily      Multiple Vitamin (DAILY VALUE MULTIVITAMIN) TABS Take by mouth      nystatin (MYCOSTATIN) powder Apply topically      Omega-3 Fatty Acids (FISH OIL) 645 MG CAPS Take by mouth      pantoprazole (PROTONIX) 40 mg tablet Take 1 tablet (40 mg total) by mouth daily  Qty: 30 tablet, Refills: 0    Associated Diagnoses: 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)      spironolactone (ALDACTONE) 50 mg tablet Take 1 tablet (50 mg total) by mouth 2 (two) times a day  Qty: 60 tablet, Refills: 6    Associated Diagnoses: 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)      SYMBICORT 160-4.5 MCG/ACT inhaler Inhale 2 puffs 2 (two) times a day           No discharge procedures on file.  ED SEPSIS DOCUMENTATION   Time reflects when diagnosis was documented in both MDM as applicable and the Disposition within this note       Time User Action Codes Description Comment    11/24/2024 11:00 AM Matt Rowland [K92.2] GI bleed     11/24/2024 11:01 AM Matt Rowland [E87.1] Hyponatremia     11/24/2024 11:31 AM Мария Braswell [D62] Acute  blood loss anemia     11/24/2024 11:31 AM Мария Braswell Add [K92.0] Hematemesis, unspecified whether nausea present                  Matt Rowland MD  11/24/24 5270

## 2024-11-24 NOTE — ASSESSMENT & PLAN NOTE
MELD 3.0: 28 at 11/24/2024 10:24 AM  MELD-Na: 27 at 11/24/2024 10:24 AM  Calculated from:  Serum Creatinine: 3.42 mg/dL (Using max of 3 mg/dL) at 11/24/2024 10:24 AM  Serum Sodium: 128 mmol/L at 11/24/2024 10:24 AM  Total Bilirubin: 0.71 mg/dL (Using min of 1 mg/dL) at 11/24/2024 10:24 AM  Serum Albumin: 2.8 g/dL at 11/24/2024 10:24 AM  INR(ratio): 1.38 at 11/24/2024 10:24 AM  Age at listing (hypothetical): 79 years  Sex: Male at 11/24/2024 10:24 AM

## 2024-11-24 NOTE — ASSESSMENT & PLAN NOTE
Holding home aspirin/statin/carvedilol  Resume aspirin when appropriate with GI  Resume oral medications when able

## 2024-11-24 NOTE — ASSESSMENT & PLAN NOTE
Likely upper GI bleed   Will continue to monitor hgb and transfuse as necessary   Please see plan for hematemesis

## 2024-11-25 ENCOUNTER — ANESTHESIA EVENT (INPATIENT)
Dept: GASTROENTEROLOGY | Facility: HOSPITAL | Age: 79
DRG: 368 | End: 2024-11-25
Payer: MEDICARE

## 2024-11-25 ENCOUNTER — APPOINTMENT (INPATIENT)
Dept: GASTROENTEROLOGY | Facility: HOSPITAL | Age: 79
DRG: 368 | End: 2024-11-25
Payer: MEDICARE

## 2024-11-25 ENCOUNTER — ANESTHESIA (INPATIENT)
Dept: GASTROENTEROLOGY | Facility: HOSPITAL | Age: 79
DRG: 368 | End: 2024-11-25
Payer: MEDICARE

## 2024-11-25 ENCOUNTER — APPOINTMENT (INPATIENT)
Dept: NON INVASIVE DIAGNOSTICS | Facility: HOSPITAL | Age: 79
DRG: 368 | End: 2024-11-25
Payer: MEDICARE

## 2024-11-25 LAB
ABO GROUP BLD BPU: NORMAL
ALBUMIN SERPL BCG-MCNC: 3.6 G/DL (ref 3.5–5)
ALP SERPL-CCNC: 40 U/L (ref 34–104)
ALT SERPL W P-5'-P-CCNC: 14 U/L (ref 7–52)
ANION GAP SERPL CALCULATED.3IONS-SCNC: 8 MMOL/L (ref 4–13)
AORTIC ROOT: 2.9 CM
APICAL FOUR CHAMBER EJECTION FRACTION: 56 %
ASCENDING AORTA: 2.8 CM
AST SERPL W P-5'-P-CCNC: 33 U/L (ref 13–39)
BASOPHILS # BLD AUTO: 0.02 THOUSANDS/ÂΜL (ref 0–0.1)
BASOPHILS NFR BLD AUTO: 1 % (ref 0–1)
BILIRUB SERPL-MCNC: 1.25 MG/DL (ref 0.2–1)
BPU ID: NORMAL
BSA FOR ECHO PROCEDURE: 1.88 M2
BUN SERPL-MCNC: 61 MG/DL (ref 5–25)
CA-I BLD-SCNC: 1.14 MMOL/L (ref 1.12–1.32)
CALCIUM SERPL-MCNC: 8.9 MG/DL (ref 8.4–10.2)
CHLORIDE SERPL-SCNC: 105 MMOL/L (ref 96–108)
CHOLEST SERPL-MCNC: 91 MG/DL (ref ?–200)
CO2 SERPL-SCNC: 18 MMOL/L (ref 21–32)
CREAT SERPL-MCNC: 2.93 MG/DL (ref 0.6–1.3)
CROSSMATCH: NORMAL
E WAVE DECELERATION TIME: 257 MS
E/A RATIO: 1.03
EOSINOPHIL # BLD AUTO: 0.04 THOUSAND/ÂΜL (ref 0–0.61)
EOSINOPHIL NFR BLD AUTO: 1 % (ref 0–6)
ERYTHROCYTE [DISTWIDTH] IN BLOOD BY AUTOMATED COUNT: 15.7 % (ref 11.6–15.1)
ERYTHROCYTE [DISTWIDTH] IN BLOOD BY AUTOMATED COUNT: 15.7 % (ref 11.6–15.1)
EST. AVERAGE GLUCOSE BLD GHB EST-MCNC: 114 MG/DL
FRACTIONAL SHORTENING: 28 (ref 28–44)
GFR SERPL CREATININE-BSD FRML MDRD: 19 ML/MIN/1.73SQ M
GLUCOSE SERPL-MCNC: 108 MG/DL (ref 65–140)
GLUCOSE SERPL-MCNC: 121 MG/DL (ref 65–140)
GLUCOSE SERPL-MCNC: 123 MG/DL (ref 65–140)
GLUCOSE SERPL-MCNC: 152 MG/DL (ref 65–140)
GLUCOSE SERPL-MCNC: 95 MG/DL (ref 65–140)
HBA1C MFR BLD: 5.6 %
HCT VFR BLD AUTO: 26.1 % (ref 36.5–49.3)
HCT VFR BLD AUTO: 26.6 % (ref 36.5–49.3)
HDLC SERPL-MCNC: 18 MG/DL
HGB BLD-MCNC: 7.5 G/DL (ref 12–17)
HGB BLD-MCNC: 7.7 G/DL (ref 12–17)
HGB BLD-MCNC: 8.1 G/DL (ref 12–17)
HGB BLD-MCNC: 8.8 G/DL (ref 12–17)
HGB BLD-MCNC: 9.1 G/DL (ref 12–17)
IMM GRANULOCYTES # BLD AUTO: 0.03 THOUSAND/UL (ref 0–0.2)
IMM GRANULOCYTES NFR BLD AUTO: 1 % (ref 0–2)
INR PPP: 1.39 (ref 0.85–1.19)
INTERVENTRICULAR SEPTUM IN DIASTOLE (PARASTERNAL SHORT AXIS VIEW): 1.6 CM
INTERVENTRICULAR SEPTUM: 1.6 CM (ref 0.6–1.1)
LA/AORTA RATIO 2D: 1.62
LAAS-AP2: 21.9 CM2
LAAS-AP4: 25 CM2
LDLC SERPL CALC-MCNC: 49 MG/DL (ref 0–100)
LEFT ATRIUM SIZE: 4.7 CM
LEFT ATRIUM VOLUME (MOD BIPLANE): 80 ML
LEFT ATRIUM VOLUME INDEX (MOD BIPLANE): 42.3 ML/M2
LEFT INTERNAL DIMENSION IN SYSTOLE: 3.1 CM (ref 2.1–4)
LEFT VENTRICULAR INTERNAL DIMENSION IN DIASTOLE: 4.3 CM (ref 3.5–6)
LEFT VENTRICULAR POSTERIOR WALL IN END DIASTOLE: 1.6 CM
LEFT VENTRICULAR STROKE VOLUME: 46 ML
LVSV (TEICH): 46 ML
LYMPHOCYTES # BLD AUTO: 0.62 THOUSANDS/ÂΜL (ref 0.6–4.47)
LYMPHOCYTES NFR BLD AUTO: 15 % (ref 14–44)
MAGNESIUM SERPL-MCNC: 2 MG/DL (ref 1.9–2.7)
MCH RBC QN AUTO: 30.7 PG (ref 26.8–34.3)
MCH RBC QN AUTO: 31.1 PG (ref 26.8–34.3)
MCHC RBC AUTO-ENTMCNC: 33.7 G/DL (ref 31.4–37.4)
MCHC RBC AUTO-ENTMCNC: 34.2 G/DL (ref 31.4–37.4)
MCV RBC AUTO: 91 FL (ref 82–98)
MCV RBC AUTO: 91 FL (ref 82–98)
MONOCYTES # BLD AUTO: 0.42 THOUSAND/ÂΜL (ref 0.17–1.22)
MONOCYTES NFR BLD AUTO: 10 % (ref 4–12)
MV E'TISSUE VEL-SEP: 8 CM/S
MV PEAK A VEL: 1.36 M/S
MV PEAK E VEL: 140 CM/S
MV STENOSIS PRESSURE HALF TIME: 75 MS
MV VALVE AREA P 1/2 METHOD: 2.93
NEUTROPHILS # BLD AUTO: 2.95 THOUSANDS/ÂΜL (ref 1.85–7.62)
NEUTS SEG NFR BLD AUTO: 72 % (ref 43–75)
NONHDLC SERPL-MCNC: 73 MG/DL
NRBC BLD AUTO-RTO: 0 /100 WBCS
PHOSPHATE SERPL-MCNC: 4.3 MG/DL (ref 2.3–4.1)
PLATELET # BLD AUTO: 24 THOUSANDS/UL (ref 149–390)
PLATELET # BLD AUTO: 31 THOUSANDS/UL (ref 149–390)
PMV BLD AUTO: 10 FL (ref 8.9–12.7)
PMV BLD AUTO: 10.7 FL (ref 8.9–12.7)
POTASSIUM SERPL-SCNC: 5.1 MMOL/L (ref 3.5–5.3)
PROT SERPL-MCNC: 5.4 G/DL (ref 6.4–8.4)
PROTHROMBIN TIME: 17.8 SECONDS (ref 12.3–15)
RBC # BLD AUTO: 2.87 MILLION/UL (ref 3.88–5.62)
RBC # BLD AUTO: 2.93 MILLION/UL (ref 3.88–5.62)
RIGHT VENTRICLE ID DIMENSION: 3.6 CM
SL CV PED ECHO LEFT VENTRICLE DIASTOLIC VOLUME (MOD BIPLANE) 2D: 84 ML
SL CV PED ECHO LEFT VENTRICLE SYSTOLIC VOLUME (MOD BIPLANE) 2D: 37 ML
SODIUM SERPL-SCNC: 131 MMOL/L (ref 135–147)
TR MAX PG: 35 MMHG
TR PEAK VELOCITY: 3 M/S
TRICUSPID ANNULAR PLANE SYSTOLIC EXCURSION: 1.6 CM
TRICUSPID VALVE PEAK REGURGITATION VELOCITY: 2.97 M/S
TRIGL SERPL-MCNC: 119 MG/DL (ref ?–150)
UNIT DISPENSE STATUS: NORMAL
UNIT PRODUCT CODE: NORMAL
UNIT PRODUCT VOLUME: 350 ML
UNIT RH: NORMAL
WBC # BLD AUTO: 3.86 THOUSAND/UL (ref 4.31–10.16)
WBC # BLD AUTO: 4.08 THOUSAND/UL (ref 4.31–10.16)

## 2024-11-25 PROCEDURE — C8929 TTE W OR WO FOL WCON,DOPPLER: HCPCS

## 2024-11-25 PROCEDURE — 30233N1 TRANSFUSION OF NONAUTOLOGOUS RED BLOOD CELLS INTO PERIPHERAL VEIN, PERCUTANEOUS APPROACH: ICD-10-PCS | Performed by: FAMILY MEDICINE

## 2024-11-25 PROCEDURE — P9017 PLASMA 1 DONOR FRZ W/IN 8 HR: HCPCS

## 2024-11-25 PROCEDURE — 85610 PROTHROMBIN TIME: CPT | Performed by: NURSE PRACTITIONER

## 2024-11-25 PROCEDURE — 80053 COMPREHEN METABOLIC PANEL: CPT | Performed by: NURSE PRACTITIONER

## 2024-11-25 PROCEDURE — 30233R1 TRANSFUSION OF NONAUTOLOGOUS PLATELETS INTO PERIPHERAL VEIN, PERCUTANEOUS APPROACH: ICD-10-PCS | Performed by: FAMILY MEDICINE

## 2024-11-25 PROCEDURE — 93306 TTE W/DOPPLER COMPLETE: CPT | Performed by: STUDENT IN AN ORGANIZED HEALTH CARE EDUCATION/TRAINING PROGRAM

## 2024-11-25 PROCEDURE — 43244 EGD VARICES LIGATION: CPT | Performed by: INTERNAL MEDICINE

## 2024-11-25 PROCEDURE — P9035 PLATELET PHERES LEUKOREDUCED: HCPCS

## 2024-11-25 PROCEDURE — 85027 COMPLETE CBC AUTOMATED: CPT | Performed by: NURSE PRACTITIONER

## 2024-11-25 PROCEDURE — 85025 COMPLETE CBC W/AUTO DIFF WBC: CPT | Performed by: NURSE PRACTITIONER

## 2024-11-25 PROCEDURE — 99233 SBSQ HOSP IP/OBS HIGH 50: CPT | Performed by: STUDENT IN AN ORGANIZED HEALTH CARE EDUCATION/TRAINING PROGRAM

## 2024-11-25 PROCEDURE — 83735 ASSAY OF MAGNESIUM: CPT | Performed by: NURSE PRACTITIONER

## 2024-11-25 PROCEDURE — 82330 ASSAY OF CALCIUM: CPT | Performed by: NURSE PRACTITIONER

## 2024-11-25 PROCEDURE — 83036 HEMOGLOBIN GLYCOSYLATED A1C: CPT | Performed by: NURSE PRACTITIONER

## 2024-11-25 PROCEDURE — 82948 REAGENT STRIP/BLOOD GLUCOSE: CPT

## 2024-11-25 PROCEDURE — NC001 PR NO CHARGE: Performed by: PHYSICIAN ASSISTANT

## 2024-11-25 PROCEDURE — 85018 HEMOGLOBIN: CPT

## 2024-11-25 PROCEDURE — 0DJ08ZZ INSPECTION OF UPPER INTESTINAL TRACT, VIA NATURAL OR ARTIFICIAL OPENING ENDOSCOPIC: ICD-10-PCS | Performed by: INTERNAL MEDICINE

## 2024-11-25 PROCEDURE — 85018 HEMOGLOBIN: CPT | Performed by: PHYSICIAN ASSISTANT

## 2024-11-25 PROCEDURE — P9016 RBC LEUKOCYTES REDUCED: HCPCS

## 2024-11-25 PROCEDURE — 30233K1 TRANSFUSION OF NONAUTOLOGOUS FROZEN PLASMA INTO PERIPHERAL VEIN, PERCUTANEOUS APPROACH: ICD-10-PCS | Performed by: FAMILY MEDICINE

## 2024-11-25 PROCEDURE — 80061 LIPID PANEL: CPT | Performed by: NURSE PRACTITIONER

## 2024-11-25 PROCEDURE — 84100 ASSAY OF PHOSPHORUS: CPT | Performed by: NURSE PRACTITIONER

## 2024-11-25 RX ORDER — FUROSEMIDE 40 MG/1
40 TABLET ORAL 2 TIMES DAILY
Status: DISCONTINUED | OUTPATIENT
Start: 2024-11-26 | End: 2024-11-29 | Stop reason: HOSPADM

## 2024-11-25 RX ORDER — MONTELUKAST SODIUM 10 MG/1
10 TABLET ORAL DAILY
Status: DISCONTINUED | OUTPATIENT
Start: 2024-11-25 | End: 2024-11-29 | Stop reason: HOSPADM

## 2024-11-25 RX ORDER — CARVEDILOL 6.25 MG/1
6.25 TABLET ORAL 2 TIMES DAILY WITH MEALS
Status: DISCONTINUED | OUTPATIENT
Start: 2024-11-25 | End: 2024-11-29 | Stop reason: HOSPADM

## 2024-11-25 RX ORDER — ATORVASTATIN CALCIUM 40 MG/1
40 TABLET, FILM COATED ORAL
Status: DISCONTINUED | OUTPATIENT
Start: 2024-11-26 | End: 2024-11-29 | Stop reason: HOSPADM

## 2024-11-25 RX ORDER — LIDOCAINE HYDROCHLORIDE 20 MG/ML
INJECTION, SOLUTION EPIDURAL; INFILTRATION; INTRACAUDAL; PERINEURAL AS NEEDED
Status: DISCONTINUED | OUTPATIENT
Start: 2024-11-25 | End: 2024-11-25

## 2024-11-25 RX ORDER — PROPOFOL 10 MG/ML
INJECTION, EMULSION INTRAVENOUS AS NEEDED
Status: DISCONTINUED | OUTPATIENT
Start: 2024-11-25 | End: 2024-11-25

## 2024-11-25 RX ORDER — INSULIN GLARGINE 100 [IU]/ML
10 INJECTION, SOLUTION SUBCUTANEOUS EVERY MORNING
Status: DISCONTINUED | OUTPATIENT
Start: 2024-11-26 | End: 2024-11-25

## 2024-11-25 RX ORDER — INSULIN LISPRO 100 [IU]/ML
1-6 INJECTION, SOLUTION INTRAVENOUS; SUBCUTANEOUS
Status: DISCONTINUED | OUTPATIENT
Start: 2024-11-25 | End: 2024-11-29 | Stop reason: HOSPADM

## 2024-11-25 RX ORDER — PANTOPRAZOLE SODIUM 40 MG/1
40 TABLET, DELAYED RELEASE ORAL
Status: DISCONTINUED | OUTPATIENT
Start: 2024-11-26 | End: 2024-11-29 | Stop reason: HOSPADM

## 2024-11-25 RX ORDER — LACTULOSE 10 G/15ML
10 SOLUTION ORAL 2 TIMES DAILY
Status: DISCONTINUED | OUTPATIENT
Start: 2024-11-25 | End: 2024-11-29 | Stop reason: HOSPADM

## 2024-11-25 RX ADMIN — PANTOPRAZOLE SODIUM 40 MG: 40 INJECTION, POWDER, FOR SOLUTION INTRAVENOUS at 11:41

## 2024-11-25 RX ADMIN — ALBUMIN (HUMAN) 25 G: 0.25 INJECTION, SOLUTION INTRAVENOUS at 08:10

## 2024-11-25 RX ADMIN — PERFLUTREN 2 ML/MIN: 6.52 INJECTION, SUSPENSION INTRAVENOUS at 11:30

## 2024-11-25 RX ADMIN — CARVEDILOL 6.25 MG: 6.25 TABLET, FILM COATED ORAL at 15:53

## 2024-11-25 RX ADMIN — ACETAMINOPHEN 650 MG: 325 TABLET, FILM COATED ORAL at 08:10

## 2024-11-25 RX ADMIN — PROPOFOL 40 MG: 10 INJECTION, EMULSION INTRAVENOUS at 13:11

## 2024-11-25 RX ADMIN — ALBUMIN (HUMAN) 25 G: 0.25 INJECTION, SOLUTION INTRAVENOUS at 18:29

## 2024-11-25 RX ADMIN — BUDESONIDE AND FORMOTEROL FUMARATE DIHYDRATE 2 PUFF: 160; 4.5 AEROSOL RESPIRATORY (INHALATION) at 18:33

## 2024-11-25 RX ADMIN — MONTELUKAST 10 MG: 10 TABLET, FILM COATED ORAL at 15:53

## 2024-11-25 RX ADMIN — PROPOFOL 40 MG: 10 INJECTION, EMULSION INTRAVENOUS at 13:09

## 2024-11-25 RX ADMIN — ALBUMIN (HUMAN) 25 G: 0.25 INJECTION, SOLUTION INTRAVENOUS at 00:48

## 2024-11-25 RX ADMIN — PROPOFOL 80 MG: 10 INJECTION, EMULSION INTRAVENOUS at 13:07

## 2024-11-25 RX ADMIN — INSULIN LISPRO 1 UNITS: 100 INJECTION, SOLUTION INTRAVENOUS; SUBCUTANEOUS at 18:42

## 2024-11-25 RX ADMIN — ACETAMINOPHEN 650 MG: 325 TABLET, FILM COATED ORAL at 16:45

## 2024-11-25 RX ADMIN — CHLORHEXIDINE GLUCONATE 0.12% ORAL RINSE 15 ML: 1.2 LIQUID ORAL at 08:10

## 2024-11-25 RX ADMIN — LIDOCAINE HYDROCHLORIDE 100 MG: 20 INJECTION, SOLUTION EPIDURAL; INFILTRATION; INTRACAUDAL; PERINEURAL at 13:07

## 2024-11-25 RX ADMIN — OCTREOTIDE ACETATE 50 MCG/HR: 500 INJECTION, SOLUTION INTRAVENOUS; SUBCUTANEOUS at 08:41

## 2024-11-25 RX ADMIN — PANTOPRAZOLE SODIUM 40 MG: 40 INJECTION, POWDER, FOR SOLUTION INTRAVENOUS at 00:48

## 2024-11-25 RX ADMIN — BUDESONIDE AND FORMOTEROL FUMARATE DIHYDRATE 2 PUFF: 160; 4.5 AEROSOL RESPIRATORY (INHALATION) at 09:54

## 2024-11-25 RX ADMIN — CEFTRIAXONE SODIUM 1000 MG: 10 INJECTION, POWDER, FOR SOLUTION INTRAVENOUS at 11:41

## 2024-11-25 RX ADMIN — ALBUMIN (HUMAN) 25 G: 0.25 INJECTION, SOLUTION INTRAVENOUS at 14:14

## 2024-11-25 RX ADMIN — LACTULOSE 10 G: 20 SOLUTION ORAL at 18:29

## 2024-11-25 NOTE — ANESTHESIA POSTPROCEDURE EVALUATION
Post-Op Assessment Note    CV Status:  Stable    Pain management: adequate       Mental Status:  Awake and alert   Hydration Status:  Stable   PONV Controlled:  None   Airway Patency:  Patent     Post Op Vitals Reviewed: Yes    No anethesia notable event occurred.    Staff: Anesthesiologist           Last Filed PACU Vitals:  Vitals Value Taken Time   Temp 97.5 °F (36.4 °C) 11/25/24 1347   Pulse 87 11/25/24 1448   /61 11/25/24 1347   Resp 23 11/25/24 1448   SpO2 95 % 11/25/24 1448   Vitals shown include unfiled device data.    Modified Trip:  Activity: 2 (11/25/2024  1:34 PM)  Respiration: 2 (11/25/2024  1:34 PM)  Circulation: 2 (11/25/2024  1:34 PM)  Consciousness: 2 (11/25/2024  1:34 PM)  Oxygen Saturation: 2 (11/25/2024  1:34 PM)  Modified Trip Score: 10 (11/25/2024  1:34 PM)

## 2024-11-25 NOTE — PLAN OF CARE
Problem: PAIN - ADULT  Goal: Verbalizes/displays adequate comfort level or baseline comfort level  Description: Interventions:  - Encourage patient to monitor pain and request assistance  - Assess pain using appropriate pain scale  - Administer analgesics based on type and severity of pain and evaluate response  - Implement non-pharmacological measures as appropriate and evaluate response  - Consider cultural and social influences on pain and pain management  - Notify physician/advanced practitioner if interventions unsuccessful or patient reports new pain  Outcome: Progressing     Problem: INFECTION - ADULT  Goal: Absence or prevention of progression during hospitalization  Description: INTERVENTIONS:  - Assess and monitor for signs and symptoms of infection  - Monitor lab/diagnostic results  - Monitor all insertion sites, i.e. indwelling lines, tubes, and drains  - Monitor endotracheal if appropriate and nasal secretions for changes in amount and color  - [place] appropriate cooling/warming therapies per order  - Administer medications as ordered  - Instruct and encourage patient and family to use good hand hygiene technique  - Identify and instruct in appropriate isolation precautions for identified infection/condition  Outcome: Progressing     Problem: Prexisting or High Potential for Compromised Skin Integrity  Goal: Skin integrity is maintained or improved  Description: INTERVENTIONS:  - Identify patients at risk for skin breakdown  - Assess and monitor skin integrity  - Assess and monitor nutrition and hydration status  - Monitor labs   - Assess for incontinence   - Turn and reposition patient  - Assist with mobility/ambulation  - Relieve pressure over bony prominences  - Avoid friction and shearing  - Provide appropriate hygiene as needed including keeping skin clean and dry  - Evaluate need for skin moisturizer/barrier cream  - Collaborate with interdisciplinary team   - Patient/family teaching  -  Consider wound care consult   Outcome: Progressing     Problem: Potential for Falls  Goal: Patient will remain free of falls  Description: INTERVENTIONS:  - Educate patient/family on patient safety including physical limitations  - Instruct patient to call for assistance with activity   - Consult OT/PT to assist with strengthening/mobility   - Keep Call bell within reach  - Keep bed low and locked with side rails adjusted as appropriate  - Keep care items and personal belongings within reach  - Initiate and maintain comfort rounds  - Make Fall Risk Sign visible to staff  - Offer Toileting every 2 Hours, in advance of need  - Apply yellow socks and bracelet for high fall risk patients  - Consider moving patient to room near nurses station  Outcome: Progressing

## 2024-11-25 NOTE — PROGRESS NOTES
Progress Note - Critical Care/ICU   Name: Bimal Lugo 79 y.o. male I MRN: [id number]  Unit/Bed#: ICU 05 I Date of Admission: 11/24/2024   Date of Service: 11/25/2024 I Hospital Day: 1      Assessment & Plan  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)  Patient has abstained from alcohol for several years  Has history of ascites requiring weekly paracentesis (last para 11/21) and esophageal varices requiring banding   MELD 28 on presentation    Presents with hematemesis and maroon stool   Resume home lactulose when appropriate  Ceftriaxone for SBP prophylaxis   Plan for EGD today  Appreciate GI recommendations  Melena  Likely secondary to upper GI bleeding  Received 2u PRBC on admission  Will trend h/h q 6 hours and transfuse as necessary   Baseline hgb 10   BID PPI and octreotide gtt given history of varices   Pending EGD  Appreciate GI recommendations  Hematemesis  Concern for recurrent variceal bleeding (history of banding)   Transfused 2 units PRBCs for hgb 7.5 from 10 in the previous 2 months   BID PPI and octrotide gtt   Pending EGD  Appreciate GI recommendations  Acute blood loss anemia  Likely upper GI bleed   Will continue to monitor hgb and transfuse as necessary   Please see plan for hematemesis   Blood count: 4u PRBC, 2 FFP  CAD (coronary artery disease)  Holding home aspirin/statin/carvedilol  Resume aspirin when appropriate with GI  Resume oral medications when able  Chronic obstructive pulmonary disease (HCC)  Continue home Symbicort  Resume Singulair  Respiratory protocol  Diabetes mellitus, type 2 (HCC)  Lab Results   Component Value Date    HGBA1C 6.0 (H) 10/15/2024       Recent Labs     11/24/24  1724 11/24/24  2357   POCGLU 120 80       Blood Sugar Average: Last 72 hrs:  (P) 100  Continue sliding scale insulin  Acute kidney injury superimposed on stage 3a chronic kidney disease (HCC)  Lab Results   Component Value Date    EGFR 18 11/24/2024    EGFR 16 11/24/2024    EGFR 32 (L) 10/15/2024     CREATININE 3.10 (H) 11/24/2024    CREATININE 3.42 (H) 11/24/2024    CREATININE 2.1 (H) 10/15/2024     Gentle volume resuscitation  Hold home diuretics  Close intake and output  Daily weights  If no imprvoement this AM, consider nephrology consult  High anion gap metabolic acidosis  Gentle volume resuscitation  Close intake and output  Daily weights  Follow serum chemistries closely  Electrolyte abnormality  Follow chemistries closely  Replete electrolytes as able  Hypertension  Hold home antihypertensives  PRN labetalol for SBP<160  Disposition: Stepdown Level 1    ICU Core Measures     A: Assess, Prevent, and Manage Pain Has pain been assessed? Yes  Need for changes to pain regimen? No   B: Both SAT/SAT  N/A   C: Choice of Sedation RASS Goal: N/A patient not on sedation  Need for changes to sedation or analgesia regimen? NA   D: Delirium CAM-ICU: Negative   E: Early Mobility  Plan for early mobility? Yes   F: Family Engagement Plan for family engagement today? Yes       Antibiotic Review: Awaiting culture results.       Prophylaxis:  VTE Contraindicated secondary to: GI Bleed   Stress Ulcer  covered bypantoprazole (PROTONIX) 40 mg tablet [[id number]] (Long-Term Med), pantoprazole (PROTONIX) injection 40 mg [[id number]]         24 Hour Events : Patient had 3 moderate to large bloody bowel movement overnight, received additional 2u PRBC/2 FFP  Subjective   Review of Systems: Review of Systems   Constitutional:  Positive for fatigue.   Respiratory:  Negative for shortness of breath.    Cardiovascular:  Negative for chest pain.   Gastrointestinal:  Positive for blood in stool and diarrhea. Negative for abdominal pain, nausea and vomiting.   Musculoskeletal:  Positive for arthralgias.   Neurological:  Positive for weakness.       Objective :                   Vitals I/O      Most Recent Min/Max in 24hrs   Temp 98.1 °F (36.7 °C) Temp  Min: 97.4 °F (36.3 °C)  Max: 99 °F (37.2 °C)   Pulse 86 Pulse  Min: 86  Max: 133   Resp  14 Resp  Min: 12  Max: 39   /57 BP  Min: 97/52  Max: 171/76   O2 Sat 99 % SpO2  Min: 95 %  Max: 100 %      Intake/Output Summary (Last 24 hours) at 11/25/2024 0229  Last data filed at 11/25/2024 0047  Gross per 24 hour   Intake 3062.91 ml   Output 50 ml   Net 3012.91 ml       Diet NPO; Sips with meds    Invasive Monitoring           Physical Exam   Physical Exam  Eyes:      Pupils: Pupils are equal, round, and reactive to light.   Skin:     General: Skin is warm and dry.      Coloration: Skin is pale.   HENT:      Head: Normocephalic and atraumatic.      Mouth/Throat:      Mouth: Mucous membranes are moist.   Cardiovascular:      Rate and Rhythm: Normal rate and regular rhythm.      Pulses: Normal pulses.   Musculoskeletal:         General: No tenderness, deformity or signs of injury.      Right lower leg: No edema.      Left lower leg: No edema.   Abdominal: General: There is distension.     Palpations: Abdomen is soft.      Tenderness: There is no abdominal tenderness.   Constitutional:       General: He is not in acute distress.     Appearance: He is ill-appearing.   Pulmonary:      Effort: Pulmonary effort is normal. No respiratory distress.      Breath sounds: Normal breath sounds.   Neurological:      GCS: GCS eye subscore is 4. GCS verbal subscore is 5. GCS motor subscore is 6.          Diagnostic Studies        Lab Results: I have reviewed the following results:     Medications:  Scheduled PRN   Albumin 25%, 25 g, Q6H  budesonide-formoterol, 2 puff, BID  cefTRIAXone, 1,000 mg, Q24H  chlorhexidine, 15 mL, Q12H LUBA  insulin lispro, 1-6 Units, Q6H LUBA  pantoprazole, 40 mg, Q12H      acetaminophen, 650 mg, Q6H PRN  labetalol, 10 mg, Q4H PRN       Continuous    octreotide, 50 mcg/hr, Last Rate: 50 mcg/hr (11/24/24 2216)         Labs:   CBC    Recent Labs     11/24/24  1024 11/24/24 2059   WBC 16.60*  --    HGB 7.5* 6.6*   HCT 22.8* 19.7*     --      BMP    Recent Labs     11/24/24  1024  11/24/24  2059   SODIUM 128* 127*   K 5.8* 5.9*    102   CO2 11* 16*   AGAP 17* 9   BUN 70* 66*   CREATININE 3.42* 3.10*   CALCIUM 8.5 8.3*       Coags    Recent Labs     11/24/24  1024   INR 1.38*   PTT 37*        Additional Electrolytes  No recent results       Blood Gas    No recent results  No recent results LFTs  Recent Labs     11/24/24  1024   ALT 16   AST 32   ALKPHOS 56   ALB 2.8*   TBILI 0.71       Infectious  No recent results  Glucose  Recent Labs     11/24/24  1024 11/24/24  2059   GLUC 218* 115

## 2024-11-25 NOTE — ANESTHESIA PREPROCEDURE EVALUATION
"Procedure:  EGD    Relevant Problems   CARDIO   (+) Atherosclerosis of lower extremity with intermittent claudication (HCC)   (+) CAD (coronary artery disease)   (+) History of aortic valve replacement   (+) Hypertension   (+) PVD (peripheral vascular disease) (HCC)      ENDO   (+) Diabetes mellitus, type 2 (HCC)      GI/HEPATIC   (+) 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)   (+) Cirrhosis (HCC)   (+) Hematemesis      /RENAL   (+) Acute kidney injury superimposed on stage 3a chronic kidney disease (HCC)   (+) Chronic kidney disease, stage 3a (HCC)      HEMATOLOGY   (+) Acute blood loss anemia   (+) Symptomatic anemia   (+) Thrombocytopenia (HCC)      MUSCULOSKELETAL   (+) Back pain      PULMONARY   (+) Chronic obstructive pulmonary disease (HCC)   (+) Sleep apnea      Respiratory/Allergy   (+) Chronic bronchitis with COPD (chronic obstructive pulmonary disease) (HCC)        Physical Exam    Airway    Mallampati score: III  TM Distance: >3 FB  Neck ROM: limited     Dental        Cardiovascular      Pulmonary      Other Findings    Anesthesia Plan  ASA Score- 4     Anesthesia Type- IV sedation with anesthesia with ASA Monitors.         Additional Monitors:     Airway Plan:            Plan Factors-    Chart reviewed. EKG reviewed. Imaging results reviewed. Existing labs reviewed. Patient summary reviewed.    Patient is not a current smoker.  Patient did not smoke on day of surgery.            Induction- intravenous.    Postoperative Plan-     Perioperative Resuscitation Plan - Level 1 - Full Code.       Informed Consent- Anesthetic plan and risks discussed with patient.  I personally reviewed this patient with the CRNA. Discussed and agreed on the Anesthesia Plan with the CRNA..      VITALS  /65   Pulse 80   Temp 98 °F (36.7 °C)   Resp 20   Ht 5' 5\" (1.651 m)   Wt 80.7 kg (178 lb)   SpO2 (!) 22%   BMI 29.62 kg/m²  BP Readings from Last 3 Encounters:   11/25/24 157/65   11/21/24 135/61   11/14/24 " 133/63        LABS  Results from Last 12 Months   Lab Units 11/25/24  1211 11/25/24  0748 11/25/24  0426   WBC Thousand/uL  --  3.86* 4.08*   HEMOGLOBIN g/dL 8.1* 8.8* 9.1*   HEMATOCRIT %  --  26.1* 26.6*   PLATELETS Thousands/uL  --  24* 31*     Results from Last 12 Months   Lab Units 11/25/24  0426 11/24/24  2059 11/24/24  1024 10/15/24  0734 05/14/24  0744 04/13/24  0515   SODIUM mmol/L 131* 127*   < > 136   < > 135   POTASSIUM mmol/L 5.1 5.9*   < > 4.1   < > 4.0   CHLORIDE mmol/L 105 102   < > 103   < > 105   CO2 mmol/L 18* 16*   < > 20*   < > 23   ANION GAP mmol/L 8 9   < > 13   < > 7   BUN mg/dL 61* 66*   < > 28*   < > 23   CREATININE mg/dL 2.93* 3.10*   < > 2.1*   < > 1.20   CALCIUM mg/dL 8.9 8.3*   < > 8.6   < > 8.0*   GLUCOSE RANDOM mg/dL 95 115   < > 114   < > 125   PHOSPHORUS mg/dL 4.3*  --   --   --   --   --    MAGNESIUM mg/dL 2.0  --   --   --   --  2.0   HEMOGLOBIN A1C % 5.6  --   --  6*  --   --     < > = values in this interval not displayed.     Results from Last 12 Months   Lab Units 11/25/24  0426 11/24/24  1024 09/30/24  1459   INR  1.39* 1.38* 1.07   PTT seconds  --  37* 37*       ECG      ECHOCARDIOGRAPHY OR OTHER TESTING/IMAGING  Encounter Date: 11/24/24   ECG 12 lead   Result Value    Ventricular Rate 119    Atrial Rate 119    OK Interval     QRSD Interval 142    QT Interval 358    QTC Interval 503    P Axis     QRS Axis 61    T Wave Axis -1    Narrative    Sinus tachycardia  Left bundle branch block  Abnormal ECG  When compared with ECG of 09-Apr-2024 18:54,  No significant change was found    Confirmed by Jeremiah Pradhan ([id number]) on 11/24/2024 11:44:10 AM     No results found for this or any previous visit. Completed today buit no yet read; wet read LVEF essentially normal in the 50% range. AV poorly visualized. At least mild to moderate MV regurgitation.     ------- ANESTHESIA RISK-BENEFIT DISCUSSION -------  BENEFITS OF A SPECIALIZED ANESTHESIA TEAM INCLUDE (NBK [id number], PMID  [id number]):  (1) Reduce mortality and morbidity for major surgeries/procedures. (2) The team provides analgesia/sedation/amnesia/akinesia as safely as possible. (3) The team strives to reduce discomfort as safely as possible.    RISKS, AND PLANS TO MITIGATE RISKS, INCLUDE:    - Neurologic system: IntraOp awareness (Risk is ~1:1,000 - 1:14,000; PMID 74950690), Stroke (Risk ~<0.1-2% for most cases; PMID [id number]), nerve injury, vision loss, and POCD.     - Airway and Pulmonary system: Dental or mouth injury, throat pain, critical hypoxia, pneumothorax, prolonged intubation, post-op respiratory compromise.  Airway/Intubation risks and prior data: No prior advanced airway notes in [facility] EMR  Major ARISCAT risk factors for pulmonary complications include: Other factors/Clinical gestalt: 1 pt, yielding a score of 0-1= Low risk, 1.6%.  - Cardiovascular system: Hypotension, arrhythmias, cardiac injury or arrest, blood clots, bleeding, infection, vascular injuries.  Armin's RCRI score items: ICM and Cr>2, yielding an RCRI Score of 2= 7% risk of MACE. Discussion of exercise tolerance or stress testing is warranted: Irrelevant given urgent procedure  Are cheko-op or intra-op beta blockers indicated? (PMID [id number]): no  - FEN/GI system: Aspiration risk (~0.5% per PMC 3401596) and PONV (10-80% per Apfel score) especially if the patient has not fasted.  ASA NPO guideline compliance?: Yes  - Medication risk assessment: Allergic reactions, excessive bleeding with anticoagulant use, overdoses, drug-drug interactions, injury to a fetus or  in pregnant or breastfeeding patients, cheko-procedural sedation including while driving/operating machinery.  Recent relevant medications: See MAR or Med Review  Personal or family history of anesthesia complications: no  Pregnancy Status: N/A  - Estimate mortality risks associated with anesthesia based on ASA-PS (PMID 52114539): ASA-PS IV: risk 1:1,800

## 2024-11-25 NOTE — PROGRESS NOTES
Progress Note - Critical Care/ICU   Name: Bimal Lugo 79 y.o. male I MRN: [id number]  Unit/Bed#: ICU 05 I Date of Admission: 11/24/2024   Date of Service: 11/25/2024 I Hospital Day: 1       Critical Care Interval Transfer Note:    Brief Hospital Summary:    79 year old male with known Cirrhosis gets weekly stephania who came with hematemesis and BRBPR, required 4 U PRBC, 2 U FFP and 1 U platelet total. Went to EGD today, he had a varix that was banded. GI cleared him for a diet; octreotide was D/C and protonix QD. GI also recommended increasing coreg which was ordered as well. Appropriate home medications also restarted    Barriers to discharge:   GI bleeding, stability     Consults: IP CONSULT TO CASE MANAGEMENT    Recommended to review admission imaging for incidental findings and document in discharge navigator: Chart reviewed, no known incidental findings noted at this time.      Discharge Plan: Anticipate discharge in 24-48 hrs to home.       Patient seen and evaluated by Critical Care today and deemed to be appropriate for transfer to Med Surg. Spoke to Dr. Tellez from [facility] to accept transfer. Critical care can be contacted via SecureChat with any questions or concerns. Please use the Critical Care AP Role in Secure Chat for any provider inquires until the patient is transferred out of the ICU or until tomorrow at 0600.

## 2024-11-25 NOTE — ANESTHESIA POSTPROCEDURE EVALUATION
Post-Op Assessment Note    CV Status:  Stable  Pain Score: 0    Pain management: adequate       Mental Status:  Sleepy   PONV Controlled:  None  Two or more mitigation strategies used for obstructive sleep apnea   Post Op Vitals Reviewed: Yes    No anethesia notable event occurred.            Last Filed PACU Vitals:  Vitals Value Taken Time   Temp     Pulse     BP     Resp     SpO2         Modified Trip:  Activity: 2 (11/25/2024 12:40 PM)  Respiration: 2 (11/25/2024 12:40 PM)  Circulation: 2 (11/25/2024 12:40 PM)  Consciousness: 2 (11/25/2024 12:40 PM)  Oxygen Saturation: 2 (11/25/2024 12:40 PM)  Modified Trip Score: 10 (11/25/2024 12:40 PM)

## 2024-11-25 NOTE — CASE MANAGEMENT
Case Management Discharge Planning Note    Patient name Bimal Lugo  Location ICU 05/ICU 05 MRN [id number]  : 1945 Date 2024       Current Admission Date: 2024  Current Admission Diagnosis: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)   Patient Active Problem List    Diagnosis Date Noted Date Diagnosed    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) 2024     Melena 2024     Hematemesis 2024     Acute blood loss anemia 2024     Acute kidney injury superimposed on stage 3a chronic kidney disease (HCC) 2024     High anion gap metabolic acidosis 2024     Electrolyte abnormality 2024     Hypertension 2024     Diabetes mellitus, type 2 (HCC) 2024     Sleep apnea 2024     Thrombocytopenia (HCC) 2024     Chronic obstructive pulmonary disease (HCC) 04/10/2024     History of aortic valve replacement 04/10/2024     Back pain 04/10/2024     CAD (coronary artery disease) 2024     Cirrhosis (HCC) 2024     Symptomatic anemia 2024     Chronic kidney disease, stage 3a (HCC) 10/11/2021     PVD (peripheral vascular disease) ([facility]) 2018     Atherosclerosis of lower extremity with intermittent claudication ([facility]) 2018     Chronic bronchitis with COPD (chronic obstructive pulmonary disease) ([facility]) 2017       LOS (days): 1  Geometric Mean LOS (GMLOS) (days): 4.5  Days to GMLOS:3.4     OBJECTIVE:  Risk of Unplanned Readmission Score: 23.14         Current admission status: Inpatient   Preferred Pharmacy:   [facility] [place] - [address]  [address] [facility] [place] [address]  Phone: [phone] Fax: [phone]    Primary Care Provider: Aly Carter MD    Primary Insurance: MEDICARE  Secondary Insurance: BLUE CROSS    DISCHARGE DETAILS:                                          Other Referral/Resources/Interventions Provided:  Referral Comments: Pt remains in ICU;   for EGD today.  IV alb, IV abx, IV protonix, IV octreotide gtt.  Pt denies any post d/c needs, says spouse can assist.  No referrals made at this time.         Treatment Team Recommendation: Home  Discharge Destination Plan:: Home  Transport at Discharge : Family

## 2024-11-25 NOTE — PLAN OF CARE
Problem: Potential for Falls  Goal: Patient will remain free of falls  Description: INTERVENTIONS:  - Educate patient/family on patient safety including physical limitations  - Instruct patient to call for assistance with activity   - Consult OT/PT to assist with strengthening/mobility   - Keep Call bell within reach  - Keep bed low and locked with side rails adjusted as appropriate  - Keep care items and personal belongings within reach  - Initiate and maintain comfort rounds  - Make Fall Risk Sign visible to staf  - Apply yellow socks and bracelet for high fall risk patients  - Consider moving patient to room near nurses station  Outcome: Progressing     Problem: PAIN - ADULT  Goal: Verbalizes/displays adequate comfort level or baseline comfort level  Description: Interventions:  - Encourage patient to monitor pain and request assistance  - Assess pain using appropriate pain scale  - Administer analgesics based on type and severity of pain and evaluate response  - Implement non-pharmacological measures as appropriate and evaluate response  - Consider cultural and social influences on pain and pain management  - Notify physician/advanced practitioner if interventions unsuccessful or patient reports new pain  Outcome: Progressing     Problem: INFECTION - ADULT  Goal: Absence or prevention of progression during hospitalization  Description: INTERVENTIONS:  - Assess and monitor for signs and symptoms of infection  - Monitor lab/diagnostic results  - Monitor all insertion sites, i.e. indwelling lines, tubes, and drains  - Monitor endotracheal if appropriate and nasal secretions for changes in amount and color  - [place] appropriate cooling/warming therapies per order  - Administer medications as ordered  - Instruct and encourage patient and family to use good hand hygiene technique  - Identify and instruct in appropriate isolation precautions for identified infection/condition  Outcome: Progressing  Goal: Absence  of fever/infection during neutropenic period  Description: INTERVENTIONS:  - Monitor WBC    Outcome: Progressing     Problem: SAFETY ADULT  Goal: Patient will remain free of falls  Description: INTERVENTIONS:  - Educate patient/family on patient safety including physical limitations  - Instruct patient to call for assistance with activity   - Consult OT/PT to assist with strengthening/mobility   - Keep Call bell within reach  - Keep bed low and locked with side rails adjusted as appropriate  - Keep care items and personal belongings within reach  - Initiate and maintain comfort rounds  - Make Fall Risk Sign visible to staff  - Apply yellow socks and bracelet for high fall risk patients  - Consider moving patient to room near nurses station  Outcome: Progressing  Goal: Maintain or return to baseline ADL function  Description: INTERVENTIONS:  -  Assess patient's ability to carry out ADLs; assess patient's baseline for ADL function and identify physical deficits which impact ability to perform ADLs (bathing, care of mouth/teeth, toileting, grooming, dressing, etc.)  - Assess/evaluate cause of self-care deficits   - Assess range of motion  - Assess patient's mobility; develop plan if impaired  - Assess patient's need for assistive devices and provide as appropriate  - Encourage maximum independence but intervene and supervise when necessary  - Involve family in performance of ADLs  - Assess for home care needs following discharge   - Consider OT consult to assist with ADL evaluation and planning for discharge  - Provide patient education as appropriate  Outcome: Progressing  Goal: Maintains/Returns to pre admission functional level  Description: INTERVENTIONS:  - Perform AM-PAC 6 Click Basic Mobility/ Daily Activity assessment daily.  - Set and communicate daily mobility goal to care team and patient/family/caregiver.   - Collaborate with rehabilitation services on mobility goals if consulted  - Out of bed for  toileting  - Record patient progress and toleration of activity level   Outcome: Progressing     Problem: DISCHARGE PLANNING  Goal: Discharge to home or other facility with appropriate resources  Description: INTERVENTIONS:  - Identify barriers to discharge w/patient and caregiver  - Arrange for needed discharge resources and transportation as appropriate  - Identify discharge learning needs (meds, wound care, etc.)  - Arrange for interpretive services to assist at discharge as needed  - Refer to Case Management Department for coordinating discharge planning if the patient needs post-hospital services based on physician/advanced practitioner order or complex needs related to functional status, cognitive ability, or social support system  Outcome: Progressing     Problem: Knowledge Deficit  Goal: Patient/family/caregiver demonstrates understanding of disease process, treatment plan, medications, and discharge instructions  Description: Complete learning assessment and assess knowledge base.  Interventions:  - Provide teaching at level of understanding  - Provide teaching via preferred learning methods  Outcome: Progressing     Problem: Prexisting or High Potential for Compromised Skin Integrity  Goal: Skin integrity is maintained or improved  Description: INTERVENTIONS:  - Identify patients at risk for skin breakdown  - Assess and monitor skin integrity  - Assess and monitor nutrition and hydration status  - Monitor labs   - Assess for incontinence   - Turn and reposition patient  - Assist with mobility/ambulation  - Relieve pressure over bony prominences  - Avoid friction and shearing  - Provide appropriate hygiene as needed including keeping skin clean and dry  - Evaluate need for skin moisturizer/barrier cream  - Collaborate with interdisciplinary team   - Patient/family teaching  - Consider wound care consult   Outcome: Progressing

## 2024-11-26 LAB
ABO GROUP BLD BPU: NORMAL
ALBUMIN SERPL BCG-MCNC: 3.5 G/DL (ref 3.5–5)
ALP SERPL-CCNC: 40 U/L (ref 34–104)
ALT SERPL W P-5'-P-CCNC: 14 U/L (ref 7–52)
ANION GAP SERPL CALCULATED.3IONS-SCNC: 6 MMOL/L (ref 4–13)
AST SERPL W P-5'-P-CCNC: 27 U/L (ref 13–39)
BILIRUB SERPL-MCNC: 0.87 MG/DL (ref 0.2–1)
BPU ID: NORMAL
BUN SERPL-MCNC: 50 MG/DL (ref 5–25)
CA-I BLD-SCNC: 1.16 MMOL/L (ref 1.12–1.32)
CALCIUM SERPL-MCNC: 8.7 MG/DL (ref 8.4–10.2)
CHLORIDE SERPL-SCNC: 107 MMOL/L (ref 96–108)
CO2 SERPL-SCNC: 20 MMOL/L (ref 21–32)
CREAT SERPL-MCNC: 2.44 MG/DL (ref 0.6–1.3)
ERYTHROCYTE [DISTWIDTH] IN BLOOD BY AUTOMATED COUNT: 16 % (ref 11.6–15.1)
GFR SERPL CREATININE-BSD FRML MDRD: 24 ML/MIN/1.73SQ M
GLUCOSE SERPL-MCNC: 109 MG/DL (ref 65–140)
GLUCOSE SERPL-MCNC: 114 MG/DL (ref 65–140)
GLUCOSE SERPL-MCNC: 121 MG/DL (ref 65–140)
GLUCOSE SERPL-MCNC: 161 MG/DL (ref 65–140)
GLUCOSE SERPL-MCNC: 192 MG/DL (ref 65–140)
HCT VFR BLD AUTO: 24.5 % (ref 36.5–49.3)
HGB BLD-MCNC: 8.1 G/DL (ref 12–17)
MAGNESIUM SERPL-MCNC: 2 MG/DL (ref 1.9–2.7)
MCH RBC QN AUTO: 30.9 PG (ref 26.8–34.3)
MCHC RBC AUTO-ENTMCNC: 33.1 G/DL (ref 31.4–37.4)
MCV RBC AUTO: 94 FL (ref 82–98)
PHOSPHATE SERPL-MCNC: 3.2 MG/DL (ref 2.3–4.1)
PLATELET # BLD AUTO: 25 THOUSANDS/UL (ref 149–390)
PMV BLD AUTO: 9.8 FL (ref 8.9–12.7)
POTASSIUM SERPL-SCNC: 4.8 MMOL/L (ref 3.5–5.3)
PROT SERPL-MCNC: 5.3 G/DL (ref 6.4–8.4)
RBC # BLD AUTO: 2.62 MILLION/UL (ref 3.88–5.62)
SODIUM SERPL-SCNC: 133 MMOL/L (ref 135–147)
UNIT DISPENSE STATUS: NORMAL
UNIT PRODUCT CODE: NORMAL
UNIT PRODUCT VOLUME: 300 ML
UNIT PRODUCT VOLUME: 316 ML
UNIT PRODUCT VOLUME: 338 ML
UNIT RH: NORMAL
WBC # BLD AUTO: 3.01 THOUSAND/UL (ref 4.31–10.16)

## 2024-11-26 PROCEDURE — 82948 REAGENT STRIP/BLOOD GLUCOSE: CPT

## 2024-11-26 PROCEDURE — 99232 SBSQ HOSP IP/OBS MODERATE 35: CPT | Performed by: INTERNAL MEDICINE

## 2024-11-26 PROCEDURE — 84100 ASSAY OF PHOSPHORUS: CPT | Performed by: PHYSICIAN ASSISTANT

## 2024-11-26 PROCEDURE — 82330 ASSAY OF CALCIUM: CPT | Performed by: PHYSICIAN ASSISTANT

## 2024-11-26 PROCEDURE — 83735 ASSAY OF MAGNESIUM: CPT | Performed by: PHYSICIAN ASSISTANT

## 2024-11-26 PROCEDURE — 80053 COMPREHEN METABOLIC PANEL: CPT | Performed by: PHYSICIAN ASSISTANT

## 2024-11-26 PROCEDURE — 85027 COMPLETE CBC AUTOMATED: CPT | Performed by: PHYSICIAN ASSISTANT

## 2024-11-26 PROCEDURE — 99233 SBSQ HOSP IP/OBS HIGH 50: CPT | Performed by: INTERNAL MEDICINE

## 2024-11-26 RX ORDER — ALBUMIN (HUMAN) 12.5 G/50ML
12.5 SOLUTION INTRAVENOUS ONCE
Status: CANCELLED | OUTPATIENT
Start: 2024-11-26 | End: 2024-11-26

## 2024-11-26 RX ADMIN — FUROSEMIDE 40 MG: 40 TABLET ORAL at 17:31

## 2024-11-26 RX ADMIN — INSULIN LISPRO 2 UNITS: 100 INJECTION, SOLUTION INTRAVENOUS; SUBCUTANEOUS at 22:32

## 2024-11-26 RX ADMIN — IRON SUCROSE 200 MG: 20 INJECTION, SOLUTION INTRAVENOUS at 09:08

## 2024-11-26 RX ADMIN — CHLORHEXIDINE GLUCONATE 0.12% ORAL RINSE 15 ML: 1.2 LIQUID ORAL at 09:08

## 2024-11-26 RX ADMIN — CEFTRIAXONE SODIUM 1000 MG: 10 INJECTION, POWDER, FOR SOLUTION INTRAVENOUS at 11:18

## 2024-11-26 RX ADMIN — PANTOPRAZOLE SODIUM 40 MG: 40 TABLET, DELAYED RELEASE ORAL at 05:01

## 2024-11-26 RX ADMIN — BUDESONIDE AND FORMOTEROL FUMARATE DIHYDRATE 2 PUFF: 160; 4.5 AEROSOL RESPIRATORY (INHALATION) at 17:31

## 2024-11-26 RX ADMIN — ATORVASTATIN CALCIUM 40 MG: 40 TABLET, FILM COATED ORAL at 05:01

## 2024-11-26 RX ADMIN — CARVEDILOL 6.25 MG: 6.25 TABLET, FILM COATED ORAL at 09:09

## 2024-11-26 RX ADMIN — INSULIN LISPRO 1 UNITS: 100 INJECTION, SOLUTION INTRAVENOUS; SUBCUTANEOUS at 11:24

## 2024-11-26 RX ADMIN — BUDESONIDE AND FORMOTEROL FUMARATE DIHYDRATE 2 PUFF: 160; 4.5 AEROSOL RESPIRATORY (INHALATION) at 09:08

## 2024-11-26 RX ADMIN — CARVEDILOL 6.25 MG: 6.25 TABLET, FILM COATED ORAL at 17:31

## 2024-11-26 RX ADMIN — FUROSEMIDE 40 MG: 40 TABLET ORAL at 09:09

## 2024-11-26 RX ADMIN — MONTELUKAST 10 MG: 10 TABLET, FILM COATED ORAL at 09:09

## 2024-11-26 NOTE — PLAN OF CARE
Problem: PAIN - ADULT  Goal: Verbalizes/displays adequate comfort level or baseline comfort level  Description: Interventions:  - Encourage patient to monitor pain and request assistance  - Assess pain using appropriate pain scale  - Administer analgesics based on type and severity of pain and evaluate response  - Implement non-pharmacological measures as appropriate and evaluate response  - Consider cultural and social influences on pain and pain management  - Notify physician/advanced practitioner if interventions unsuccessful or patient reports new pain  Outcome: Progressing     Problem: INFECTION - ADULT  Goal: Absence or prevention of progression during hospitalization  Description: INTERVENTIONS:  - Assess and monitor for signs and symptoms of infection  - Monitor lab/diagnostic results  - Monitor all insertion sites, i.e. indwelling lines, tubes, and drains  - Monitor endotracheal if appropriate and nasal secretions for changes in amount and color  - [place] appropriate cooling/warming therapies per order  - Administer medications as ordered  - Instruct and encourage patient and family to use good hand hygiene technique  - Identify and instruct in appropriate isolation precautions for identified infection/condition  Outcome: Progressing     Problem: Prexisting or High Potential for Compromised Skin Integrity  Goal: Skin integrity is maintained or improved  Description: INTERVENTIONS:  - Identify patients at risk for skin breakdown  - Assess and monitor skin integrity  - Assess and monitor nutrition and hydration status  - Monitor labs   - Assess for incontinence   - Turn and reposition patient  - Assist with mobility/ambulation  - Relieve pressure over bony prominences  - Avoid friction and shearing  - Provide appropriate hygiene as needed including keeping skin clean and dry  - Evaluate need for skin moisturizer/barrier cream  - Collaborate with interdisciplinary team   - Patient/family teaching  -  Consider wound care consult   Outcome: Progressing     Problem: Knowledge Deficit  Goal: Patient/family/caregiver demonstrates understanding of disease process, treatment plan, medications, and discharge instructions  Description: Complete learning assessment and assess knowledge base.  Interventions:  - Provide teaching at level of understanding  - Provide teaching via preferred learning methods  Outcome: Progressing

## 2024-11-26 NOTE — ASSESSMENT & PLAN NOTE
Concern for recurrent variceal bleeding (history of banding)   Transfused 2 units PRBCs for hgb 7.5 from 10 in the previous 2 months   BID PPI and octrotide gtt   Appreciate GI recommendations

## 2024-11-26 NOTE — CASE MANAGEMENT
Case Management Progress Note    Patient name Bimal Lugo  Location /-01 MRN [id number]  : 1945 Date 2024       LOS (days): 2  Geometric Mean LOS (GMLOS) (days): 4.5  Days to GMLOS:2.5        OBJECTIVE:        Current admission status: Inpatient  Preferred Pharmacy:   [facility], [place] - [address] [address]  Phone: [phone] Fax: [phone]    Primary Care Provider: Aly Carter MD    Primary Insurance: MEDICARE  Secondary Insurance: BLUE CROSS    PROGRESS NOTE:  As per SLIM rounds, pt is anticipated for dc within 24-48hrs. Cm continues to follow.

## 2024-11-26 NOTE — ASSESSMENT & PLAN NOTE
Lab Results   Component Value Date    HGBA1C 5.6 11/25/2024       Recent Labs     11/25/24  2147 11/26/24  0611 11/26/24  1042 11/26/24  1548   POCGLU 123 114 161* 109       Blood Sugar Average: Last 72 hrs:  (P) [id number]  Continue sliding scale insulin coverage

## 2024-11-26 NOTE — PLAN OF CARE
Problem: Potential for Falls  Goal: Patient will remain free of falls  Description: INTERVENTIONS:  - Educate patient/family on patient safety including physical limitations  - Instruct patient to call for assistance with activity   - Consult OT/PT to assist with strengthening/mobility   - Keep Call bell within reach  - Keep bed low and locked with side rails adjusted as appropriate  - Keep care items and personal belongings within reach  - Initiate and maintain comfort rounds  - Make Fall Risk Sign visible to staff  - Offer Toileting every 2 Hours, in advance of need  - Initiate/Maintain bed/chair alarm  - Obtain necessary fall risk management equipment: chair alarm  - Apply yellow socks and bracelet for high fall risk patients  - Consider moving patient to room near nurses station  Outcome: Progressing     Problem: PAIN - ADULT  Goal: Verbalizes/displays adequate comfort level or baseline comfort level  Description: Interventions:  - Encourage patient to monitor pain and request assistance  - Assess pain using appropriate pain scale  - Administer analgesics based on type and severity of pain and evaluate response  - Implement non-pharmacological measures as appropriate and evaluate response  - Consider cultural and social influences on pain and pain management  - Notify physician/advanced practitioner if interventions unsuccessful or patient reports new pain  Outcome: Progressing     Problem: INFECTION - ADULT  Goal: Absence or prevention of progression during hospitalization  Description: INTERVENTIONS:  - Assess and monitor for signs and symptoms of infection  - Monitor lab/diagnostic results  - Monitor all insertion sites, i.e. indwelling lines, tubes, and drains  - Monitor endotracheal if appropriate and nasal secretions for changes in amount and color  - [place] appropriate cooling/warming therapies per order  - Administer medications as ordered  - Instruct and encourage patient and family to use good  hand hygiene technique  - Identify and instruct in appropriate isolation precautions for identified infection/condition  Outcome: Progressing  Goal: Absence of fever/infection during neutropenic period  Description: INTERVENTIONS:  - Monitor WBC    Outcome: Progressing     Problem: SAFETY ADULT  Goal: Patient will remain free of falls  Description: INTERVENTIONS:  - Educate patient/family on patient safety including physical limitations  - Instruct patient to call for assistance with activity   - Consult OT/PT to assist with strengthening/mobility   - Keep Call bell within reach  - Keep bed low and locked with side rails adjusted as appropriate  - Keep care items and personal belongings within reach  - Initiate and maintain comfort rounds  - Make Fall Risk Sign visible to staff  - Offer Toileting every 2 Hours, in advance of need  - Initiate/Maintain chair alarm  - Obtain necessary fall risk management equipment: chair/bed alarm  - Apply yellow socks and bracelet for high fall risk patients  - Consider moving patient to room near nurses station  Outcome: Progressing  Goal: Maintain or return to baseline ADL function  Description: INTERVENTIONS:  -  Assess patient's ability to carry out ADLs; assess patient's baseline for ADL function and identify physical deficits which impact ability to perform ADLs (bathing, care of mouth/teeth, toileting, grooming, dressing, etc.)  - Assess/evaluate cause of self-care deficits   - Assess range of motion  - Assess patient's mobility; develop plan if impaired  - Assess patient's need for assistive devices and provide as appropriate  - Encourage maximum independence but intervene and supervise when necessary  - Involve family in performance of ADLs  - Assess for home care needs following discharge   - Consider OT consult to assist with ADL evaluation and planning for discharge  - Provide patient education as appropriate  Outcome: Progressing  Goal: Maintains/Returns to pre admission  functional level  Description: INTERVENTIONS:  - Perform AM-PAC 6 Click Basic Mobility/ Daily Activity assessment daily.  - Set and communicate daily mobility goal to care team and patient/family/caregiver.   - Collaborate with rehabilitation services on mobility goals if consulted  - Perform Range of Motion 3 times a day.  - Reposition patient every 2 hours.  - Dangle patient 3 times a day  - Stand patient 3 times a day  - Ambulate patient 3 times a day  - Out of bed to chair 3 times a day   - Out of bed for meals 3 times a day  - Out of bed for toileting  - Record patient progress and toleration of activity level   Outcome: Progressing     Problem: DISCHARGE PLANNING  Goal: Discharge to home or other facility with appropriate resources  Description: INTERVENTIONS:  - Identify barriers to discharge w/patient and caregiver  - Arrange for needed discharge resources and transportation as appropriate  - Identify discharge learning needs (meds, wound care, etc.)  - Arrange for interpretive services to assist at discharge as needed  - Refer to Case Management Department for coordinating discharge planning if the patient needs post-hospital services based on physician/advanced practitioner order or complex needs related to functional status, cognitive ability, or social support system  Outcome: Progressing     Problem: Knowledge Deficit  Goal: Patient/family/caregiver demonstrates understanding of disease process, treatment plan, medications, and discharge instructions  Description: Complete learning assessment and assess knowledge base.  Interventions:  - Provide teaching at level of understanding  - Provide teaching via preferred learning methods  Outcome: Progressing     Problem: Prexisting or High Potential for Compromised Skin Integrity  Goal: Skin integrity is maintained or improved  Description: INTERVENTIONS:  - Identify patients at risk for skin breakdown  - Assess and monitor skin integrity  - Assess and monitor  nutrition and hydration status  - Monitor labs   - Assess for incontinence   - Turn and reposition patient  - Assist with mobility/ambulation  - Relieve pressure over bony prominences  - Avoid friction and shearing  - Provide appropriate hygiene as needed including keeping skin clean and dry  - Evaluate need for skin moisturizer/barrier cream  - Collaborate with interdisciplinary team   - Patient/family teaching  - Consider wound care consult   Outcome: Progressing

## 2024-11-26 NOTE — ASSESSMENT & PLAN NOTE
Lab Results   Component Value Date    EGFR 24 11/26/2024    EGFR 19 11/25/2024    EGFR 18 11/24/2024    CREATININE 2.44 (H) 11/26/2024    CREATININE 2.93 (H) 11/25/2024    CREATININE 3.10 (H) 11/24/2024       Gentle volume resuscitation  Hold home diuretics  Close intake and output  Daily weights  NICOLE on CKD 3a, susp 2' ABLA and compromised renal perfusion, but cannot exclude worsening hepatorenal syndrome   Will get nephrology consult

## 2024-11-26 NOTE — ASSESSMENT & PLAN NOTE
Likely secondary to upper GI bleeding  Received 2u PRBC on admission  Will trend h/h and transfuse as necessary   Baseline hgb 10   BID PPI and octreotide gtt given history of varices   Appreciate GI recommendations

## 2024-11-26 NOTE — PROGRESS NOTES
Progress Note - Hospitalist   Name: Bimal Lugo 79 y.o. male I MRN: [id number]  Unit/Bed#: -01 I Date of Admission: 11/24/2024   Date of Service: 11/26/2024 I Hospital Day: 2    Assessment & Plan  CAD (coronary artery disease)  Holding home aspirin/statin/carvedilol  Resume aspirin when appropriate with GI  Resume oral medications when able  Chronic obstructive pulmonary disease (HCC)  Continue home Symbicort  Resume Singulair  Respiratory protocol  Diabetes mellitus, type 2 (HCC)  Lab Results   Component Value Date    HGBA1C 5.6 11/25/2024       Recent Labs     11/25/24  2147 11/26/24  0611 11/26/24  1042 11/26/24  1548   POCGLU 123 114 161* 109       Blood Sugar Average: Last 72 hrs:  (P) [id number]  Continue sliding scale insulin coverage  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)  Patient has abstained from alcohol for several years  Has history of ascites requiring weekly paracentesis (last para 11/21) and esophageal varices requiring banding   MELD 28 on presentation    Presents with hematemesis and maroon stool   Resume home lactulose when appropriate  Ceftriaxone for SBP prophylaxis   Appreciate GI recommendations  Melena  Likely secondary to upper GI bleeding  Received 2u PRBC on admission  Will trend h/h and transfuse as necessary   Baseline hgb 10   BID PPI and octreotide gtt given history of varices   Appreciate GI recommendations  Hematemesis  Concern for recurrent variceal bleeding (history of banding)   Transfused 2 units PRBCs for hgb 7.5 from 10 in the previous 2 months   BID PPI and octrotide gtt   Appreciate GI recommendations  Acute blood loss anemia  - see plan under hemetemesis  Acute kidney injury superimposed on stage 3a chronic kidney disease (HCC)  Lab Results   Component Value Date    EGFR 24 11/26/2024    EGFR 19 11/25/2024    EGFR 18 11/24/2024    CREATININE 2.44 (H) 11/26/2024    CREATININE 2.93 (H) 11/25/2024    CREATININE 3.10 (H) 11/24/2024       Gentle volume  resuscitation  Hold home diuretics  Close intake and output  Daily weights  NICOLE on CKD 3a, susp 2' ABLA and compromised renal perfusion, but cannot exclude worsening hepatorenal syndrome   Will get nephrology consult  High anion gap metabolic acidosis    Electrolyte abnormality    Hypertension  Continue carvedilol and Lasix with holding parameters    VTE Pharmacologic Prophylaxis:   Contraindicated secondary to GI bleed    Mobility:   Basic Mobility Inpatient Raw Score: 18  JH-HLM Goal: 6: Walk 10 steps or more  JH-HLM Achieved: 4: Move to chair/commode  JH-HLM Goal NOT achieved. Continue with multidisciplinary rounding and encourage appropriate mobility to improve upon JH-HLM goals.    Patient Centered Rounds: I performed bedside rounds with nursing staff today.   Discussions with Specialists or Other Care Team Provider: Gastroenterology    Education and Discussions with Family / Patient: Updated  (wife) at bedside.    Current Length of Stay: 2 day(s)  Current Patient Status: Inpatient   Certification Statement: The patient will continue to require additional inpatient hospital stay due to NICOEL and GI bleed  Discharge Plan: Anticipate discharge in 24-48 hrs to discharge location to be determined pending rehab evaluations.    Code Status: Level 1 - Full Code    Subjective     Patient seen and examined by me at bedside.  No chest pain, palpitations or diaphoresis.  No fever or chills at this point of time.  Patient does have weakness and the weakness is generalized.  No dysuria or diarrhea.    Objective :  Temp:  [97.9 °F (36.6 °C)-98.5 °F (36.9 °C)] 98.3 °F (36.8 °C)  HR:  [63-85] 65  BP: (108-144)/(55-76) 139/61  Resp:  [17] 17  SpO2:  [91 %-98 %] 94 %  O2 Device: None (Room air)    Body mass index is 31.51 kg/m².     Input and Output Summary (last 24 hours):     Intake/Output Summary (Last 24 hours) at 11/26/2024 1754  Last data filed at 11/26/2024 1646  Gross per 24 hour   Intake 520 ml   Output 1200  ml   Net -680 ml       Physical Exam    General exam- looks a little weak  HEENT - atraumatic and normocephalic  Neck- supple  Skin - no fresh rash  Extremities no fresh focal deformities  Cardiovascular- S1-S2 heard  Respiratory- bilateral air entry present, no crackles or rhonchi  Skin - no fresh rash  Abdomen - normal bowel sounds present, no rebound tenderness  CNS- No fresh focal deficits  Psych- no acute psychosis      Lines/Drains:              Lab Results: I have reviewed the following results:   Results from last 7 days   Lab Units 11/26/24  0551 11/25/24  0748 11/25/24  0426   WBC Thousand/uL 3.01*   < > 4.08*   HEMOGLOBIN g/dL 8.1*   < > 9.1*   HEMATOCRIT % 24.5*   < > 26.6*   PLATELETS Thousands/uL 25*   < > 31*   SEGS PCT %  --   --  72   LYMPHO PCT %  --   --  15   MONO PCT %  --   --  10   EOS PCT %  --   --  1    < > = values in this interval not displayed.     Results from last 7 days   Lab Units 11/26/24  0551   SODIUM mmol/L 133*   POTASSIUM mmol/L 4.8   CHLORIDE mmol/L 107   CO2 mmol/L 20*   BUN mg/dL 50*   CREATININE mg/dL 2.44*   ANION GAP mmol/L 6   CALCIUM mg/dL 8.7   ALBUMIN g/dL 3.5   TOTAL BILIRUBIN mg/dL 0.87   ALK PHOS U/L 40   ALT U/L 14   AST U/L 27   GLUCOSE RANDOM mg/dL 121     Results from last 7 days   Lab Units 11/25/24  0426   INR  1.39*     Results from last 7 days   Lab Units 11/26/24  1548 11/26/24  1042 11/26/24  0611 11/25/24  2147 11/25/24  1841 11/25/24  1211 11/25/24  0644 11/24/24  2357 11/24/24  1724   POC GLUCOSE mg/dl 109 161* 114 123 152* 121 108 80 120     Results from last 7 days   Lab Units 11/25/24  0426   HEMOGLOBIN A1C % 5.6           Recent Cultures (last 7 days):         Imaging Results Review: No pertinent imaging studies reviewed.  Other Study Results Review: No additional pertinent studies reviewed.    Last 24 Hours Medication List:     Current Facility-Administered Medications:     acetaminophen (TYLENOL) tablet 650 mg, Q6H PRN    atorvastatin (LIPITOR)  tablet 40 mg, Early Morning    budesonide-formoterol (SYMBICORT) 160-4.5 mcg/act inhaler 2 puff, BID    carvedilol (COREG) tablet 6.25 mg, BID With Meals    ceftriaxone (ROCEPHIN) 1 g/50 mL in dextrose IVPB, Q24H, Last Rate: 1,000 mg (11/26/24 1118)    chlorhexidine (PERIDEX) 0.12 % oral rinse 15 mL, Q12H LUBA    furosemide (LASIX) tablet 40 mg, BID    insulin lispro (HumALOG/ADMELOG) 100 units/mL subcutaneous injection 1-6 Units, 4x Daily (AC & HS) **AND** Fingerstick Glucose (POCT), 4x Daily AC and at bedtime    iron sucrose (VENOFER) 200 mg in sodium chloride 0.9 % 100 mL IVPB, Daily, Last Rate: 200 mg (11/26/24 0908)    labetalol (NORMODYNE) injection 10 mg, Q4H PRN    lactulose (CHRONULAC) oral solution 10 g, BID    montelukast (SINGULAIR) tablet 10 mg, Daily    pantoprazole (PROTONIX) EC tablet 40 mg, Early Morning    Administrative Statements   Today, Patient Was Seen By: Ramone Green MD  I have spent a total time of 50 minutes in caring for this patient on the day of the visit/encounter including Diagnostic results, Prognosis, Risks and benefits of tx options, Counseling / Coordination of care, Documenting in the medical record, Reviewing / ordering tests, medicine, procedures  , Obtaining or reviewing history  , and Communicating with other healthcare professionals .    **Please Note: This note may have been constructed using a voice recognition system.**

## 2024-11-26 NOTE — PROGRESS NOTES
"Progress Note - Bimal Lugo 79 y.o. male MRN: [id number]    Unit/Bed#: -01 Encounter: [id number]        Assessment and Plan:   1) Acute on chronic anemia secondary to portal hypertensive gastropathy and profound thrombocytopenia -  EGD performed yesterday revealing 1 small nonbleeding varix with a band placed, severe portal hypertensive gastropathy and very mild GAVE syndrome.  His last colonoscopy was in May 2024 revealing 3 small polyps and diverticulosis.  Hemorrhoids were noted as well.  - Patient and patient's wife are agreeable to outpatient video capsule endoscopy, we will arrange next week after discharge  - Continue monitor hemoglobin closely and trances as necessary to goal hemoglobin above 7-8  - Protonix daily  - GI will sign off, please call with questions  - Discussed with Dr. Green     2) Alcohol-induced cirrhosis complicated by esophageal varices, portal hypertensive gastropathy, thrombocytopenia, ascites requiring weekly paracentesis and acute kidney injury- MELD 22.  Fortunately creatinine has improved from 3.42 down to 2.44 which is closer to patient's baseline.  Sodium improved from 127 to 133.  Last HCC screening appears to have been in August 2024 revealing no focal liver abnormality and a paddle of pedal blood flow.  - Outpatient follow-up with GI for cirrhosis management  - Continue carvedilol    Subjective:     Patient is happy to be eating a solid meal again.  He and his wife are very interested in having video capsule endoscopy performed as an outpatient.     ROS: As noted in the HPI, otherwise all others negative.    Objective:     Vitals: Blood pressure 144/66, pulse 69, temperature 98.3 °F (36.8 °C), resp. rate 17, height 5' 5\" (1.651 m), weight 85.9 kg (189 lb 6 oz), SpO2 94%.,Body mass index is 31.51 kg/m².      Intake/Output Summary (Last 24 hours) at 11/26/2024 1552  Last data filed at 11/26/2024 1500  Gross per 24 hour   Intake 300 ml   Output 1000 ml   Net -700 ml "       Physical Exam:     General Appearance: Alert and oriented x 3. In no respiratory distress  Lungs: Clear to auscultation bilaterally, no rales or rhonchi  Heart: Regular rate and rhythm, S1, S2 normal, no murmur, click, rub or gallop  Abdomen: Soft, non-tender, non-distended; bowel sounds normal; no masses or no organomegaly  Extremities: No cyanosis, edema    Invasive Devices       Peripheral Intravenous Line  Duration             Peripheral IV 11/24/24 Distal;Left;Upper;Ventral (anterior) Antecubital 2 days    Peripheral IV 11/24/24 Distal;Left;Ventral (anterior) Forearm 2 days                    Lab Results:  Results from last 7 days   Lab Units 11/26/24  0551 11/25/24  0748 11/25/24  0426   WBC Thousand/uL 3.01*   < > 4.08*   HEMOGLOBIN g/dL 8.1*   < > 9.1*   HEMATOCRIT % 24.5*   < > 26.6*   PLATELETS Thousands/uL 25*   < > 31*   SEGS PCT %  --   --  72   LYMPHO PCT %  --   --  15   MONO PCT %  --   --  10   EOS PCT %  --   --  1    < > = values in this interval not displayed.     Results from last 7 days   Lab Units 11/26/24  0551   POTASSIUM mmol/L 4.8   CHLORIDE mmol/L 107   CO2 mmol/L 20*   BUN mg/dL 50*   CREATININE mg/dL 2.44*   CALCIUM mg/dL 8.7   ALK PHOS U/L 40   ALT U/L 14   AST U/L 27     Results from last 7 days   Lab Units 11/25/24  0426   INR  1.39*           Imaging Studies: I have personally reviewed pertinent imaging studies.    EGD  Addendum Date: 11/25/2024  Pending sale to [facility] [address] [facility] [address] [phone] DATE OF SERVICE: 11/25/24 PHYSICIAN(S): Attending: Dominique Roy MD Fellow: No Staff Documented INDICATION: Acute blood loss anemia, Hematemesis, unspecified whether nausea present POST-OP DIAGNOSIS: See the impression below. PREPROCEDURE: Informed consent was obtained for the procedure, including sedation.  Risks of perforation, hemorrhage, adverse drug reaction and aspiration were discussed. The patient was placed in the left lateral decubitus  position. Patient was explained about the risks and benefits of the procedure. Risks including but not limited to bleeding, infection, and perforation were explained in detail. Also explained about less than 100% sensitivity with the exam and other alternatives. PROCEDURE: EGD DETAILS OF PROCEDURE: Patient was taken to the procedure room where a time out was performed to confirm correct patient and correct procedure. The patient underwent monitored anesthesia care, which was administered by an anesthesia professional. The patient's blood pressure, heart rate, level of consciousness, respirations, oxygen, ECG and ETCO2 were monitored throughout the procedure. The scope was introduced through the mouth and advanced to the second part of the duodenum. Retroflexion was performed in the fundus. The patient experienced no blood loss. The procedure was not difficult. The patient tolerated the procedure well. There were no apparent adverse events. ANESTHESIA INFORMATION: ASA: IV Anesthesia Type: Anesthesia type not filed in the log. 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 SPECIMENS: * No specimens in log * IMPRESSION: One small nonbleeding varix in the esophagus; placed 1 band successfully, resulting in complete eradication Severe portal hypertensive gastropathy in the body of the stomach Very mild GAVE.  I do not think this is contributing to his anemia. The 1st part of the duodenum and 2nd part of the duodenum appeared normal. RECOMMENDATION: Suspect bleeding/anemia in the setting of severe portal hypertensive gastropathy and thrombocytopenia.  His PHG and his platelets have significantly  "worsened since previous EGD, likely indicating worsening portal hypertension.  Consider increasing carvedilol if able to tolerate and Venofer.  Okay to discontinue octreotide.  Discontinue IV PPI twice daily.  Okay to restart diet. Recommend outpatient capsule endoscopy to evaluate AVM disease.    Dominique Roy MD     Result Date: 11/25/2024  Impression: One small nonbleeding varix in the esophagus; placed 1 band successfully, resulting in complete eradication Severe portal hypertensive gastropathy in the body of the stomach Very mild GAVE.  I do not think this is contributing to his anemia. The 1st part of the duodenum and 2nd part of the duodenum appeared normal. RECOMMENDATION: Suspect bleeding/anemia in the setting of severe portal hypertensive gastropathy and thrombocytopenia.  His PHG and his platelets have significantly worsened since previous EGD, likely indicating worsening portal hypertension.  Consider increasing carvedilol if able to tolerate and Venofer.  Okay to discontinue octreotide.  Discontinue IV PPI twice daily.  Okay to restart diet.    Dominique Roy MD     XR chest 1 view portable  Result Date: 11/24/2024  Impression: No acute cardiopulmonary disease. Workstation performed: [id number]     CT abdomen pelvis wo contrast  Result Date: 11/24/2024  Impression: 1.  Hepatic cirrhosis and findings of portal hypertension without significant interval change since 6/29/2024. 2.  Mild to moderate volume abdominopelvic ascites, similar to prior examination. No encapsulated collections. Resident: SALINAS Funez I, the attending radiologist, have reviewed the images and agree with the final report above. Workstation performed: [id number]           Portions of the record may have been created with voice recognition software.  Occasional wrong word or \"sound a like\" substitutions may have occurred due to the inherent limitations of voice recognition software.  Read the chart carefully and recognize, using " context, where substitutions have occurred.

## 2024-11-27 ENCOUNTER — TELEPHONE (OUTPATIENT)
Dept: GASTROENTEROLOGY | Facility: CLINIC | Age: 79
End: 2024-11-27

## 2024-11-27 ENCOUNTER — APPOINTMENT (INPATIENT)
Dept: NON INVASIVE DIAGNOSTICS | Facility: HOSPITAL | Age: 79
DRG: 368 | End: 2024-11-27
Attending: INTERNAL MEDICINE
Payer: MEDICARE

## 2024-11-27 LAB
ALBUMIN SERPL BCG-MCNC: 3.6 G/DL (ref 3.5–5)
ALP SERPL-CCNC: 51 U/L (ref 34–104)
ALT SERPL W P-5'-P-CCNC: 14 U/L (ref 7–52)
ANION GAP SERPL CALCULATED.3IONS-SCNC: 9 MMOL/L (ref 4–13)
AST SERPL W P-5'-P-CCNC: 26 U/L (ref 13–39)
BILIRUB SERPL-MCNC: 0.7 MG/DL (ref 0.2–1)
BUN SERPL-MCNC: 43 MG/DL (ref 5–25)
CALCIUM SERPL-MCNC: 8.5 MG/DL (ref 8.4–10.2)
CHLORIDE SERPL-SCNC: 104 MMOL/L (ref 96–108)
CO2 SERPL-SCNC: 19 MMOL/L (ref 21–32)
CREAT SERPL-MCNC: 2.12 MG/DL (ref 0.6–1.3)
ERYTHROCYTE [DISTWIDTH] IN BLOOD BY AUTOMATED COUNT: 15.4 % (ref 11.6–15.1)
GFR SERPL CREATININE-BSD FRML MDRD: 28 ML/MIN/1.73SQ M
GLUCOSE SERPL-MCNC: 106 MG/DL (ref 65–140)
GLUCOSE SERPL-MCNC: 116 MG/DL (ref 65–140)
GLUCOSE SERPL-MCNC: 133 MG/DL (ref 65–140)
GLUCOSE SERPL-MCNC: 156 MG/DL (ref 65–140)
GLUCOSE SERPL-MCNC: 97 MG/DL (ref 65–140)
HCT VFR BLD AUTO: 26.5 % (ref 36.5–49.3)
HGB BLD-MCNC: 8.7 G/DL (ref 12–17)
MCH RBC QN AUTO: 31.1 PG (ref 26.8–34.3)
MCHC RBC AUTO-ENTMCNC: 32.8 G/DL (ref 31.4–37.4)
MCV RBC AUTO: 95 FL (ref 82–98)
PLATELET # BLD AUTO: 33 THOUSANDS/UL (ref 149–390)
PMV BLD AUTO: 11.2 FL (ref 8.9–12.7)
POTASSIUM SERPL-SCNC: 4 MMOL/L (ref 3.5–5.3)
PROT SERPL-MCNC: 5.3 G/DL (ref 6.4–8.4)
RBC # BLD AUTO: 2.8 MILLION/UL (ref 3.88–5.62)
SODIUM SERPL-SCNC: 132 MMOL/L (ref 135–147)
WBC # BLD AUTO: 3 THOUSAND/UL (ref 4.31–10.16)

## 2024-11-27 PROCEDURE — 80053 COMPREHEN METABOLIC PANEL: CPT | Performed by: INTERNAL MEDICINE

## 2024-11-27 PROCEDURE — 85027 COMPLETE CBC AUTOMATED: CPT | Performed by: INTERNAL MEDICINE

## 2024-11-27 PROCEDURE — 99232 SBSQ HOSP IP/OBS MODERATE 35: CPT | Performed by: INTERNAL MEDICINE

## 2024-11-27 PROCEDURE — 99223 1ST HOSP IP/OBS HIGH 75: CPT | Performed by: INTERNAL MEDICINE

## 2024-11-27 PROCEDURE — 49083 ABD PARACENTESIS W/IMAGING: CPT

## 2024-11-27 PROCEDURE — 82948 REAGENT STRIP/BLOOD GLUCOSE: CPT

## 2024-11-27 PROCEDURE — 0W9G3ZZ DRAINAGE OF PERITONEAL CAVITY, PERCUTANEOUS APPROACH: ICD-10-PCS | Performed by: STUDENT IN AN ORGANIZED HEALTH CARE EDUCATION/TRAINING PROGRAM

## 2024-11-27 RX ORDER — LIDOCAINE WITH 8.4% SOD BICARB 0.9%(10ML)
SYRINGE (ML) INJECTION AS NEEDED
Status: COMPLETED | OUTPATIENT
Start: 2024-11-27 | End: 2024-11-27

## 2024-11-27 RX ORDER — SODIUM BICARBONATE 650 MG/1
650 TABLET ORAL
Status: DISCONTINUED | OUTPATIENT
Start: 2024-11-27 | End: 2024-11-29 | Stop reason: HOSPADM

## 2024-11-27 RX ADMIN — CHLORHEXIDINE GLUCONATE 0.12% ORAL RINSE 15 ML: 1.2 LIQUID ORAL at 10:32

## 2024-11-27 RX ADMIN — IRON SUCROSE 200 MG: 20 INJECTION, SOLUTION INTRAVENOUS at 08:16

## 2024-11-27 RX ADMIN — BUDESONIDE AND FORMOTEROL FUMARATE DIHYDRATE 2 PUFF: 160; 4.5 AEROSOL RESPIRATORY (INHALATION) at 17:06

## 2024-11-27 RX ADMIN — BUDESONIDE AND FORMOTEROL FUMARATE DIHYDRATE 2 PUFF: 160; 4.5 AEROSOL RESPIRATORY (INHALATION) at 10:32

## 2024-11-27 RX ADMIN — INSULIN LISPRO 1 UNITS: 100 INJECTION, SOLUTION INTRAVENOUS; SUBCUTANEOUS at 11:33

## 2024-11-27 RX ADMIN — PANTOPRAZOLE SODIUM 40 MG: 40 TABLET, DELAYED RELEASE ORAL at 05:53

## 2024-11-27 RX ADMIN — SODIUM BICARBONATE 650 MG: 650 TABLET ORAL at 17:06

## 2024-11-27 RX ADMIN — ATORVASTATIN CALCIUM 40 MG: 40 TABLET, FILM COATED ORAL at 05:53

## 2024-11-27 RX ADMIN — CARVEDILOL 6.25 MG: 6.25 TABLET, FILM COATED ORAL at 08:40

## 2024-11-27 RX ADMIN — CARVEDILOL 6.25 MG: 6.25 TABLET, FILM COATED ORAL at 17:06

## 2024-11-27 RX ADMIN — CEFTRIAXONE SODIUM 1000 MG: 10 INJECTION, POWDER, FOR SOLUTION INTRAVENOUS at 11:31

## 2024-11-27 RX ADMIN — SODIUM BICARBONATE 650 MG: 650 TABLET ORAL at 11:31

## 2024-11-27 RX ADMIN — FUROSEMIDE 40 MG: 40 TABLET ORAL at 17:06

## 2024-11-27 RX ADMIN — Medication 10 ML: at 08:52

## 2024-11-27 RX ADMIN — MONTELUKAST 10 MG: 10 TABLET, FILM COATED ORAL at 08:40

## 2024-11-27 NOTE — BRIEF OP NOTE (RAD/CATH)
IR PARACENTESIS Procedure Note    PATIENT NAME: Bimal Lugo  : 1945  MRN: [id number]    Pre-op Diagnosis:   1. GI bleed    2. Hyponatremia    3. Acute blood loss anemia    4. Hematemesis, unspecified whether nausea present    5. Acute kidney injury superimposed on stage 3a chronic kidney disease (HCC)      Post-op Diagnosis:   1. GI bleed    2. Hyponatremia    3. Acute blood loss anemia    4. Hematemesis, unspecified whether nausea present    5. Acute kidney injury superimposed on stage 3a chronic kidney disease (HCC)        Surgeon:   SUNDAY Fierro  Assistants:     No qualified resident was available, Resident is only observing    Estimated Blood Loss: minimal  Findings: 3475 ml clear yellow ascites fluid, RLQ.  Exofin glue applied to puncture site.    Specimens: none    Complications:  none immediate    Anesthesia: local    SUNDAY Fierro     Date: 2024  Time: 9:43 AM

## 2024-11-27 NOTE — CONSULTS
NEPHROLOGY CONSULTATION NOTE    Patient: Bimal Lugo               Sex: male          DOA: 11/24/2024  9:54 AM   YOB: 1945        Age:  79 y.o.        LOS:  LOS: 3 days         REASON FOR THE REFERRAL / CONSULTATION: Acute kidney injury on chronic kidney disease stage IIIb    DATE OF CONSULTATION / SERVICE: 11/27/2024    ADMISSION DIAGNOSIS: <principal problem not specified>       PLAN / RECOMMENDATIONS      Assessment & Plan  Acute kidney injury superimposed on stage 3a chronic kidney disease (HCC)  Lab Results   Component Value Date    EGFR 28 11/27/2024    EGFR 24 11/26/2024    EGFR 19 11/25/2024    CREATININE 2.12 (H) 11/27/2024    CREATININE 2.44 (H) 11/26/2024    CREATININE 2.93 (H) 11/25/2024     Baseline serum creatinine 1.6 to 2.0 mg/dL.  Presented to our facility with a creatinine of 3.4 mg/dL and elevated  After necessary blood transfusion and paracentesis earlier in admission, creatinine has improved to 2.12 mg/dL and close to baseline.  Chronic obstructive pulmonary disease (HCC)  Stable symptoms.  Diabetes mellitus, type 2 (HCC)  Lab Results   Component Value Date    HGBA1C 5.6 11/25/2024     Blood glucose are within acceptable range.  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)  Prior history of alcohol liver cirrhosis with ascites.  Presented with melena and underwent EGD with banding done.  Melena  Presented with hemoglobin 7.3 g/dL and low.  Underwent blood transfusion.  Currently undergoing IV iron infusion  High anion gap metabolic acidosis  Presented with anion gap metabolic acidosis with serum bicarbonate of 11 and low.  Current bicarbonate is 19 and improved.  Patient will require sodium bicarb tablets 650 mg p.o. 3 times daily.  Electrolyte abnormality  Noted to have sodium level of 128 mEq/L and low.  Currently on Lasix 40 mg twice daily with improved sodium levels up to 132 meq/L and improving.  Hypertension  Blood pressure is acceptable at 131/63.    CHIEF COMPLAINT      Melena    HPI     Bimal Lugo is a 79 y.o. male  with a past medical history of alcoholic liver cirrhosis complicated by ascites, GAVE syndrome, diabetes mellitus type 2, COPD, obstructive sleep apnea, and was admitted to our facility after presenting with nausea, vomiting and maroon stool.A renal consultation is requested today for assistance in the management of acute kidney injury on chronic kidney disease stage IIIb.  Patient presented to our facility with symptoms described and found to have a creatinine of 3.4 mg/dL.  He has been noted to have elevated renal parameters over the past 6 months ranging between 1.6 to 2.0 mg/dL.  He was found to have low hemoglobin and received a blood transfusion.  Had been undergoing weekly paracentesis and is being taken for paracentesis today.  He underwent EGD during this hospitalization which revealed 1 small varices in the esophagus and 1 band was placed.        PAST MEDICAL HISTORY     Past Medical History:   Diagnosis Date    Chronic bronchitis (HCC)     COPD (chronic obstructive pulmonary disease) (HCC)     Diabetes mellitus (HCC)     Esophageal varices (HCC) 11 apr 2024    Hyperlipidemia     Hypertension     Obesity     ARACELY (obstructive sleep apnea)        PAST SURGICAL HISTORY     Past Surgical History:   Procedure Laterality Date    AORTIC VALVE REPLACEMENT      Jan 4 2024    IR PARACENTESIS  04/04/2024    IR PARACENTESIS  03/07/2024    IR PARACENTESIS  04/11/2024    IR PARACENTESIS  04/26/2024    IR PARACENTESIS  05/09/2024    IR PARACENTESIS  05/16/2024    IR PARACENTESIS  05/23/2024    IR PARACENTESIS  06/06/2024    IR PARACENTESIS  06/13/2024    IR PARACENTESIS  06/20/2024    IR PARACENTESIS  06/27/2024    IR PARACENTESIS  07/03/2024    IR PARACENTESIS  07/11/2024    IR PARACENTESIS  07/18/2024    IR PARACENTESIS  07/24/2024    IR PARACENTESIS  08/01/2024    IR PARACENTESIS  8/8/2024    IR PARACENTESIS  8/15/2024    IR PARACENTESIS  8/22/2024    IR  PARACENTESIS  2024    IR PARACENTESIS  2024    IR PARACENTESIS  2024    IR PARACENTESIS  2024    IR PARACENTESIS  10/3/2024    IR PARACENTESIS  2024    IR PARACENTESIS  10/10/2024    IR PARACENTESIS  10/17/2024    IR PARACENTESIS  10/24/2024    IR PARACENTESIS  10/31/2024    IR PARACENTESIS  2024    IR PARACENTESIS  2024    IR PARACENTESIS  2024    UPPER GASTROINTESTINAL ENDOSCOPY  2024       ALLERGIES     Allergies   Allergen Reactions    Gemfibrozil Swelling and Rash    Carbamazepine     Carbamazepine And Analogs      swelling    Oxycodone Other (See Comments)     Pt states he hallucinates       SOCIAL HISTORY     Social History     Substance and Sexual Activity   Alcohol Use Not Currently    Alcohol/week: 24.0 standard drinks of alcohol    Types: 24 Cans of beer per week    Comment: sober- quit      Social History     Substance and Sexual Activity   Drug Use Not Currently     Social History     Tobacco Use   Smoking Status Former    Current packs/day: 0.00    Average packs/day: 5.0 packs/day for 35.0 years (175.0 ttl pk-yrs)    Types: Cigarettes    Start date:     Quit date:     Years since quittin.9   Smokeless Tobacco Never   Tobacco Comments    Quit in  (approx)       FAMILY HISTORY     History reviewed. No pertinent family history.    CURRENT MEDICATIONS       Current Facility-Administered Medications:     acetaminophen (TYLENOL) tablet 650 mg, 650 mg, Oral, Q6H PRN, Stephen Vieira PA-C, 650 mg at 24 1645    atorvastatin (LIPITOR) tablet 40 mg, 40 mg, Oral, Early Morning, Stephen Vieira PA-C, 40 mg at 24 0553    budesonide-formoterol (SYMBICORT) 160-4.5 mcg/act inhaler 2 puff, 2 puff, Inhalation, BID, Stephen Vieira PA-C, 2 puff at 24 1032    carvedilol (COREG) tablet 6.25 mg, 6.25 mg, Oral, BID With Meals, Stephen Vieira PA-C, 6.25 mg at 24 0840    ceftriaxone (ROCEPHIN) 1 g/50 mL in dextrose IVPB, 1,000 mg, Intravenous,  Q24H, Stephen Vieira PA-C, Last Rate: 100 mL/hr at 11/26/24 1118, 1,000 mg at 11/26/24 1118    chlorhexidine (PERIDEX) 0.12 % oral rinse 15 mL, 15 mL, Mouth/Throat, Q12H LUBA, Stephen Vieira PA-C, 15 mL at 11/27/24 1032    furosemide (LASIX) tablet 40 mg, 40 mg, Oral, BID, Stephen Vieira PA-C, 40 mg at 11/26/24 1731    insulin lispro (HumALOG/ADMELOG) 100 units/mL subcutaneous injection 1-6 Units, 1-6 Units, Subcutaneous, 4x Daily (AC & HS), 2 Units at 11/26/24 2232 **AND** Fingerstick Glucose (POCT), , , 4x Daily AC and at bedtime, Stephen Vieira PA-C    labetalol (NORMODYNE) injection 10 mg, 10 mg, Intravenous, Q4H PRN, Stephen Vieira PA-C    lactulose (CHRONULAC) oral solution 10 g, 10 g, Oral, BID, Stephen Vieira PA-C, 10 g at 11/25/24 1829    montelukast (SINGULAIR) tablet 10 mg, 10 mg, Oral, Daily, Stephen Vieira PA-C, 10 mg at 11/27/24 0840    pantoprazole (PROTONIX) EC tablet 40 mg, 40 mg, Oral, Early Morning, Stephen Vieira PA-C, 40 mg at 11/27/24 0553    REVIEW OF SYSTEMS   Review of Systems   Constitutional: Negative.    HENT: Negative.     Eyes: Negative.    Respiratory: Negative.     Cardiovascular: Negative.    Gastrointestinal:  Positive for abdominal distention.   Endocrine: Negative.    Genitourinary: Negative.    Musculoskeletal: Negative.    Skin: Negative.    Allergic/Immunologic: Negative.    Neurological: Negative.    Hematological: Negative.    All other systems reviewed and are negative.          OBJECTIVE     Current Weight: Weight - Scale: 83 kg (182 lb 15.7 oz)  Vitals:    11/27/24 0918   BP: 131/63   Pulse: 62   Resp: 20   Temp:    SpO2: 97%     Body mass index is 30.45 kg/m².    Intake/Output Summary (Last 24 hours) at 11/27/2024 1101  Last data filed at 11/27/2024 0901  Gross per 24 hour   Intake 580 ml   Output 5400 ml   Net -4820 ml       PHYSICAL EXAMINATION     Physical Exam  HENT:      Head: Normocephalic and atraumatic.   Eyes:      Pupils: Pupils are equal, round, and reactive to light.   Neck:       Vascular: No JVD.   Cardiovascular:      Rate and Rhythm: Normal rate and regular rhythm.      Heart sounds: Normal heart sounds. No murmur heard.     No friction rub.   Pulmonary:      Effort: Pulmonary effort is normal.      Breath sounds: Normal breath sounds.   Abdominal:      General: Bowel sounds are normal. There is distension.      Palpations: Abdomen is soft.      Tenderness: There is no abdominal tenderness. There is no rebound.   Musculoskeletal:         General: No tenderness.      Cervical back: Neck supple.   Skin:     General: Skin is dry.      Findings: No rash.   Neurological:      Mental Status: He is alert and oriented to person, place, and time.               LAB RESULTS        Results from last 7 days   Lab Units 11/27/24  0438 11/26/24  0551 11/25/24  2214 11/25/24  1640 11/25/24  1211 11/25/24  0748 11/25/24  0426 11/24/24  2059 11/24/24  1024   WBC Thousand/uL 3.00* 3.01*  --   --   --  3.86* 4.08*  --  16.60*   HEMOGLOBIN g/dL 8.7* 8.1* 7.7* 7.5* 8.1* 8.8* 9.1* 6.6* 7.5*   HEMATOCRIT % 26.5* 24.5*  --   --   --  26.1* 26.6* 19.7* 22.8*   PLATELETS Thousands/uL 33* 25*  --   --   --  24* 31*  --  154   POTASSIUM mmol/L 4.0 4.8  --   --   --   --  5.1 5.9* 5.8*   CHLORIDE mmol/L 104 107  --   --   --   --  105 102 100   CO2 mmol/L 19* 20*  --   --   --   --  18* 16* 11*   BUN mg/dL 43* 50*  --   --   --   --  61* 66* 70*   CREATININE mg/dL 2.12* 2.44*  --   --   --   --  2.93* 3.10* 3.42*   EGFR ml/min/1.73sq m 28 24  --   --   --   --  19 18 16   CALCIUM mg/dL 8.5 8.7  --   --   --   --  8.9 8.3* 8.5   MAGNESIUM mg/dL  --  2.0  --   --   --   --  2.0  --   --    PHOSPHORUS mg/dL  --  3.2  --   --   --   --  4.3*  --   --        Recent Labs     11/27/24 0438   WBC 3.00*     Recent Labs     11/27/24 0438   HGB 8.7*       Recent Labs     11/27/24 0438   HCT 26.5*     Recent Labs     11/27/24 0438   PLT 33*     Recent Labs     11/27/24 0438   SODIUM 132*     Recent Labs      "11/27/24 0438   K 4.0     Recent Labs     11/27/24 0438        Recent Labs     11/27/24 0438   CO2 19*     Recent Labs     11/27/24 0438   BUN 43*     Recent Labs     11/27/24 0438   CREATININE 2.12*     Recent Labs     11/27/24 0438   EGFR 28     Recent Labs     11/27/24 0438   CALCIUM 8.5     Recent Labs     11/26/24  0551   MG 2.0     Recent Labs     11/26/24  0551   PHOS 3.2     Invalid input(s): \"ALBUMIN\"  No results for input(s): \"PROT\" in the last 72 hours.  No results for input(s): \"GLUCOSE\" in the last 72 hours.    RADIOLOGY RESULTS     Results for orders placed during the hospital encounter of 11/24/24    XR chest 1 view portable    Narrative  XR CHEST PORTABLE    INDICATION: GI bleed.    COMPARISON: CXR 4/9/2024, chest CT 6/18/2020.    FINDINGS:    Clear lungs. No pneumothorax or pleural effusion.    Normal heart size, CABG. TAVR. Clips projecting over the left upper lung.    Bones are unremarkable for age. Moderate left glenohumeral degenerative disease.    Upper abdomen normal. Cholecystectomy.    Impression  No acute cardiopulmonary disease.        Workstation performed: [id number]    Results for orders placed during the hospital encounter of 04/09/24    XR chest 2 views    Narrative  XR CHEST PA & LATERAL    INDICATION: weakness.    COMPARISON: None    FINDINGS:    No airspace consolidation, pneumothorax, pulmonary edema, or pleural effusion.  .    Normal cardiac silhouette. Prosthetic aortic valve.    Prior median sternotomy. Degenerative changes of bilateral shoulders. Multilevel thoracic spondylosis and paravertebral ossification in a pattern suggestive of diffuse idiopathic skeletal hyperostosis.    Cholecystectomy clips.    Impression  No radiographic evidence of acute intrathoracic process.        Workstation performed: [id number]    No results found for this or any previous visit.    No results found for this or any previous visit.    Results for orders placed during the hospital " encounter of 11/24/24    CT abdomen pelvis wo contrast    Narrative  CT ABDOMEN AND PELVIS WITHOUT IV CONTRAST    INDICATION: Abdominal pain. Patient reports black tarry stools and hematemesis.    COMPARISON: CT abdomen pelvis 6/29/2024    TECHNIQUE: CT examination of the abdomen and pelvis was performed without intravenous contrast. Multiplanar 2D reformatted images were created from the source data.    This examination, like all CT scans performed in the [facility], was performed utilizing techniques to minimize radiation dose exposure, including the use of iterative reconstruction and automated exposure control. Radiation dose length  product (DLP) for this visit: 712 mGy-cm    Enteric Contrast: Not administered.    FINDINGS: Absence of intravenous contrast limits evaluation of the abdominal and pelvic viscera.    ABDOMEN    LOWER CHEST: Aortic valve replacement. Mild bibasilar atelectasis and scarring, similar to prior. Symmetric nodular gynecomastia noted incidentally requires no additional work-up.    LIVER/BILIARY TREE: Small with cirrhotic morphology and nodular contour. No suspicious mass within study limitations. Benign calcified granuloma. No biliary dilation. Redemonstrated findings of portal hypertension including recanalized paraumbilical  vein, splenic and gastric varices.    GALLBLADDER: Post cholecystectomy.    SPLEEN: Enlarged spleen measuring 13 cm in AP dimension.    PANCREAS: Unremarkable.    ADRENAL GLANDS: Unremarkable.    KIDNEYS/URETERS: Unremarkable. No hydronephrosis.    STOMACH AND BOWEL: Colonic diverticulosis without findings of acute diverticulitis.    APPENDIX: Not well demonstrated.  No evidence for acute appendicitis.    ABDOMINOPELVIC CAVITY: Mild to moderate volume perihepatic and perisplenic ascites as well as ascites within the pelvic cavity. Mesenteric edema. No encapsulated collections or free air. No mesenteric, retroperitoneal, or pelvic sidewall  "lymphadenopathy.    VESSELS: Marked atherosclerosis without abdominal aortic aneurysm. Findings of portal venous hypertension are discussed above.    PELVIS    REPRODUCTIVE ORGANS: Unremarkable for patient's age.    URINARY BLADDER: Normal when accounting for degree of nondistention..    ABDOMINAL WALL/INGUINAL REGIONS: Small fat-containing umbilical hernia.    BONES: No acute fracture or suspicious osseous lesion. Visualized portion of chronic sternal dehiscence appears unchanged. Spinal degenerative changes.    Impression  1.  Hepatic cirrhosis and findings of portal hypertension without significant interval change since 6/29/2024.    2.  Mild to moderate volume abdominopelvic ascites, similar to prior examination. No encapsulated collections.    Resident: SALINAS Funez I, the attending radiologist, have reviewed the images and agree with the final report above.    Workstation performed: [id number]    No results found for this or any previous visit.          Thank you for the consultation to participate in patient's care. I have discussed this plan with my attending physician.     Noe Ortega MD    11/27/2024      Portions of the record may have been created with voice recognition software. Occasional wrong word or \"sound a like\" substitutions may have occurred due to the inherent limitations of voice recognition software. Read the chart carefully and recognize, using context, where substitutions have occurred.  "

## 2024-11-27 NOTE — ASSESSMENT & PLAN NOTE
Prior history of alcohol liver cirrhosis with ascites.  Presented with melena and underwent EGD with banding done.

## 2024-11-27 NOTE — PLAN OF CARE
Problem: PAIN - ADULT  Goal: Verbalizes/displays adequate comfort level or baseline comfort level  Description: Interventions:  - Encourage patient to monitor pain and request assistance  - Assess pain using appropriate pain scale  - Administer analgesics based on type and severity of pain and evaluate response  - Implement non-pharmacological measures as appropriate and evaluate response  - Consider cultural and social influences on pain and pain management  - Notify physician/advanced practitioner if interventions unsuccessful or patient reports new pain  Outcome: Progressing     Problem: INFECTION - ADULT  Goal: Absence or prevention of progression during hospitalization  Description: INTERVENTIONS:  - Assess and monitor for signs and symptoms of infection  - Monitor lab/diagnostic results  - Monitor all insertion sites, i.e. indwelling lines, tubes, and drains  - Monitor endotracheal if appropriate and nasal secretions for changes in amount and color  - [place] appropriate cooling/warming therapies per order  - Administer medications as ordered  - Instruct and encourage patient and family to use good hand hygiene technique  - Identify and instruct in appropriate isolation precautions for identified infection/condition  Outcome: Progressing     Problem: INFECTION - ADULT  Goal: Absence of fever/infection during neutropenic period  Description: INTERVENTIONS:  - Monitor WBC    Outcome: Progressing     Problem: DISCHARGE PLANNING  Goal: Discharge to home or other facility with appropriate resources  Description: INTERVENTIONS:  - Identify barriers to discharge w/patient and caregiver  - Arrange for needed discharge resources and transportation as appropriate  - Identify discharge learning needs (meds, wound care, etc.)  - Arrange for interpretive services to assist at discharge as needed  - Refer to Case Management Department for coordinating discharge planning if the patient needs post-hospital services based  on physician/advanced practitioner order or complex needs related to functional status, cognitive ability, or social support system  Outcome: Progressing     Problem: Knowledge Deficit  Goal: Patient/family/caregiver demonstrates understanding of disease process, treatment plan, medications, and discharge instructions  Description: Complete learning assessment and assess knowledge base.  Interventions:  - Provide teaching at level of understanding  - Provide teaching via preferred learning methods  Outcome: Progressing

## 2024-11-27 NOTE — ASSESSMENT & PLAN NOTE
Likely secondary to upper GI bleeding  Received 2u PRBC on admission  Will trend h/h and transfuse as necessary   Baseline hgb 10   BID PPI and octreotide gtt given history of varices   Appreciate GI recommendations.

## 2024-11-27 NOTE — ASSESSMENT & PLAN NOTE
Lab Results   Component Value Date    HGBA1C 5.6 11/25/2024     Blood glucose are within acceptable range.

## 2024-11-27 NOTE — ASSESSMENT & PLAN NOTE
Holding home aspirin/statin/carvedilol  Resume aspirin when appropriate with GI-they are planning outpatient capsule endoscopy

## 2024-11-27 NOTE — ASSESSMENT & PLAN NOTE
Presented with anion gap metabolic acidosis with serum bicarbonate of 11 and low.  Current bicarbonate is 19 and improved.  Patient will require sodium bicarb tablets 650 mg p.o. 3 times daily.

## 2024-11-27 NOTE — PLAN OF CARE
Problem: Potential for Falls  Goal: Patient will remain free of falls  Description: INTERVENTIONS:  - Educate patient/family on patient safety including physical limitations  - Instruct patient to call for assistance with activity   - Consult OT/PT to assist with strengthening/mobility   - Keep Call bell within reach  - Keep bed low and locked with side rails adjusted as appropriate  - Keep care items and personal belongings within reach  - Initiate and maintain comfort rounds  - Make Fall Risk Sign visible to staff  - Offer Toileting every 2 Hours, in advance of need  - Initiate/Maintain bed alarm  - Obtain necessary fall risk management equipment: bed alarm  - Apply yellow socks and bracelet for high fall risk patients  - Consider moving patient to room near nurses station  Outcome: Progressing     Problem: PAIN - ADULT  Goal: Verbalizes/displays adequate comfort level or baseline comfort level  Description: Interventions:  - Encourage patient to monitor pain and request assistance  - Assess pain using appropriate pain scale  - Administer analgesics based on type and severity of pain and evaluate response  - Implement non-pharmacological measures as appropriate and evaluate response  - Consider cultural and social influences on pain and pain management  - Notify physician/advanced practitioner if interventions unsuccessful or patient reports new pain  Outcome: Progressing     Problem: INFECTION - ADULT  Goal: Absence or prevention of progression during hospitalization  Description: INTERVENTIONS:  - Assess and monitor for signs and symptoms of infection  - Monitor lab/diagnostic results  - Monitor all insertion sites, i.e. indwelling lines, tubes, and drains  - Monitor endotracheal if appropriate and nasal secretions for changes in amount and color  - [place] appropriate cooling/warming therapies per order  - Administer medications as ordered  - Instruct and encourage patient and family to use good hand  hygiene technique  - Identify and instruct in appropriate isolation precautions for identified infection/condition  Outcome: Progressing  Goal: Absence of fever/infection during neutropenic period  Description: INTERVENTIONS:  - Monitor WBC    Outcome: Progressing     Problem: SAFETY ADULT  Goal: Patient will remain free of falls  Description: INTERVENTIONS:  - Educate patient/family on patient safety including physical limitations  - Instruct patient to call for assistance with activity   - Consult OT/PT to assist with strengthening/mobility   - Keep Call bell within reach  - Keep bed low and locked with side rails adjusted as appropriate  - Keep care items and personal belongings within reach  - Initiate and maintain comfort rounds  - Make Fall Risk Sign visible to staff  - Offer Toileting every 2 Hours, in advance of need  - Initiate/Maintain bed alarm alarm  - Obtain necessary fall risk management equipment: bed alarm  - Apply yellow socks and bracelet for high fall risk patients  - Consider moving patient to room near nurses station  Outcome: Progressing  Goal: Maintain or return to baseline ADL function  Description: INTERVENTIONS:  -  Assess patient's ability to carry out ADLs; assess patient's baseline for ADL function and identify physical deficits which impact ability to perform ADLs (bathing, care of mouth/teeth, toileting, grooming, dressing, etc.)  - Assess/evaluate cause of self-care deficits   - Assess range of motion  - Assess patient's mobility; develop plan if impaired  - Assess patient's need for assistive devices and provide as appropriate  - Encourage maximum independence but intervene and supervise when necessary  - Involve family in performance of ADLs  - Assess for home care needs following discharge   - Consider OT consult to assist with ADL evaluation and planning for discharge  - Provide patient education as appropriate  Outcome: Progressing  Goal: Maintains/Returns to pre admission  functional level  Description: INTERVENTIONS:  - Perform AM-PAC 6 Click Basic Mobility/ Daily Activity assessment daily.  - Set and communicate daily mobility goal to care team and patient/family/caregiver.   - Collaborate with rehabilitation services on mobility goals if consulted  - Perform Range of Motion 3 times a day.  - Reposition patient every 2 hours.  - Dangle patient 3 times a day  - Stand patient 3 times a day  - Ambulate patient 3 times a day  - Out of bed to chair 3 times a day   - Out of bed for meals 3 times a day  - Out of bed for toileting  - Record patient progress and toleration of activity level   Outcome: Progressing     Problem: DISCHARGE PLANNING  Goal: Discharge to home or other facility with appropriate resources  Description: INTERVENTIONS:  - Identify barriers to discharge w/patient and caregiver  - Arrange for needed discharge resources and transportation as appropriate  - Identify discharge learning needs (meds, wound care, etc.)  - Arrange for interpretive services to assist at discharge as needed  - Refer to Case Management Department for coordinating discharge planning if the patient needs post-hospital services based on physician/advanced practitioner order or complex needs related to functional status, cognitive ability, or social support system  Outcome: Progressing     Problem: Knowledge Deficit  Goal: Patient/family/caregiver demonstrates understanding of disease process, treatment plan, medications, and discharge instructions  Description: Complete learning assessment and assess knowledge base.  Interventions:  - Provide teaching at level of understanding  - Provide teaching via preferred learning methods  Outcome: Progressing     Problem: Prexisting or High Potential for Compromised Skin Integrity  Goal: Skin integrity is maintained or improved  Description: INTERVENTIONS:  - Identify patients at risk for skin breakdown  - Assess and monitor skin integrity  - Assess and monitor  nutrition and hydration status  - Monitor labs   - Assess for incontinence   - Turn and reposition patient  - Assist with mobility/ambulation  - Relieve pressure over bony prominences  - Avoid friction and shearing  - Provide appropriate hygiene as needed including keeping skin clean and dry  - Evaluate need for skin moisturizer/barrier cream  - Collaborate with interdisciplinary team   - Patient/family teaching  - Consider wound care consult   Outcome: Progressing

## 2024-11-27 NOTE — ASSESSMENT & PLAN NOTE
Noted to have sodium level of 128 mEq/L and low.  Currently on Lasix 40 mg twice daily with improved sodium levels up to 132 meq/L and improving.

## 2024-11-27 NOTE — ASSESSMENT & PLAN NOTE
Patient has abstained from alcohol for several years  Has history of ascites requiring weekly paracentesis (last para 11/21) and esophageal varices requiring banding   MELD 28 on presentation    Presents with hematemesis and maroon stool   Resume home lactulose when appropriate  Ceftriaxone for SBP prophylaxis   Appreciate GI recommendations.  Needs outpatient follow-up.  Paracentesis done on 11/27/2024 and 3400 mL of fluid removed

## 2024-11-27 NOTE — ASSESSMENT & PLAN NOTE
Lab Results   Component Value Date    EGFR 28 11/27/2024    EGFR 24 11/26/2024    EGFR 19 11/25/2024    CREATININE 2.12 (H) 11/27/2024    CREATININE 2.44 (H) 11/26/2024    CREATININE 2.93 (H) 11/25/2024     Baseline serum creatinine 1.6 to 2.0 mg/dL.  Presented to our facility with a creatinine of 3.4 mg/dL and elevated  After necessary blood transfusion and paracentesis earlier in admission, creatinine has improved to 2.12 mg/dL and close to baseline.

## 2024-11-27 NOTE — ASSESSMENT & PLAN NOTE
Lab Results   Component Value Date    HGBA1C 5.6 11/25/2024       Recent Labs     11/26/24  1548 11/26/24 2034 11/27/24  0615 11/27/24  1046   POCGLU 109 192* 133 156*       Blood Sugar Average: Last 72 hrs:  (P) 130.75  Continue sliding scale insulin coverage.

## 2024-11-27 NOTE — PROGRESS NOTES
Progress Note - Hospitalist   Name: Bimal Lugo 79 y.o. male I MRN: [id number]  Unit/Bed#: -01 I Date of Admission: 11/24/2024   Date of Service: 11/27/2024 I Hospital Day: 3    Assessment & Plan  CAD (coronary artery disease)  Holding home aspirin/statin/carvedilol  Resume aspirin when appropriate with GI-they are planning outpatient capsule endoscopy  Chronic obstructive pulmonary disease (HCC)  Continue home Symbicort  Resume Singulair  Respiratory protocol.  Diabetes mellitus, type 2 (HCC)  Lab Results   Component Value Date    HGBA1C 5.6 11/25/2024       Recent Labs     11/26/24  1548 11/26/24  2034 11/27/24  0615 11/27/24  1046   POCGLU 109 192* 133 156*       Blood Sugar Average: Last 72 hrs:  (P) 130.75  Continue sliding scale insulin coverage.  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)  Patient has abstained from alcohol for several years  Has history of ascites requiring weekly paracentesis (last para 11/21) and esophageal varices requiring banding   MELD 28 on presentation    Presents with hematemesis and maroon stool   Resume home lactulose when appropriate  Ceftriaxone for SBP prophylaxis   Appreciate GI recommendations.  Needs outpatient follow-up.  Paracentesis done on 11/27/2024 and 3400 mL of fluid removed  Melena  Likely secondary to upper GI bleeding  Received 2u PRBC on admission  Will trend h/h and transfuse as necessary   Baseline hgb 10   BID PPI and octreotide gtt given history of varices   Appreciate GI recommendations.  Hematemesis  Concern for recurrent variceal bleeding (history of banding)   Transfused 2 units PRBCs for hgb 7.5 from 10 in the previous 2 months   BID PPI and octrotide gtt   Appreciate GI recommendations-cleared for discharge from GI standpoint but patient still feels that he is not ready to be discharged  Acute blood loss anemia  - see plan under hemetemesis.  Acute kidney injury superimposed on stage 3a chronic kidney disease (HCC)  Lab Results   Component  Value Date    EGFR 28 11/27/2024    EGFR 24 11/26/2024    EGFR 19 11/25/2024    CREATININE 2.12 (H) 11/27/2024    CREATININE 2.44 (H) 11/26/2024    CREATININE 2.93 (H) 11/25/2024     Improved with gentle hydration, IV fluids now stopped, hold home diuretics  Close intake and output  Daily weights  NICOLE on CKD 3a, susp 2' ABLA and compromised renal perfusion, but cannot exclude worsening hepatorenal syndrome   Nephrology input appreciated   High anion gap metabolic acidosis  Improved on sodium bicarb tablets  Electrolyte abnormality    Hypertension  Continue carvedilol and Lasix with holding parameters    VTE Pharmacologic Prophylaxis:   Contraindicated secondary to GI bleed    Mobility:   Basic Mobility Inpatient Raw Score: 19  JH-HLM Goal: 6: Walk 10 steps or more  JH-HLM Achieved: 4: Move to chair/commode  JH-HLM Goal NOT achieved. Continue with multidisciplinary rounding and encourage appropriate mobility to improve upon JH-HLM goals.    Patient Centered Rounds: I performed bedside rounds with nursing staff today.   Discussions with Specialists or Other Care Team Provider: IR, radiology, nephrology and GI    Education and Discussions with Family / Patient: Updated  (wife) at bedside.    Current Length of Stay: 3 day(s)  Current Patient Status: Inpatient   Certification Statement: The patient will continue to require additional inpatient hospital stay due to NICOLE and GI bleed  Discharge Plan: Anticipate discharge in 24-48 hrs to home with home services.    Code Status: Level 1 - Full Code    Subjective     Patient seen and examined at bedside by me.  Patient did feel extremely bloated but feels better after 3400 mL of fluid removed via paracentesis.  No nausea or vomiting.  Patient feels exceedingly weak and does not feel like he can be discharged home today.  No fever or chills at this point of time    Objective :  Temp:  [98.6 °F (37 °C)-99.1 °F (37.3 °C)] 98.6 °F (37 °C)  HR:  [54-68] 62  BP:  (110-153)/(46-74) 131/63  Resp:  [18-20] 20  SpO2:  [94 %-97 %] 97 %    Body mass index is 30.45 kg/m².     Input and Output Summary (last 24 hours):     Intake/Output Summary (Last 24 hours) at 11/27/2024 1545  Last data filed at 11/27/2024 1421  Gross per 24 hour   Intake 580 ml   Output 5100 ml   Net -4520 ml       Physical Exam    General exam- looks a little weak  HEENT - atraumatic and normocephalic  Neck- supple  Skin - no fresh rash  Extremities no fresh focal deformities  Cardiovascular- S1-S2 heard  Respiratory- bilateral air entry present, no crackles or rhonchi  Skin - no fresh rash  Abdomen - normal bowel sounds present, no rebound tenderness  CNS- No fresh focal deficits  Psych- no acute psychosis      Lines/Drains:              Lab Results: I have reviewed the following results:   Results from last 7 days   Lab Units 11/27/24  0438 11/25/24  0748 11/25/24  0426   WBC Thousand/uL 3.00*   < > 4.08*   HEMOGLOBIN g/dL 8.7*   < > 9.1*   HEMATOCRIT % 26.5*   < > 26.6*   PLATELETS Thousands/uL 33*   < > 31*   SEGS PCT %  --   --  72   LYMPHO PCT %  --   --  15   MONO PCT %  --   --  10   EOS PCT %  --   --  1    < > = values in this interval not displayed.     Results from last 7 days   Lab Units 11/27/24  0438   SODIUM mmol/L 132*   POTASSIUM mmol/L 4.0   CHLORIDE mmol/L 104   CO2 mmol/L 19*   BUN mg/dL 43*   CREATININE mg/dL 2.12*   ANION GAP mmol/L 9   CALCIUM mg/dL 8.5   ALBUMIN g/dL 3.6   TOTAL BILIRUBIN mg/dL 0.70   ALK PHOS U/L 51   ALT U/L 14   AST U/L 26   GLUCOSE RANDOM mg/dL 106     Results from last 7 days   Lab Units 11/25/24  0426   INR  1.39*     Results from last 7 days   Lab Units 11/27/24  1046 11/27/24  0615 11/26/24  2034 11/26/24  1548 11/26/24  1042 11/26/24  0611 11/25/24  2147 11/25/24  1841 11/25/24  1211 11/25/24  0644 11/24/24  2357 11/24/24  1724   POC GLUCOSE mg/dl 156* 133 192* 109 161* 114 123 152* 121 108 80 120     Results from last 7 days   Lab Units 11/25/24  0422    HEMOGLOBIN A1C % 5.6           Recent Cultures (last 7 days):         Imaging Results Review: No pertinent imaging studies reviewed.  Other Study Results Review: No additional pertinent studies reviewed.    Last 24 Hours Medication List:     Current Facility-Administered Medications:     acetaminophen (TYLENOL) tablet 650 mg, Q6H PRN    atorvastatin (LIPITOR) tablet 40 mg, Early Morning    budesonide-formoterol (SYMBICORT) 160-4.5 mcg/act inhaler 2 puff, BID    carvedilol (COREG) tablet 6.25 mg, BID With Meals    ceftriaxone (ROCEPHIN) 1 g/50 mL in dextrose IVPB, Q24H, Last Rate: 1,000 mg (11/27/24 1131)    chlorhexidine (PERIDEX) 0.12 % oral rinse 15 mL, Q12H LUBA    furosemide (LASIX) tablet 40 mg, BID    insulin lispro (HumALOG/ADMELOG) 100 units/mL subcutaneous injection 1-6 Units, 4x Daily (AC & HS) **AND** Fingerstick Glucose (POCT), 4x Daily AC and at bedtime    labetalol (NORMODYNE) injection 10 mg, Q4H PRN    lactulose (CHRONULAC) oral solution 10 g, BID    montelukast (SINGULAIR) tablet 10 mg, Daily    pantoprazole (PROTONIX) EC tablet 40 mg, Early Morning    sodium bicarbonate tablet 650 mg, TID after meals    Administrative Statements   Today, Patient Was Seen By: Ramone Green MD  I have spent a total time of 35 minutes in caring for this patient on the day of the visit/encounter including Diagnostic results, Prognosis, Risks and benefits of tx options, Patient and family education, Counseling / Coordination of care, Documenting in the medical record, Reviewing / ordering tests, medicine, procedures  , Obtaining or reviewing history  , and Communicating with other healthcare professionals .    **Please Note: This note may have been constructed using a voice recognition system.**

## 2024-11-27 NOTE — CASE MANAGEMENT
Case Management Progress Note    Patient name Bimal Lugo  Location /-01 MRN [id number]  : 1945 Date 2024       LOS (days): 3  Geometric Mean LOS (GMLOS) (days): 4.5  Days to GMLOS:1.5        OBJECTIVE:        Current admission status: Inpatient  Preferred Pharmacy:   [facility], [place] - [address] [address]  Phone: [phone] Fax: [phone]    Primary Care Provider: Aly Carter MD    Primary Insurance: MEDICARE  Secondary Insurance: BLUE CROSS    PROGRESS NOTE:  As per SLIM rounds, pt is anticipated for dc within 24-48hrs. CM continues to follow.

## 2024-11-27 NOTE — TELEPHONE ENCOUNTER
Patient has scheduled a pill cam/ capsule endoscopy 12/11 (Kirill)  Please send his prep instructions through my chart.  Thank you.

## 2024-11-27 NOTE — ASSESSMENT & PLAN NOTE
Lab Results   Component Value Date    EGFR 28 11/27/2024    EGFR 24 11/26/2024    EGFR 19 11/25/2024    CREATININE 2.12 (H) 11/27/2024    CREATININE 2.44 (H) 11/26/2024    CREATININE 2.93 (H) 11/25/2024     Improved with gentle hydration, IV fluids now stopped, hold home diuretics  Close intake and output  Daily weights  NICOLE on CKD 3a, susp 2' ABLA and compromised renal perfusion, but cannot exclude worsening hepatorenal syndrome   Nephrology input appreciated

## 2024-11-27 NOTE — TELEPHONE ENCOUNTER
----- Message from Salma PIZARRO sent at 11/27/2024  6:16 AM EST -----    ----- Message -----  From: Lori Marvin PA-C  Sent: 11/26/2024   3:36 PM EST  To: DEVAN Bejarano, I heard that Katherine is off this week.  Can we schedule video capsule endoscopy ASAP next week for Dr. Roy to read.  Thank you

## 2024-11-27 NOTE — ASSESSMENT & PLAN NOTE
Presented with hemoglobin 7.3 g/dL and low.  Underwent blood transfusion.  Currently undergoing IV iron infusion

## 2024-11-27 NOTE — ASSESSMENT & PLAN NOTE
Concern for recurrent variceal bleeding (history of banding)   Transfused 2 units PRBCs for hgb 7.5 from 10 in the previous 2 months   BID PPI and octrotide gtt   Appreciate GI recommendations-cleared for discharge from GI standpoint but patient still feels that he is not ready to be discharged

## 2024-11-27 NOTE — TELEPHONE ENCOUNTER
Pt spouse calling back to speak with [facility] to schedule pill cam/capsule endo. Transfer to [facility]

## 2024-11-28 LAB
ANION GAP SERPL CALCULATED.3IONS-SCNC: 8 MMOL/L (ref 4–13)
BUN SERPL-MCNC: 40 MG/DL (ref 5–25)
CALCIUM SERPL-MCNC: 8.3 MG/DL (ref 8.4–10.2)
CHLORIDE SERPL-SCNC: 103 MMOL/L (ref 96–108)
CO2 SERPL-SCNC: 22 MMOL/L (ref 21–32)
CREAT SERPL-MCNC: 1.86 MG/DL (ref 0.6–1.3)
ERYTHROCYTE [DISTWIDTH] IN BLOOD BY AUTOMATED COUNT: 15.8 % (ref 11.6–15.1)
GFR SERPL CREATININE-BSD FRML MDRD: 33 ML/MIN/1.73SQ M
GLUCOSE SERPL-MCNC: 101 MG/DL (ref 65–140)
GLUCOSE SERPL-MCNC: 114 MG/DL (ref 65–140)
GLUCOSE SERPL-MCNC: 114 MG/DL (ref 65–140)
GLUCOSE SERPL-MCNC: 194 MG/DL (ref 65–140)
GLUCOSE SERPL-MCNC: 304 MG/DL (ref 65–140)
HCT VFR BLD AUTO: 27.5 % (ref 36.5–49.3)
HGB BLD-MCNC: 9.3 G/DL (ref 12–17)
MAGNESIUM SERPL-MCNC: 1.6 MG/DL (ref 1.9–2.7)
MCH RBC QN AUTO: 31.3 PG (ref 26.8–34.3)
MCHC RBC AUTO-ENTMCNC: 33.8 G/DL (ref 31.4–37.4)
MCV RBC AUTO: 93 FL (ref 82–98)
PLATELET # BLD AUTO: 41 THOUSANDS/UL (ref 149–390)
PMV BLD AUTO: 11.9 FL (ref 8.9–12.7)
POTASSIUM SERPL-SCNC: 3.4 MMOL/L (ref 3.5–5.3)
RBC # BLD AUTO: 2.97 MILLION/UL (ref 3.88–5.62)
SODIUM SERPL-SCNC: 133 MMOL/L (ref 135–147)
WBC # BLD AUTO: 3.1 THOUSAND/UL (ref 4.31–10.16)

## 2024-11-28 PROCEDURE — 85027 COMPLETE CBC AUTOMATED: CPT | Performed by: INTERNAL MEDICINE

## 2024-11-28 PROCEDURE — 99232 SBSQ HOSP IP/OBS MODERATE 35: CPT | Performed by: INTERNAL MEDICINE

## 2024-11-28 PROCEDURE — 99232 SBSQ HOSP IP/OBS MODERATE 35: CPT | Performed by: STUDENT IN AN ORGANIZED HEALTH CARE EDUCATION/TRAINING PROGRAM

## 2024-11-28 PROCEDURE — 82948 REAGENT STRIP/BLOOD GLUCOSE: CPT

## 2024-11-28 PROCEDURE — 80048 BASIC METABOLIC PNL TOTAL CA: CPT | Performed by: INTERNAL MEDICINE

## 2024-11-28 PROCEDURE — 83735 ASSAY OF MAGNESIUM: CPT | Performed by: INTERNAL MEDICINE

## 2024-11-28 RX ORDER — POTASSIUM CHLORIDE 1500 MG/1
20 TABLET, EXTENDED RELEASE ORAL 2 TIMES DAILY
Status: DISCONTINUED | OUTPATIENT
Start: 2024-11-28 | End: 2024-11-29 | Stop reason: HOSPADM

## 2024-11-28 RX ORDER — ASPIRIN 81 MG/1
81 TABLET ORAL DAILY
Status: DISCONTINUED | OUTPATIENT
Start: 2024-11-28 | End: 2024-11-29 | Stop reason: HOSPADM

## 2024-11-28 RX ORDER — MAGNESIUM SULFATE HEPTAHYDRATE 40 MG/ML
2 INJECTION, SOLUTION INTRAVENOUS EVERY 12 HOURS
Status: COMPLETED | OUTPATIENT
Start: 2024-11-28 | End: 2024-11-28

## 2024-11-28 RX ADMIN — MAGNESIUM SULFATE HEPTAHYDRATE 2 G: 40 INJECTION, SOLUTION INTRAVENOUS at 09:21

## 2024-11-28 RX ADMIN — POTASSIUM CHLORIDE 20 MEQ: 1500 TABLET, EXTENDED RELEASE ORAL at 17:22

## 2024-11-28 RX ADMIN — SODIUM BICARBONATE 650 MG: 650 TABLET ORAL at 17:22

## 2024-11-28 RX ADMIN — CARVEDILOL 6.25 MG: 6.25 TABLET, FILM COATED ORAL at 16:21

## 2024-11-28 RX ADMIN — POTASSIUM CHLORIDE 20 MEQ: 1500 TABLET, EXTENDED RELEASE ORAL at 09:03

## 2024-11-28 RX ADMIN — CHLORHEXIDINE GLUCONATE 0.12% ORAL RINSE 15 ML: 1.2 LIQUID ORAL at 09:02

## 2024-11-28 RX ADMIN — SODIUM BICARBONATE 650 MG: 650 TABLET ORAL at 12:07

## 2024-11-28 RX ADMIN — CARVEDILOL 6.25 MG: 6.25 TABLET, FILM COATED ORAL at 09:04

## 2024-11-28 RX ADMIN — PANTOPRAZOLE SODIUM 40 MG: 40 TABLET, DELAYED RELEASE ORAL at 04:54

## 2024-11-28 RX ADMIN — SODIUM BICARBONATE 650 MG: 650 TABLET ORAL at 09:02

## 2024-11-28 RX ADMIN — BUDESONIDE AND FORMOTEROL FUMARATE DIHYDRATE 2 PUFF: 160; 4.5 AEROSOL RESPIRATORY (INHALATION) at 09:06

## 2024-11-28 RX ADMIN — ATORVASTATIN CALCIUM 40 MG: 40 TABLET, FILM COATED ORAL at 04:54

## 2024-11-28 RX ADMIN — BUDESONIDE AND FORMOTEROL FUMARATE DIHYDRATE 2 PUFF: 160; 4.5 AEROSOL RESPIRATORY (INHALATION) at 17:17

## 2024-11-28 RX ADMIN — MONTELUKAST 10 MG: 10 TABLET, FILM COATED ORAL at 09:04

## 2024-11-28 RX ADMIN — INSULIN LISPRO 2 UNITS: 100 INJECTION, SOLUTION INTRAVENOUS; SUBCUTANEOUS at 21:08

## 2024-11-28 RX ADMIN — INSULIN LISPRO 4 UNITS: 100 INJECTION, SOLUTION INTRAVENOUS; SUBCUTANEOUS at 12:08

## 2024-11-28 RX ADMIN — CEFTRIAXONE SODIUM 1000 MG: 10 INJECTION, POWDER, FOR SOLUTION INTRAVENOUS at 12:06

## 2024-11-28 RX ADMIN — FUROSEMIDE 40 MG: 40 TABLET ORAL at 17:22

## 2024-11-28 RX ADMIN — FUROSEMIDE 40 MG: 40 TABLET ORAL at 09:03

## 2024-11-28 RX ADMIN — ASPIRIN 81 MG: 81 TABLET, COATED ORAL at 16:22

## 2024-11-28 RX ADMIN — MAGNESIUM SULFATE HEPTAHYDRATE 2 G: 40 INJECTION, SOLUTION INTRAVENOUS at 21:08

## 2024-11-28 RX ADMIN — LACTULOSE 10 G: 20 SOLUTION ORAL at 09:02

## 2024-11-28 NOTE — ASSESSMENT & PLAN NOTE
Patient has abstained from alcohol for several years  Has history of ascites requiring weekly paracentesis (last para 11/21) and esophageal varices requiring banding   MELD 28 on presentation    Presents with hematemesis and maroon stool       Resume home lactulose when appropriate  Completed 5 days of IV ceftriaxone for SBP prophylaxis.  Appreciate GI recommendations.  Needs outpatient follow-up.  Paracentesis done on 11/27/2024 and 3400 mL of fluid removed  Patient does get weekly paracentesis every Thursday.

## 2024-11-28 NOTE — PLAN OF CARE
Problem: Potential for Falls  Goal: Patient will remain free of falls  Description: INTERVENTIONS:  - Educate patient/family on patient safety including physical limitations  - Instruct patient to call for assistance with activity   - Consult OT/PT to assist with strengthening/mobility   - Keep Call bell within reach  - Keep bed low and locked with side rails adjusted as appropriate  - Keep care items and personal belongings within reach  - Initiate and maintain comfort rounds  - Make Fall Risk Sign visible to staff  - Offer Toileting every  Hours, in advance of need  - Initiate/Maintain alarm  - Obtain necessary fall risk management equipment:   - Apply yellow socks and bracelet for high fall risk patients  - Consider moving patient to room near nurses station  Outcome: Progressing     Problem: PAIN - ADULT  Goal: Verbalizes/displays adequate comfort level or baseline comfort level  Description: Interventions:  - Encourage patient to monitor pain and request assistance  - Assess pain using appropriate pain scale  - Administer analgesics based on type and severity of pain and evaluate response  - Implement non-pharmacological measures as appropriate and evaluate response  - Consider cultural and social influences on pain and pain management  - Notify physician/advanced practitioner if interventions unsuccessful or patient reports new pain  Outcome: Progressing     Problem: INFECTION - ADULT  Goal: Absence or prevention of progression during hospitalization  Description: INTERVENTIONS:  - Assess and monitor for signs and symptoms of infection  - Monitor lab/diagnostic results  - Monitor all insertion sites, i.e. indwelling lines, tubes, and drains  - Monitor endotracheal if appropriate and nasal secretions for changes in amount and color  - [place] appropriate cooling/warming therapies per order  - Administer medications as ordered  - Instruct and encourage patient and family to use good hand hygiene technique  -  Identify and instruct in appropriate isolation precautions for identified infection/condition  Outcome: Progressing  Goal: Absence of fever/infection during neutropenic period  Description: INTERVENTIONS:  - Monitor WBC    Outcome: Progressing     Problem: SAFETY ADULT  Goal: Patient will remain free of falls  Description: INTERVENTIONS:  - Educate patient/family on patient safety including physical limitations  - Instruct patient to call for assistance with activity   - Consult OT/PT to assist with strengthening/mobility   - Keep Call bell within reach  - Keep bed low and locked with side rails adjusted as appropriate  - Keep care items and personal belongings within reach  - Initiate and maintain comfort rounds  - Make Fall Risk Sign visible to staff  - Offer Toileting every  Hours, in advance of need  - Initiate/Maintain alarm  - Obtain necessary fall risk management equipment:   - Apply yellow socks and bracelet for high fall risk patients  - Consider moving patient to room near nurses station  Outcome: Progressing  Goal: Maintain or return to baseline ADL function  Description: INTERVENTIONS:  -  Assess patient's ability to carry out ADLs; assess patient's baseline for ADL function and identify physical deficits which impact ability to perform ADLs (bathing, care of mouth/teeth, toileting, grooming, dressing, etc.)  - Assess/evaluate cause of self-care deficits   - Assess range of motion  - Assess patient's mobility; develop plan if impaired  - Assess patient's need for assistive devices and provide as appropriate  - Encourage maximum independence but intervene and supervise when necessary  - Involve family in performance of ADLs  - Assess for home care needs following discharge   - Consider OT consult to assist with ADL evaluation and planning for discharge  - Provide patient education as appropriate  Outcome: Progressing  Goal: Maintains/Returns to pre admission functional level  Description:  INTERVENTIONS:  - Perform AM-PAC 6 Click Basic Mobility/ Daily Activity assessment daily.  - Set and communicate daily mobility goal to care team and patient/family/caregiver.   - Collaborate with rehabilitation services on mobility goals if consulted  - Perform Range of Motion  times a day.  - Reposition patient every  hours.  - Dangle patient  times a day  - Stand patient  times a day  - Ambulate patient  times a day  - Out of bed to chair  times a day   - Out of bed for meals  times a day  - Out of bed for toileting  - Record patient progress and toleration of activity level   Outcome: Progressing     Problem: DISCHARGE PLANNING  Goal: Discharge to home or other facility with appropriate resources  Description: INTERVENTIONS:  - Identify barriers to discharge w/patient and caregiver  - Arrange for needed discharge resources and transportation as appropriate  - Identify discharge learning needs (meds, wound care, etc.)  - Arrange for interpretive services to assist at discharge as needed  - Refer to Case Management Department for coordinating discharge planning if the patient needs post-hospital services based on physician/advanced practitioner order or complex needs related to functional status, cognitive ability, or social support system  Outcome: Progressing     Problem: Knowledge Deficit  Goal: Patient/family/caregiver demonstrates understanding of disease process, treatment plan, medications, and discharge instructions  Description: Complete learning assessment and assess knowledge base.  Interventions:  - Provide teaching at level of understanding  - Provide teaching via preferred learning methods  Outcome: Progressing     Problem: Prexisting or High Potential for Compromised Skin Integrity  Goal: Skin integrity is maintained or improved  Description: INTERVENTIONS:  - Identify patients at risk for skin breakdown  - Assess and monitor skin integrity  - Assess and monitor nutrition and hydration status  -  Monitor labs   - Assess for incontinence   - Turn and reposition patient  - Assist with mobility/ambulation  - Relieve pressure over bony prominences  - Avoid friction and shearing  - Provide appropriate hygiene as needed including keeping skin clean and dry  - Evaluate need for skin moisturizer/barrier cream  - Collaborate with interdisciplinary team   - Patient/family teaching  - Consider wound care consult   Outcome: Progressing     Problem: GASTROINTESTINAL - ADULT  Goal: Maintains or returns to baseline bowel function  Description: INTERVENTIONS:  - Assess bowel function  - Encourage oral fluids to ensure adequate hydration  - Administer IV fluids if ordered to ensure adequate hydration  - Administer ordered medications as needed  - Encourage mobilization and activity  - Consider nutritional services referral to assist patient with adequate nutrition and appropriate food choices  Outcome: Progressing     Problem: METABOLIC, FLUID AND ELECTROLYTES - ADULT  Goal: Glucose maintained within target range  Description: INTERVENTIONS:  - Monitor Blood Glucose as ordered  - Assess for signs and symptoms of hyperglycemia and hypoglycemia  - Administer ordered medications to maintain glucose within target range  - Assess nutritional intake and initiate nutrition service referral as needed  Outcome: Progressing     Problem: HEMATOLOGIC - ADULT  Goal: Maintains hematologic stability  Description: INTERVENTIONS  - Assess for signs and symptoms of bleeding or hemorrhage  - Monitor labs  - Administer supportive blood products/factors as ordered and appropriate  Outcome: Progressing

## 2024-11-28 NOTE — ASSESSMENT & PLAN NOTE
Lab Results   Component Value Date    EGFR 33 11/28/2024    EGFR 28 11/27/2024    EGFR 24 11/26/2024    CREATININE 1.86 (H) 11/28/2024    CREATININE 2.12 (H) 11/27/2024    CREATININE 2.44 (H) 11/26/2024     Baseline serum creatinine 1.6 to 2.0 mg/dL.  Presented to our facility with a creatinine of 3.4 mg/dL and elevated  Creatinine has improved to 1.9 mg/dL today and back to baseline.

## 2024-11-28 NOTE — PROGRESS NOTES
Progress Note - Nephrology   Name: Bimal Lugo 79 y.o. male I MRN: [id number]  Unit/Bed#: -01 I Date of Admission: 11/24/2024   Date of Service: 11/28/2024 I Hospital Day: 4     Assessment & Plan  Acute kidney injury superimposed on stage 3a chronic kidney disease (HCC)  Lab Results   Component Value Date    EGFR 33 11/28/2024    EGFR 28 11/27/2024    EGFR 24 11/26/2024    CREATININE 1.86 (H) 11/28/2024    CREATININE 2.12 (H) 11/27/2024    CREATININE 2.44 (H) 11/26/2024     Baseline serum creatinine 1.6 to 2.0 mg/dL.  Presented to our facility with a creatinine of 3.4 mg/dL and elevated  Creatinine has improved to 1.9 mg/dL today and back to baseline.  Chronic obstructive pulmonary disease (HCC)  Not in exacerbation at present time.  Using inhaler.  Diabetes mellitus, type 2 (HCC)  Lab Results   Component Value Date    HGBA1C 5.6 11/25/2024     Blood glucose are within acceptable range.  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)  Prior history of alcoholic liver cirrhosis with ascites.  Presented with melena and underwent EGD with banding done.  Underwent paracentesis yesterday.  Melena  Current hemoglobin is 9.3 g/dL and improved.  High anion gap metabolic acidosis  Presented with anion gap metabolic acidosis with serum bicarbonate of 11 and low.  Current bicarbonate is 19 and improved.  Patient will require sodium bicarb tablets 650 mg p.o. 3 times daily.  Electrolyte abnormality  Noted to have sodium level of 128 mEq/L and low.   Sodium has improved to 133 mEq/L.  Hypertension  Blood pressure within normal range.  Recommend holding off on losartan upon discharge.      Subjective   Brief History of Admission -79 years old male with past medical history of alcoholic liver cirrhosis, GAVE syndrome, diabetes mellitus type 2, hypertension, COPD, obstructive sleep apnea who presented to our facility with nausea vomiting and maroon-colored stool.    Patient sitting up in chair and feels well.  Underwent  paracentesis yesterday.  Admits to improved urine output as well.    Objective :  Temp:  [98.5 °F (36.9 °C)-99 °F (37.2 °C)] 99 °F (37.2 °C)  HR:  [57-62] 57  BP: (118-128)/(57-62) 128/60  Resp:  [16] 16  SpO2:  [95 %-97 %] 95 %    Current Weight: Weight - Scale: 78.9 kg (173 lb 15.1 oz)  First Weight: Weight - Scale: 81 kg (178 lb 9.2 oz)  I/O         11/26 0701  11/27 0700 11/27 0701  11/28 0700 11/28 0701  11/29 0700    P.O. 760 600 300    Blood       Total Intake(mL/kg) 760 (9.2) 600 (7.6) 300 (3.8)    Urine (mL/kg/hr) 1950 (1) 1775 (0.9) 200 (0.8)    Other  3450     Stool  0 0    Total Output 1950 5225 200    Net -1190 -4625 +100           Unmeasured Urine Occurrence  3 x 1 x    Unmeasured Stool Occurrence  3 x 1 x          Physical Exam  HENT:      Head: Normocephalic and atraumatic.   Eyes:      Pupils: Pupils are equal, round, and reactive to light.   Neck:      Vascular: No JVD.   Cardiovascular:      Rate and Rhythm: Normal rate and regular rhythm.      Heart sounds: Murmur heard.      No friction rub.   Pulmonary:      Effort: Pulmonary effort is normal.      Breath sounds: Normal breath sounds.   Abdominal:      General: Bowel sounds are normal. There is distension.      Palpations: Abdomen is soft.      Tenderness: There is no abdominal tenderness. There is no rebound.   Musculoskeletal:         General: No tenderness.      Cervical back: Neck supple.   Skin:     General: Skin is dry.      Findings: No rash.   Neurological:      Mental Status: He is alert and oriented to person, place, and time.         Medications:    Current Facility-Administered Medications:     acetaminophen (TYLENOL) tablet 650 mg, 650 mg, Oral, Q6H PRN, Stephen Vieira PA-C, 650 mg at 11/25/24 1645    atorvastatin (LIPITOR) tablet 40 mg, 40 mg, Oral, Early Morning, Stephen Vieira PA-C, 40 mg at 11/28/24 0454    budesonide-formoterol (SYMBICORT) 160-4.5 mcg/act inhaler 2 puff, 2 puff, Inhalation, BID, Stephen Vieira PA-C, 2 puff at  11/28/24 0906    carvedilol (COREG) tablet 6.25 mg, 6.25 mg, Oral, BID With Meals, Stephen Vieira PA-C, 6.25 mg at 11/28/24 0904    ceftriaxone (ROCEPHIN) 1 g/50 mL in dextrose IVPB, 1,000 mg, Intravenous, Q24H, Stephen Vieira PA-C, Last Rate: 100 mL/hr at 11/27/24 1131, 1,000 mg at 11/27/24 1131    chlorhexidine (PERIDEX) 0.12 % oral rinse 15 mL, 15 mL, Mouth/Throat, Q12H LUBA, Stephen Vieira PA-C, 15 mL at 11/28/24 0902    furosemide (LASIX) tablet 40 mg, 40 mg, Oral, BID, Stephen Vieira PA-C, 40 mg at 11/28/24 0903    insulin lispro (HumALOG/ADMELOG) 100 units/mL subcutaneous injection 1-6 Units, 1-6 Units, Subcutaneous, 4x Daily (AC & HS), 1 Units at 11/27/24 1133 **AND** Fingerstick Glucose (POCT), , , 4x Daily AC and at bedtime, Stephen Vieira PA-C    labetalol (NORMODYNE) injection 10 mg, 10 mg, Intravenous, Q4H PRN, Stephen Vieira PA-C    lactulose (CHRONULAC) oral solution 10 g, 10 g, Oral, BID, Stephen Vieira PA-C, 10 g at 11/28/24 0902    magnesium sulfate 2 g/50 mL IVPB (premix) 2 g, 2 g, Intravenous, Q12H, José PETERSON MD, Last Rate: 25 mL/hr at 11/28/24 0921, 2 g at 11/28/24 0921    montelukast (SINGULAIR) tablet 10 mg, 10 mg, Oral, Daily, Stephen Vieira PA-C, 10 mg at 11/28/24 0904    pantoprazole (PROTONIX) EC tablet 40 mg, 40 mg, Oral, Early Morning, Stephen Vieira PA-C, 40 mg at 11/28/24 0454    potassium chloride (Klor-Con M20) CR tablet 20 mEq, 20 mEq, Oral, BID, Noe Ortega MD, 20 mEq at 11/28/24 0903    sodium bicarbonate tablet 650 mg, 650 mg, Oral, TID after meals, Noe Ortega MD, 650 mg at 11/28/24 0902      Lab Results: I have reviewed the following results:  Results from last 7 days   Lab Units 11/28/24  0453 11/27/24  0438 11/26/24  0551 11/25/24  2214 11/25/24  1640 11/25/24  1211 11/25/24  0748 11/25/24  0426 11/24/24  2059 11/24/24  1024   WBC Thousand/uL 3.10* 3.00* 3.01*  --   --   --  3.86* 4.08*  --  16.60*   HEMOGLOBIN g/dL 9.3* 8.7* 8.1* 7.7* 7.5* 8.1* 8.8* 9.1* 6.6* 7.5*   HEMATOCRIT % 27.5*  "26.5* 24.5*  --   --   --  26.1* 26.6* 19.7* 22.8*   PLATELETS Thousands/uL 41* 33* 25*  --   --   --  24* 31*  --  154   POTASSIUM mmol/L 3.4* 4.0 4.8  --   --   --   --  5.1 5.9* 5.8*   CHLORIDE mmol/L 103 104 107  --   --   --   --  105 102 100   CO2 mmol/L 22 19* 20*  --   --   --   --  18* 16* 11*   BUN mg/dL 40* 43* 50*  --   --   --   --  61* 66* 70*   CREATININE mg/dL 1.86* 2.12* 2.44*  --   --   --   --  2.93* 3.10* 3.42*   CALCIUM mg/dL 8.3* 8.5 8.7  --   --   --   --  8.9 8.3* 8.5   MAGNESIUM mg/dL 1.6*  --  2.0  --   --   --   --  2.0  --   --    PHOSPHORUS mg/dL  --   --  3.2  --   --   --   --  4.3*  --   --    ALBUMIN g/dL  --  3.6 3.5  --   --   --   --  3.6  --  2.8*       Administrative Statements     Portions of the record may have been created with voice recognition software. Occasional wrong word or \"sound a like\" substitutions may have occurred due to the inherent limitations of voice recognition software. Read the chart carefully and recognize, using context, where substitutions have occurred.If you have any questions, please contact the dictating provider.  "

## 2024-11-28 NOTE — ASSESSMENT & PLAN NOTE
Potassium 3.4.  Supplement with caution due to elevated creatinine.  Magnesium 1.6 ordered IV magnesium 2 g x 2.  Continue supplement electrolytes as needed.

## 2024-11-28 NOTE — ASSESSMENT & PLAN NOTE
Lab Results   Component Value Date    HGBA1C 5.6 11/25/2024       Recent Labs     11/27/24  1046 11/27/24  1604 11/27/24 2050 11/28/24  0604   POCGLU 156* 116 97 114       Blood Sugar Average: Last 72 hrs:  (P) [id number]  Continue sliding scale insulin coverage.

## 2024-11-28 NOTE — ASSESSMENT & PLAN NOTE
Prior history of alcoholic liver cirrhosis with ascites.  Presented with melena and underwent EGD with banding done.  Underwent paracentesis yesterday.

## 2024-11-28 NOTE — ASSESSMENT & PLAN NOTE
79-year-old male patient with past medical history of alcoholic liver cirrhosis complicated by esophageal varices status post banding and ascites in the past, GAVE, hospitalized secondary to acute blood loss anemia due to hematemesis and melena.  Concern for recurrent variceal bleeding (history of banding)   Patient presented with hemoglobin of 7.5 which downtrended to 6.6,, platelet 31,000.  Patient had received 2 units of FFP, 1 unit of platelet, 4 units of PRBC transfusion.  Current hemoglobin 9.3, platelet 41,000.  EGD on this admission noted with 1 small nonbleeding varix in the esophagus, 1 band successfully placed, several portal hypertensive gastropathy in the body of the stomach, very mild GAVE.  Patient symptoms likely due to worsening portal hypertension therefore GI recommended increase carvedilol, Venofer and outpatient capsule endoscopy to evaluate AVM.      Now resolved.  Currently patient overall reports feeling well and denies any further episode of nausea, vomiting, hematemesis.  Tolerating diet well.  Completed 5 days of IV ceftriaxone for SBP prophylaxis.    Care discussed with GI and patient has been cleared to resume aspirin 81 mg daily per GI.  Appreciate GI recommendations-cleared for discharge from GI standpoint but patient still feels that he is not ready to be discharged

## 2024-11-28 NOTE — ASSESSMENT & PLAN NOTE
Lab Results   Component Value Date    EGFR 33 11/28/2024    EGFR 28 11/27/2024    EGFR 24 11/26/2024    CREATININE 1.86 (H) 11/28/2024    CREATININE 2.12 (H) 11/27/2024    CREATININE 2.44 (H) 11/26/2024     Baseline creatinine around 1.6-2.0 range.  Presented with creatinine 3.4.  After blood transfusion and paracentesis creatinine improving.  Currently on p.o. Lasix 40 mg twice daily.  Currently improving 1.86.  Nephrology recommendation appreciated..

## 2024-11-28 NOTE — PROGRESS NOTES
Progress Note - Hospitalist   Name: Bimal Lugo 79 y.o. male I MRN: [id number]  Unit/Bed#: -01 I Date of Admission: 11/24/2024   Date of Service: 11/28/2024 I Hospital Day: 4    Assessment & Plan  Hematemesis  79-year-old male patient with past medical history of alcoholic liver cirrhosis complicated by esophageal varices status post banding and ascites in the past, GAVE, hospitalized secondary to acute blood loss anemia due to hematemesis and melena.  Concern for recurrent variceal bleeding (history of banding)   Patient presented with hemoglobin of 7.5 which downtrended to 6.6,, platelet 31,000.  Patient had received 2 units of FFP, 1 unit of platelet, 4 units of PRBC transfusion.  Current hemoglobin 9.3, platelet 41,000.  EGD on this admission noted with 1 small nonbleeding varix in the esophagus, 1 band successfully placed, several portal hypertensive gastropathy in the body of the stomach, very mild GAVE.  Patient symptoms likely due to worsening portal hypertension therefore GI recommended increase carvedilol, Venofer and outpatient capsule endoscopy to evaluate AVM.      Now resolved.  Currently patient overall reports feeling well and denies any further episode of nausea, vomiting, hematemesis.  Tolerating diet well.  Completed 5 days of IV ceftriaxone for SBP prophylaxis.    Care discussed with GI and patient has been cleared to resume aspirin 81 mg daily per GI.  Appreciate GI recommendations-cleared for discharge from GI standpoint but patient still feels that he is not ready to be discharged  Acute blood loss anemia  - see plan under hemetemesis.  Melena  Baseline hemoglobin around 10.  Presented with hemoglobin of 6.6, platelet of 31,000.  Symptoms are now resolved.  Current hemoglobin stable 9.3.  Resume aspirin 81 mg daily and monitor hemoglobin  Plan as above.  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)  Patient has abstained from alcohol for several years  Has history of ascites  requiring weekly paracentesis (last para 11/21) and esophageal varices requiring banding   MELD 28 on presentation    Presents with hematemesis and maroon stool       Resume home lactulose when appropriate  Completed 5 days of IV ceftriaxone for SBP prophylaxis.  Appreciate GI recommendations.  Needs outpatient follow-up.  Paracentesis done on 11/27/2024 and 3400 mL of fluid removed  Patient does get weekly paracentesis every Thursday.  CAD (coronary artery disease)  On aspirin/statin/carvedilol  Resume aspirin 81 reviewed today and monitor hemoglobin.  Chronic obstructive pulmonary disease (HCC)  Continue home Symbicort  Resume Singulair  Respiratory protocol.  Diabetes mellitus, type 2 (Former[facility])  Lab Results   Component Value Date    HGBA1C 5.6 11/25/2024       Recent Labs     11/27/24  1046 11/27/24  1604 11/27/24  2050 11/28/24  0604   POCGLU 156* 116 97 114       Blood Sugar Average: Last 72 hrs:  (P) [id number]  Continue sliding scale insulin coverage.  Acute kidney injury superimposed on stage 3a chronic kidney disease ([facility])  Lab Results   Component Value Date    EGFR 33 11/28/2024    EGFR 28 11/27/2024    EGFR 24 11/26/2024    CREATININE 1.86 (H) 11/28/2024    CREATININE 2.12 (H) 11/27/2024    CREATININE 2.44 (H) 11/26/2024     Baseline creatinine around 1.6-2.0 range.  Presented with creatinine 3.4.  After blood transfusion and paracentesis creatinine improving.  Currently on p.o. Lasix 40 mg twice daily.  Currently improving 1.86.  Nephrology recommendation appreciated..  High anion gap metabolic acidosis  Improved on sodium bicarb tablets  Electrolyte abnormality  Potassium 3.4.  Supplement with caution due to elevated creatinine.  Magnesium 1.6 ordered IV magnesium 2 g x 2.  Continue supplement electrolytes as needed.  Hypertension  Blood pressure currently controlled.  Continue carvedilol and Lasix with holding parameters    VTE Pharmacologic Prophylaxis:   Moderate Risk (Score 3-4) -  Pharmacological DVT Prophylaxis Contraindicated. Sequential Compression Devices Ordered.    Mobility:   Basic Mobility Inpatient Raw Score: 19  JH-HLM Goal: 6: Walk 10 steps or more  JH-HLM Achieved: 5: Stand (1 or more minutes)      Patient Centered Rounds: I performed bedside rounds with nursing staff today.   Discussions with Specialists or Other Care Team Provider: GI     Education and Discussions with Family / Patient: Updated  (wife) at bedside.    Current Length of Stay: 4 day(s)  Current Patient Status: Inpatient   Certification Statement: The patient will continue to require additional inpatient hospital stay due to resuming aspirin and monitoring hemoglobin, pending PT OT eval.  Discharge Plan: Anticipate discharge in 24-48 hrs to discharge location to be determined pending rehab evaluations.    Code Status: Level 1 - Full Code    Subjective   Seen during rounds.  Patient appears comfortable not in distress.  Denies any episode of nausea, vomiting, hematemesis, melena, hematochezia.  Tolerating diet well.  Patient reports feeling well after paracentesis yesterday.  Care discussed with GI team and cleared to resume aspirin today.  Patient denies any complaint at this time.    Objective :  Temp:  [98.5 °F (36.9 °C)-99 °F (37.2 °C)] 99 °F (37.2 °C)  HR:  [57-62] 57  BP: (118-131)/(57-63) 128/60  Resp:  [16-20] 16  SpO2:  [95 %-97 %] 95 %    Body mass index is 28.95 kg/m².     Input and Output Summary (last 24 hours):     Intake/Output Summary (Last 24 hours) at 11/28/2024 0913  Last data filed at 11/28/2024 0336  Gross per 24 hour   Intake 480 ml   Output 1775 ml   Net -1295 ml       Physical Exam  Constitutional:       General: He is not in acute distress.     Appearance: Normal appearance. He is not ill-appearing, toxic-appearing or diaphoretic.   Cardiovascular:      Rate and Rhythm: Normal rate.      Pulses: Normal pulses.   Pulmonary:      Effort: Pulmonary effort is normal. No respiratory  distress.      Breath sounds: Normal breath sounds. No wheezing.   Abdominal:      General: Bowel sounds are normal. There is distension.      Palpations: Abdomen is soft.      Tenderness: There is no abdominal tenderness. There is no guarding.   Musculoskeletal:      Right lower leg: Edema present.      Left lower leg: Edema present.   Neurological:      Mental Status: He is alert and oriented to person, place, and time. Mental status is at baseline.   Psychiatric:         Mood and Affect: Mood normal.         Behavior: Behavior normal.           Lines/Drains:              Lab Results: I have reviewed the following results:   Results from last 7 days   Lab Units 11/28/24  0453 11/25/24  0748 11/25/24  0426   WBC Thousand/uL 3.10*   < > 4.08*   HEMOGLOBIN g/dL 9.3*   < > 9.1*   HEMATOCRIT % 27.5*   < > 26.6*   PLATELETS Thousands/uL 41*   < > 31*   SEGS PCT %  --   --  72   LYMPHO PCT %  --   --  15   MONO PCT %  --   --  10   EOS PCT %  --   --  1    < > = values in this interval not displayed.     Results from last 7 days   Lab Units 11/28/24  0453 11/27/24  0438   SODIUM mmol/L 133* 132*   POTASSIUM mmol/L 3.4* 4.0   CHLORIDE mmol/L 103 104   CO2 mmol/L 22 19*   BUN mg/dL 40* 43*   CREATININE mg/dL 1.86* 2.12*   ANION GAP mmol/L 8 9   CALCIUM mg/dL 8.3* 8.5   ALBUMIN g/dL  --  3.6   TOTAL BILIRUBIN mg/dL  --  0.70   ALK PHOS U/L  --  51   ALT U/L  --  14   AST U/L  --  26   GLUCOSE RANDOM mg/dL 114 106     Results from last 7 days   Lab Units 11/25/24  0426   INR  1.39*     Results from last 7 days   Lab Units 11/28/24  0604 11/27/24  2050 11/27/24  1604 11/27/24  1046 11/27/24  0615 11/26/24  2034 11/26/24  1548 11/26/24  1042 11/26/24  0611 11/25/24  2147 11/25/24  1841 11/25/24  1211   POC GLUCOSE mg/dl 114 97 116 156* 133 192* 109 161* 114 123 152* 121     Results from last 7 days   Lab Units 11/25/24  0426   HEMOGLOBIN A1C % 5.6           Recent Cultures (last 7 days):           Last 24 Hours Medication  List:     Current Facility-Administered Medications:     acetaminophen (TYLENOL) tablet 650 mg, Q6H PRN    atorvastatin (LIPITOR) tablet 40 mg, Early Morning    budesonide-formoterol (SYMBICORT) 160-4.5 mcg/act inhaler 2 puff, BID    carvedilol (COREG) tablet 6.25 mg, BID With Meals    ceftriaxone (ROCEPHIN) 1 g/50 mL in dextrose IVPB, Q24H, Last Rate: 1,000 mg (11/27/24 1131)    chlorhexidine (PERIDEX) 0.12 % oral rinse 15 mL, Q12H LUBA    furosemide (LASIX) tablet 40 mg, BID    insulin lispro (HumALOG/ADMELOG) 100 units/mL subcutaneous injection 1-6 Units, 4x Daily (AC & HS) **AND** Fingerstick Glucose (POCT), 4x Daily AC and at bedtime    labetalol (NORMODYNE) injection 10 mg, Q4H PRN    lactulose (CHRONULAC) oral solution 10 g, BID    magnesium sulfate 2 g/50 mL IVPB (premix) 2 g, Q12H    montelukast (SINGULAIR) tablet 10 mg, Daily    pantoprazole (PROTONIX) EC tablet 40 mg, Early Morning    potassium chloride (Klor-Con M20) CR tablet 20 mEq, BID    sodium bicarbonate tablet 650 mg, TID after meals    Administrative Statements   Today, Patient Was Seen By: José Peralta MD  I have spent a total time of 35 minutes in caring for this patient on the day of the visit/encounter including Diagnostic results, Patient and family education, Counseling / Coordination of care, Documenting in the medical record, and Reviewing / ordering tests, medicine, procedures  .    **Please Note: This note may have been constructed using a voice recognition system.**

## 2024-11-29 VITALS
BODY MASS INDEX: 29.09 KG/M2 | HEIGHT: 65 IN | RESPIRATION RATE: 16 BRPM | SYSTOLIC BLOOD PRESSURE: 116 MMHG | TEMPERATURE: 97.9 F | WEIGHT: 174.6 LBS | HEART RATE: 55 BPM | DIASTOLIC BLOOD PRESSURE: 63 MMHG | OXYGEN SATURATION: 96 %

## 2024-11-29 LAB
ANION GAP SERPL CALCULATED.3IONS-SCNC: 8 MMOL/L (ref 4–13)
BUN SERPL-MCNC: 39 MG/DL (ref 5–25)
CALCIUM SERPL-MCNC: 8.3 MG/DL (ref 8.4–10.2)
CHLORIDE SERPL-SCNC: 102 MMOL/L (ref 96–108)
CO2 SERPL-SCNC: 24 MMOL/L (ref 21–32)
CREAT SERPL-MCNC: 1.83 MG/DL (ref 0.6–1.3)
ERYTHROCYTE [DISTWIDTH] IN BLOOD BY AUTOMATED COUNT: 16.2 % (ref 11.6–15.1)
GFR SERPL CREATININE-BSD FRML MDRD: 34 ML/MIN/1.73SQ M
GLUCOSE SERPL-MCNC: 122 MG/DL (ref 65–140)
GLUCOSE SERPL-MCNC: 141 MG/DL (ref 65–140)
GLUCOSE SERPL-MCNC: 148 MG/DL (ref 65–140)
HCT VFR BLD AUTO: 26.7 % (ref 36.5–49.3)
HGB BLD-MCNC: 9 G/DL (ref 12–17)
MCH RBC QN AUTO: 31.5 PG (ref 26.8–34.3)
MCHC RBC AUTO-ENTMCNC: 33.7 G/DL (ref 31.4–37.4)
MCV RBC AUTO: 93 FL (ref 82–98)
PLATELET # BLD AUTO: 37 THOUSANDS/UL (ref 149–390)
PMV BLD AUTO: 11.7 FL (ref 8.9–12.7)
POTASSIUM SERPL-SCNC: 3.8 MMOL/L (ref 3.5–5.3)
RBC # BLD AUTO: 2.86 MILLION/UL (ref 3.88–5.62)
SODIUM SERPL-SCNC: 134 MMOL/L (ref 135–147)
WBC # BLD AUTO: 3.2 THOUSAND/UL (ref 4.31–10.16)

## 2024-11-29 PROCEDURE — 99239 HOSP IP/OBS DSCHRG MGMT >30: CPT | Performed by: FAMILY MEDICINE

## 2024-11-29 PROCEDURE — 85027 COMPLETE CBC AUTOMATED: CPT | Performed by: STUDENT IN AN ORGANIZED HEALTH CARE EDUCATION/TRAINING PROGRAM

## 2024-11-29 PROCEDURE — 82948 REAGENT STRIP/BLOOD GLUCOSE: CPT

## 2024-11-29 PROCEDURE — 97167 OT EVAL HIGH COMPLEX 60 MIN: CPT

## 2024-11-29 PROCEDURE — 80048 BASIC METABOLIC PNL TOTAL CA: CPT | Performed by: STUDENT IN AN ORGANIZED HEALTH CARE EDUCATION/TRAINING PROGRAM

## 2024-11-29 PROCEDURE — 97163 PT EVAL HIGH COMPLEX 45 MIN: CPT

## 2024-11-29 RX ORDER — SPIRONOLACTONE 50 MG/1
50 TABLET, FILM COATED ORAL DAILY
Qty: 60 TABLET | Refills: 0 | Status: SHIPPED | OUTPATIENT
Start: 2024-11-29

## 2024-11-29 RX ORDER — CARVEDILOL 6.25 MG/1
6.25 TABLET ORAL 2 TIMES DAILY WITH MEALS
Qty: 60 TABLET | Refills: 0 | Status: SHIPPED | OUTPATIENT
Start: 2024-11-29

## 2024-11-29 RX ORDER — SODIUM BICARBONATE 650 MG/1
650 TABLET ORAL
Qty: 90 TABLET | Refills: 0 | Status: SHIPPED | OUTPATIENT
Start: 2024-11-29

## 2024-11-29 RX ORDER — INSULIN ASPART 100 [IU]/ML
INJECTION, SOLUTION INTRAVENOUS; SUBCUTANEOUS
Qty: 15 ML | Refills: 0 | Status: SHIPPED | OUTPATIENT
Start: 2024-11-29

## 2024-11-29 RX ADMIN — POTASSIUM CHLORIDE 20 MEQ: 1500 TABLET, EXTENDED RELEASE ORAL at 10:32

## 2024-11-29 RX ADMIN — MONTELUKAST 10 MG: 10 TABLET, FILM COATED ORAL at 10:31

## 2024-11-29 RX ADMIN — BUDESONIDE AND FORMOTEROL FUMARATE DIHYDRATE 2 PUFF: 160; 4.5 AEROSOL RESPIRATORY (INHALATION) at 10:40

## 2024-11-29 RX ADMIN — ASPIRIN 81 MG: 81 TABLET, COATED ORAL at 10:31

## 2024-11-29 RX ADMIN — PANTOPRAZOLE SODIUM 40 MG: 40 TABLET, DELAYED RELEASE ORAL at 05:25

## 2024-11-29 RX ADMIN — LACTULOSE 10 G: 20 SOLUTION ORAL at 10:30

## 2024-11-29 RX ADMIN — CARVEDILOL 6.25 MG: 6.25 TABLET, FILM COATED ORAL at 10:40

## 2024-11-29 RX ADMIN — SODIUM BICARBONATE 650 MG: 650 TABLET ORAL at 11:35

## 2024-11-29 RX ADMIN — FUROSEMIDE 40 MG: 40 TABLET ORAL at 10:31

## 2024-11-29 RX ADMIN — ATORVASTATIN CALCIUM 40 MG: 40 TABLET, FILM COATED ORAL at 05:25

## 2024-11-29 RX ADMIN — CHLORHEXIDINE GLUCONATE 0.12% ORAL RINSE 15 ML: 1.2 LIQUID ORAL at 10:32

## 2024-11-29 RX ADMIN — SODIUM BICARBONATE 650 MG: 650 TABLET ORAL at 10:40

## 2024-11-29 NOTE — PHYSICAL THERAPY NOTE
Physical Therapy Evaluation    Patient's Name: Bimal Lugo    Admitting Diagnosis  Blood in stool [K92.1]  Acute blood loss anemia [D62]  Hyponatremia [E87.1]  GI bleed [K92.2]  Hematemesis, unspecified whether nausea present [K92.0]    Problem List  Patient Active Problem List   Diagnosis    Atherosclerosis of lower extremity with intermittent claudication (HCC)    CAD (coronary artery disease)    Chronic bronchitis with COPD (chronic obstructive pulmonary disease) (HCC)    Chronic kidney disease, stage 3a (HCC)    PVD (peripheral vascular disease) (HCC)    Symptomatic anemia    Cirrhosis (HCC)    Chronic obstructive pulmonary disease (HCC)    History of aortic valve replacement    Back pain    Thrombocytopenia (HCC)    Sleep apnea    Diabetes mellitus, type 2 (HCC)    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)    Melena    Hematemesis    Acute blood loss anemia    Acute kidney injury superimposed on stage 3a chronic kidney disease (HCC)    High anion gap metabolic acidosis    Electrolyte abnormality    Hypertension       Past Medical History  Past Medical History:   Diagnosis Date    Chronic bronchitis (HCC)     COPD (chronic obstructive pulmonary disease) (HCC)     Diabetes mellitus (HCC)     Esophageal varices (HCC) 11 apr 2024    Hyperlipidemia     Hypertension     Obesity     ARACELY (obstructive sleep apnea)        Past Surgical History  Past Surgical History:   Procedure Laterality Date    AORTIC VALVE REPLACEMENT      Jan 4 2024    IR PARACENTESIS  04/04/2024    IR PARACENTESIS  03/07/2024    IR PARACENTESIS  04/11/2024    IR PARACENTESIS  04/26/2024    IR PARACENTESIS  05/09/2024    IR PARACENTESIS  05/16/2024    IR PARACENTESIS  05/23/2024    IR PARACENTESIS  06/06/2024    IR PARACENTESIS  06/13/2024    IR PARACENTESIS  06/20/2024    IR PARACENTESIS  06/27/2024    IR PARACENTESIS  07/03/2024    IR PARACENTESIS  07/11/2024    IR PARACENTESIS  07/18/2024    IR PARACENTESIS  07/24/2024    IR PARACENTESIS   08/01/2024    IR PARACENTESIS  8/8/2024    IR PARACENTESIS  8/15/2024    IR PARACENTESIS  8/22/2024    IR PARACENTESIS  8/29/2024    IR PARACENTESIS  9/5/2024    IR PARACENTESIS  9/12/2024    IR PARACENTESIS  9/19/2024    IR PARACENTESIS  10/3/2024    IR PARACENTESIS  9/26/2024    IR PARACENTESIS  10/10/2024    IR PARACENTESIS  10/17/2024    IR PARACENTESIS  10/24/2024    IR PARACENTESIS  10/31/2024    IR PARACENTESIS  11/7/2024    IR PARACENTESIS  11/14/2024    IR PARACENTESIS  11/21/2024    IR PARACENTESIS  11/27/2024    UPPER GASTROINTESTINAL ENDOSCOPY  11 apr 2024       Recent Imaging  IR INPATIENT Paracentesis   Final Result by Stephen Bocanegra MD (11/29 0859)   Impression:      Ultrasound-guided paracentesis.      Signed, performed, and dictated by SUNDAY Miller under the supervision of Dr. Stephen Bocanegra.            Workstation performed: [id number]         CT abdomen pelvis wo contrast   Final Result by Nayan Biswas MD (11/24 1202)      1.  Hepatic cirrhosis and findings of portal hypertension without significant interval change since 6/29/2024.      2.  Mild to moderate volume abdominopelvic ascites, similar to prior examination. No encapsulated collections.      Resident: SALINAS Funez I, the attending radiologist, have reviewed the images and agree with the final report above.      Workstation performed: [id number]         XR chest 1 view portable   Final Result by Lorie Ozuna MD (11/24 1251)      No acute cardiopulmonary disease.            Workstation performed: [id number]             Recent Vital Signs  Vitals:    11/28/24 1714 11/28/24 2252 11/29/24 0540 11/29/24 0705   BP: 114/58 117/60  116/63   Pulse: 67 63  55   Resp:  17  16   Temp:  98.3 °F (36.8 °C)  97.9 °F (36.6 °C)   TempSrc:       SpO2: 98% 95%  96%   Weight:   79.2 kg (174 lb 9.7 oz)    Height:              11/29/24 1111   PT Last Visit   PT Visit Date 11/29/24   Note Type   Note type Evaluation   Pain Assessment    Pain Assessment Tool 0-10   Pain Score No Pain   Restrictions/Precautions   Weight Bearing Precautions Per Order No   Braces or Orthoses Other (Comment)  (none)   Other Precautions Chair Alarm;Fall Risk   Home Living   Type of Home House   Home Layout One level;Stairs to enter with rails  (4 SCARLETT with rails)   Bathroom Shower/Tub Walk-in shower   Bathroom Toilet Raised   Bathroom Equipment Grab bars in shower;Shower chair;Grab bars around toilet   Bathroom Accessibility Accessible   Home Equipment Cane;Walker;Wheelchair-manual  (Pt just uses cane, does not use walker/wheelchair/rollator)   Prior Function   Level of [place] Independent with ADLs;Independent with functional mobility;Needs assistance with IADLS  (Wife completes IADL tasks)   Lives With Spouse   Receives Help From Family   IADLs Independent with driving;Independent with medication management;Family/Friend/Other provides meals  (Wife does not drive. Wife usually does cooking however she had heart surgery and cannot prepare large meals.)   Falls in the last 6 months 0   Vocational Retired  ()   General   Family/Caregiver Present Yes   Cognition   Overall Cognitive Status WFL   Orientation Level Oriented X4   Memory Within functional limits   Following Commands Follows all commands and directions without difficulty   Comments Pt agreeable to PT evaluation   RLE Assessment   RLE Assessment WFL  (4/5)   LLE Assessment   LLE Assessment WFL  (4/5)   Vision-Basic Assessment   Current Vision Wears glasses only for reading   Coordination   Sensation WFL   Light Touch   RLE Light Touch Grossly intact   LLE Light Touch Grossly intact   Bed Mobility   Additional Comments Pt OOB at start and end of session   Transfers   Sit to Stand 4  Minimal assistance   Additional items Assist x 1;Armrests;Increased time required;Verbal cues   Stand to Sit 4  Minimal assistance   Additional items Assist x 1;Armrests;Increased time required;Verbal cues   Additional  Comments with SPC, pt denied dizziness and lightheadedness   Ambulation/Elevation   Gait pattern Decreased hip extension;Decreased heel strike;Decreased toe off;Step through pattern;Short stride   Gait Assistance 4  Minimal assist   Additional items Assist x 1;Verbal cues   Assistive Device Straight cane   Distance 120'   Stair Management Assistance 4  Minimal assist   Additional items Assist x 1;Verbal cues;Increased time required   Stair Management Technique One rail R;Step to pattern;Foreward;With cane   Number of Stairs 5   Ambulation/Elevation Additional Comments Pt with SPC, pt with 1 instance of LOB requiring min a x1 to correct, Pt usiung SPC and railing on wall during ambulation returning to room, pt educated on use of RW for more support, pt declined RW   Balance   Static Sitting Fair +   Dynamic Sitting Fair +   Static Standing Fair   Dynamic Standing Fair   Ambulatory Fair -   Activity Tolerance   Activity Tolerance Patient limited by fatigue   Medical Staff Made Aware AMBER Carvalho  (Pt seen as co-evaluation with OT due to pt's co-morbidities, clinically unstable presentation, and present impairments)   Assessment   Prognosis Good   Problem List Decreased strength;Decreased endurance;Impaired balance;Decreased mobility   Assessment Bimal Lugo is a 79 y.o. male admitted [facility] for Hematemesis, melena, alcoholic cirrhosis of liver with ascites. Pt  has a past medical history of Chronic bronchitis (HCC), COPD (chronic obstructive pulmonary disease) (HCC), Diabetes mellitus (HCC), Esophageal varices (HCC) (11 apr 2024), Hyperlipidemia, Hypertension, Obesity, and ARACELY (obstructive sleep apnea).. Order placed for PT eval and tx. PT was consulted and pt was seen on 11/29/2024 for mobility assessment and d/c planning. Chart review and two person identifiers were completed.   Currently pt presents with decreased strength , decreased static sitting balance , decreased dynamic sitting balance ,  decreased static standing balance, decreased dynamic standing balance , decreased gait speed, decreased step length , and decreased muscular endurance . Due to these impairments, they will require assistance to perform bed mobility, sit to stand , ambulation, stair negotiation, and transfers. Pt is currently functioning at a minimum assistance x1 level for transfers, minimum assistance x1 level for ambulation with Single Point Cane, and minimum assistance x1 for stair climbing/elevations. These activity limitations significantly impact their ability to participate in previous home and community roles and responsibilities , ambulation in home, ambulation in the community, and driving . The patient's AM-[facility] Basic Mobility Inpatient Short Form Raw Score is 19. PT recommends level III minimum resource intensity. They will benefit from skilled therapy to to reduce the risk of falls, to allow for safe ambulation, and to maximize functional potential.   Barriers to Discharge Inaccessible home environment;Decreased caregiver support   Goals   STG Expiration Date 12/09/24   Short Term Goal #1 Within 10 days patient will complete: 1) Bed mobility skills with modified independent assistance to facilitate safe return to previous living environment 2) Functional transfers with modified independent assistance to facilitate safe return to previous living environment  3) Ambulation with least restrictive AD modified independent assistance without LOB and stable vitals for safe ambulation home/ community distances. 4) Stair training up/down flight 4 step/s with appropriate rail/s and modified independent assistance for safe access to previous living environment. 5) Improve balance grades to fair + to reduce risk of falls. 6)Improve LE strength grades by 1 to increase independence w/ transfers and gait.  7) PT for ongoing pt and family education; DME needs and D/C planning to promote highest level of function in least restrictive  environment.   Plan   Treatment/Interventions Functional transfer training;LE strengthening/ROM;Elevations;Therapeutic exercise;Endurance training;Patient/family training;Equipment eval/education;Bed mobility;Gait training;Continued evaluation;OT   PT Frequency 2-3x/wk   Discharge Recommendation   Rehab Resource Intensity Level, PT III (Minimum Resource Intensity)   Equipment Recommended Walker   Walker Package Recommended Wheeled walker   AM-PAC Basic Mobility Inpatient   Turning in Flat Bed Without Bedrails 4   Lying on Back to Sitting on Edge of Flat Bed Without Bedrails 3   Moving Bed to Chair 3   Standing Up From Chair Using Arms 3   Walk in Room 3   Climb 3-5 Stairs With Railing 3   Basic Mobility Inpatient Raw Score 19   Basic Mobility Standardized Score 42.48   [facility] Highest Level Of Mobility   -HLM Goal 6: Walk 10 steps or more   -HLM Achieved 7: Walk 25 feet or more   End of Consult   Patient Position at End of Consult Bedside chair;Bed/Chair alarm activated;All needs within reach       Recommendations                                                                                                              Pt will benefit from continued skilled IP PT to address the above mentioned impairments in order to maximize recovery and increase functional independence when completing mobility and ADLs. See flow sheet for goals and POC.     PT Evaluation Time: 5753-1830    Nicholas Daley, PT, DPT

## 2024-11-29 NOTE — OCCUPATIONAL THERAPY NOTE
Occupational Therapy Evaluation     Patient Name: Bimal Lugo  Today's Date: 11/29/2024  Problem List  Principal Problem:    Hematemesis  Active Problems:    CAD (coronary artery disease)    Chronic obstructive pulmonary disease (HCC)    Diabetes mellitus, type 2 (HCC)    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)    Melena    Acute blood loss anemia    Acute kidney injury superimposed on stage 3a chronic kidney disease (HCC)    High anion gap metabolic acidosis    Electrolyte abnormality    Hypertension    Past Medical History  Past Medical History:   Diagnosis Date    Chronic bronchitis (HCC)     COPD (chronic obstructive pulmonary disease) (HCC)     Diabetes mellitus (HCC)     Esophageal varices (HCC) 11 apr 2024    Hyperlipidemia     Hypertension     Obesity     ARACELY (obstructive sleep apnea)      Past Surgical History  Past Surgical History:   Procedure Laterality Date    AORTIC VALVE REPLACEMENT      Jan 4 2024    IR PARACENTESIS  04/04/2024    IR PARACENTESIS  03/07/2024    IR PARACENTESIS  04/11/2024    IR PARACENTESIS  04/26/2024    IR PARACENTESIS  05/09/2024    IR PARACENTESIS  05/16/2024    IR PARACENTESIS  05/23/2024    IR PARACENTESIS  06/06/2024    IR PARACENTESIS  06/13/2024    IR PARACENTESIS  06/20/2024    IR PARACENTESIS  06/27/2024    IR PARACENTESIS  07/03/2024    IR PARACENTESIS  07/11/2024    IR PARACENTESIS  07/18/2024    IR PARACENTESIS  07/24/2024    IR PARACENTESIS  08/01/2024    IR PARACENTESIS  8/8/2024    IR PARACENTESIS  8/15/2024    IR PARACENTESIS  8/22/2024    IR PARACENTESIS  8/29/2024    IR PARACENTESIS  9/5/2024    IR PARACENTESIS  9/12/2024    IR PARACENTESIS  9/19/2024    IR PARACENTESIS  10/3/2024    IR PARACENTESIS  9/26/2024    IR PARACENTESIS  10/10/2024    IR PARACENTESIS  10/17/2024    IR PARACENTESIS  10/24/2024    IR PARACENTESIS  10/31/2024    IR PARACENTESIS  11/7/2024    IR PARACENTESIS  11/14/2024    IR PARACENTESIS  11/21/2024    IR PARACENTESIS  11/27/2024     UPPER GASTROINTESTINAL ENDOSCOPY  11 apr 2024 11/29/24 1054   OT Last Visit   OT Visit Date 11/29/24   Note Type   Note type Evaluation   Pain Assessment   Pain Assessment Tool 0-10   Pain Score No Pain   Restrictions/Precautions   Weight Bearing Precautions Per Order No   Braces or Orthoses Other (Comment)  (None)   Other Precautions Bed Alarm;Chair Alarm;Fall Risk   Home Living   Type of Home House   Home Layout One level;Stairs to enter with rails  (4 SCARLETT with rails)   Bathroom Shower/Tub Walk-in shower   Bathroom Toilet Raised   Bathroom Equipment Grab bars in shower;Shower chair;Grab bars around toilet   Bathroom Accessibility Accessible   Home Equipment Cane;Walker;Wheelchair-manual  (Pt just uses cane, does not use walker/wheelchair/rollator.)   Prior Function   Level of [place] Independent with ADLs;Independent with functional mobility;Needs assistance with IADLS  (Wife completes IADL tasks.)   Lives With Spouse   Receives Help From Family   IADLs Independent with driving;Independent with medication management;Family/Friend/Other provides meals  (Wife does not drive. Wife usually does cooking however she had heart surgery and cannot prepare large meals.)   Falls in the last 6 months   (0)   Vocational Retired  ()   Lifestyle   Autonomy Pt was independent with mobility and ADL tasks prior to admission.   Reciprocal Relationships Wife   Service to Others Retired   General   Family/Caregiver Present Yes  (Wife)   Subjective   Subjective Pt agreeable to session.   ADL   Where Assessed Other (Comment)  (In room)   Eating Assistance 7  Independent   Eating Deficit None   Grooming Assistance 5  Supervision/Setup   Grooming Deficit Setup   UB Bathing Assistance 5  Supervision/Setup   UB Bathing Deficit Setup   LB Bathing Assistance 5  Supervision/Setup   LB Bathing Deficit Setup   UB Dressing Assistance 5  Supervision/Setup   UB Dressing Deficit Setup   LB Dressing Assistance 5   Supervision/Setup   LB Dressing Deficit Setup   Toileting Assistance  5  Supervision/Setup   Toileting Deficit Setup   Bed Mobility   Additional Comments Pt found seated in chair upon arrival. Left seated in chair with all needs met at end of session.   Transfers   Sit to Stand 4  Minimal assistance   Additional items Assist x 1;Armrests   Stand to Sit 4  Minimal assistance   Additional items Assist x 1;Increased time required;Verbal cues   Functional Mobility   Functional Mobility 4  Minimal assistance   Additional Comments Ax1 with SPC in room and out into hallway. Pt had 1 LOB however was able to recover.   Additional items SPC   Balance   Static Sitting Fair +   Dynamic Sitting Fair +   Static Standing Fair   Dynamic Standing Fair   Ambulatory Fair -   Activity Tolerance   Activity Tolerance Patient limited by fatigue   Medical Staff Made Aware Pt seen as a co-eval with PT Nicholas due to the patient's co-morbidities, clinically unstable presentation, and present impairments which are a regression from the patient's baseline.   RUE Assessment   RUE Assessment WFL   LUE Assessment   LUE Assessment WFL   Hand Function   Gross Motor Coordination Functional   Fine Motor Coordination Functional   Sensation   Light Touch No apparent deficits   Vision-Basic Assessment   Current Vision Wears glasses only for reading   Cognition   Overall Cognitive Status WFL   Arousal/Participation Alert;Cooperative;Responsive   Attention Within functional limits   Orientation Level Oriented X4   Memory Within functional limits   Following Commands Follows all commands and directions without difficulty   Assessment   Limitation Decreased ADL status;Decreased endurance   Prognosis Good   Assessment Patient is a 79 y.o. male seen for OT evaluation s/p admit to [facility] on 11/24/2024 w/ Hematemesis. Commorbidities affecting patient's functional performance at time of assessment include:  has a past medical history of Chronic  bronchitis (HCC), COPD (chronic obstructive pulmonary disease) (HCC), Diabetes mellitus (HCC), Esophageal varices (HCC), Hyperlipidemia, Hypertension, Obesity, and ARACELY (obstructive sleep apnea).  Orders placed for OT evaluation and treatment.  Performed at least two patient identifiers during session including name and wristband.  Prior to admission, patient was Independent with ADLs and transportation. He completed light meal prep. Personal factors affecting patient at time of initial evaluation include: limited caregiver support, steps to enter, difficulty performing ADLs, and difficulty performing IADLs. Upon evaluation, patient requires supervision assist for UB ADLs, supervision assist for LB ADLs, transfers and functional ambulation in room and bathroom with minimum assistance x1 assist, with the use of SPC.  Patient is oriented x 4. Occupational performance is affected by the following deficits: decreased muscle strength, dynamic sit/ stand balance deficit with poor standing tolerance time for self care and functional mobility, and decreased activity tolerance.  Patient to benefit from continued Occupational Therapy treatment while in the hospital to address deficits as defined above and maximize level of functional independence with ADLs and functional mobility. Occupational Performance areas to address include: grooming , bathing/ shower, dressing, toilet hygiene, transfer to all surfaces, functional mobility, and community mobility. From OT standpoint, recommendation at time of d/c would be   Level III (Minimum Resource Intensity).   Goals   Patient Goals To go home   Plan   Treatment Interventions ADL retraining;Functional transfer training;Endurance training;Patient/family training   Goal Expiration Date 12/13/24   OT Treatment Day 0   OT Frequency 2-3x/wk   Discharge Recommendation   Rehab Resource Intensity Level, OT III (Minimum Resource Intensity)   AM-PAC Daily Activity Inpatient   Lower Body  Dressing 3   Bathing 3   Toileting 3   Upper Body Dressing 3   Grooming 3   Eating 4   Daily Activity Raw Score 19   Daily Activity Standardized Score (Calc for Raw Score >=11) 40.22   AM-PAC Applied Cognition Inpatient   Following a Speech/Presentation 4   Understanding Ordinary Conversation 4   Taking Medications 4   Remembering Where Things Are Placed or Put Away 4   Remembering List of 4-5 Errands 4   Taking Care of Complicated Tasks 4   Applied Cognition Raw Score 24   Applied Cognition Standardized Score 62.21   Barthel Index   Feeding 10   Bathing 0   Grooming Score 0   Dressing Score 5   Bladder Score 10   Bowels Score 10   Toilet Use Score 5   Transfers (Bed/Chair) Score 10   Mobility (Level Surface) Score 0   Stairs Score 5   Barthel Index Score 55     Occupational Therapy Goals: In 7- 10 days:  1.  Patient will complete UB dressing tasks while seated with MI.   2. Patient will complete LB ADLs at MI level,  with the use of AE as indicated.  3. Patient will increase OOB/ sitting tolerance to 2-4 hours per day for increased participation in self care and leisure tasks with no s/s of exertion  4. Patient will increase standing tolerance time to  5  minutes with  Unilateral UE support to complete sink level ADLs @ MI level.   5.  Patient/ Family  will demonstrate competency with UE Home Exercise Program.

## 2024-11-29 NOTE — ASSESSMENT & PLAN NOTE
Lab Results   Component Value Date    HGBA1C 5.6 11/25/2024       Recent Labs     11/28/24  1554 11/28/24 2055 11/29/24  0615 11/29/24  1050   POCGLU 101 194* 148* 141*       Blood Sugar Average: Last 72 hrs:  (P) [id number]  Continue sliding scale insulin coverage.

## 2024-11-29 NOTE — PLAN OF CARE
Problem: PHYSICAL THERAPY ADULT  Goal: Performs mobility at highest level of function for planned discharge setting.  See evaluation for individualized goals.  Description: Treatment/Interventions: Functional transfer training, LE strengthening/ROM, Elevations, Therapeutic exercise, Endurance training, Patient/family training, Equipment eval/education, Bed mobility, Gait training, Continued evaluation, OT  Equipment Recommended: Walker       See flowsheet documentation for full assessment, interventions and recommendations.  Note: Prognosis: Good  Problem List: Decreased strength, Decreased endurance, Impaired balance, Decreased mobility  Assessment: Bimal Lugo is a 79 y.o. male admitted to [facility] on 11/24/2024 for Hematemesis, melena, alcoholic cirrhosis of liver with ascites. Pt  has a past medical history of Chronic bronchitis (HCC), COPD (chronic obstructive pulmonary disease) (HCC), Diabetes mellitus (HCC), Esophageal varices (HCC) (11 apr 2024), Hyperlipidemia, Hypertension, Obesity, and ARACELY (obstructive sleep apnea).. Order placed for PT eval and tx. PT was consulted and pt was seen on 11/29/2024 for mobility assessment and d/c planning. Chart review and two person identifiers were completed.   Currently pt presents with decreased strength , decreased static sitting balance , decreased dynamic sitting balance , decreased static standing balance, decreased dynamic standing balance , decreased gait speed, decreased step length , and decreased muscular endurance . Due to these impairments, they will require assistance to perform bed mobility, sit to stand , ambulation, stair negotiation, and transfers. Pt is currently functioning at a minimum assistance x1 level for transfers, minimum assistance x1 level for ambulation with Single Point Cane, and minimum assistance x1 for stair climbing/elevations. These activity limitations significantly impact their ability to participate in previous home and community roles  and responsibilities , ambulation in home, ambulation in the community, and driving . The patient's AM-PAC Basic Mobility Inpatient Short Form Raw Score is 19. PT recommends level III minimum resource intensity. They will benefit from skilled therapy to to reduce the risk of falls, to allow for safe ambulation, and to maximize functional potential.  Barriers to Discharge: Inaccessible home environment, Decreased caregiver support     Rehab Resource Intensity Level, PT: III (Minimum Resource Intensity)    See flowsheet documentation for full assessment.

## 2024-11-29 NOTE — PLAN OF CARE
Problem: Potential for Falls  Goal: Patient will remain free of falls  Description: INTERVENTIONS:  - Educate patient/family on patient safety including physical limitations  - Instruct patient to call for assistance with activity   - Consult OT/PT to assist with strengthening/mobility   - Keep Call bell within reach  - Keep bed low and locked with side rails adjusted as appropriate  - Keep care items and personal belongings within reach  - Initiate and maintain comfort rounds  - Make Fall Risk Sign visible to staff  - Offer Toileting every 2 Hours, in advance of need  - Initiate/Maintain bed alarm  - Obtain necessary fall risk management equipment: yellow socks  - Apply yellow socks and bracelet for high fall risk patients  - Consider moving patient to room near nurses station  Outcome: Progressing     Problem: INFECTION - ADULT  Goal: Absence or prevention of progression during hospitalization  Description: INTERVENTIONS:  - Assess and monitor for signs and symptoms of infection  - Monitor lab/diagnostic results  - Monitor all insertion sites, i.e. indwelling lines, tubes, and drains  - Monitor endotracheal if appropriate and nasal secretions for changes in amount and color  - [place] appropriate cooling/warming therapies per order  - Administer medications as ordered  - Instruct and encourage patient and family to use good hand hygiene technique  - Identify and instruct in appropriate isolation precautions for identified infection/condition  Outcome: Progressing  Goal: Absence of fever/infection during neutropenic period  Description: INTERVENTIONS:  - Monitor WBC    Outcome: Progressing     Problem: SAFETY ADULT  Goal: Patient will remain free of falls  Description: INTERVENTIONS:  - Educate patient/family on patient safety including physical limitations  - Instruct patient to call for assistance with activity   - Consult OT/PT to assist with strengthening/mobility   - Keep Call bell within reach  - Keep bed  low and locked with side rails adjusted as appropriate  - Keep care items and personal belongings within reach  - Initiate and maintain comfort rounds  - Make Fall Risk Sign visible to staff  - Offer Toileting every 2 Hours, in advance of need  - Initiate/Maintain bed alarm  - Obtain necessary fall risk management equipment: yellow socks  - Apply yellow socks and bracelet for high fall risk patients  - Consider moving patient to room near nurses station  Outcome: Progressing  Goal: Maintain or return to baseline ADL function  Description: INTERVENTIONS:  -  Assess patient's ability to carry out ADLs; assess patient's baseline for ADL function and identify physical deficits which impact ability to perform ADLs (bathing, care of mouth/teeth, toileting, grooming, dressing, etc.)  - Assess/evaluate cause of self-care deficits   - Assess range of motion  - Assess patient's mobility; develop plan if impaired  - Assess patient's need for assistive devices and provide as appropriate  - Encourage maximum independence but intervene and supervise when necessary  - Involve family in performance of ADLs  - Assess for home care needs following discharge   - Consider OT consult to assist with ADL evaluation and planning for discharge  - Provide patient education as appropriate  Outcome: Progressing  Goal: Maintains/Returns to pre admission functional level  Description: INTERVENTIONS:  - Perform AM-PAC 6 Click Basic Mobility/ Daily Activity assessment daily.  - Set and communicate daily mobility goal to care team and patient/family/caregiver.   - Collaborate with rehabilitation services on mobility goals if consulted  - Perform Range of Motion 2 times a day.  - Reposition patient every 2 hours.  - Dangle patient 2 times a day  - Stand patient 2 times a day  - Ambulate patient 2 times a day  - Out of bed to chair 2 times a day   - Out of bed for meals 2 times a day  - Out of bed for toileting  - Record patient progress and  toleration of activity level   Outcome: Progressing     Problem: Prexisting or High Potential for Compromised Skin Integrity  Goal: Skin integrity is maintained or improved  Description: INTERVENTIONS:  - Identify patients at risk for skin breakdown  - Assess and monitor skin integrity  - Assess and monitor nutrition and hydration status  - Monitor labs   - Assess for incontinence   - Turn and reposition patient  - Assist with mobility/ambulation  - Relieve pressure over bony prominences  - Avoid friction and shearing  - Provide appropriate hygiene as needed including keeping skin clean and dry  - Evaluate need for skin moisturizer/barrier cream  - Collaborate with interdisciplinary team   - Patient/family teaching  - Consider wound care consult   Outcome: Progressing     Problem: GASTROINTESTINAL - ADULT  Goal: Maintains or returns to baseline bowel function  Description: INTERVENTIONS:  - Assess bowel function  - Encourage oral fluids to ensure adequate hydration  - Administer IV fluids if ordered to ensure adequate hydration  - Administer ordered medications as needed  - Encourage mobilization and activity  - Consider nutritional services referral to assist patient with adequate nutrition and appropriate food choices  Outcome: Progressing     Problem: METABOLIC, FLUID AND ELECTROLYTES - ADULT  Goal: Glucose maintained within target range  Description: INTERVENTIONS:  - Monitor Blood Glucose as ordered  - Assess for signs and symptoms of hyperglycemia and hypoglycemia  - Administer ordered medications to maintain glucose within target range  - Assess nutritional intake and initiate nutrition service referral as needed  Outcome: Progressing     Problem: HEMATOLOGIC - ADULT  Goal: Maintains hematologic stability  Description: INTERVENTIONS  - Assess for signs and symptoms of bleeding or hemorrhage  - Monitor labs  - Administer supportive blood products/factors as ordered and appropriate  Outcome: Progressing

## 2024-11-29 NOTE — PLAN OF CARE
Problem: OCCUPATIONAL THERAPY ADULT  Goal: Performs self-care activities at highest level of function for planned discharge setting.  See evaluation for individualized goals.  Description: Treatment Interventions: ADL retraining, Functional transfer training, Endurance training, Patient/family training          See flowsheet documentation for full assessment, interventions and recommendations.   Note: Limitation: Decreased ADL status, Decreased endurance  Prognosis: Good  Assessment: Patient is a 79 y.o. male seen for OT evaluation s/p admit to [facility] on 11/24/2024 w/ Hematemesis. Commorbidities affecting patient's functional performance at time of assessment include:  has a past medical history of Chronic bronchitis (HCC), COPD (chronic obstructive pulmonary disease) (HCC), Diabetes mellitus (HCC), Esophageal varices (HCC), Hyperlipidemia, Hypertension, Obesity, and ARACELY (obstructive sleep apnea).  Orders placed for OT evaluation and treatment.  Performed at least two patient identifiers during session including name and wristband.  Prior to admission, patient was Independent with ADLs and transportation. He completed light meal prep. Personal factors affecting patient at time of initial evaluation include: limited caregiver support, steps to enter, difficulty performing ADLs, and difficulty performing IADLs. Upon evaluation, patient requires supervision assist for UB ADLs, supervision assist for LB ADLs, transfers and functional ambulation in room and bathroom with minimum assistance x1 assist, with the use of SPC.  Patient is oriented x 4. Occupational performance is affected by the following deficits: decreased muscle strength, dynamic sit/ stand balance deficit with poor standing tolerance time for self care and functional mobility, and decreased activity tolerance.  Patient to benefit from continued Occupational Therapy treatment while in the hospital to address deficits as defined above and  maximize level of functional independence with ADLs and functional mobility. Occupational Performance areas to address include: grooming , bathing/ shower, dressing, toilet hygiene, transfer to all surfaces, functional mobility, and community mobility. From OT standpoint, recommendation at time of d/c would be   Level III (Minimum Resource Intensity).     Rehab Resource Intensity Level, OT: III (Minimum Resource Intensity)

## 2024-11-29 NOTE — ASSESSMENT & PLAN NOTE
Lab Results   Component Value Date    EGFR 34 11/29/2024    EGFR 33 11/28/2024    EGFR 28 11/27/2024    CREATININE 1.83 (H) 11/29/2024    CREATININE 1.86 (H) 11/28/2024    CREATININE 2.12 (H) 11/27/2024     Baseline creatinine around 1.6-2.0 range.  Presented with creatinine 3.4.  After blood transfusion and paracentesis creatinine improving.  Currently on p.o. Lasix 40 mg twice daily.  Currently improving 1.86.  Nephrology recommendation appreciated..

## 2024-11-29 NOTE — DISCHARGE SUMMARY
Discharge Summary - Hospitalist   Name: Bimal Lugo 79 y.o. male I MRN: [id number]  Unit/Bed#: -01 I Date of Admission: 11/24/2024   Date of Service: 11/29/2024 I Hospital Day: 5     Assessment & Plan  Hematemesis  79-year-old male patient with past medical history of alcoholic liver cirrhosis complicated by esophageal varices status post banding and ascites in the past, GAVE, hospitalized secondary to acute blood loss anemia due to hematemesis and melena.  Concern for recurrent variceal bleeding (history of banding)   Patient presented with hemoglobin of 7.5 which downtrended to 6.6,, platelet 31,000.  Patient had received 2 units of FFP, 1 unit of platelet, 4 units of PRBC transfusion.  Current hemoglobin 9.3, platelet 41,000.  EGD on this admission noted with 1 small nonbleeding varix in the esophagus, 1 band successfully placed, several portal hypertensive gastropathy in the body of the stomach, very mild GAVE.  Patient symptoms likely due to worsening portal hypertension therefore GI recommended increase carvedilol, Venofer and outpatient capsule endoscopy to evaluate AVM.      Now resolved.  Currently patient overall reports feeling well and denies any further episode of nausea, vomiting, hematemesis.  Tolerating diet well.  Completed 5 days of IV ceftriaxone for SBP prophylaxis.    Care discussed with GI and patient has been cleared to resume aspirin 81 mg daily per GI.  Appreciate GI recommendations-cleared for discharge from GI standpoint but patient still feels that he is not ready to be discharged  Acute blood loss anemia  - see plan under hemetemesis.  Melena  Baseline hemoglobin around 10.  Presented with hemoglobin of 6.6, platelet of 31,000.  Symptoms are now resolved.  Current hemoglobin stable 9.3.  Resume aspirin 81 mg daily and monitor hemoglobin  Plan as above.  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)  Patient has abstained from alcohol for several years  Has history of ascites  requiring weekly paracentesis (last para 11/21) and esophageal varices requiring banding   MELD 28 on presentation    Presents with hematemesis and maroon stool       Resume home lactulose when appropriate  Completed 5 days of IV ceftriaxone for SBP prophylaxis.  Appreciate GI recommendations.  Needs outpatient follow-up.  Paracentesis done on 11/27/2024 and 3400 mL of fluid removed  Patient does get weekly paracentesis every Thursday.  CAD (coronary artery disease)  On aspirin/statin/carvedilol  Resume aspirin 81 reviewed today and monitor hemoglobin.  Chronic obstructive pulmonary disease (HCC)  Continue home Symbicort  Resume Singulair  Respiratory protocol.  Diabetes mellitus, type 2 ([facility])  Lab Results   Component Value Date    HGBA1C 5.6 11/25/2024       Recent Labs     11/28/24  1554 11/28/24  2055 11/29/24  0615 11/29/24  1050   POCGLU 101 194* 148* 141*       Blood Sugar Average: Last 72 hrs:  (P) [id number]  Continue sliding scale insulin coverage.  Acute kidney injury superimposed on stage 3a chronic kidney disease ([facility])  Lab Results   Component Value Date    EGFR 34 11/29/2024    EGFR 33 11/28/2024    EGFR 28 11/27/2024    CREATININE 1.83 (H) 11/29/2024    CREATININE 1.86 (H) 11/28/2024    CREATININE 2.12 (H) 11/27/2024     Baseline creatinine around 1.6-2.0 range.  Presented with creatinine 3.4.  After blood transfusion and paracentesis creatinine improving.  Currently on p.o. Lasix 40 mg twice daily.  Currently improving 1.86.  Nephrology recommendation appreciated..  High anion gap metabolic acidosis  Improved on sodium bicarb tablets.  Plan to continue at discharge  Electrolyte abnormality  Potassium 3.4.  Supplement with caution due to elevated creatinine.  Magnesium 1.6 ordered IV magnesium 2 g x 2.  Continue supplement electrolytes as needed.  Hypertension  Blood pressure currently controlled.  Continue carvedilol and Lasix with holding parameters     Medical Problems       Resolved  Problems  Date Reviewed: 9/11/2024   None       Discharging Physician / Practitioner: Evert Galvan MD  PCP: Aly Carter MD  Admission Date:   Admission Orders (From admission, onward)       Ordered        11/24/24 1148  INPATIENT ADMISSION  Once                          Discharge Date: 11/29/24    Consultations During Hospital Stay:  IP CONSULT TO CASE MANAGEMENT  IP CONSULT TO NEPHROLOGY      Procedures Performed:   EGD 11/25/24; One small nonbleeding varix in the esophagus; placed 1 band successfully, resulting in complete eradication  Severe portal hypertensive gastropathy in the body of the stomach  Very mild GAVE.  I do not think this is contributing to his anemia.  The 1st part of the duodenum and 2nd part of the duodenum appeared normal.    Significant Findings / Test Results:   As above    Incidental Findings:   none       Test Results Pending at Discharge (will require follow up):   none     Outpatient Tests Requested:  none    Complications:  none    Reason for Admission: Hematemesis and melena    Hospital Course:   Bimal Lugo is a 79 y.o. male patient who originally presented to the hospital on 11/24/2024 due to hematemesis and melena.  Patient states that he had 6 ounce cans of tomato sauce with his dinner last evening and he had several episodes of vomiting that were bright red.  He thought it was the tomato sauce but then in the morning he had melena.  His hemoglobin drop was consistent with blood loss anemia.  He did receive 2 units of PRBCs and was referred to stepdown unit for management.  For GI bleed with history of esophageal varices and portal hypertensive gastropathy, GI was consulted.  The patient had received a total of 4 units of PRBC and 2 units of FFP and 1 unit of platelets since admission.  Patient underwent EGD which also shows the above-mentioned findings.  Patient was also placed on ceftriaxone for SBP prophylaxis which patient has finished.  Patient was on octreotide  "infusion and PPI.  Coreg dose was increased to 6.25 mg twice daily and Cozaar was discontinued in the setting of NICOLE.  Considering blood pressures are stable on increased dose of Coreg, losartan is discontinued at discharge.  Patient also had NICOLE for which nephrology was consulted.  Patient's baseline serum creatinine was around 1.6-2 and went back to his baseline.  Patient had a high anion Metabolic acidosis and was started on sodium bicarb tabs and per nephrology recommendations we will continue that at discharge.  Patient did have low sodium of 128 and was on Lasix which improved patient's sodium levels.  Patient also underwent paracentesis on 11/27 and 3.4 L of fluid was removed.  GI bleed has now stopped and hemoglobin has remained stable.  Patient is cleared for discharge home in a stable condition with outpatient follow-up with gastroenterology.  Dose of Aldactone has also been reduced to once a day from twice a day until patient is seen outpatient for renal function stabilization      Please see above list of diagnoses and related plan for additional information.     Condition at Discharge: stable    Discharge Day Visit / Exam:   Subjective:   I am feeling much better\", my daughter in law or my son will pick me up today\"  Vitals: Blood Pressure: 116/63 (11/29/24 0705)  Pulse: 55 (11/29/24 0705)  Temperature: 97.9 °F (36.6 °C) (11/29/24 0705)  Temp Source: Oral (11/25/24 1452)  Respirations: 16 (11/29/24 0705)  Height: 5' 5\" (165.1 cm) (11/25/24 1130)  Weight - Scale: 79.2 kg (174 lb 9.7 oz) (11/29/24 0540)  SpO2: 96 % (11/29/24 0705)  Physical Exam General- Awake, alert and oriented x 3, looks comfortable  HEENT- Normocephalic, atraumatic, oral mucosa- moist  Neck- Supple, No carotid bruit, no JVD  CVS- Normal S1/ S2, Regular rate and rhythm, No murmur, No edema  Respiratory system- B/L clear breath sounds, no wheezing  Abdomen- Soft, Non distended, no tenderness, Bowel sound- present 4 quads  Genitourinary- " No suprapubic tenderness, No CVA tenderness  Skin- No new bruise or rash  Musculoskeletal- No gross deformity  Psych- No acute psychosis  CNS- CN II- XII grossly intact, No acute focal neurologic deficit noted      Discussion with Family: Updated  (wife) at bedside.    Discharge instructions/Information to patient and family:   See after visit summary for information provided to patient and family.      Provisions for Follow-Up Care:  See after visit summary for information related to follow-up care and any pertinent home health orders.      Mobility at time of Discharge:   Basic Mobility Inpatient Raw Score: 19  JH-HLM Goal: 6: Walk 10 steps or more  JH-HLM Achieved: 7: Walk 25 feet or more  HLM Goal achieved. Continue to encourage appropriate mobility.     Disposition:   Home    Planned Readmission: no    Discharge Medications:  See after visit summary for reconciled discharge medications provided to patient and/or family.      Administrative Statements   Discharge Statement:  I have spent a total time of 76 minutes in caring for this patient on the day of the visit/encounter. >30 minutes of time was spent on: Diagnostic results, Prognosis, Risks and benefits of tx options, Instructions for management, Patient and family education, Importance of tx compliance, Risk factor reductions, Impressions, Counseling / Coordination of care, Documenting in the medical record, Reviewing / ordering tests, medicine, procedures  , and Communicating with other healthcare professionals .    **Please Note: This note may have been constructed using a voice recognition system**

## 2024-11-29 NOTE — DISCHARGE INSTR - AVS FIRST PAGE
If you are using tresiba at night , make sure you check your BG in morning and use sliding scale instead of using your pre meal dose of novolog  Take protonix every morning  See your PCP in one week  Take sodium bicarb tablets as prescribed  See your kidney doctor in 1-2 weeks

## 2024-11-29 NOTE — PLAN OF CARE
Problem: Potential for Falls  Goal: Patient will remain free of falls  Description: INTERVENTIONS:  - Educate patient/family on patient safety including physical limitations  - Instruct patient to call for assistance with activity   - Consult OT/PT to assist with strengthening/mobility   - Keep Call bell within reach  - Keep bed low and locked with side rails adjusted as appropriate  - Keep care items and personal belongings within reach  - Initiate and maintain comfort rounds  - Make Fall Risk Sign visible to staff  - Offer Toileting every  Hours, in advance of need  - Initiate/Maintain alarm  - Obtain necessary fall risk management equipment:   - Apply yellow socks and bracelet for high fall risk patients  - Consider moving patient to room near nurses station  Outcome: Progressing     Problem: PAIN - ADULT  Goal: Verbalizes/displays adequate comfort level or baseline comfort level  Description: Interventions:  - Encourage patient to monitor pain and request assistance  - Assess pain using appropriate pain scale  - Administer analgesics based on type and severity of pain and evaluate response  - Implement non-pharmacological measures as appropriate and evaluate response  - Consider cultural and social influences on pain and pain management  - Notify physician/advanced practitioner if interventions unsuccessful or patient reports new pain  Outcome: Progressing     Problem: INFECTION - ADULT  Goal: Absence or prevention of progression during hospitalization  Description: INTERVENTIONS:  - Assess and monitor for signs and symptoms of infection  - Monitor lab/diagnostic results  - Monitor all insertion sites, i.e. indwelling lines, tubes, and drains  - Monitor endotracheal if appropriate and nasal secretions for changes in amount and color  - [place] appropriate cooling/warming therapies per order  - Administer medications as ordered  - Instruct and encourage patient and family to use good hand hygiene technique  -  Identify and instruct in appropriate isolation precautions for identified infection/condition  Outcome: Progressing  Goal: Absence of fever/infection during neutropenic period  Description: INTERVENTIONS:  - Monitor WBC    Outcome: Progressing     Problem: SAFETY ADULT  Goal: Patient will remain free of falls  Description: INTERVENTIONS:  - Educate patient/family on patient safety including physical limitations  - Instruct patient to call for assistance with activity   - Consult OT/PT to assist with strengthening/mobility   - Keep Call bell within reach  - Keep bed low and locked with side rails adjusted as appropriate  - Keep care items and personal belongings within reach  - Initiate and maintain comfort rounds  - Make Fall Risk Sign visible to staff  - Offer Toileting every  Hours, in advance of need  - Initiate/Maintain alarm  - Obtain necessary fall risk management equipment:   - Apply yellow socks and bracelet for high fall risk patients  - Consider moving patient to room near nurses station  Outcome: Progressing  Goal: Maintain or return to baseline ADL function  Description: INTERVENTIONS:  -  Assess patient's ability to carry out ADLs; assess patient's baseline for ADL function and identify physical deficits which impact ability to perform ADLs (bathing, care of mouth/teeth, toileting, grooming, dressing, etc.)  - Assess/evaluate cause of self-care deficits   - Assess range of motion  - Assess patient's mobility; develop plan if impaired  - Assess patient's need for assistive devices and provide as appropriate  - Encourage maximum independence but intervene and supervise when necessary  - Involve family in performance of ADLs  - Assess for home care needs following discharge   - Consider OT consult to assist with ADL evaluation and planning for discharge  - Provide patient education as appropriate  Outcome: Progressing  Goal: Maintains/Returns to pre admission functional level  Description:  INTERVENTIONS:  - Perform AM-PAC 6 Click Basic Mobility/ Daily Activity assessment daily.  - Set and communicate daily mobility goal to care team and patient/family/caregiver.   - Collaborate with rehabilitation services on mobility goals if consulted  - Perform Range of Motion  times a day.  - Reposition patient every  hours.  - Dangle patient  times a day  - Stand patient  times a day  - Ambulate patient  times a day  - Out of bed to chair  times a day   - Out of bed for meals times a day  - Out of bed for toileting  - Record patient progress and toleration of activity level   Outcome: Progressing     Problem: DISCHARGE PLANNING  Goal: Discharge to home or other facility with appropriate resources  Description: INTERVENTIONS:  - Identify barriers to discharge w/patient and caregiver  - Arrange for needed discharge resources and transportation as appropriate  - Identify discharge learning needs (meds, wound care, etc.)  - Arrange for interpretive services to assist at discharge as needed  - Refer to Case Management Department for coordinating discharge planning if the patient needs post-hospital services based on physician/advanced practitioner order or complex needs related to functional status, cognitive ability, or social support system  Outcome: Progressing

## 2024-11-29 NOTE — ASSESSMENT & PLAN NOTE
Baseline hemoglobin around 10.  Presented with hemoglobin of 6.6, platelet of 31,000.  Symptoms are now resolved.  Current hemoglobin stable 9.3.  Resume aspirin 81 mg daily and monitor hemoglobin  Plan as above.

## 2024-12-05 ENCOUNTER — HOSPITAL ENCOUNTER (OUTPATIENT)
Dept: NON INVASIVE DIAGNOSTICS | Facility: HOSPITAL | Age: 79
Discharge: HOME | End: 2024-12-05
Payer: MEDICARE

## 2024-12-05 ENCOUNTER — HOSPITAL ENCOUNTER (INPATIENT)
Facility: HOSPITAL | Age: 79
LOS: 5 days | Discharge: HOME HEALTH | DRG: 442 | End: 2024-12-11
Attending: EMERGENCY MEDICINE | Admitting: INTERNAL MEDICINE
Payer: MEDICARE

## 2024-12-05 VITALS
SYSTOLIC BLOOD PRESSURE: 117 MMHG | DIASTOLIC BLOOD PRESSURE: 58 MMHG | RESPIRATION RATE: 18 BRPM | OXYGEN SATURATION: 97 % | HEART RATE: 60 BPM

## 2024-12-05 DIAGNOSIS — K70.31 ALCOHOLIC CIRRHOSIS OF LIVER WITH ASCITES (HCC): ICD-10-CM

## 2024-12-05 DIAGNOSIS — D50.0 IRON DEFICIENCY ANEMIA DUE TO CHRONIC BLOOD LOSS: ICD-10-CM

## 2024-12-05 DIAGNOSIS — K92.1 MELENA: ICD-10-CM

## 2024-12-05 DIAGNOSIS — K31.819 GAVE (GASTRIC ANTRAL VASCULAR ECTASIA): ICD-10-CM

## 2024-12-05 DIAGNOSIS — E11.69 TYPE 2 DIABETES MELLITUS WITH OTHER SPECIFIED COMPLICATION, UNSPECIFIED WHETHER LONG TERM INSULIN USE (HCC): ICD-10-CM

## 2024-12-05 DIAGNOSIS — N18.31 CHRONIC KIDNEY DISEASE, STAGE 3A (HCC): ICD-10-CM

## 2024-12-05 DIAGNOSIS — I85.00 VARICES OF ESOPHAGUS DETERMINED BY ENDOSCOPY (HCC): ICD-10-CM

## 2024-12-05 DIAGNOSIS — K62.5 RECTAL BLEEDING: Primary | ICD-10-CM

## 2024-12-05 PROBLEM — I85.10 SECONDARY ESOPHAGEAL VARICES WITHOUT BLEEDING (HCC): Status: ACTIVE | Noted: 2024-12-05

## 2024-12-05 PROBLEM — R19.5 DARK STOOLS: Status: ACTIVE | Noted: 2024-12-05

## 2024-12-05 LAB
ABO GROUP BLD: NORMAL
ALBUMIN SERPL BCG-MCNC: 3.2 G/DL (ref 3.5–5)
ALP SERPL-CCNC: 89 U/L (ref 34–104)
ALT SERPL W P-5'-P-CCNC: 28 U/L (ref 7–52)
ANION GAP SERPL CALCULATED.3IONS-SCNC: 6 MMOL/L (ref 4–13)
APTT PPP: 38 SECONDS (ref 23–34)
AST SERPL W P-5'-P-CCNC: 48 U/L (ref 13–39)
BASOPHILS # BLD AUTO: 0.03 THOUSANDS/ΜL (ref 0–0.1)
BASOPHILS NFR BLD AUTO: 1 % (ref 0–1)
BILIRUB SERPL-MCNC: 0.68 MG/DL (ref 0.2–1)
BLD GP AB SCN SERPL QL: NEGATIVE
BUN SERPL-MCNC: 27 MG/DL (ref 5–25)
CALCIUM ALBUM COR SERPL-MCNC: 9 MG/DL (ref 8.3–10.1)
CALCIUM SERPL-MCNC: 8.4 MG/DL (ref 8.4–10.2)
CHLORIDE SERPL-SCNC: 105 MMOL/L (ref 96–108)
CO2 SERPL-SCNC: 23 MMOL/L (ref 21–32)
CREAT SERPL-MCNC: 1.64 MG/DL (ref 0.6–1.3)
EOSINOPHIL # BLD AUTO: 0.18 THOUSAND/ΜL (ref 0–0.61)
EOSINOPHIL NFR BLD AUTO: 5 % (ref 0–6)
ERYTHROCYTE [DISTWIDTH] IN BLOOD BY AUTOMATED COUNT: 18.3 % (ref 11.6–15.1)
GFR SERPL CREATININE-BSD FRML MDRD: 38 ML/MIN/1.73SQ M
GLUCOSE SERPL-MCNC: 73 MG/DL (ref 65–140)
GLUCOSE SERPL-MCNC: 82 MG/DL (ref 65–140)
HCT VFR BLD AUTO: 28.5 % (ref 36.5–49.3)
HGB BLD-MCNC: 9.2 G/DL (ref 12–17)
IMM GRANULOCYTES # BLD AUTO: 0.01 THOUSAND/UL (ref 0–0.2)
IMM GRANULOCYTES NFR BLD AUTO: 0 % (ref 0–2)
INR PPP: 1.23 (ref 0.85–1.19)
LYMPHOCYTES # BLD AUTO: 0.58 THOUSANDS/ΜL (ref 0.6–4.47)
LYMPHOCYTES NFR BLD AUTO: 15 % (ref 14–44)
MCH RBC QN AUTO: 31.6 PG (ref 26.8–34.3)
MCHC RBC AUTO-ENTMCNC: 32.3 G/DL (ref 31.4–37.4)
MCV RBC AUTO: 98 FL (ref 82–98)
MONOCYTES # BLD AUTO: 0.4 THOUSAND/ΜL (ref 0.17–1.22)
MONOCYTES NFR BLD AUTO: 11 % (ref 4–12)
NEUTROPHILS # BLD AUTO: 2.62 THOUSANDS/ΜL (ref 1.85–7.62)
NEUTS SEG NFR BLD AUTO: 68 % (ref 43–75)
NRBC BLD AUTO-RTO: 0 /100 WBCS
PLATELET # BLD AUTO: 45 THOUSANDS/UL (ref 149–390)
PMV BLD AUTO: 11.2 FL (ref 8.9–12.7)
POTASSIUM SERPL-SCNC: 4.3 MMOL/L (ref 3.5–5.3)
PROT SERPL-MCNC: 5.6 G/DL (ref 6.4–8.4)
PROTHROMBIN TIME: 16.3 SECONDS (ref 12.3–15)
RBC # BLD AUTO: 2.91 MILLION/UL (ref 3.88–5.62)
RH BLD: POSITIVE
SODIUM SERPL-SCNC: 134 MMOL/L (ref 135–147)
SPECIMEN EXPIRATION DATE: NORMAL
WBC # BLD AUTO: 3.82 THOUSAND/UL (ref 4.31–10.16)

## 2024-12-05 PROCEDURE — 99285 EMERGENCY DEPT VISIT HI MDM: CPT | Performed by: EMERGENCY MEDICINE

## 2024-12-05 PROCEDURE — 85730 THROMBOPLASTIN TIME PARTIAL: CPT | Performed by: EMERGENCY MEDICINE

## 2024-12-05 PROCEDURE — 86850 RBC ANTIBODY SCREEN: CPT | Performed by: EMERGENCY MEDICINE

## 2024-12-05 PROCEDURE — 80053 COMPREHEN METABOLIC PANEL: CPT | Performed by: EMERGENCY MEDICINE

## 2024-12-05 PROCEDURE — 85610 PROTHROMBIN TIME: CPT | Performed by: EMERGENCY MEDICINE

## 2024-12-05 PROCEDURE — 82948 REAGENT STRIP/BLOOD GLUCOSE: CPT

## 2024-12-05 PROCEDURE — 86901 BLOOD TYPING SEROLOGIC RH(D): CPT | Performed by: EMERGENCY MEDICINE

## 2024-12-05 PROCEDURE — 49083 ABD PARACENTESIS W/IMAGING: CPT | Performed by: NURSE PRACTITIONER

## 2024-12-05 PROCEDURE — 99284 EMERGENCY DEPT VISIT MOD MDM: CPT

## 2024-12-05 PROCEDURE — 86900 BLOOD TYPING SEROLOGIC ABO: CPT | Performed by: EMERGENCY MEDICINE

## 2024-12-05 PROCEDURE — 49083 ABD PARACENTESIS W/IMAGING: CPT

## 2024-12-05 PROCEDURE — 96365 THER/PROPH/DIAG IV INF INIT: CPT

## 2024-12-05 PROCEDURE — 99223 1ST HOSP IP/OBS HIGH 75: CPT | Performed by: FAMILY MEDICINE

## 2024-12-05 PROCEDURE — 96375 TX/PRO/DX INJ NEW DRUG ADDON: CPT

## 2024-12-05 PROCEDURE — 36415 COLL VENOUS BLD VENIPUNCTURE: CPT | Performed by: EMERGENCY MEDICINE

## 2024-12-05 PROCEDURE — 85025 COMPLETE CBC W/AUTO DIFF WBC: CPT | Performed by: EMERGENCY MEDICINE

## 2024-12-05 PROCEDURE — 0W9G3ZZ DRAINAGE OF PERITONEAL CAVITY, PERCUTANEOUS APPROACH: ICD-10-PCS | Performed by: RADIOLOGY

## 2024-12-05 RX ORDER — ALBUTEROL SULFATE 90 UG/1
2 INHALANT RESPIRATORY (INHALATION) EVERY 4 HOURS PRN
Status: DISCONTINUED | OUTPATIENT
Start: 2024-12-05 | End: 2024-12-11 | Stop reason: HOSPADM

## 2024-12-05 RX ORDER — PANTOPRAZOLE SODIUM 40 MG/10ML
40 INJECTION, POWDER, LYOPHILIZED, FOR SOLUTION INTRAVENOUS EVERY 12 HOURS SCHEDULED
Status: DISCONTINUED | OUTPATIENT
Start: 2024-12-05 | End: 2024-12-11 | Stop reason: HOSPADM

## 2024-12-05 RX ORDER — PANTOPRAZOLE SODIUM 40 MG/10ML
40 INJECTION, POWDER, LYOPHILIZED, FOR SOLUTION INTRAVENOUS ONCE
Status: COMPLETED | OUTPATIENT
Start: 2024-12-05 | End: 2024-12-05

## 2024-12-05 RX ORDER — SPIRONOLACTONE 25 MG/1
50 TABLET ORAL DAILY
Status: DISCONTINUED | OUTPATIENT
Start: 2024-12-06 | End: 2024-12-11 | Stop reason: HOSPADM

## 2024-12-05 RX ORDER — CHLORAL HYDRATE 500 MG
1000 CAPSULE ORAL DAILY
Status: DISCONTINUED | OUTPATIENT
Start: 2024-12-06 | End: 2024-12-11 | Stop reason: HOSPADM

## 2024-12-05 RX ORDER — ATORVASTATIN CALCIUM 40 MG/1
40 TABLET, FILM COATED ORAL
Status: DISCONTINUED | OUTPATIENT
Start: 2024-12-06 | End: 2024-12-11 | Stop reason: HOSPADM

## 2024-12-05 RX ORDER — MONTELUKAST SODIUM 10 MG/1
10 TABLET ORAL DAILY
Status: DISCONTINUED | OUTPATIENT
Start: 2024-12-06 | End: 2024-12-11 | Stop reason: HOSPADM

## 2024-12-05 RX ORDER — ISOSORBIDE MONONITRATE 60 MG/1
60 TABLET, EXTENDED RELEASE ORAL DAILY
Status: DISCONTINUED | OUTPATIENT
Start: 2024-12-06 | End: 2024-12-11 | Stop reason: HOSPADM

## 2024-12-05 RX ORDER — ASPIRIN 81 MG/1
81 TABLET, CHEWABLE ORAL DAILY
Status: DISCONTINUED | OUTPATIENT
Start: 2024-12-06 | End: 2024-12-11 | Stop reason: HOSPADM

## 2024-12-05 RX ORDER — LIDOCAINE WITH 8.4% SOD BICARB 0.9%(10ML)
SYRINGE (ML) INJECTION AS NEEDED
Status: COMPLETED | OUTPATIENT
Start: 2024-12-05 | End: 2024-12-05

## 2024-12-05 RX ORDER — INSULIN GLARGINE 100 [IU]/ML
10 INJECTION, SOLUTION SUBCUTANEOUS
Status: DISCONTINUED | OUTPATIENT
Start: 2024-12-05 | End: 2024-12-11 | Stop reason: HOSPADM

## 2024-12-05 RX ORDER — BUDESONIDE AND FORMOTEROL FUMARATE DIHYDRATE 160; 4.5 UG/1; UG/1
2 AEROSOL RESPIRATORY (INHALATION) 2 TIMES DAILY
Status: DISCONTINUED | OUTPATIENT
Start: 2024-12-05 | End: 2024-12-11 | Stop reason: HOSPADM

## 2024-12-05 RX ORDER — FUROSEMIDE 40 MG/1
40 TABLET ORAL
Status: DISCONTINUED | OUTPATIENT
Start: 2024-12-06 | End: 2024-12-11 | Stop reason: HOSPADM

## 2024-12-05 RX ORDER — SODIUM BICARBONATE 650 MG/1
650 TABLET ORAL
Status: DISCONTINUED | OUTPATIENT
Start: 2024-12-05 | End: 2024-12-11 | Stop reason: HOSPADM

## 2024-12-05 RX ORDER — INSULIN LISPRO 100 [IU]/ML
1-6 INJECTION, SOLUTION INTRAVENOUS; SUBCUTANEOUS EVERY 6 HOURS SCHEDULED
Status: DISCONTINUED | OUTPATIENT
Start: 2024-12-06 | End: 2024-12-09

## 2024-12-05 RX ORDER — CARVEDILOL 6.25 MG/1
6.25 TABLET ORAL 2 TIMES DAILY WITH MEALS
Status: DISCONTINUED | OUTPATIENT
Start: 2024-12-06 | End: 2024-12-11 | Stop reason: HOSPADM

## 2024-12-05 RX ADMIN — Medication 10 ML: at 08:36

## 2024-12-05 RX ADMIN — CEFTRIAXONE SODIUM 1000 MG: 10 INJECTION, POWDER, FOR SOLUTION INTRAVENOUS at 20:34

## 2024-12-05 RX ADMIN — PANTOPRAZOLE SODIUM 40 MG: 40 INJECTION, POWDER, FOR SOLUTION INTRAVENOUS at 20:34

## 2024-12-05 RX ADMIN — PANTOPRAZOLE SODIUM 40 MG: 40 INJECTION, POWDER, FOR SOLUTION INTRAVENOUS at 22:49

## 2024-12-05 RX ADMIN — SODIUM BICARBONATE 650 MG: 650 TABLET ORAL at 22:27

## 2024-12-05 RX ADMIN — BUDESONIDE AND FORMOTEROL FUMARATE DIHYDRATE 2 PUFF: 160; 4.5 AEROSOL RESPIRATORY (INHALATION) at 22:49

## 2024-12-05 NOTE — BRIEF OP NOTE (RAD/CATH)
IR PARACENTESIS Procedure Note    PATIENT NAME: Bimal Lugo  : 1945  MRN: [id number]    Pre-op Diagnosis:   1. 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)        Provider:   SUNDAY Worley    Assistants:   None     Estimated Blood Loss:  None     Findings: 3300 ml clear yellow ascites aspirated from RIGHT sided ultrasound guided paracentesis.    Specimens: None     Complications:  None immediate     Anesthesia: local    SUNDAY Worley     Date: 2024  Time: 9:20 AM

## 2024-12-06 PROBLEM — I85.11 SECONDARY ESOPHAGEAL VARICES WITH BLEEDING (HCC): Status: ACTIVE | Noted: 2024-12-05

## 2024-12-06 LAB
ANION GAP SERPL CALCULATED.3IONS-SCNC: 5 MMOL/L (ref 4–13)
BUN SERPL-MCNC: 27 MG/DL (ref 5–25)
CALCIUM SERPL-MCNC: 8.3 MG/DL (ref 8.4–10.2)
CHLORIDE SERPL-SCNC: 108 MMOL/L (ref 96–108)
CO2 SERPL-SCNC: 23 MMOL/L (ref 21–32)
CREAT SERPL-MCNC: 1.72 MG/DL (ref 0.6–1.3)
ERYTHROCYTE [DISTWIDTH] IN BLOOD BY AUTOMATED COUNT: 18.3 % (ref 11.6–15.1)
GFR SERPL CREATININE-BSD FRML MDRD: 36 ML/MIN/1.73SQ M
GLUCOSE P FAST SERPL-MCNC: 95 MG/DL (ref 65–99)
GLUCOSE SERPL-MCNC: 110 MG/DL (ref 65–140)
GLUCOSE SERPL-MCNC: 116 MG/DL (ref 65–140)
GLUCOSE SERPL-MCNC: 117 MG/DL (ref 65–140)
GLUCOSE SERPL-MCNC: 93 MG/DL (ref 65–140)
GLUCOSE SERPL-MCNC: 95 MG/DL (ref 65–140)
HCT VFR BLD AUTO: 27.1 % (ref 36.5–49.3)
HCT VFR BLD AUTO: 27.2 % (ref 36.5–49.3)
HCT VFR BLD AUTO: 30.7 % (ref 36.5–49.3)
HGB BLD-MCNC: 8.6 G/DL (ref 12–17)
HGB BLD-MCNC: 9 G/DL (ref 12–17)
HGB BLD-MCNC: 9.8 G/DL (ref 12–17)
MCH RBC QN AUTO: 31.2 PG (ref 26.8–34.3)
MCHC RBC AUTO-ENTMCNC: 31.6 G/DL (ref 31.4–37.4)
MCV RBC AUTO: 99 FL (ref 82–98)
PLATELET # BLD AUTO: 35 THOUSANDS/UL (ref 149–390)
PMV BLD AUTO: 11.8 FL (ref 8.9–12.7)
POTASSIUM SERPL-SCNC: 4.5 MMOL/L (ref 3.5–5.3)
RBC # BLD AUTO: 2.76 MILLION/UL (ref 3.88–5.62)
SODIUM SERPL-SCNC: 136 MMOL/L (ref 135–147)
WBC # BLD AUTO: 2.38 THOUSAND/UL (ref 4.31–10.16)

## 2024-12-06 PROCEDURE — 85014 HEMATOCRIT: CPT | Performed by: PHYSICIAN ASSISTANT

## 2024-12-06 PROCEDURE — 82948 REAGENT STRIP/BLOOD GLUCOSE: CPT

## 2024-12-06 PROCEDURE — 85018 HEMOGLOBIN: CPT | Performed by: PHYSICIAN ASSISTANT

## 2024-12-06 PROCEDURE — 80048 BASIC METABOLIC PNL TOTAL CA: CPT | Performed by: FAMILY MEDICINE

## 2024-12-06 PROCEDURE — 85027 COMPLETE CBC AUTOMATED: CPT | Performed by: FAMILY MEDICINE

## 2024-12-06 PROCEDURE — 99232 SBSQ HOSP IP/OBS MODERATE 35: CPT | Performed by: PHYSICIAN ASSISTANT

## 2024-12-06 PROCEDURE — 99223 1ST HOSP IP/OBS HIGH 75: CPT | Performed by: INTERNAL MEDICINE

## 2024-12-06 RX ADMIN — FUROSEMIDE 40 MG: 40 TABLET ORAL at 17:23

## 2024-12-06 RX ADMIN — CARVEDILOL 6.25 MG: 6.25 TABLET, FILM COATED ORAL at 17:23

## 2024-12-06 RX ADMIN — SPIRONOLACTONE 50 MG: 25 TABLET ORAL at 08:31

## 2024-12-06 RX ADMIN — BUDESONIDE AND FORMOTEROL FUMARATE DIHYDRATE 2 PUFF: 160; 4.5 AEROSOL RESPIRATORY (INHALATION) at 08:32

## 2024-12-06 RX ADMIN — ATORVASTATIN CALCIUM 40 MG: 40 TABLET, FILM COATED ORAL at 05:49

## 2024-12-06 RX ADMIN — ISOSORBIDE MONONITRATE 60 MG: 60 TABLET, EXTENDED RELEASE ORAL at 08:32

## 2024-12-06 RX ADMIN — B-COMPLEX W/ C & FOLIC ACID TAB 1 TABLET: TAB at 08:31

## 2024-12-06 RX ADMIN — CARVEDILOL 6.25 MG: 6.25 TABLET, FILM COATED ORAL at 08:31

## 2024-12-06 RX ADMIN — SODIUM BICARBONATE 650 MG: 650 TABLET ORAL at 12:11

## 2024-12-06 RX ADMIN — SODIUM BICARBONATE 650 MG: 650 TABLET ORAL at 08:32

## 2024-12-06 RX ADMIN — SODIUM BICARBONATE 650 MG: 650 TABLET ORAL at 17:23

## 2024-12-06 RX ADMIN — PANTOPRAZOLE SODIUM 40 MG: 40 INJECTION, POWDER, FOR SOLUTION INTRAVENOUS at 08:32

## 2024-12-06 RX ADMIN — FUROSEMIDE 40 MG: 40 TABLET ORAL at 08:32

## 2024-12-06 RX ADMIN — PANTOPRAZOLE SODIUM 40 MG: 40 INJECTION, POWDER, FOR SOLUTION INTRAVENOUS at 22:18

## 2024-12-06 RX ADMIN — OMEGA-3 FATTY ACIDS CAP 1000 MG 1000 MG: 1000 CAP at 08:32

## 2024-12-06 RX ADMIN — ASPIRIN 81 MG: 81 TABLET, CHEWABLE ORAL at 08:32

## 2024-12-06 RX ADMIN — MONTELUKAST 10 MG: 10 TABLET, FILM COATED ORAL at 08:32

## 2024-12-06 NOTE — ED PROVIDER NOTES
Time reflects when diagnosis was documented in both MDM as applicable and the Disposition within this note       Time User Action Codes Description Comment    12/5/2024  8:39 PM Tanika Martínez [K62.5] Rectal bleeding     12/5/2024  8:39 PM Tanika Martínez [K31.819] GAVE (gastric antral vascular ectasia)     12/5/2024  8:39 PM Tanika Martínez [K70.31] 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)     12/5/2024  8:40 PM Tanika Martínez [I85.00] Varices of esophagus determined by endoscopy (HCC)           ED Disposition       ED Disposition   Admit    Condition   Stable    Date/Time   Thu Dec 5, 2024  8:40 PM    Comment   Case was discussed with SLIM and the patient's admission status was agreed to be Admission Status: observation status to the service of Dr. Lake.               Assessment & Plan       Medical Decision Making  79-year-old male presents for evaluation after 2 episodes of melanotic stools.  On exam, patient with normal vitals, no acute distress.  Patient's abdomen is soft, nontender.  Patient's hemoglobin stable at 9.2.  CMP unremarkable when compared to previous.  Coags only mildly elevated.  Patient given IV Protonix and Rocephin for empiric treatment given his history of varices and GAVE.  Patient was admitted to medicine service for further evaluation and treatment.  Patient admitted in stable condition.    Amount and/or Complexity of Data Reviewed  Labs: ordered. Decision-making details documented in ED Course.    Risk  Prescription drug management.  Decision regarding hospitalization.        ED Course as of 12/05/24 2040   Thu Dec 05, 2024   2003 Comprehensive metabolic panel(!)  Unremarkable when compared to previous.    2003 Hemoglobin(!): 9.2  Stable       Medications   ceftriaxone (ROCEPHIN) 1 g/50 mL in dextrose IVPB (1,000 mg Intravenous New Bag 12/5/24 2034)   pantoprazole (PROTONIX) injection 40 mg (40 mg Intravenous Given 12/5/24 2034)       ED Risk Strat Scores                    Identification of Seniors at Risk      Flowsheet Row Most Recent Value   (ISAR) Identification of Seniors at Risk    Before the illness or injury that brought you to the Emergency, did you need someone to help you on a regular basis? 0 Filed at: 12/05/2024 1844   In the last 24 hours, have you needed more help than usual? 0 Filed at: 12/05/2024 1844   Have you been hospitalized for one or more nights during the past 6 months? 1 Filed at: 12/05/2024 1844   In general, do you see well? 0 Filed at: 12/05/2024 1844   In general, do you have serious problems with your memory? 0 Filed at: 12/05/2024 1844   Do you take more than three different medications every day? 1 Filed at: 12/05/2024 1844   ISAR Score 2 Filed at: 12/05/2024 1844                SBIRT 22yo+      Flowsheet Row Most Recent Value   Initial Alcohol Screen: US AUDIT-C     1. How often do you have a drink containing alcohol? 0 Filed at: 12/05/2024 1845   2. How many drinks containing alcohol do you have on a typical day you are drinking?  0 Filed at: 12/05/2024 1845   3a. Male UNDER 65: How often do you have five or more drinks on one occasion? 0 Filed at: 12/05/2024 1845   Audit-C Score 0 Filed at: 12/05/2024 1845   GRAHAM: How many times in the past year have you...    Used an illegal drug or used a prescription medication for non-medical reasons? Never Filed at: 12/05/2024 1845                            History of Present Illness       Chief Complaint   Patient presents with    Rectal Bleeding     Pt. Presents to ER with c/o black stool today at 1430.         Past Medical History:   Diagnosis Date    Chronic bronchitis (HCC)     COPD (chronic obstructive pulmonary disease) (HCC)     Diabetes mellitus (HCC)     Esophageal varices (HCC) 11 apr 2024    Hyperlipidemia     Hypertension     Obesity     ARACELY (obstructive sleep apnea)       Past Surgical History:   Procedure Laterality Date    AORTIC VALVE REPLACEMENT      Jan 4 2024    IR PARACENTESIS   2024    IR PARACENTESIS  2024    IR PARACENTESIS  2024    IR PARACENTESIS  2024    IR PARACENTESIS  2024    IR PARACENTESIS  2024    IR PARACENTESIS  2024    IR PARACENTESIS  2024    IR PARACENTESIS  2024    IR PARACENTESIS  2024    IR PARACENTESIS  2024    IR PARACENTESIS  2024    IR PARACENTESIS  2024    IR PARACENTESIS  2024    IR PARACENTESIS  2024    IR PARACENTESIS  2024    IR PARACENTESIS  2024    IR PARACENTESIS  8/15/2024    IR PARACENTESIS  2024    IR PARACENTESIS  2024    IR PARACENTESIS  2024    IR PARACENTESIS  2024    IR PARACENTESIS  2024    IR PARACENTESIS  10/3/2024    IR PARACENTESIS  2024    IR PARACENTESIS  10/10/2024    IR PARACENTESIS  10/17/2024    IR PARACENTESIS  10/24/2024    IR PARACENTESIS  10/31/2024    IR PARACENTESIS  2024    IR PARACENTESIS  2024    IR PARACENTESIS  2024    IR PARACENTESIS  2024    IR PARACENTESIS  2024    UPPER GASTROINTESTINAL ENDOSCOPY  2024      History reviewed. No pertinent family history.   Social History     Tobacco Use    Smoking status: Former     Current packs/day: 0.00     Average packs/day: 5.0 packs/day for 35.0 years (175.0 ttl pk-yrs)     Types: Cigarettes     Start date:      Quit date:      Years since quittin.9    Smokeless tobacco: Never    Tobacco comments:     Quit in  (approx)   Vaping Use    Vaping status: Never Used   Substance Use Topics    Alcohol use: Not Currently     Alcohol/week: 24.0 standard drinks of alcohol     Types: 24 Cans of beer per week     Comment: sober- quit     Drug use: Not Currently      E-Cigarette/Vaping    E-Cigarette Use Never User       E-Cigarette/Vaping Substances    Nicotine No     THC No     CBD No     Flavoring No     Other No     Unknown No       I have reviewed and agree with the history as documented.     79-year-old male with  "a past medical history of alcoholic cirrhosis with ascites and GAVE syndrome presents for evaluation after 2 episodes of rectal bleeding.  Patient reports that earlier today, he had some abdominal discomfort followed by passage of a bloody bowel movement.  Patient told his wife that it appeared dark in color, but when I asked him he says he does not know because \" the lighting was bad\".  Patient then had another episode later in the day.  He denies any passage of clots.  Denies any associated nausea or vomiting.  Patient denies any chest pain, shortness of breath, fevers, or other concerning symptoms.  Patient was recently admitted to the hospital and had 1 varices banded at the time.  Patient was also started on new medications for treatment of his worsening portal hypertension.  Patient is scheduled to have a pill endoscopy as an outpatient.        Review of Systems   Constitutional:  Negative for fever.   Respiratory:  Negative for shortness of breath.    Cardiovascular:  Negative for chest pain.   Gastrointestinal:  Positive for abdominal pain and blood in stool. Negative for diarrhea and vomiting.   All other systems reviewed and are negative.          Objective       ED Triage Vitals [12/05/24 1843]   Temperature Pulse Blood Pressure Respirations SpO2 Patient Position - Orthostatic VS   (!) 97 °F (36.1 °C) 69 132/59 20 99 % Sitting      Temp Source Heart Rate Source BP Location FiO2 (%) Pain Score    Oral Monitor Left arm -- --      Vitals      Date and Time Temp Pulse SpO2 Resp BP Pain Score FACES Pain Rating User   12/05/24 2000 -- 60 98 % -- 133/62 -- -- TW   12/05/24 1930 -- 68 99 % -- 118/58 -- -- TW   12/05/24 1843 97 °F (36.1 °C) 69 99 % 20 132/59 -- -- AM            Physical Exam  Vitals and nursing note reviewed.   Constitutional:       General: He is awake. He is not in acute distress.     Appearance: He is not toxic-appearing.   HENT:      Head: Normocephalic and atraumatic.   Eyes:      General: " Vision grossly intact. Gaze aligned appropriately.   Cardiovascular:      Rate and Rhythm: Normal rate and regular rhythm.      Heart sounds: Normal heart sounds.   Pulmonary:      Effort: Pulmonary effort is normal. No respiratory distress.      Breath sounds: Normal breath sounds.   Abdominal:      Palpations: Abdomen is soft.      Tenderness: There is no abdominal tenderness.      Comments: Mildly distended abdomen.    Musculoskeletal:      Cervical back: Full passive range of motion without pain and neck supple.   Skin:     General: Skin is warm and dry.   Neurological:      General: No focal deficit present.      Mental Status: He is alert and oriented to person, place, and time.         Results Reviewed       Procedure Component Value Units Date/Time    Comprehensive metabolic panel [[id number]]  (Abnormal) Collected: 12/05/24 1916    Lab Status: Final result Specimen: Blood from Arm, Left Updated: 12/05/24 1947     Sodium 134 mmol/L      Potassium 4.3 mmol/L      Chloride 105 mmol/L      CO2 23 mmol/L      ANION GAP 6 mmol/L      BUN 27 mg/dL      Creatinine 1.64 mg/dL      Glucose 73 mg/dL      Calcium 8.4 mg/dL      Corrected Calcium 9.0 mg/dL      AST 48 U/L      ALT 28 U/L      Alkaline Phosphatase 89 U/L      Total Protein 5.6 g/dL      Albumin 3.2 g/dL      Total Bilirubin 0.68 mg/dL      eGFR 38 ml/min/1.73sq m     Narrative:      National Kid[employer] guidelines for Chronic Kidney Disease (CKD):     Stage 1 with normal or high GFR (GFR > 90 mL/min/1.73 square meters)    Stage 2 Mild CKD (GFR = 60-89 mL/min/1.73 square meters)    Stage 3A Moderate CKD (GFR = 45-59 mL/min/1.73 square meters)    Stage 3B Moderate CKD (GFR = 30-44 mL/min/1.73 square meters)    Stage 4 Severe CKD (GFR = 15-29 mL/min/1.73 square meters)    Stage 5 End Stage CKD (GFR <15 mL/min/1.73 square meters)  Note: GFR calculation is accurate only with a steady state creatinine    Protime-INR [[id number]]  (Abnormal)  Collected: 12/05/24 1916    Lab Status: Final result Specimen: Blood from Arm, Left Updated: 12/05/24 1940     Protime 16.3 seconds      INR 1.23    Narrative:      INR Therapeutic Range    Indication                                             INR Range      Atrial Fibrillation                                               2.0-3.0  Hypercoagulable State                                    2.0.2.3  Left Ventricular Asist Device                            2.0-3.0  Mechanical Heart Valve                                  -    Aortic(with afib, MI, embolism, HF, LA enlargement,    and/or coagulopathy)                                     2.0-3.0 (2.5-3.5)     Mitral                                                             2.5-3.5  Prosthetic/Bioprosthetic Heart Valve               2.0-3.0  Venous thromboembolism (VTE: VT, PE        2.0-3.0    APTT [[id number]]  (Abnormal) Collected: 12/05/24 1916    Lab Status: Final result Specimen: Blood from Arm, Left Updated: 12/05/24 1940     PTT 38 seconds     CBC and differential [[id number]]  (Abnormal) Collected: 12/05/24 1916    Lab Status: Final result Specimen: Blood from Arm, Left Updated: 12/05/24 1925     WBC 3.82 Thousand/uL      RBC 2.91 Million/uL      Hemoglobin 9.2 g/dL      Hematocrit 28.5 %      MCV 98 fL      MCH 31.6 pg      MCHC 32.3 g/dL      RDW 18.3 %      MPV 11.2 fL      Platelets 45 Thousands/uL      nRBC 0 /100 WBCs      Segmented % 68 %      Immature Grans % 0 %      Lymphocytes % 15 %      Monocytes % 11 %      Eosinophils Relative 5 %      Basophils Relative 1 %      Absolute Neutrophils 2.62 Thousands/µL      Absolute Immature Grans 0.01 Thousand/uL      Absolute Lymphocytes 0.58 Thousands/µL      Absolute Monocytes 0.40 Thousand/µL      Eosinophils Absolute 0.18 Thousand/µL      Basophils Absolute 0.03 Thousands/µL             No orders to display       Procedures    ED Medication and Procedure Management   Prior to Admission Medications    Prescriptions Last Dose Informant Patient Reported? Taking?   Lancets (ONETOUCH ULTRASOFT) lancets  Self Yes No   Sig: Check twice a day and as needed, dx E11.9,   Misc. Devices MISC  Self Yes No   Sig: by Does not apply route   Multiple Vitamin (DAILY VALUE MULTIVITAMIN) TABS  Self Yes No   Sig: Take by mouth   Omega-3 Fatty Acids (FISH OIL) 645 MG CAPS  Self Yes No   Sig: Take by mouth   SYMBICORT 160-4.5 MCG/ACT inhaler  Self Yes No   Sig: Inhale 2 puffs 2 (two) times a day   albuterol (PROVENTIL HFA,VENTOLIN HFA) 90 mcg/act inhaler  Self Yes No   Sig: Inhale 2 puffs every 4 (four) hours as needed   ammonium lactate (LAC-HYDRIN) 12 % lotion  Self Yes No   Sig: Apply topically 2 (two) times a day   aspirin 81 MG tablet  Self Yes No   Sig: Take 81 mg by mouth daily in the early morning   atorvastatin (LIPITOR) 40 mg tablet  Self Yes No   Sig: Take 40 mg by mouth daily in the early morning   carvedilol (COREG) 6.25 mg tablet   No No   Sig: Take 1 tablet (6.25 mg total) by mouth 2 (two) times a day with meals   furosemide (LASIX) 40 mg tablet   No No   Sig: Take 1 tablet (40 mg total) by mouth 2 (two) times a day   glucose 4-6 GM-MG  Self Yes No   Sig: Chew 16 g daily as needed   glucose blood test strip  Self Yes No   Sig: Check twice a day and as needed, dx E11.9,   hydrocortisone (PROCTOSOL HC) 2.5 % rectal cream  Self Yes No   Sig: Insert into the rectum   insulin aspart (NovoLOG FlexPen) 100 UNIT/ML injection pen   No No   Si in a.m , 10units in p.m.   insulin degludec (Tresiba FlexTouch) 100 units/mL injection pen  Self Yes No   Sig: Inject 10 Units under the skin daily at bedtime   isosorbide mononitrate (IMDUR) 30 mg 24 hr tablet  Self Yes No   lactulose (CHRONULAC) 10 g/15 mL solution  Self No No   Sig: 10mL PO BID   lovastatin (MEVACOR) 10 MG tablet  Self Yes No   montelukast (SINGULAIR) 10 mg tablet  Self Yes No   Sig: Take 10 mg by mouth daily   nystatin (MYCOSTATIN) powder  Self Yes No   Sig:  Apply topically   pantoprazole (PROTONIX) 40 mg tablet  Self No No   Sig: Take 1 tablet (40 mg total) by mouth daily   Patient taking differently: Take 40 mg by mouth daily in the early morning   sodium bicarbonate 650 mg tablet   No No   Sig: Take 1 tablet (650 mg total) by mouth 3 (three) times daily after meals   spironolactone (ALDACTONE) 50 mg tablet   No No   Sig: Take 1 tablet (50 mg total) by mouth daily      Facility-Administered Medications: None     Patient's Medications   Discharge Prescriptions    No medications on file     No discharge procedures on file.  ED SEPSIS DOCUMENTATION   Time reflects when diagnosis was documented in both MDM as applicable and the Disposition within this note       Time User Action Codes Description Comment    12/5/2024  8:39 PM Tanika Martínze [K62.5] Rectal bleeding     12/5/2024  8:39 PM Tanika Martínez [K31.819] GAVE (gastric antral vascular ectasia)     12/5/2024  8:39 PM Tanika Martínez [K70.31] 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)     12/5/2024  8:40 PM Tanika Martínez [I85.00] Varices of esophagus determined by endoscopy (HCC)                  Tanika Martínez DO  12/05/24 1726

## 2024-12-06 NOTE — ASSESSMENT & PLAN NOTE
Patient has a history of alcoholic cirrhosis although he stopped drinking.  No signs of overt liver failure at the moment.

## 2024-12-06 NOTE — PROGRESS NOTES
Progress Note - Hospitalist   Name: Bimal Lugo 79 y.o. male I MRN: [id number]  Unit/Bed#: -01 I Date of Admission: 12/5/2024   Date of Service: 12/6/2024 I Hospital Day: 0    Assessment & Plan  Iron deficiency anemia due to chronic blood loss  Chronic, in setting of recurrent upper GI bleeds secondary to esophageal varices; last hospitalized 11/24-11/29 resulting in EGD with 1 band placed   Current hgb 9.2 > 8.6  Recent hgb 7-8 last admission, previously baseline around 10  Serial labs given concern for recurrent bleed  Dark stools  Patient with dark stools in the setting of known recurrent GI bleed, presents with concern for the same  GI consulted, EGD planned 12/7 inpatient   Presently NPO  Secondary esophageal varices with bleeding (HCC)  Secondary to cirrhosis in setting of alcohol abuse  Cirrhosis (HCC)  Chronic, secondary to alcohol abuse. Sober  Continue lasix/aldactone   LFTs stable on presentation  MELD 15   CAD (coronary artery disease)  Chronic, no chest pain on admit  Continue statin, aspirin, imdur, and beta-blocker  Will need to weigh risks/benefits of continue antiplatelet therapy given recurrent GI bleeding   Chronic kidney disease, stage 3a (HCC)  Renal functions have overall progressed over the past year  Presently, stable  Monitor, avoid nephrotoxins as able, control BP as able  Chronic obstructive pulmonary disease (HCC)  Patient history of smoking breathing comfortably.    Continue combo inhaler  Diabetes mellitus, type 2 (HCC)  Blood Sugar Average: Last 72 hrs (P) 87.5  Well-controlled a/e/b A1c 5.6%  Continue Humalog SSI; hold standing 10-14u from home regimen  Continue Lantus 10u HS (consider 1/2 dose if remains NPO)   Blood glucose monitoring ACHS  Hypoglycemia protocol  Adjust as needed   Hypertension  Chronic, continue home meds  Monitor VS per unit protocol, Blood Pressure: 116/53     VTE Pharmacologic Prophylaxis: VTE Score: 3 Moderate Risk (Score 3-4) - Pharmacological DVT  Prophylaxis Contraindicated. Sequential Compression Devices Ordered.    Mobility:   Basic Mobility Inpatient Raw Score: 20  JH-HLM Goal: 6: Walk 10 steps or more  JH-HLM Achieved: 6: Walk 10 steps or more  JH-HLM Goal achieved. Continue to encourage appropriate mobility.    Patient Centered Rounds: I performed bedside rounds with nursing staff today.   Discussions with Specialists: CM, GI    Education and Discussions with Family / Patient: Patient declined call to .     Current Length of Stay: 0 day(s)  Current Patient Status: Observation   Certification Statement: The patient, admitted on an observation basis, will now require > 2 midnight hospital stay due to awaiting inpatient EGD due to recurrent GI bleed, unsafe to discharge prior to GI visualization   Discharge Plan: Anticipate discharge tomorrow to home.    Code Status: Level 1 - Full Code    Subjective   Patient seen this morning and doing okay.  Denies any bowel movement since presentation to the hospital.  No hematemesis.  Denies any abdominal pain or worsening distention.  Remains agreeable to GI evaluation and probable upper endoscopy.    Objective :  Temp:  [97 °F (36.1 °C)-98.7 °F (37.1 °C)] 97.3 °F (36.3 °C)  HR:  [59-69] 59  BP: (116-146)/(53-63) 116/53  Resp:  [18-20] 20  SpO2:  [97 %-99 %] 98 %  O2 Device: None (Room air)    Body mass index is 29.06 kg/m².     Input and Output Summary (last 24 hours):     Intake/Output Summary (Last 24 hours) at 12/6/2024 0825  Last data filed at 12/6/2024 0720  Gross per 24 hour   Intake 50 ml   Output 600 ml   Net -550 ml       Physical Exam  Vitals and nursing note reviewed.   Constitutional:       General: He is not in acute distress.     Appearance: Normal appearance. He is obese. He is not ill-appearing or toxic-appearing.   Cardiovascular:      Rate and Rhythm: Normal rate and regular rhythm.      Heart sounds: Normal heart sounds. No murmur heard.  Pulmonary:      Effort: Pulmonary effort is  normal. No respiratory distress.      Breath sounds: Normal breath sounds.   Abdominal:      General: Abdomen is protuberant. Bowel sounds are normal. There is no distension.      Palpations: Abdomen is soft. There is no fluid wave.      Tenderness: There is no abdominal tenderness.   Musculoskeletal:      Right lower leg: No edema.      Left lower leg: No edema.   Skin:     General: Skin is warm and dry.      Coloration: Skin is not pale.   Neurological:      Mental Status: He is alert and oriented to person, place, and time.   Psychiatric:         Mood and Affect: Mood normal.         Behavior: Behavior normal.             Lab Results: I have reviewed the following results:   Results from last 7 days   Lab Units 12/06/24  0604 12/05/24 1916   WBC Thousand/uL 2.38* 3.82*   HEMOGLOBIN g/dL 8.6* 9.2*   HEMATOCRIT % 27.2* 28.5*   PLATELETS Thousands/uL 35* 45*   SEGS PCT %  --  68   LYMPHO PCT %  --  15   MONO PCT %  --  11   EOS PCT %  --  5     Results from last 7 days   Lab Units 12/06/24  0604 12/05/24  1916   SODIUM mmol/L 136 134*   POTASSIUM mmol/L 4.5 4.3   CHLORIDE mmol/L 108 105   CO2 mmol/L 23 23   BUN mg/dL 27* 27*   CREATININE mg/dL 1.72* 1.64*   ANION GAP mmol/L 5 6   CALCIUM mg/dL 8.3* 8.4   ALBUMIN g/dL  --  3.2*   TOTAL BILIRUBIN mg/dL  --  0.68   ALK PHOS U/L  --  89   ALT U/L  --  28   AST U/L  --  48*   GLUCOSE RANDOM mg/dL 95 73     Results from last 7 days   Lab Units 12/05/24  1916   INR  1.23*     Results from last 7 days   Lab Units 12/06/24  0559 12/05/24  2233 11/29/24  1050   POC GLUCOSE mg/dl 93 82 141*               Recent Cultures (last 7 days):         Imaging Results Review: No pertinent imaging studies reviewed.  Other Study Results Review: No additional pertinent studies reviewed.    Last 24 Hours Medication List:     Current Facility-Administered Medications:     albuterol (PROVENTIL HFA,VENTOLIN HFA) inhaler 2 puff, Q4H PRN    aspirin chewable tablet 81 mg, Daily    atorvastatin  (LIPITOR) tablet 40 mg, Early Morning    budesonide-formoterol (SYMBICORT) 160-4.5 mcg/act inhaler 2 puff, BID    carvedilol (COREG) tablet 6.25 mg, BID With Meals    fish oil capsule 1,000 mg, Daily    furosemide (LASIX) tablet 40 mg, BID (diuretic)    insulin glargine (LANTUS) subcutaneous injection 10 Units 0.1 mL, HS    insulin lispro (HumALOG/ADMELOG) 100 units/mL subcutaneous injection 1-6 Units, Q6H LUBA **AND** Fingerstick Glucose (POCT), Q6H    isosorbide mononitrate (IMDUR) 24 hr tablet 60 mg, Daily    montelukast (SINGULAIR) tablet 10 mg, Daily    multivitamin stress formula tablet 1 tablet, Daily    pantoprazole (PROTONIX) injection 40 mg, Q12H LUBA    sodium bicarbonate tablet 650 mg, TID after meals    spironolactone (ALDACTONE) tablet 50 mg, Daily    Administrative Statements   Today, Patient Was Seen By: Pearl Ritter PA-C  I have spent a total time of 40 minutes in caring for this patient on the day of the visit/encounter including Counseling / Coordination of care, Documenting in the medical record, Obtaining or reviewing history  , and Communicating with other healthcare professionals .    **Please Note: This note may have been constructed using a voice recognition system.**

## 2024-12-06 NOTE — ASSESSMENT & PLAN NOTE
Patient with dark stools in the face of a history of varices and prior GI bleed.  The emergency room started him on IV Protonix.  Will continue that and have the gastroenterology team see him in the morning.  Within the past week he has had banding of the varices in the EGD and a colonoscopy.  Will monitor his hemoglobin overnight as well as his hemodynamics

## 2024-12-06 NOTE — CONSULTS
Consultation - Gastroenterology   Name: Bimal Lugo 79 y.o. male I MRN: [id number]  Unit/Bed#: -01 I Date of Admission: 12/5/2024   Date of Service: 12/6/2024 I Hospital Day: 0   Inpatient consult to gastroenterology  Consult performed by: Grazyna Mtz PA-C  Consult ordered by: Terrell Lake MD        Physician Requesting Evaluation: Caio Harper DO   Reason for Evaluation / Principal Problem: Recurrent GI bleeding with acute on chronic anemia    Assessment & Plan  Cirrhosis (HCC)    MELD 3.0: 15 at 12/6/2024  6:04 AM  MELD-Na: 15 at 12/6/2024  6:04 AM  Calculated from:  Serum Creatinine: 1.72 mg/dL at 12/6/2024  6:04 AM  Serum Sodium: 136 mmol/L at 12/6/2024  6:04 AM  Total Bilirubin: 0.68 mg/dL (Using min of 1 mg/dL) at 12/5/2024  7:16 PM  Serum Albumin: 3.2 g/dL at 12/5/2024  7:16 PM  INR(ratio): 1.23 at 12/5/2024  7:16 PM  Age at listing (hypothetical): 79 years  Sex: Male at 12/6/2024  6:04 AM    -Complicated by recurrent GI bleeds  -EGD 11/25/24 with 1 small varix status post banding, complete eradication achieved, severe portal hypertensive gastropathy, linear gastric antral vascular ectasia which was noted to be mild  -Patient presented with maroon stools x 2   -Hgb on admission 9.2 with slight drop to 8.6 today  -Plan to repeat EGD tomorrow  -Outpatient VCE had been planned - will try to perform while inpatient on Monday    Ascites:  Refractory.  Requiring paracentesis weekly.  Last was yesterday, 12/5 with 3300cc clear fluid removed.  Maintains on Aldactone and Lasix in the outpatient setting.  Low Na diet <2g daily    HE: none    HCC: Negative CT on 11/26. AFP 2.9 on 8/16/24    Acute blood loss anemia  -Likely secondary to portal gastropathy, GAVE but also rule out PUD, additional AVMs  -EGD 11/25 with 1 small varix in the esophagus status post banding with complete eradication, severe portal hypertensive gastropathy, linear gastric antral vascular ectasia which was noted to  be mild  -Plan for repeat EGD tomorrow  -Plan for VCE while inpatient on Monday, 12/9  -Hgb on admission 9.2 with drop to 8.6 today  -Continue to trend Hgb/Hct       Patient will be seen and evaluated by Dr. Morocho.  All rodriguez medical decisions were made by Dr. Morocho.    History of Present Illness   HPI:  Bimal Lugo is a 79 y.o. male with cirrhosis complicated by esophageal varices, portal hypertensive gastropathy, refractory ascites who presented back to [facility] yesterday with complaints of rectal bleeding.  The patient reports that he had 2 episodes of dark red stool prior to presenting.  He had abdominal cramping associated with this.  There was no nausea or vomiting.  Hemoglobin on admission was 9.2 with slight drop to 8.6 today.  The patient reports he has had no further bleeding since admission.  The patient has a recent admission a week and a half ago with GI bleeding.  He underwent an EGD on November 25 with noted portal gastropathy, GAVE and a small varix which was banded to eradication.  Plan had been for outpatient video capsule endoscopy which was scheduled for later this month.  Patient's last colonoscopy was in May of this year with hemorrhoids noted.  The patient does admit that he bleeds occasionally from these hemorrhoids but that bleeding is very different.  He requires weekly paracentesis for his ascites.  He maintains on diuretics-Lasix and Aldactone.  He tries to be cautious with his sodium intake.  He does have a history of hepatic encephalopathy which is well-controlled on lactulose.  He is up-to-date on imaging.    Review of Systems   Constitutional:  Positive for fatigue. Negative for activity change, appetite change, fever and unexpected weight change.   Respiratory:  Negative for cough, shortness of breath and wheezing.    Gastrointestinal:  Positive for abdominal distention, abdominal pain and blood in stool. Negative for constipation, diarrhea, nausea and vomiting.   Endocrine:  Negative for cold intolerance and heat intolerance.   Genitourinary:  Negative for dysuria, flank pain and hematuria.   Neurological:  Negative for dizziness, numbness and headaches.     I have reviewed the patient's PMH, PSH, Social History, Family History, Meds, and Allergies  Historical Information   Past Medical History:   Diagnosis Date    Chronic bronchitis (HCC)     COPD (chronic obstructive pulmonary disease) (HCC)     Diabetes mellitus (HCC)     Esophageal varices (HCC) 11 apr 2024    Hyperlipidemia     Hypertension     Obesity     ARACELY (obstructive sleep apnea)      Past Surgical History:   Procedure Laterality Date    AORTIC VALVE REPLACEMENT      Jan 4 2024    IR PARACENTESIS  04/04/2024    IR PARACENTESIS  03/07/2024    IR PARACENTESIS  04/11/2024    IR PARACENTESIS  04/26/2024    IR PARACENTESIS  05/09/2024    IR PARACENTESIS  05/16/2024    IR PARACENTESIS  05/23/2024    IR PARACENTESIS  06/06/2024    IR PARACENTESIS  06/13/2024    IR PARACENTESIS  06/20/2024    IR PARACENTESIS  06/27/2024    IR PARACENTESIS  07/03/2024    IR PARACENTESIS  07/11/2024    IR PARACENTESIS  07/18/2024    IR PARACENTESIS  07/24/2024    IR PARACENTESIS  08/01/2024    IR PARACENTESIS  8/8/2024    IR PARACENTESIS  8/15/2024    IR PARACENTESIS  8/22/2024    IR PARACENTESIS  8/29/2024    IR PARACENTESIS  9/5/2024    IR PARACENTESIS  9/12/2024    IR PARACENTESIS  9/19/2024    IR PARACENTESIS  10/3/2024    IR PARACENTESIS  9/26/2024    IR PARACENTESIS  10/10/2024    IR PARACENTESIS  10/17/2024    IR PARACENTESIS  10/24/2024    IR PARACENTESIS  10/31/2024    IR PARACENTESIS  11/7/2024    IR PARACENTESIS  11/14/2024    IR PARACENTESIS  11/21/2024    IR PARACENTESIS  11/27/2024    IR PARACENTESIS  12/5/2024    UPPER GASTROINTESTINAL ENDOSCOPY  11 apr 2024     Social History     Tobacco Use    Smoking status: Former     Current packs/day: 0.00     Average packs/day: 5.0 packs/day for 35.0 years (175.0 ttl pk-yrs)     Types: Cigarettes      Start date:      Quit date:      Years since quittin.9    Smokeless tobacco: Never    Tobacco comments:     Quit in  (approx)   Vaping Use    Vaping status: Never Used   Substance and Sexual Activity    Alcohol use: Not Currently     Alcohol/week: 24.0 standard drinks of alcohol     Types: 24 Cans of beer per week     Comment: sober- quit     Drug use: Not Currently    Sexual activity: Not Currently     Partners: Female     Birth control/protection: Abstinence, Condom Male     E-Cigarette/Vaping    E-Cigarette Use Never User      E-Cigarette/Vaping Substances    Nicotine No     THC No     CBD No     Flavoring No     Other No     Unknown No        Social History     Tobacco Use    Smoking status: Former     Current packs/day: 0.00     Average packs/day: 5.0 packs/day for 35.0 years (175.0 ttl pk-yrs)     Types: Cigarettes     Start date:      Quit date:      Years since quittin.9    Smokeless tobacco: Never    Tobacco comments:     Quit in  (approx)   Vaping Use    Vaping status: Never Used   Substance and Sexual Activity    Alcohol use: Not Currently     Alcohol/week: 24.0 standard drinks of alcohol     Types: 24 Cans of beer per week     Comment: sober- quit     Drug use: Not Currently    Sexual activity: Not Currently     Partners: Female     Birth control/protection: Abstinence, Condom Male     Prior to Admission Medications   Prescriptions Last Dose Informant Patient Reported? Taking?   Lancets (ONETOUCH ULTRASOFT) lancets  Self Yes No   Sig: Check twice a day and as needed, dx E11.9,   Misc. Devices MISC  Self Yes No   Sig: by Does not apply route   Multiple Vitamin (DAILY VALUE MULTIVITAMIN) TABS  Self Yes No   Sig: Take by mouth   Omega-3 Fatty Acids (FISH OIL) 645 MG CAPS  Self Yes No   Sig: Take by mouth   SYMBICORT 160-4.5 MCG/ACT inhaler  Self Yes No   Sig: Inhale 2 puffs 2 (two) times a day   albuterol (PROVENTIL HFA,VENTOLIN HFA) 90 mcg/act inhaler  Self Yes  No   Sig: Inhale 2 puffs every 4 (four) hours as needed   ammonium lactate (LAC-HYDRIN) 12 % lotion  Self Yes No   Sig: Apply topically 2 (two) times a day   aspirin 81 MG tablet  Self Yes No   Sig: Take 81 mg by mouth daily in the early morning   atorvastatin (LIPITOR) 40 mg tablet  Self Yes No   Sig: Take 40 mg by mouth daily in the early morning   carvedilol (COREG) 6.25 mg tablet   No No   Sig: Take 1 tablet (6.25 mg total) by mouth 2 (two) times a day with meals   furosemide (LASIX) 40 mg tablet   No No   Sig: Take 1 tablet (40 mg total) by mouth 2 (two) times a day   glucose 4-6 GM-MG  Self Yes No   Sig: Chew 16 g daily as needed   glucose blood test strip  Self Yes No   Sig: Check twice a day and as needed, dx E11.9,   hydrocortisone (PROCTOSOL HC) 2.5 % rectal cream  Self Yes No   Sig: Insert into the rectum   insulin aspart (NovoLOG FlexPen) 100 UNIT/ML injection pen   No No   Si in a.m , 10units in p.m.   insulin degludec (Tresiba FlexTouch) 100 units/mL injection pen  Self Yes No   Sig: Inject 10 Units under the skin daily at bedtime   isosorbide mononitrate (IMDUR) 30 mg 24 hr tablet  Self Yes No   lactulose (CHRONULAC) 10 g/15 mL solution  Self No No   Sig: 10mL PO BID   lovastatin (MEVACOR) 10 MG tablet  Self Yes No   montelukast (SINGULAIR) 10 mg tablet  Self Yes No   Sig: Take 10 mg by mouth daily   nystatin (MYCOSTATIN) powder  Self Yes No   Sig: Apply topically   pantoprazole (PROTONIX) 40 mg tablet  Self No No   Sig: Take 1 tablet (40 mg total) by mouth daily   Patient taking differently: Take 40 mg by mouth daily in the early morning   sodium bicarbonate 650 mg tablet   No No   Sig: Take 1 tablet (650 mg total) by mouth 3 (three) times daily after meals   spironolactone (ALDACTONE) 50 mg tablet   No No   Sig: Take 1 tablet (50 mg total) by mouth daily      Facility-Administered Medications: None     Gemfibrozil, Carbamazepine, Carbamazepine and analogs, and Oxycodone    Objective :  Temp:   [97 °F (36.1 °C)-98.7 °F (37.1 °C)] 97.3 °F (36.3 °C)  HR:  [59-69] 59  BP: (116-133)/(53-62) 116/53  Resp:  [20] 20  SpO2:  [97 %-99 %] 98 %  O2 Device: None (Room air)    Physical Exam  Vitals reviewed.   Constitutional:       Appearance: Normal appearance.   HENT:      Head: Normocephalic and atraumatic.   Eyes:      Extraocular Movements: Extraocular movements intact.      Pupils: Pupils are equal, round, and reactive to light.   Cardiovascular:      Rate and Rhythm: Normal rate and regular rhythm.   Abdominal:      General: Bowel sounds are normal. There is no distension.      Palpations: Abdomen is soft. There is no mass.      Tenderness: There is abdominal tenderness.   Skin:     General: Skin is warm and dry.      Coloration: Skin is not jaundiced.   Neurological:      General: No focal deficit present.      Mental Status: He is alert and oriented to person, place, and time.           Lab Results: I have reviewed the following results:CBC/BMP:   .     12/06/24  0604   WBC 2.38*   HGB 8.6*   HCT 27.2*   PLT 35*   SODIUM 136   K 4.5      CO2 23   BUN 27*   CREATININE 1.72*   GLUC 95    , LFTs:   .     12/05/24 1916   AST 48*   ALT 28   ALB 3.2*   TBILI 0.68   ALKPHOS 89    , PTT/INR:  .     12/05/24 1916   PTT 38*   INR 1.23*        3300

## 2024-12-06 NOTE — PLAN OF CARE
Problem: SAFETY ADULT  Goal: Patient will remain free of falls  Description: INTERVENTIONS:  - Educate patient/family on patient safety including physical limitations  - Instruct patient to call for assistance with activity   - Consult OT/PT to assist with strengthening/mobility   - Keep Call bell within reach  - Keep bed low and locked with side rails adjusted as appropriate  - Keep care items and personal belongings within reach  - Initiate and maintain comfort rounds  - Make Fall Risk Sign visible to staff  Problem: GASTROINTESTINAL - ADULT  Goal: Minimal or absence of nausea and/or vomiting  Description: INTERVENTIONS:  - Administer IV fluids if ordered to ensure adequate hydration  - Maintain NPO status until nausea and vomiting are resolved  - Nasogastric tube if ordered  - Administer ordered antiemetic medications as needed  - Provide nonpharmacologic comfort measures as appropriate  - Advance diet as tolerated, if ordered  - Consider nutrition services referral to assist patient with adequate nutrition and appropriate food choices  Outcome: Progressing  Goal: Maintains or returns to baseline bowel function  Description: INTERVENTIONS:  - Assess bowel function  - Encourage oral fluids to ensure adequate hydration  - Administer IV fluids if ordered to ensure adequate hydration  - Administer ordered medications as needed  - Encourage mobilization and activity  - Consider nutritional services referral to assist patient with adequate nutrition and appropriate food choices  Outcome: Progressing  Goal: Maintains adequate nutritional intake  Description: INTERVENTIONS:  - Monitor percentage of each meal consumed  - Identify factors contributing to decreased intake, treat as appropriate  - Assist with meals as needed  - Monitor I&O, weight, and lab values if indicated  - Obtain nutrition services referral as needed  Outcome: Progressing     - Apply yellow socks and bracelet for high fall risk patients  - Consider  moving patient to room near nurses station  Outcome: Progressing

## 2024-12-06 NOTE — ASSESSMENT & PLAN NOTE
MELD 3.0: 15 at 12/6/2024  6:04 AM  MELD-Na: 15 at 12/6/2024  6:04 AM  Calculated from:  Serum Creatinine: 1.72 mg/dL at 12/6/2024  6:04 AM  Serum Sodium: 136 mmol/L at 12/6/2024  6:04 AM  Total Bilirubin: 0.68 mg/dL (Using min of 1 mg/dL) at 12/5/2024  7:16 PM  Serum Albumin: 3.2 g/dL at 12/5/2024  7:16 PM  INR(ratio): 1.23 at 12/5/2024  7:16 PM  Age at listing (hypothetical): 79 years  Sex: Male at 12/6/2024  6:04 AM    -Complicated by recurrent GI bleeds  -EGD 11/25/24 with 1 small varix status post banding, complete eradication achieved, severe portal hypertensive gastropathy, linear gastric antral vascular ectasia which was noted to be mild  -Patient presented with maroon stools x 2   -Hgb on admission 9.2 with slight drop to 8.6 today  -Plan to repeat EGD tomorrow  -Outpatient VCE had been planned - will try to perform while inpatient on Monday    Ascites:  Refractory.  Requiring paracentesis weekly.  Last was yesterday, 12/5 with 3300cc clear fluid removed.  Maintains on Aldactone and Lasix in the outpatient setting.  Low Na diet <2g daily    HE: none    HCC: Negative CT on 11/26. AFP 2.9 on 8/16/24

## 2024-12-06 NOTE — ASSESSMENT & PLAN NOTE
-Likely secondary to portal gastropathy, GAVE but also rule out PUD, additional AVMs  -EGD 11/25 with 1 small varix in the esophagus status post banding with complete eradication, severe portal hypertensive gastropathy, linear gastric antral vascular ectasia which was noted to be mild  -Plan for repeat EGD tomorrow  -Plan for VCE while inpatient on Monday, 12/9  -Hgb on admission 9.2 with drop to 8.6 today  -Continue to trend Hgb/Hct

## 2024-12-06 NOTE — ASSESSMENT & PLAN NOTE
Patient's hemoglobin is 9.2 has had 8 episodes of GI bleeding in the past.  His count is stable though hemodynamics look good.  I will check a hemoglobin about 4 hours and tomorrow morning IV Protonix started in the ER

## 2024-12-06 NOTE — ASSESSMENT & PLAN NOTE
Chronic, no chest pain on admit  Continue statin, aspirin, imdur, and beta-blocker  Will need to weigh risks/benefits of continue antiplatelet therapy given recurrent GI bleeding

## 2024-12-06 NOTE — ASSESSMENT & PLAN NOTE
Patient with dark stools in the setting of known recurrent GI bleed, presents with concern for the same  GI consulted, EGD planned 12/7 inpatient   Presently NPO

## 2024-12-06 NOTE — ASSESSMENT & PLAN NOTE
Lab Results   Component Value Date    EGFR 38 12/05/2024    EGFR 34 11/29/2024    EGFR 33 11/28/2024    CREATININE 1.64 (H) 12/05/2024    CREATININE 1.83 (H) 11/29/2024    CREATININE 1.86 (H) 11/28/2024   Patient with strict stable chronic kidney disease.  GFR actually is better than it has been recently to avoid nephrotoxic agents

## 2024-12-06 NOTE — ASSESSMENT & PLAN NOTE
Patient history of smoking breathing comfortably.  Continue long-acting beta agonist inhaled corticosteroid combo

## 2024-12-06 NOTE — ASSESSMENT & PLAN NOTE
Chronic, in setting of recurrent upper GI bleeds secondary to esophageal varices; last hospitalized 11/24-11/29 resulting in EGD with 1 band placed   Current hgb 9.2 > 8.6  Recent hgb 7-8 last admission, previously baseline around 10  Serial labs given concern for recurrent bleed

## 2024-12-06 NOTE — ASSESSMENT & PLAN NOTE
Blood Sugar Average: Last 72 hrs (P) 87.5  Well-controlled a/e/b A1c 5.6%  Continue Humalog SSI; hold standing 10-14u from home regimen  Continue Lantus 10u HS (consider 1/2 dose if remains NPO)   Blood glucose monitoring ACHS  Hypoglycemia protocol  Adjust as needed

## 2024-12-06 NOTE — CASE MANAGEMENT
Case Management Assessment & Discharge Planning Note    Patient name Bimal Lugo  Location /-01 MRN [id number]  : 1945 Date 2024       Current Admission Date: 2024  Current Admission Diagnosis:Dark stools   Patient Active Problem List    Diagnosis Date Noted Date Diagnosed    Secondary esophageal varices with bleeding (HCC) 2024     Dark stools 2024     Iron deficiency anemia due to chronic blood loss 2024     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) 2024     Melena 2024     Hematemesis 2024     Acute blood loss anemia 2024     Acute kidney injury superimposed on stage 3a chronic kidney disease (HCC) 2024     High anion gap metabolic acidosis 2024     Electrolyte abnormality 2024     Hypertension 2024     Diabetes mellitus, type 2 ([facility]) 2024     Sleep apnea 2024     Thrombocytopenia ([facility]) 2024     Chronic obstructive pulmonary disease ([facility]) 04/10/2024     History of aortic valve replacement 04/10/2024     Back pain 04/10/2024     CAD (coronary artery disease) 2024     Cirrhosis ([facility]) 2024     Symptomatic anemia 2024     Chronic kidney disease, stage 3a ([facility]) 10/11/2021     PVD (peripheral vascular disease) ([facility]) 2018     Atherosclerosis of lower extremity with intermittent claudication ([facility]) 2018     Chronic bronchitis with COPD (chronic obstructive pulmonary disease) ([facility]) 2017       LOS (days): 0  Geometric Mean LOS (GMLOS) (days):   Days to GMLOS:     OBJECTIVE:  PATIENT READMITTED TO HOSPITAL            Current admission status: Inpatient       Preferred Pharmacy:   [facility] [place] - [address]  [address]  [place] [address]  Phone: [phone] Fax: [phone]    Primary Care Provider: Aly Carter MD    Primary Insurance: MEDICARE  Secondary Insurance: BLUE CROSS    ASSESSMENT:  Active  Health Care Proxies       Viki Lugo Health Care Representative - Spouse   Primary Phone: [phone] (Mobile)                 Advance Directives  Does patient have a Health Care POA?: No  Was patient offered paperwork?: Yes (not interested)  Does patient currently have a Health Care decision maker?: Yes, please see Health Care Proxy section  Does patient have Advance Directives?: No  Was patient offered paperwork?: Yes         Readmission Root Cause  30 Day Readmission: No    Patient Information  Admitted from:: Home  Mental Status: Alert  During Assessment patient was accompanied by: Not accompanied during assessment  Assessment information provided by:: Patient  Primary Caregiver: Self  Support Systems: Spouse/significant other  County of Residence: [place]  What city do you live in?: [place]  Home entry access options. Select all that apply.: Stairs  Number of steps to enter home.: 4  Do the steps have railings?: Yes  Type of Current Residence: [facility]  Living Arrangements: Lives w/ Spouse/significant other    Activities of Daily Living Prior to Admission  Functional Status: Independent  Completes ADLs independently?: Yes  Ambulates independently?: Yes  Does patient use assisted devices?: Yes  Assisted Devices (DME) used: Straight Cane  Does patient currently own DME?: Yes  What DME does the patient currently own?: Straight Cane  Does patient have a history of Outpatient Therapy (PT/OT)?: Yes  Does the patient have a history of Short-Term Rehab?: No  Does patient have a history of HHC?: No  Does patient currently have HHC?: No         Patient Information Continued  Income Source: Unemployed  Does patient have prescription coverage?: Yes  Does patient receive dialysis treatments?: No  Does patient have a history of substance abuse?: No  Does patient have a history of Mental Health Diagnosis?: No         Means of Transportation  [facility]          DISCHARGE  DETAILS:    Discharge planning discussed with:: patient  Freedom of Choice: Yes  Comments - Freedom of Choice: No anticipated DC needs at this time. refuses STR and Homecare  CM contacted family/caregiver?: No- see comments                  Requested Home Health Care         Is the patient interested in HHC at discharge?: No    DME Referral Provided  Referral made for DME?: No    Other Referral/Resources/Interventions Provided:  Referral Comments: No anticipated DC needs at this time. refuses STR and Homecare         Treatment Team Recommendation: Home  Discharge Destination Plan:: Home  Transport at Discharge : Family

## 2024-12-06 NOTE — ASSESSMENT & PLAN NOTE
Patient has a history of varices and a recent banding.  Has dark stools tonight.  Will check another hemoglobin this evening as noted above and in the morning.  Continue IV PPI follow his hemodynamics and GI will see him tomorrow

## 2024-12-06 NOTE — ASSESSMENT & PLAN NOTE
Renal functions have overall progressed over the past year  Presently, stable  Monitor, avoid nephrotoxins as able, control BP as able

## 2024-12-06 NOTE — ASSESSMENT & PLAN NOTE
Lab Results   Component Value Date    HGBA1C 5.6 11/25/2024   His A1c is excellent about 2 weeks ago.  Will continue his basal insulin hold bolus insulin as he will be n.p.o. tonight gastroenterology wants to do something with him

## 2024-12-06 NOTE — ASSESSMENT & PLAN NOTE
Chronic, secondary to alcohol abuse. Sober  Continue lasix/aldactone   LFTs stable on presentation  MELD 15

## 2024-12-06 NOTE — H&P
H&P - Hospitalist   Name: Bimal Lugo 79 y.o. male I MRN: [id number]  Unit/Bed#: ED 16 I Date of Admission: 12/5/2024   Date of Service: 12/5/2024 I Hospital Day: 0     Assessment & Plan  CAD (coronary artery disease)  Patient without chest pain.  Continue statin, antiplatelet therapy and beta-blocker  Chronic kidney disease, stage 3a (HCC)  Lab Results   Component Value Date    EGFR 38 12/05/2024    EGFR 34 11/29/2024    EGFR 33 11/28/2024    CREATININE 1.64 (H) 12/05/2024    CREATININE 1.83 (H) 11/29/2024    CREATININE 1.86 (H) 11/28/2024   Patient with strict stable chronic kidney disease.  GFR actually is better than it has been recently to avoid nephrotoxic agents  Cirrhosis (HCC)  Patient has a history of alcoholic cirrhosis although he stopped drinking.  No signs of overt liver failure at the moment.  Chronic obstructive pulmonary disease (HCC)  Patient history of smoking breathing comfortably.  Continue long-acting beta agonist inhaled corticosteroid combo  Diabetes mellitus, type 2 (HCC)    Lab Results   Component Value Date    HGBA1C 5.6 11/25/2024   His A1c is excellent about 2 weeks ago.  Will continue his basal insulin hold bolus insulin as he will be n.p.o. tonight gastroenterology wants to do something with him  Hypertension  Blood pressure well-controlled on beta-blocker  Secondary esophageal varices without bleeding (HCC)  Patient has a history of varices and a recent banding.  Has dark stools tonight.  Will check another hemoglobin this evening as noted above and in the morning.  Continue IV PPI follow his hemodynamics and GI will see him tomorrow  Dark stools  Patient with dark stools in the face of a history of varices and prior GI bleed.  The emergency room started him on IV Protonix.  Will continue that and have the gastroenterology team see him in the morning.  Within the past week he has had banding of the varices in the EGD and a colonoscopy.  Will monitor his hemoglobin overnight as well  as his hemodynamics  Iron deficiency anemia due to chronic blood loss  Patient's hemoglobin is 9.2 has had 8 episodes of GI bleeding in the past.  His count is stable though hemodynamics look good.  I will check a hemoglobin about 4 hours and tomorrow morning IV Protonix started in the ER      VTE Pharmacologic Prophylaxis:   Moderate Risk (Score 3-4) - Pharmacological DVT Prophylaxis Contraindicated. Sequential Compression Devices Ordered.  Code Status: Prior full  Discussion with family: Updated  (wife) at bedside.    Anticipated Length of Stay: Patient will be admitted on an observation basis with an anticipated length of stay of less than 2 midnights secondary to dark stools.    History of Present Illness   Chief Complaint: Dark stools    Bimal Lugo is a 79 y.o. male with a PMH of hypertension, coronary artery disease, chronic kidney disease, diabetes mellitus, esophageal varices, cirrhosis, who presents with stools.  Patient with a history of cirrhosis and has had GI bleeds recently related to that and actually within the week had esophageal banding done.  Also had an EGD and a colonoscopy done.  They were going to a capsule endoscopy.  The family at home reports concerns for dark stools and possible bleeding.  Here in the emergency room the patient is hemodynamically stable.  His hemoglobin is 9.2 which is at its baseline.  Rest of his blood work does not show any changes for the worse his BUN is not indicative of bleeding, creatinine is actually improved does not have leukocytosis.  Thought is to bring the patient in, and have gastroenterology see him because his situation is always a bit tenuous..    Review of Systems   Constitutional:  Negative for activity change, appetite change, chills, fatigue and fever.   HENT:  Negative for congestion, drooling, ear pain, nosebleeds, postnasal drip, sneezing, sore throat and trouble swallowing.    Respiratory:  Negative for cough, choking, chest  tightness, shortness of breath, wheezing and stridor.    Cardiovascular:  Negative for chest pain, palpitations and leg swelling.   Gastrointestinal:  Positive for blood in stool. Negative for abdominal distention, abdominal pain, constipation, diarrhea and nausea.   Genitourinary:  Negative for dysuria and flank pain.   Musculoskeletal:  Negative for arthralgias, back pain and gait problem.   Neurological:  Negative for dizziness, tremors, seizures, syncope, light-headedness and headaches.   Hematological:  Negative for adenopathy.   Psychiatric/Behavioral:  Negative for agitation, behavioral problems and confusion.        Historical Information   Past Medical History:   Diagnosis Date    Chronic bronchitis (HCC)     COPD (chronic obstructive pulmonary disease) (HCC)     Diabetes mellitus (HCC)     Esophageal varices (HCC) 11 apr 2024    Hyperlipidemia     Hypertension     Obesity     ARACELY (obstructive sleep apnea)      Past Surgical History:   Procedure Laterality Date    AORTIC VALVE REPLACEMENT      Jan 4 2024    IR PARACENTESIS  04/04/2024    IR PARACENTESIS  03/07/2024    IR PARACENTESIS  04/11/2024    IR PARACENTESIS  04/26/2024    IR PARACENTESIS  05/09/2024    IR PARACENTESIS  05/16/2024    IR PARACENTESIS  05/23/2024    IR PARACENTESIS  06/06/2024    IR PARACENTESIS  06/13/2024    IR PARACENTESIS  06/20/2024    IR PARACENTESIS  06/27/2024    IR PARACENTESIS  07/03/2024    IR PARACENTESIS  07/11/2024    IR PARACENTESIS  07/18/2024    IR PARACENTESIS  07/24/2024    IR PARACENTESIS  08/01/2024    IR PARACENTESIS  8/8/2024    IR PARACENTESIS  8/15/2024    IR PARACENTESIS  8/22/2024    IR PARACENTESIS  8/29/2024    IR PARACENTESIS  9/5/2024    IR PARACENTESIS  9/12/2024    IR PARACENTESIS  9/19/2024    IR PARACENTESIS  10/3/2024    IR PARACENTESIS  9/26/2024    IR PARACENTESIS  10/10/2024    IR PARACENTESIS  10/17/2024    IR PARACENTESIS  10/24/2024    IR PARACENTESIS  10/31/2024    IR PARACENTESIS  11/7/2024     IR PARACENTESIS  2024    IR PARACENTESIS  2024    IR PARACENTESIS  2024    IR PARACENTESIS  2024    UPPER GASTROINTESTINAL ENDOSCOPY  2024     Social History     Tobacco Use    Smoking status: Former     Current packs/day: 0.00     Average packs/day: 5.0 packs/day for 35.0 years (175.0 ttl pk-yrs)     Types: Cigarettes     Start date:      Quit date:      Years since quittin.9    Smokeless tobacco: Never    Tobacco comments:     Quit in  (approx)   Vaping Use    Vaping status: Never Used   Substance and Sexual Activity    Alcohol use: Not Currently     Alcohol/week: 24.0 standard drinks of alcohol     Types: 24 Cans of beer per week     Comment: sober- quit     Drug use: Not Currently    Sexual activity: Not Currently     Partners: Female     Birth control/protection: Abstinence, Condom Male     E-Cigarette/Vaping    E-Cigarette Use Never User      E-Cigarette/Vaping Substances    Nicotine No     THC No     CBD No     Flavoring No     Other No     Unknown No        Social History:  Marital Status: /Civil Union   Occupation: electrican retired  Patient Pre-hospital Living Situation: Home  Patient Pre-hospital Level of Mobility: walks  Patient Pre-hospital Diet Restrictions: dm    Meds/Allergies   I have reviewed home medications with patient personally.  Prior to Admission medications    Medication Sig Start Date End Date Taking? Authorizing Provider   albuterol (PROVENTIL HFA,VENTOLIN HFA) 90 mcg/act inhaler Inhale 2 puffs every 4 (four) hours as needed 6/16/15   Historical Provider, MD   ammonium lactate (LAC-HYDRIN) 12 % lotion Apply topically 2 (two) times a day 24   Historical Provider, MD   aspirin 81 MG tablet Take 81 mg by mouth daily in the early morning    Historical Provider, MD   atorvastatin (LIPITOR) 40 mg tablet Take 40 mg by mouth daily in the early morning    Historical Provider, MD   carvedilol (COREG) 6.25 mg tablet Take 1 tablet (6.25  mg total) by mouth 2 (two) times a day with meals 11/29/24   Evert Galvan MD   furosemide (LASIX) 40 mg tablet Take 1 tablet (40 mg total) by mouth 2 (two) times a day 11/18/24   Grazyna Mtz PA-C   glucose 4-6 GM-MG Chew 16 g daily as needed 2/16/24   Historical Provider, MD   glucose blood test strip Check twice a day and as needed, dx E11.9, 3/18/16   Historical Provider, MD   hydrocortisone (PROCTOSOL HC) 2.5 % rectal cream Insert into the rectum    Historical Provider, MD   insulin aspart (NovoLOG FlexPen) 100 UNIT/ML injection pen 14 in a.m , 10units in p.m. 11/29/24   Evert Galvan MD   insulin degludec (Tresiba FlexTouch) 100 units/mL injection pen Inject 10 Units under the skin daily at bedtime    Historical Provider, MD   isosorbide mononitrate (IMDUR) 30 mg 24 hr tablet  8/12/24   Historical Provider, MD   lactulose (CHRONULAC) 10 g/15 mL solution 10mL PO BID 6/10/24   Grazyna Mtz PA-C   Lancets (ONETOUCH ULTRASOFT) lancets Check twice a day and as needed, dx E11.9, 3/18/16   Historical Provider, MD   lovastatin (MEVACOR) 10 MG tablet  2/2/18   Historical Provider, MD   Misc. Devices MISC by Does not apply route    Historical Provider, MD   montelukast (SINGULAIR) 10 mg tablet Take 10 mg by mouth daily 9/9/24   Historical Provider, MD   Multiple Vitamin (DAILY VALUE MULTIVITAMIN) TABS Take by mouth    Historical Provider, MD   nystatin (MYCOSTATIN) powder Apply topically 1/2/24   Historical Provider, MD   Omega-3 Fatty Acids (FISH OIL) 645 MG CAPS Take by mouth    Historical Provider, MD   pantoprazole (PROTONIX) 40 mg tablet Take 1 tablet (40 mg total) by mouth daily  Patient taking differently: Take 40 mg by mouth daily in the early morning 4/13/24   Carlitos Seth MD   sodium bicarbonate 650 mg tablet Take 1 tablet (650 mg total) by mouth 3 (three) times daily after meals 11/29/24   Evert Galvan MD   spironolactone (ALDACTONE) 50 mg tablet Take 1 tablet (50 mg total) by mouth daily 11/29/24    Evert Galvan MD   SYMBICORT 160-4.5 MCG/ACT inhaler Inhale 2 puffs 2 (two) times a day 12/22/17   Historical Provider, MD     Allergies   Allergen Reactions    Gemfibrozil Swelling and Rash    Carbamazepine     Carbamazepine And Analogs      swelling    Oxycodone Other (See Comments)     Pt states he hallucinates       Objective :  Temp:  [97 °F (36.1 °C)] 97 °F (36.1 °C)  HR:  [60-69] 60  BP: (117-146)/(58-63) 133/62  Resp:  [18-20] 20  SpO2:  [97 %-99 %] 98 %  O2 Device: None (Room air)    Physical Exam  Constitutional:       General: He is not in acute distress.     Appearance: Normal appearance. He is not ill-appearing.   HENT:      Head: Normocephalic and atraumatic.      Right Ear: Tympanic membrane normal. There is no impacted cerumen.      Left Ear: Tympanic membrane normal. There is no impacted cerumen.      Nose: Nose normal. No congestion or rhinorrhea.      Mouth/Throat:      Mouth: Mucous membranes are dry.      Pharynx: No oropharyngeal exudate.   Eyes:      Pupils: Pupils are equal, round, and reactive to light.   Cardiovascular:      Rate and Rhythm: Normal rate and regular rhythm.      Pulses: Normal pulses.      Heart sounds: Normal heart sounds.   Pulmonary:      Effort: Pulmonary effort is normal. No respiratory distress.      Breath sounds: No stridor.   Abdominal:      General: Abdomen is flat. There is no distension.      Palpations: Abdomen is soft. There is no mass.   Musculoskeletal:         General: No swelling or tenderness.      Cervical back: No rigidity or tenderness.   Skin:     General: Skin is warm and dry.      Capillary Refill: Capillary refill takes less than 2 seconds.      Coloration: Skin is not jaundiced or pale.   Neurological:      General: No focal deficit present.      Mental Status: He is alert.   Psychiatric:         Mood and Affect: Mood normal.         Behavior: Behavior normal.          Lines/Drains:            Lab Results: I have reviewed the following  results:  Results from last 7 days   Lab Units 12/05/24 1916   WBC Thousand/uL 3.82*   HEMOGLOBIN g/dL 9.2*   HEMATOCRIT % 28.5*   PLATELETS Thousands/uL 45*   SEGS PCT % 68   LYMPHO PCT % 15   MONO PCT % 11   EOS PCT % 5     Results from last 7 days   Lab Units 12/05/24 1916   SODIUM mmol/L 134*   POTASSIUM mmol/L 4.3   CHLORIDE mmol/L 105   CO2 mmol/L 23   BUN mg/dL 27*   CREATININE mg/dL 1.64*   ANION GAP mmol/L 6   CALCIUM mg/dL 8.4   ALBUMIN g/dL 3.2*   TOTAL BILIRUBIN mg/dL 0.68   ALK PHOS U/L 89   ALT U/L 28   AST U/L 48*   GLUCOSE RANDOM mg/dL 73     Results from last 7 days   Lab Units 12/05/24 1916   INR  1.23*     Results from last 7 days   Lab Units 11/29/24  1050 11/29/24  0615 11/28/24 2055   POC GLUCOSE mg/dl 141* 148* 194*     Lab Results   Component Value Date    HGBA1C 5.6 11/25/2024    HGBA1C 6 (H) 10/15/2024    HGBA1C 5.7 (H) 04/04/2024                 Administrative Statements       ** Please Note: This note has been constructed using a voice recognition system. **

## 2024-12-07 ENCOUNTER — APPOINTMENT (INPATIENT)
Dept: GASTROENTEROLOGY | Facility: HOSPITAL | Age: 79
DRG: 442 | End: 2024-12-07
Payer: MEDICARE

## 2024-12-07 ENCOUNTER — ANESTHESIA (INPATIENT)
Dept: GASTROENTEROLOGY | Facility: HOSPITAL | Age: 79
DRG: 442 | End: 2024-12-07
Payer: MEDICARE

## 2024-12-07 ENCOUNTER — ANESTHESIA EVENT (INPATIENT)
Dept: GASTROENTEROLOGY | Facility: HOSPITAL | Age: 79
DRG: 442 | End: 2024-12-07
Payer: MEDICARE

## 2024-12-07 LAB
ANION GAP SERPL CALCULATED.3IONS-SCNC: 7 MMOL/L (ref 4–13)
BUN SERPL-MCNC: 29 MG/DL (ref 5–25)
CALCIUM SERPL-MCNC: 8.3 MG/DL (ref 8.4–10.2)
CHLORIDE SERPL-SCNC: 103 MMOL/L (ref 96–108)
CO2 SERPL-SCNC: 24 MMOL/L (ref 21–32)
CREAT SERPL-MCNC: 1.75 MG/DL (ref 0.6–1.3)
GFR SERPL CREATININE-BSD FRML MDRD: 35 ML/MIN/1.73SQ M
GLUCOSE SERPL-MCNC: 100 MG/DL (ref 65–140)
GLUCOSE SERPL-MCNC: 116 MG/DL (ref 65–140)
GLUCOSE SERPL-MCNC: 119 MG/DL (ref 65–140)
GLUCOSE SERPL-MCNC: 124 MG/DL (ref 65–140)
GLUCOSE SERPL-MCNC: 198 MG/DL (ref 65–140)
HCT VFR BLD AUTO: 28.3 % (ref 36.5–49.3)
HCT VFR BLD AUTO: 28.8 % (ref 36.5–49.3)
HCT VFR BLD AUTO: 31.7 % (ref 36.5–49.3)
HGB BLD-MCNC: 10.2 G/DL (ref 12–17)
HGB BLD-MCNC: 9.2 G/DL (ref 12–17)
HGB BLD-MCNC: 9.3 G/DL (ref 12–17)
POTASSIUM SERPL-SCNC: 4.3 MMOL/L (ref 3.5–5.3)
SODIUM SERPL-SCNC: 134 MMOL/L (ref 135–147)

## 2024-12-07 PROCEDURE — 80048 BASIC METABOLIC PNL TOTAL CA: CPT | Performed by: PHYSICIAN ASSISTANT

## 2024-12-07 PROCEDURE — 82948 REAGENT STRIP/BLOOD GLUCOSE: CPT

## 2024-12-07 PROCEDURE — 85018 HEMOGLOBIN: CPT | Performed by: PHYSICIAN ASSISTANT

## 2024-12-07 PROCEDURE — 85014 HEMATOCRIT: CPT | Performed by: PHYSICIAN ASSISTANT

## 2024-12-07 PROCEDURE — 43235 EGD DIAGNOSTIC BRUSH WASH: CPT | Performed by: INTERNAL MEDICINE

## 2024-12-07 PROCEDURE — 0DJ08ZZ INSPECTION OF UPPER INTESTINAL TRACT, VIA NATURAL OR ARTIFICIAL OPENING ENDOSCOPIC: ICD-10-PCS | Performed by: INTERNAL MEDICINE

## 2024-12-07 PROCEDURE — 99232 SBSQ HOSP IP/OBS MODERATE 35: CPT | Performed by: PHYSICIAN ASSISTANT

## 2024-12-07 RX ORDER — NYSTATIN 100000 [USP'U]/G
POWDER TOPICAL 2 TIMES DAILY
Status: DISCONTINUED | OUTPATIENT
Start: 2024-12-07 | End: 2024-12-11 | Stop reason: HOSPADM

## 2024-12-07 RX ORDER — PROPOFOL 10 MG/ML
INJECTION, EMULSION INTRAVENOUS AS NEEDED
Status: DISCONTINUED | OUTPATIENT
Start: 2024-12-07 | End: 2024-12-07

## 2024-12-07 RX ORDER — SODIUM CHLORIDE, SODIUM LACTATE, POTASSIUM CHLORIDE, CALCIUM CHLORIDE 600; 310; 30; 20 MG/100ML; MG/100ML; MG/100ML; MG/100ML
50 INJECTION, SOLUTION INTRAVENOUS CONTINUOUS
Status: CANCELLED | OUTPATIENT
Start: 2024-12-07

## 2024-12-07 RX ORDER — PHENYLEPHRINE HCL IN 0.9% NACL 1 MG/10 ML
SYRINGE (ML) INTRAVENOUS AS NEEDED
Status: DISCONTINUED | OUTPATIENT
Start: 2024-12-07 | End: 2024-12-07

## 2024-12-07 RX ORDER — EPHEDRINE SULFATE 50 MG/ML
INJECTION INTRAVENOUS AS NEEDED
Status: DISCONTINUED | OUTPATIENT
Start: 2024-12-07 | End: 2024-12-07

## 2024-12-07 RX ORDER — LIDOCAINE HYDROCHLORIDE 10 MG/ML
INJECTION, SOLUTION EPIDURAL; INFILTRATION; INTRACAUDAL; PERINEURAL AS NEEDED
Status: DISCONTINUED | OUTPATIENT
Start: 2024-12-07 | End: 2024-12-07

## 2024-12-07 RX ORDER — SODIUM CHLORIDE, SODIUM LACTATE, POTASSIUM CHLORIDE, CALCIUM CHLORIDE 600; 310; 30; 20 MG/100ML; MG/100ML; MG/100ML; MG/100ML
INJECTION, SOLUTION INTRAVENOUS CONTINUOUS PRN
Status: DISCONTINUED | OUTPATIENT
Start: 2024-12-07 | End: 2024-12-07

## 2024-12-07 RX ADMIN — ASPIRIN 81 MG: 81 TABLET, CHEWABLE ORAL at 08:22

## 2024-12-07 RX ADMIN — PROPOFOL 20 MG: 10 INJECTION, EMULSION INTRAVENOUS at 11:17

## 2024-12-07 RX ADMIN — INSULIN GLARGINE 10 UNITS: 100 INJECTION, SOLUTION SUBCUTANEOUS at 21:45

## 2024-12-07 RX ADMIN — SPIRONOLACTONE 50 MG: 25 TABLET ORAL at 08:22

## 2024-12-07 RX ADMIN — Medication 100 MCG: at 11:22

## 2024-12-07 RX ADMIN — PROPOFOL 80 MG: 10 INJECTION, EMULSION INTRAVENOUS at 11:16

## 2024-12-07 RX ADMIN — SODIUM CHLORIDE, SODIUM LACTATE, POTASSIUM CHLORIDE, AND CALCIUM CHLORIDE: .6; .31; .03; .02 INJECTION, SOLUTION INTRAVENOUS at 10:14

## 2024-12-07 RX ADMIN — PANTOPRAZOLE SODIUM 40 MG: 40 INJECTION, POWDER, FOR SOLUTION INTRAVENOUS at 21:45

## 2024-12-07 RX ADMIN — PANTOPRAZOLE SODIUM 40 MG: 40 INJECTION, POWDER, FOR SOLUTION INTRAVENOUS at 08:22

## 2024-12-07 RX ADMIN — NYSTATIN 1 APPLICATION: 100000 POWDER TOPICAL at 17:12

## 2024-12-07 RX ADMIN — PROPOFOL 20 MG: 10 INJECTION, EMULSION INTRAVENOUS at 11:18

## 2024-12-07 RX ADMIN — ISOSORBIDE MONONITRATE 60 MG: 60 TABLET, EXTENDED RELEASE ORAL at 08:22

## 2024-12-07 RX ADMIN — CARVEDILOL 6.25 MG: 6.25 TABLET, FILM COATED ORAL at 08:28

## 2024-12-07 RX ADMIN — FUROSEMIDE 40 MG: 40 TABLET ORAL at 08:22

## 2024-12-07 RX ADMIN — BUDESONIDE AND FORMOTEROL FUMARATE DIHYDRATE 2 PUFF: 160; 4.5 AEROSOL RESPIRATORY (INHALATION) at 17:12

## 2024-12-07 RX ADMIN — SODIUM BICARBONATE 650 MG: 650 TABLET ORAL at 12:38

## 2024-12-07 RX ADMIN — CARVEDILOL 6.25 MG: 6.25 TABLET, FILM COATED ORAL at 17:11

## 2024-12-07 RX ADMIN — EPHEDRINE SULFATE 10 MG: 50 INJECTION, SOLUTION INTRAVENOUS at 11:21

## 2024-12-07 RX ADMIN — SODIUM BICARBONATE 650 MG: 650 TABLET ORAL at 17:11

## 2024-12-07 RX ADMIN — FUROSEMIDE 40 MG: 40 TABLET ORAL at 17:11

## 2024-12-07 RX ADMIN — B-COMPLEX W/ C & FOLIC ACID TAB 1 TABLET: TAB at 08:22

## 2024-12-07 RX ADMIN — LIDOCAINE HYDROCHLORIDE 50 MG: 10 INJECTION, SOLUTION EPIDURAL; INFILTRATION; INTRACAUDAL; PERINEURAL at 11:16

## 2024-12-07 RX ADMIN — OMEGA-3 FATTY ACIDS CAP 1000 MG 1000 MG: 1000 CAP at 08:22

## 2024-12-07 RX ADMIN — INSULIN LISPRO 2 UNITS: 100 INJECTION, SOLUTION INTRAVENOUS; SUBCUTANEOUS at 17:14

## 2024-12-07 RX ADMIN — MONTELUKAST 10 MG: 10 TABLET, FILM COATED ORAL at 08:22

## 2024-12-07 RX ADMIN — SODIUM BICARBONATE 650 MG: 650 TABLET ORAL at 08:22

## 2024-12-07 RX ADMIN — BUDESONIDE AND FORMOTEROL FUMARATE DIHYDRATE 2 PUFF: 160; 4.5 AEROSOL RESPIRATORY (INHALATION) at 08:27

## 2024-12-07 NOTE — ASSESSMENT & PLAN NOTE
Chronic, secondary to alcohol abuse. Sober  Continue lasix/aldactone   Paracentesis 12/5 for 3300 mL   LFTs stable on presentation  MELD 15

## 2024-12-07 NOTE — ASSESSMENT & PLAN NOTE
Blood Sugar Average: Last 72 hrs (P) 107  Well-controlled a/e/b A1c 5.6%  Continue Humalog SSI; hold standing 10-14u from home regimen  Continue Lantus 10u HS (consider 1/2 dose if remains NPO)   Blood glucose monitoring ACHS  Hypoglycemia protocol  Adjust as needed

## 2024-12-07 NOTE — ANESTHESIA POSTPROCEDURE EVALUATION
Post-Op Assessment Note    CV Status:  Stable  Pain Score: 0    Pain management: adequate       Mental Status:  Alert and sleepy   Hydration Status:  Stable   PONV Controlled:  None   Airway Patency:  Patent     Post Op Vitals Reviewed: Yes    No anethesia notable event occurred.    Staff: CRNA           Last Filed PACU Vitals:  Vitals Value Taken Time   Temp 97.7 °F (36.5 °C) 12/07/24 1128   Pulse 50 12/07/24 1128   /53 12/07/24 1128   Resp 18 12/07/24 1128   SpO2 97 % 12/07/24 1128       Modified Trip:  Activity: 2 (12/7/2024 11:28 AM)  Respiration: 2 (12/7/2024 11:28 AM)  Circulation: 2 (12/7/2024 11:28 AM)  Consciousness: 1 (12/7/2024 11:28 AM)  Oxygen Saturation: 2 (12/7/2024 11:28 AM)  Modified Trip Score: 9 (12/7/2024 11:28 AM)

## 2024-12-07 NOTE — ANESTHESIA PREPROCEDURE EVALUATION
Today's Plan:  Refer to your After Visit Summary for more information on your visit.       Talking to Your Healthcare Provider: Play an Active Role  To get the most out of your healthcare, take an active role. This means thinking of every healthcare provider (HCP) as a partner in your care. Below are things you can do to help that partnership go smoothly.    Tell the truth.  To get the best possible care, you need to be honest with your healthcare provider.      Dont be afraid to talk about sensitive subjects.  Keep in mind that your HCP is used to talking about personal matters.    Stay focused.  Try not to think about what your HCP said 5 minutes ago or what you will say next.    Take notes.  When you write down what you hear, you listen more carefully. Dont write down every word your HCP says, just the main points.    Look at your HCP.  When you make eye contact, you show you are listening and you get more out of the discussion.    Repeat back.  Say something like What I hear you saying is... This shows you are listening and understanding, and gives your HCP a chance to correct any mistakes.     Keep all your appointments.  If you cant make it, call as soon as you know.    Be on time.  When a patient is late for an appointment, it can throw the HCP and his or her staff off schedule.    If you are having a medical test, follow your HCPs instructions.  For some medical tests, you need to fast (not eat or drink). For others, you need to take medication.    Do what your HCP asks.  Your HCP may give you instructions concerning things such as medication use, diet, or exercise.    If you cant follow your HCPs instructions, be sure to speak up.  Whether your medication makes you sick or you cant give up junk food, let your HCP know. Together, you may be able to solve the problem.    Talk about over-the-counter drugs.  If you are receiving treatment, ask your HCP which over-the-counter drugs you can  Procedure:  EGD    Relevant Problems   CARDIO   (+) Atherosclerosis of lower extremity with intermittent claudication (HCC)   (+) CAD (coronary artery disease)   (+) History of aortic valve replacement   (+) Hypertension   (+) PVD (peripheral vascular disease) (HCC)   (+) Secondary esophageal varices with bleeding (HCC)      ENDO   (+) Diabetes mellitus, type 2 (HCC)      GI/HEPATIC   (+) 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)   (+) Cirrhosis (HCC)   (+) Hematemesis      /RENAL   (+) Acute kidney injury superimposed on stage 3a chronic kidney disease (HCC)   (+) Chronic kidney disease, stage 3a (HCC)      HEMATOLOGY   (+) Acute blood loss anemia   (+) Iron deficiency anemia due to chronic blood loss   (+) Symptomatic anemia   (+) Thrombocytopenia (HCC)      MUSCULOSKELETAL   (+) Back pain      PULMONARY   (+) Chronic obstructive pulmonary disease (HCC)   (+) Sleep apnea        Physical Exam    Airway    Mallampati score: II  TM Distance: >3 FB  Neck ROM: full     Dental    upper dentures and lower dentures    Cardiovascular      Pulmonary      Other Findings        Anesthesia Plan  ASA Score- 3     Anesthesia Type- IV sedation with anesthesia with ASA Monitors.         Additional Monitors:     Airway Plan:            Plan Factors-Exercise tolerance (METS): >4 METS.    Chart reviewed. EKG reviewed.  Existing labs reviewed. Patient summary reviewed.    Patient is not a current smoker.      There is medical exclusion for perioperative obstructive sleep apnea risk education.        Induction- intravenous.    Postoperative Plan-     Perioperative Resuscitation Plan - Level 1 - Full Code.       Informed Consent- Anesthetic plan and risks discussed with patient.  I personally reviewed this patient with the CRNA. Discussed and agreed on the Anesthesia Plan with the CRNA..         take.    Take prescription drugs as instructed.  Dont take more or less than your doctor prescribed. Dont stop taking your medication without talking to your doctor first.    Make sure your primary doctor is kept up-to-date.  If you are seeing a specialist, ask the specialist to let your primary doctor know how you are doing.    Ask your HCP how to contact him or her.  Find out if there are certain times during the day when your HCP takes phone calls. Ask who to call if you need an answer right away.    © 6345-7930 [facility], [address] [place] [address], [place] [address]. All rights reserved. This information is not intended as a substitute for professional medical care. Always follow your healthcare professional's instructions.      Thank you for your visit today. Please call our office if you can NOT make your future appointment.    If you are a smoker and would like to quit smoking and need help please call [phone] ([phone])

## 2024-12-07 NOTE — ASSESSMENT & PLAN NOTE
Chronic, in setting of recurrent upper GI bleeds secondary to esophageal varices; last hospitalized 11/24-11/29 resulting in EGD with 1 band placed   Current hgb 9.2  Recent hgb 7-8 last admission, previously baseline around 10  Serial labs given concern for recurrent bleed

## 2024-12-07 NOTE — PROGRESS NOTES
Progress Note - Hospitalist   Name: Bimal Lugo 79 y.o. male I MRN: [id number]  Unit/Bed#: -01 I Date of Admission: 12/5/2024   Date of Service: 12/7/2024 I Hospital Day: 1    Assessment & Plan  Iron deficiency anemia due to chronic blood loss  Chronic, in setting of recurrent upper GI bleeds secondary to esophageal varices; last hospitalized 11/24-11/29 resulting in EGD with 1 band placed   Current hgb 9.2  Recent hgb 7-8 last admission, previously baseline around 10  Serial labs given concern for recurrent bleed  Dark stools  Patient with dark stools in the setting of known recurrent GI bleed, presents with concern for the same  GI consulted, EGD planned 12/7 inpatient   Presently NPO  Secondary esophageal varices with bleeding (HCC)  Secondary to cirrhosis in setting of alcohol abuse  Cirrhosis (HCC)  Chronic, secondary to alcohol abuse. Sober  Continue lasix/aldactone   Paracentesis 12/5 for 3300 mL   LFTs stable on presentation  MELD 15   CAD (coronary artery disease)  Chronic, no chest pain on admit  Continue statin, aspirin, imdur, and beta-blocker  Will need to weigh risks/benefits of continue antiplatelet therapy given recurrent GI bleeding   Chronic kidney disease, stage 3a (HCC)  Renal functions have overall progressed over the past year  Presently, stable  Monitor, avoid nephrotoxins as able, control BP as able  Chronic obstructive pulmonary disease (HCC)  Patient history of smoking breathing comfortably.    Continue combo inhaler  Diabetes mellitus, type 2 (HCC)  Blood Sugar Average: Last 72 hrs (P) 107  Well-controlled a/e/b A1c 5.6%  Continue Humalog SSI; hold standing 10-14u from home regimen  Continue Lantus 10u HS (consider 1/2 dose if remains NPO)   Blood glucose monitoring ACHS  Hypoglycemia protocol  Adjust as needed   Hypertension  Chronic, continue home meds  Monitor VS per unit protocol, Blood Pressure: 126/58   Acute blood loss anemia      VTE Pharmacologic Prophylaxis: VTE Score: 3  Moderate Risk (Score 3-4) - Pharmacological DVT Prophylaxis Contraindicated. Sequential Compression Devices Ordered.    Mobility:   Basic Mobility Inpatient Raw Score: 20  JH-HLM Goal: 6: Walk 10 steps or more  JH-HLM Achieved: 1: Laying in bed  JH-HLM Goal NOT achieved. Continue with multidisciplinary rounding and encourage appropriate mobility to improve upon JH-HLM goals.    Patient Centered Rounds: I performed bedside rounds with nursing staff today.   Discussions with Specialists or Other Care Team Provider: GI    Education and Discussions with Family / Patient: Patient declined call to .     Current Length of Stay: 1 day(s)  Current Patient Status: Inpatient   Certification Statement: The patient will continue to require additional inpatient hospital stay due to awaiting inpatient VCE Monday   Discharge Plan: Anticipate discharge in >72 hrs to home.    Code Status: Level 1 - Full Code    Subjective   Patient for EGD today, last BM this am and denies visible melena or BRB. Comfortable otherwise and understanding of GI recs for inpatient VCE Monday.     Objective :  Temp:  [98 °F (36.7 °C)] 98 °F (36.7 °C)  HR:  [56-67] 56  BP: (117-126)/(52-58) 126/58  Resp:  [22] 22  SpO2:  [97 %-100 %] 99 %  O2 Device: None (Room air)    Body mass index is 29.06 kg/m².     Input and Output Summary (last 24 hours):     Intake/Output Summary (Last 24 hours) at 12/7/2024 0904  Last data filed at 12/7/2024 0742  Gross per 24 hour   Intake 780 ml   Output 1750 ml   Net -970 ml       Physical Exam  Vitals and nursing note reviewed.   Constitutional:       General: He is awake. He is not in acute distress.     Appearance: Normal appearance. He is well-developed. He is obese. He is not ill-appearing or toxic-appearing.   HENT:      Head: Normocephalic and atraumatic.   Eyes:      General: No scleral icterus.  Cardiovascular:      Rate and Rhythm: Normal rate.   Pulmonary:      Effort: Pulmonary effort is normal. No  respiratory distress.   Abdominal:      General: Abdomen is protuberant. There is no distension.      Tenderness: There is no abdominal tenderness. There is no guarding.   Skin:     General: Skin is warm and dry.      Coloration: Skin is not jaundiced or pale.   Neurological:      Mental Status: He is alert and oriented to person, place, and time.   Psychiatric:         Mood and Affect: Mood normal.         Behavior: Behavior normal. Behavior is cooperative.           Lines/Drains:              Lab Results: I have reviewed the following results:   Results from last 7 days   Lab Units 12/07/24  0519 12/06/24  1346 12/06/24  0604 12/05/24 1916   WBC Thousand/uL  --   --  2.38* 3.82*   HEMOGLOBIN g/dL 9.2*   < > 8.6* 9.2*   HEMATOCRIT % 28.3*   < > 27.2* 28.5*   PLATELETS Thousands/uL  --   --  35* 45*   SEGS PCT %  --   --   --  68   LYMPHO PCT %  --   --   --  15   MONO PCT %  --   --   --  11   EOS PCT %  --   --   --  5    < > = values in this interval not displayed.     Results from last 7 days   Lab Units 12/07/24  0519 12/06/24  0604 12/05/24 1916   SODIUM mmol/L 134*   < > 134*   POTASSIUM mmol/L 4.3   < > 4.3   CHLORIDE mmol/L 103   < > 105   CO2 mmol/L 24   < > 23   BUN mg/dL 29*   < > 27*   CREATININE mg/dL 1.75*   < > 1.64*   ANION GAP mmol/L 7   < > 6   CALCIUM mg/dL 8.3*   < > 8.4   ALBUMIN g/dL  --   --  3.2*   TOTAL BILIRUBIN mg/dL  --   --  0.68   ALK PHOS U/L  --   --  89   ALT U/L  --   --  28   AST U/L  --   --  48*   GLUCOSE RANDOM mg/dL 116   < > 73    < > = values in this interval not displayed.     Results from last 7 days   Lab Units 12/05/24 1916   INR  1.23*     Results from last 7 days   Lab Units 12/07/24  0625 12/06/24  2211 12/06/24  1730 12/06/24  1211 12/06/24  0559 12/05/24  2233   POC GLUCOSE mg/dl 124 110 117 116 93 82               Recent Cultures (last 7 days):         Imaging Results Review: No pertinent imaging studies reviewed.  Other Study Results Review: No additional  pertinent studies reviewed.    Last 24 Hours Medication List:     Current Facility-Administered Medications:     albuterol (PROVENTIL HFA,VENTOLIN HFA) inhaler 2 puff, Q4H PRN    aspirin chewable tablet 81 mg, Daily    atorvastatin (LIPITOR) tablet 40 mg, Early Morning    budesonide-formoterol (SYMBICORT) 160-4.5 mcg/act inhaler 2 puff, BID    carvedilol (COREG) tablet 6.25 mg, BID With Meals    fish oil capsule 1,000 mg, Daily    furosemide (LASIX) tablet 40 mg, BID (diuretic)    insulin glargine (LANTUS) subcutaneous injection 10 Units 0.1 mL, HS    insulin lispro (HumALOG/ADMELOG) 100 units/mL subcutaneous injection 1-6 Units, Q6H LUBA **AND** Fingerstick Glucose (POCT), Q6H    isosorbide mononitrate (IMDUR) 24 hr tablet 60 mg, Daily    montelukast (SINGULAIR) tablet 10 mg, Daily    multivitamin stress formula tablet 1 tablet, Daily    pantoprazole (PROTONIX) injection 40 mg, Q12H LUBA    sodium bicarbonate tablet 650 mg, TID after meals    spironolactone (ALDACTONE) tablet 50 mg, Daily    Administrative Statements   Today, Patient Was Seen By: Pearl Ritter PA-C  I have spent a total time of 25 minutes in caring for this patient on the day of the visit/encounter including Counseling / Coordination of care, Documenting in the medical record, and Reviewing / ordering tests, medicine, procedures  .    **Please Note: This note may have been constructed using a voice recognition system.**

## 2024-12-07 NOTE — PLAN OF CARE
Problem: SAFETY ADULT  Goal: Patient will remain free of falls  Description: INTERVENTIONS:  - Educate patient/family on patient safety including physical limitations  - Instruct patient to call for assistance with activity   - Consult OT/PT to assist with strengthening/mobility   - Keep Call bell within reach  - Keep bed low and locked with side rails adjusted as appropriate  - Keep care items and personal belongings within reach  - Initiate and maintain comfort rounds  - Make Fall Risk Sign visible to staff  - Apply yellow socks and bracelet for high fall risk patients  - Consider moving patient to room near nurses station  Outcome: Progressing  Goal: Maintain or return to baseline ADL function  Description: INTERVENTIONS:  -  Assess patient's ability to carry out ADLs; assess patient's baseline for ADL function and identify physical deficits which impact ability to perform ADLs (bathing, care of mouth/teeth, toileting, grooming, dressing, etc.)  - Assess/evaluate cause of self-care deficits   - Assess range of motion  - Assess patient's mobility; develop plan if impaired  - Assess patient's need for assistive devices and provide as appropriate  - Encourage maximum independence but intervene and supervise when necessary  - Involve family in performance of ADLs  - Assess for home care needs following discharge   - Consider OT consult to assist with ADL evaluation and planning for discharge  - Provide patient education as appropriate  Outcome: Progressing    Problem: DISCHARGE PLANNING  Goal: Discharge to home or other facility with appropriate resources  Description: INTERVENTIONS:  - Identify barriers to discharge w/patient and caregiver  - Arrange for needed discharge resources and transportation as appropriate  - Identify discharge learning needs (meds, wound care, etc.)  - Arrange for interpretive services to assist at discharge as needed  - Refer to Case Management Department for coordinating discharge  planning if the patient needs post-hospital services based on physician/advanced practitioner order or complex needs related to functional status, cognitive ability, or social support system  Outcome: Progressing     Problem: Knowledge Deficit  Goal: Patient/family/caregiver demonstrates understanding of disease process, treatment plan, medications, and discharge instructions  Description: Complete learning assessment and assess knowledge base.  Interventions:  - Provide teaching at level of understanding  - Provide teaching via preferred learning methods  Outcome: Progressing     Problem: GASTROINTESTINAL - ADULT  Goal: Minimal or absence of nausea and/or vomiting  Description: INTERVENTIONS:  - Administer IV fluids if ordered to ensure adequate hydration  - Maintain NPO status until nausea and vomiting are resolved  - Nasogastric tube if ordered  - Administer ordered antiemetic medications as needed  - Provide nonpharmacologic comfort measures as appropriate  - Advance diet as tolerated, if ordered  - Consider nutrition services referral to assist patient with adequate nutrition and appropriate food choices  Outcome: Progressing  Goal: Maintains or returns to baseline bowel function  Description: INTERVENTIONS:  - Assess bowel function  - Encourage oral fluids to ensure adequate hydration  - Administer IV fluids if ordered to ensure adequate hydration  - Administer ordered medications as needed  - Encourage mobilization and activity  - Consider nutritional services referral to assist patient with adequate nutrition and appropriate food choices  Outcome: Progressing  Goal: Maintains adequate nutritional intake  Description: INTERVENTIONS:  - Monitor percentage of each meal consumed  - Identify factors contributing to decreased intake, treat as appropriate  - Assist with meals as needed  - Monitor I&O, weight, and lab values if indicated  - Obtain nutrition services referral as needed  Outcome: Progressing      Problem: HEMATOLOGIC - ADULT  Goal: Maintains hematologic stability  Description: INTERVENTIONS  - Assess for signs and symptoms of bleeding or hemorrhage  - Monitor labs  - Administer supportive blood products/factors as ordered and appropriate  Outcome: Progressing

## 2024-12-07 NOTE — PLAN OF CARE
Problem: SAFETY ADULT  Goal: Patient will remain free of falls  Description: INTERVENTIONS:  - Educate patient/family on patient safety including physical limitations  - Instruct patient to call for assistance with activity   - Consult OT/PT to assist with strengthening/mobility   - Keep Call bell within reach  - Keep bed low and locked with side rails adjusted as appropriate  - Keep care items and personal belongings within reach  - Initiate and maintain comfort rounds  - Make Fall Risk Sign visible to staff  Problem: Knowledge Deficit  Goal: Patient/family/caregiver demonstrates understanding of disease process, treatment plan, medications, and discharge instructions  Description: Complete learning assessment and assess knowledge base.  Interventions:  - Provide teaching at level of understanding  - Provide teaching via preferred learning methods  Outcome: Progressing     - Apply yellow socks and bracelet for high fall risk patients  - Consider moving patient to room near nurses station  Outcome: Progressing

## 2024-12-08 LAB
GLUCOSE SERPL-MCNC: 101 MG/DL (ref 65–140)
GLUCOSE SERPL-MCNC: 118 MG/DL (ref 65–140)
GLUCOSE SERPL-MCNC: 122 MG/DL (ref 65–140)
GLUCOSE SERPL-MCNC: 158 MG/DL (ref 65–140)
GLUCOSE SERPL-MCNC: 89 MG/DL (ref 65–140)
HCT VFR BLD AUTO: 29.3 % (ref 36.5–49.3)
HGB BLD-MCNC: 9.5 G/DL (ref 12–17)

## 2024-12-08 PROCEDURE — 85018 HEMOGLOBIN: CPT | Performed by: PHYSICIAN ASSISTANT

## 2024-12-08 PROCEDURE — 99232 SBSQ HOSP IP/OBS MODERATE 35: CPT | Performed by: INTERNAL MEDICINE

## 2024-12-08 PROCEDURE — 99232 SBSQ HOSP IP/OBS MODERATE 35: CPT | Performed by: PHYSICIAN ASSISTANT

## 2024-12-08 PROCEDURE — 85014 HEMATOCRIT: CPT | Performed by: PHYSICIAN ASSISTANT

## 2024-12-08 PROCEDURE — 82948 REAGENT STRIP/BLOOD GLUCOSE: CPT

## 2024-12-08 RX ORDER — POLYETHYLENE GLYCOL 3350 17 G/17G
119 POWDER, FOR SOLUTION ORAL ONCE
Status: COMPLETED | OUTPATIENT
Start: 2024-12-08 | End: 2024-12-08

## 2024-12-08 RX ORDER — BISACODYL 5 MG/1
10 TABLET, DELAYED RELEASE ORAL ONCE
Status: COMPLETED | OUTPATIENT
Start: 2024-12-08 | End: 2024-12-08

## 2024-12-08 RX ADMIN — BISACODYL 10 MG: 5 TABLET, COATED ORAL at 17:42

## 2024-12-08 RX ADMIN — CARVEDILOL 6.25 MG: 6.25 TABLET, FILM COATED ORAL at 17:41

## 2024-12-08 RX ADMIN — SODIUM BICARBONATE 650 MG: 650 TABLET ORAL at 17:43

## 2024-12-08 RX ADMIN — ASPIRIN 81 MG: 81 TABLET, CHEWABLE ORAL at 08:22

## 2024-12-08 RX ADMIN — FUROSEMIDE 40 MG: 40 TABLET ORAL at 17:42

## 2024-12-08 RX ADMIN — ISOSORBIDE MONONITRATE 60 MG: 60 TABLET, EXTENDED RELEASE ORAL at 08:23

## 2024-12-08 RX ADMIN — PANTOPRAZOLE SODIUM 40 MG: 40 INJECTION, POWDER, FOR SOLUTION INTRAVENOUS at 08:23

## 2024-12-08 RX ADMIN — SODIUM BICARBONATE 650 MG: 650 TABLET ORAL at 08:23

## 2024-12-08 RX ADMIN — NYSTATIN: 100000 POWDER TOPICAL at 08:24

## 2024-12-08 RX ADMIN — MONTELUKAST 10 MG: 10 TABLET, FILM COATED ORAL at 08:22

## 2024-12-08 RX ADMIN — B-COMPLEX W/ C & FOLIC ACID TAB 1 TABLET: TAB at 08:22

## 2024-12-08 RX ADMIN — BUDESONIDE AND FORMOTEROL FUMARATE DIHYDRATE 2 PUFF: 160; 4.5 AEROSOL RESPIRATORY (INHALATION) at 08:24

## 2024-12-08 RX ADMIN — INSULIN LISPRO 1 UNITS: 100 INJECTION, SOLUTION INTRAVENOUS; SUBCUTANEOUS at 12:29

## 2024-12-08 RX ADMIN — CARVEDILOL 6.25 MG: 6.25 TABLET, FILM COATED ORAL at 08:23

## 2024-12-08 RX ADMIN — POLYETHYLENE GLYCOL 3350 119 G: 17 POWDER, FOR SOLUTION ORAL at 18:44

## 2024-12-08 RX ADMIN — BUDESONIDE AND FORMOTEROL FUMARATE DIHYDRATE 2 PUFF: 160; 4.5 AEROSOL RESPIRATORY (INHALATION) at 17:43

## 2024-12-08 RX ADMIN — INSULIN GLARGINE 10 UNITS: 100 INJECTION, SOLUTION SUBCUTANEOUS at 21:50

## 2024-12-08 RX ADMIN — OMEGA-3 FATTY ACIDS CAP 1000 MG 1000 MG: 1000 CAP at 08:23

## 2024-12-08 RX ADMIN — PANTOPRAZOLE SODIUM 40 MG: 40 INJECTION, POWDER, FOR SOLUTION INTRAVENOUS at 21:50

## 2024-12-08 RX ADMIN — SODIUM BICARBONATE 650 MG: 650 TABLET ORAL at 12:29

## 2024-12-08 RX ADMIN — SPIRONOLACTONE 50 MG: 25 TABLET ORAL at 08:22

## 2024-12-08 RX ADMIN — FUROSEMIDE 40 MG: 40 TABLET ORAL at 08:23

## 2024-12-08 RX ADMIN — NYSTATIN: 100000 POWDER TOPICAL at 17:44

## 2024-12-08 RX ADMIN — ATORVASTATIN CALCIUM 40 MG: 40 TABLET, FILM COATED ORAL at 04:46

## 2024-12-08 NOTE — PROGRESS NOTES
Progress Note - Gastroenterology   Name: Bimal Lugo 79 y.o. male I MRN: [id number]  Unit/Bed#: -01 I Date of Admission: 12/5/2024   Date of Service: 12/8/2024 I Hospital Day: 2    Assessment & Plan  Cirrhosis (HCC)    MELD 3.0: 17 at 12/7/2024  5:19 AM  MELD-Na: 17 at 12/7/2024  5:19 AM  Calculated from:  Serum Creatinine: 1.75 mg/dL at 12/7/2024  5:19 AM  Serum Sodium: 134 mmol/L at 12/7/2024  5:19 AM  Total Bilirubin: 0.68 mg/dL (Using min of 1 mg/dL) at 12/5/2024  7:16 PM  Serum Albumin: 3.2 g/dL at 12/5/2024  7:16 PM  INR(ratio): 1.23 at 12/5/2024  7:16 PM  Age at listing (hypothetical): 79 years  Sex: Male at 12/7/2024  5:19 AM    -Complicated by recurrent GI bleeds  -EGD 11/25/24 with 1 small varix status post banding, complete eradication achieved, severe portal hypertensive gastropathy, 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  Requiring paracentesis weekly.  Last was yesterday, 12/5 with 3300cc clear fluid removed.  Maintains on Aldactone and Lasix in the outpatient setting.  Low Na diet <2g daily    HE: none    HCC: Negative CT on 11/26. 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  He denies any bloody stools since admission.  He denies abdominal pain nausea or vomiting.    Objective :  Temp:  [96 °F (35.6 °C)-98.4 °F (36.9 °C)] 98.4 °F (36.9 °C)  HR:  [50-67] 61  BP: ()/(50-58) 113/53  Resp:  [18-20] 20  SpO2:  [91 %-97 %] 97 %  O2 Device: None (Room air)    Physical Exam  Vitals reviewed.   Constitutional:       Appearance: Normal appearance.   HENT:      Head: Normocephalic and atraumatic.   Eyes:      Extraocular Movements: Extraocular movements intact.      Pupils: Pupils are equal, round, and reactive to light.   Cardiovascular:      Rate and Rhythm: Normal rate and regular rhythm.   Abdominal:      General: Bowel sounds are normal. There is distension.      Palpations: Abdomen is soft. There is no mass.      Tenderness: There is abdominal tenderness.   Skin:     General: Skin is warm and dry.      Coloration: Skin is not jaundiced.   Neurological:      General: No focal deficit present.      Mental Status: He is alert and oriented to person, place, and time.           Lab Results: I have reviewed the following results:

## 2024-12-08 NOTE — PLAN OF CARE
Problem: SAFETY ADULT  Goal: Patient will remain free of falls  Description: INTERVENTIONS:  - Educate patient/family on patient safety including physical limitations  - Instruct patient to call for assistance with activity   - Consult OT/PT to assist with strengthening/mobility   - Keep Call bell within reach  - Keep bed low and locked with side rails adjusted as appropriate  - Keep care items and personal belongings within reach  - Initiate and maintain comfort rounds  - Make Fall Risk Sign visible to staff  - Offer Toileting every 2 Hours, in advance of need  - Initiate/Maintain bed/chair alarm  - Obtain necessary fall risk management equipment: call bell within reach  - Apply yellow socks and bracelet for high fall risk patients  - Consider moving patient to room near nurses station  Outcome: Progressing     Problem: DISCHARGE PLANNING  Goal: Discharge to home or other facility with appropriate resources  Description: INTERVENTIONS:  - Identify barriers to discharge w/patient and caregiver  - Arrange for needed discharge resources and transportation as appropriate  - Identify discharge learning needs (meds, wound care, etc.)  - Arrange for interpretive services to assist at discharge as needed  - Refer to Case Management Department for coordinating discharge planning if the patient needs post-hospital services based on physician/advanced practitioner order or complex needs related to functional status, cognitive ability, or social support system  Outcome: Progressing     Problem: Knowledge Deficit  Goal: Patient/family/caregiver demonstrates understanding of disease process, treatment plan, medications, and discharge instructions  Description: Complete learning assessment and assess knowledge base.  Interventions:  - Provide teaching at level of understanding  - Provide teaching via preferred learning methods  Outcome: Progressing     Problem: GASTROINTESTINAL - ADULT  Goal: Minimal or absence of nausea and/or  vomiting  Description: INTERVENTIONS:  - Administer IV fluids if ordered to ensure adequate hydration  - Maintain NPO status until nausea and vomiting are resolved  - Nasogastric tube if ordered  - Administer ordered antiemetic medications as needed  - Provide nonpharmacologic comfort measures as appropriate  - Advance diet as tolerated, if ordered  - Consider nutrition services referral to assist patient with adequate nutrition and appropriate food choices  Outcome: Progressing     Problem: HEMATOLOGIC - ADULT  Goal: Maintains hematologic stability  Description: INTERVENTIONS  - Assess for signs and symptoms of bleeding or hemorrhage  - Monitor labs  - Administer supportive blood products/factors as ordered and appropriate  Outcome: Progressing

## 2024-12-08 NOTE — PLAN OF CARE
Problem: Knowledge Deficit  Goal: Patient/family/caregiver demonstrates understanding of disease process, treatment plan, medications, and discharge instructions  Description: Complete learning assessment and assess knowledge base.  Interventions:  - Provide teaching at level of understanding  - Provide teaching via preferred learning methods  Outcome: Progressing     Problem: SAFETY ADULT  Goal: Patient will remain free of falls  Description: INTERVENTIONS:  - Educate patient/family on patient safety including physical limitations  - Instruct patient to call for assistance with activity   - Consult OT/PT to assist with strengthening/mobility   - Keep Call bell within reach  - Keep bed low and locked with side rails adjusted as appropriate  - Keep care items and personal belongings within reach  - Initiate and maintain comfort rounds  - Make Fall Risk Sign visible to staff  - Apply yellow socks and bracelet for high fall risk patients  - Consider moving patient to room near nurses station  Outcome: Progressing  Goal: Maintain or return to baseline ADL function  Description: INTERVENTIONS:  -  Assess patient's ability to carry out ADLs; assess patient's baseline for ADL function and identify physical deficits which impact ability to perform ADLs (bathing, care of mouth/teeth, toileting, grooming, dressing, etc.)  - Assess/evaluate cause of self-care deficits   - Assess range of motion  - Assess patient's mobility; develop plan if impaired  - Assess patient's need for assistive devices and provide as appropriate  - Encourage maximum independence but intervene and supervise when necessary  - Involve family in performance of ADLs  - Assess for home care needs following discharge   - Consider OT consult to assist with ADL evaluation and planning for discharge  - Provide patient education as appropriate  Outcome: Progressing  Goal: Maintains/Returns to pre admission functional level  Description: INTERVENTIONS:  -  Perform AM-PAC 6 Click Basic Mobility/ Daily Activity assessment daily.  - Set and communicate daily mobility goal to care team and patient/family/caregiver.   - Collaborate with rehabilitation services on mobility goals if consulted  - Record patient progress and toleration of activity level   Outcome: Progressing

## 2024-12-08 NOTE — PROGRESS NOTES
Progress Note - Hospitalist   Name: Bimal Lugo 79 y.o. male I MRN: [id number]  Unit/Bed#: -01 I Date of Admission: 12/5/2024   Date of Service: 12/8/2024 I Hospital Day: 2    Assessment & Plan  Iron deficiency anemia due to chronic blood loss  Chronic, in setting of recurrent upper GI bleeds secondary to esophageal varices; last hospitalized 11/24-11/29 resulting in EGD with 1 band placed   Current hgb 9.5  Recent hgb 7-8 last admission, previously baseline around 10  Serial labs given concern for recurrent bleed  Dark stools  Patient with dark stools in the setting of known recurrent GI bleed, presents with concern for the same. No further bloody BM at least 48 hrs  GI consulted,   EGD 12/7 without bleeding  Inpatient VCE 12/8   Secondary esophageal varices with bleeding (HCC)  Secondary to cirrhosis in setting of alcohol abuse  Cirrhosis (HCC)  Chronic, secondary to alcohol abuse. Sober  Continue lasix/aldactone   Paracentesis 12/5 for 3300 mL   LFTs stable on presentation  CAD (coronary artery disease)  Chronic, no chest pain on admit  Continue statin, aspirin, imdur, and beta-blocker  Will need to weigh risks/benefits of continued antiplatelet therapy given recurrent GI bleeding   Chronic kidney disease, stage 3a (HCC)  Renal functions have overall progressed over the past year  Presently, stable  Monitor, avoid nephrotoxins as able  Chronic obstructive pulmonary disease (HCC)  Patient history of smoking breathing comfortably.    Continue combo inhaler  Diabetes mellitus, type 2 (HCC)  Blood Sugar Average: Last 72 hrs (P) 118.1  Well-controlled a/e/b A1c 5.6%  Continue Humalog SSI; hold standing 10-14u from home regimen  Continue Lantus 10u HS (consider 1/2 dose if remains NPO)   Blood glucose monitoring ACHS  Hypoglycemia protocol  Adjust as needed   Hypertension  Chronic, continue home meds  Monitor VS per unit protocol, Blood Pressure: 113/53   Acute blood loss anemia      VTE Pharmacologic  Prophylaxis: VTE Score: 3 Moderate Risk (Score 3-4) - Pharmacological DVT Prophylaxis Contraindicated. Sequential Compression Devices Ordered.    Mobility:   Basic Mobility Inpatient Raw Score: 20  JH-HLM Goal: 6: Walk 10 steps or more  JH-HLM Achieved: 5: Stand (1 or more minutes)  JH-HLM Goal NOT achieved. Continue with multidisciplinary rounding and encourage appropriate mobility to improve upon JH-HLM goals.    Patient Centered Rounds: I performed bedside rounds with nursing staff today.   Discussions with Specialists or Other Care Team Provider: GI    Education and Discussions with Family / Patient: Updated  (wife) at bedside.    Current Length of Stay: 2 day(s)  Current Patient Status: Inpatient   Certification Statement: The patient will continue to require additional inpatient hospital stay due to awaiting inpatient VCE tomorrow due to high risk for recurrent bleed   Discharge Plan:  TBD based on capsule endoscopy results     Code Status: Level 1 - Full Code    Subjective   Patient seen OOB to the chair, wife present at bedside. Doing well this am. No bloody BMs since last evaluation. Denies chest pain or shortness of breath. Remains agreeable to inpatient VCE.    Objective :  Temp:  [96 °F (35.6 °C)-98.4 °F (36.9 °C)] 98.4 °F (36.9 °C)  HR:  [50-67] 61  BP: ()/(50-61) 113/53  Resp:  [18-20] 20  SpO2:  [91 %-98 %] 97 %  O2 Device: None (Room air)    Body mass index is 29.06 kg/m².     Input and Output Summary (last 24 hours):     Intake/Output Summary (Last 24 hours) at 12/8/2024 0904  Last data filed at 12/8/2024 0446  Gross per 24 hour   Intake 1170 ml   Output 1375 ml   Net -205 ml       Physical Exam  Vitals and nursing note reviewed.   Constitutional:       General: He is awake. He is not in acute distress.     Appearance: Normal appearance. He is well-developed and overweight. He is not ill-appearing or toxic-appearing.   HENT:      Head: Normocephalic.   Cardiovascular:      Rate  and Rhythm: Normal rate and regular rhythm.      Heart sounds: Normal heart sounds.   Pulmonary:      Effort: Pulmonary effort is normal. No respiratory distress.      Breath sounds: Normal breath sounds.   Abdominal:      General: Bowel sounds are normal. There is no distension.      Palpations: Abdomen is soft.   Musculoskeletal:      Right lower leg: No edema.      Left lower leg: No edema.   Skin:     General: Skin is warm and dry.      Coloration: Skin is not pale.   Neurological:      Mental Status: He is alert and oriented to person, place, and time.   Psychiatric:         Mood and Affect: Mood normal.         Behavior: Behavior normal. Behavior is cooperative.           Lines/Drains:              Lab Results: I have reviewed the following results:   Results from last 7 days   Lab Units 12/08/24  0509 12/06/24 1346 12/06/24 0604 12/05/24 1916   WBC Thousand/uL  --   --  2.38* 3.82*   HEMOGLOBIN g/dL 9.5*   < > 8.6* 9.2*   HEMATOCRIT % 29.3*   < > 27.2* 28.5*   PLATELETS Thousands/uL  --   --  35* 45*   SEGS PCT %  --   --   --  68   LYMPHO PCT %  --   --   --  15   MONO PCT %  --   --   --  11   EOS PCT %  --   --   --  5    < > = values in this interval not displayed.     Results from last 7 days   Lab Units 12/07/24  0519 12/06/24 0604 12/05/24 1916   SODIUM mmol/L 134*   < > 134*   POTASSIUM mmol/L 4.3   < > 4.3   CHLORIDE mmol/L 103   < > 105   CO2 mmol/L 24   < > 23   BUN mg/dL 29*   < > 27*   CREATININE mg/dL 1.75*   < > 1.64*   ANION GAP mmol/L 7   < > 6   CALCIUM mg/dL 8.3*   < > 8.4   ALBUMIN g/dL  --   --  3.2*   TOTAL BILIRUBIN mg/dL  --   --  0.68   ALK PHOS U/L  --   --  89   ALT U/L  --   --  28   AST U/L  --   --  48*   GLUCOSE RANDOM mg/dL 116   < > 73    < > = values in this interval not displayed.     Results from last 7 days   Lab Units 12/05/24 1916   INR  1.23*     Results from last 7 days   Lab Units 12/08/24  0708 12/07/24  2139 12/07/24  1713 12/07/24  1242 12/07/24  0625  12/06/24  2211 12/06/24  1730 12/06/24  1211 12/06/24  0559 12/05/24  2233   POC GLUCOSE mg/dl 122 119 198* 100 124 110 117 116 93 82               Recent Cultures (last 7 days):         Imaging Results Review: I reviewed radiology reports from this admission including: procedure reports.  Other Study Results Review: No additional pertinent studies reviewed.    Last 24 Hours Medication List:     Current Facility-Administered Medications:     albuterol (PROVENTIL HFA,VENTOLIN HFA) inhaler 2 puff, Q4H PRN    aspirin chewable tablet 81 mg, Daily    atorvastatin (LIPITOR) tablet 40 mg, Early Morning    bisacodyl (DULCOLAX) EC tablet 10 mg, Once    budesonide-formoterol (SYMBICORT) 160-4.5 mcg/act inhaler 2 puff, BID    carvedilol (COREG) tablet 6.25 mg, BID With Meals    fish oil capsule 1,000 mg, Daily    furosemide (LASIX) tablet 40 mg, BID (diuretic)    insulin glargine (LANTUS) subcutaneous injection 10 Units 0.1 mL, HS    insulin lispro (HumALOG/ADMELOG) 100 units/mL subcutaneous injection 1-6 Units, Q6H LUBA **AND** Fingerstick Glucose (POCT), Q6H    isosorbide mononitrate (IMDUR) 24 hr tablet 60 mg, Daily    montelukast (SINGULAIR) tablet 10 mg, Daily    multivitamin stress formula tablet 1 tablet, Daily    nystatin (MYCOSTATIN) powder, BID    pantoprazole (PROTONIX) injection 40 mg, Q12H LUBA    polyethylene glycol (GLYCOLAX) bowel prep 119 g, Once    sodium bicarbonate tablet 650 mg, TID after meals    spironolactone (ALDACTONE) tablet 50 mg, Daily    Administrative Statements   Today, Patient Was Seen By: Pearl Ritter PA-C  I have spent a total time of 42 minutes in caring for this patient on the day of the visit/encounter including Counseling / Coordination of care, Documenting in the medical record, Reviewing / ordering tests, medicine, procedures  , and Communicating with other healthcare professionals .    **Please Note: This note may have been constructed using a voice recognition system.**

## 2024-12-08 NOTE — ASSESSMENT & PLAN NOTE
Chronic, no chest pain on admit  Continue statin, aspirin, imdur, and beta-blocker  Will need to weigh risks/benefits of continued antiplatelet therapy given recurrent GI bleeding

## 2024-12-08 NOTE — ASSESSMENT & PLAN NOTE
Renal functions have overall progressed over the past year  Presently, stable  Monitor, avoid nephrotoxins as able

## 2024-12-08 NOTE — ASSESSMENT & PLAN NOTE
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  Requiring paracentesis weekly.  Last was yesterday, 12/5 with 3300cc clear fluid removed.  Maintains on Aldactone and Lasix in the outpatient setting.  Low Na diet <2g daily    HE: none    HCC: Negative CT on 11/26. AFP 2.9 on 8/16/24

## 2024-12-08 NOTE — ASSESSMENT & PLAN NOTE
Blood Sugar Average: Last 72 hrs (P) 118.1  Well-controlled a/e/b A1c 5.6%  Continue Humalog SSI; hold standing 10-14u from home regimen  Continue Lantus 10u HS (consider 1/2 dose if remains NPO)   Blood glucose monitoring ACHS  Hypoglycemia protocol  Adjust as needed

## 2024-12-08 NOTE — ASSESSMENT & PLAN NOTE
Chronic, in setting of recurrent upper GI bleeds secondary to esophageal varices; last hospitalized 11/24-11/29 resulting in EGD with 1 band placed   Current hgb 9.5  Recent hgb 7-8 last admission, previously baseline around 10  Serial labs given concern for recurrent bleed

## 2024-12-08 NOTE — ASSESSMENT & PLAN NOTE
Chronic, secondary to alcohol abuse. Sober  Continue lasix/aldactone   Paracentesis 12/5 for 3300 mL   LFTs stable on presentation

## 2024-12-08 NOTE — ASSESSMENT & PLAN NOTE
Patient with dark stools in the setting of known recurrent GI bleed, presents with concern for the same. No further bloody BM at least 48 hrs  GI consulted,   EGD 12/7 without bleeding  Inpatient VCE 12/8

## 2024-12-08 NOTE — ASSESSMENT & PLAN NOTE
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

## 2024-12-09 ENCOUNTER — OFFICE VISIT (OUTPATIENT)
Dept: GASTROENTEROLOGY | Facility: CLINIC | Age: 79
End: 2024-12-09
Payer: MEDICARE

## 2024-12-09 DIAGNOSIS — D50.0 IRON DEFICIENCY ANEMIA SECONDARY TO BLOOD LOSS (CHRONIC): Primary | ICD-10-CM

## 2024-12-09 LAB
ANION GAP SERPL CALCULATED.3IONS-SCNC: 6 MMOL/L (ref 4–13)
BUN SERPL-MCNC: 29 MG/DL (ref 5–25)
CALCIUM SERPL-MCNC: 9 MG/DL (ref 8.4–10.2)
CHLORIDE SERPL-SCNC: 100 MMOL/L (ref 96–108)
CO2 SERPL-SCNC: 28 MMOL/L (ref 21–32)
CREAT SERPL-MCNC: 1.55 MG/DL (ref 0.6–1.3)
ERYTHROCYTE [DISTWIDTH] IN BLOOD BY AUTOMATED COUNT: 18.1 % (ref 11.6–15.1)
GFR SERPL CREATININE-BSD FRML MDRD: 41 ML/MIN/1.73SQ M
GLUCOSE SERPL-MCNC: 103 MG/DL (ref 65–140)
GLUCOSE SERPL-MCNC: 106 MG/DL (ref 65–140)
GLUCOSE SERPL-MCNC: 108 MG/DL (ref 65–140)
GLUCOSE SERPL-MCNC: 109 MG/DL (ref 65–140)
GLUCOSE SERPL-MCNC: 153 MG/DL (ref 65–140)
GLUCOSE SERPL-MCNC: 99 MG/DL (ref 65–140)
HCT VFR BLD AUTO: 30.7 % (ref 36.5–49.3)
HGB BLD-MCNC: 10.1 G/DL (ref 12–17)
MCH RBC QN AUTO: 32.2 PG (ref 26.8–34.3)
MCHC RBC AUTO-ENTMCNC: 32.9 G/DL (ref 31.4–37.4)
MCV RBC AUTO: 98 FL (ref 82–98)
PLATELET # BLD AUTO: 41 THOUSANDS/UL (ref 149–390)
PMV BLD AUTO: 11.2 FL (ref 8.9–12.7)
POTASSIUM SERPL-SCNC: 3.9 MMOL/L (ref 3.5–5.3)
RBC # BLD AUTO: 3.14 MILLION/UL (ref 3.88–5.62)
SODIUM SERPL-SCNC: 134 MMOL/L (ref 135–147)
WBC # BLD AUTO: 3.44 THOUSAND/UL (ref 4.31–10.16)

## 2024-12-09 PROCEDURE — 80048 BASIC METABOLIC PNL TOTAL CA: CPT | Performed by: PHYSICIAN ASSISTANT

## 2024-12-09 PROCEDURE — 85027 COMPLETE CBC AUTOMATED: CPT | Performed by: PHYSICIAN ASSISTANT

## 2024-12-09 PROCEDURE — 82948 REAGENT STRIP/BLOOD GLUCOSE: CPT

## 2024-12-09 PROCEDURE — 91110 GI TRC IMG INTRAL ESOPH-ILE: CPT | Performed by: INTERNAL MEDICINE

## 2024-12-09 PROCEDURE — 99232 SBSQ HOSP IP/OBS MODERATE 35: CPT

## 2024-12-09 RX ORDER — INSULIN LISPRO 100 [IU]/ML
1-6 INJECTION, SOLUTION INTRAVENOUS; SUBCUTANEOUS
Status: DISCONTINUED | OUTPATIENT
Start: 2024-12-09 | End: 2024-12-11 | Stop reason: HOSPADM

## 2024-12-09 RX ADMIN — OMEGA-3 FATTY ACIDS CAP 1000 MG 1000 MG: 1000 CAP at 09:37

## 2024-12-09 RX ADMIN — CARVEDILOL 6.25 MG: 6.25 TABLET, FILM COATED ORAL at 16:27

## 2024-12-09 RX ADMIN — BUDESONIDE AND FORMOTEROL FUMARATE DIHYDRATE 2 PUFF: 160; 4.5 AEROSOL RESPIRATORY (INHALATION) at 09:35

## 2024-12-09 RX ADMIN — PANTOPRAZOLE SODIUM 40 MG: 40 INJECTION, POWDER, FOR SOLUTION INTRAVENOUS at 21:33

## 2024-12-09 RX ADMIN — CARVEDILOL 6.25 MG: 6.25 TABLET, FILM COATED ORAL at 09:39

## 2024-12-09 RX ADMIN — INSULIN GLARGINE 10 UNITS: 100 INJECTION, SOLUTION SUBCUTANEOUS at 21:32

## 2024-12-09 RX ADMIN — B-COMPLEX W/ C & FOLIC ACID TAB 1 TABLET: TAB at 09:36

## 2024-12-09 RX ADMIN — ASPIRIN 81 MG: 81 TABLET, CHEWABLE ORAL at 09:36

## 2024-12-09 RX ADMIN — NYSTATIN: 100000 POWDER TOPICAL at 09:39

## 2024-12-09 RX ADMIN — MONTELUKAST 10 MG: 10 TABLET, FILM COATED ORAL at 09:41

## 2024-12-09 RX ADMIN — FUROSEMIDE 40 MG: 40 TABLET ORAL at 16:27

## 2024-12-09 RX ADMIN — INSULIN LISPRO 1 UNITS: 100 INJECTION, SOLUTION INTRAVENOUS; SUBCUTANEOUS at 17:15

## 2024-12-09 RX ADMIN — SPIRONOLACTONE 50 MG: 25 TABLET ORAL at 09:36

## 2024-12-09 RX ADMIN — SODIUM BICARBONATE 650 MG: 650 TABLET ORAL at 09:39

## 2024-12-09 RX ADMIN — PANTOPRAZOLE SODIUM 40 MG: 40 INJECTION, POWDER, FOR SOLUTION INTRAVENOUS at 09:37

## 2024-12-09 RX ADMIN — BUDESONIDE AND FORMOTEROL FUMARATE DIHYDRATE 2 PUFF: 160; 4.5 AEROSOL RESPIRATORY (INHALATION) at 16:30

## 2024-12-09 RX ADMIN — NYSTATIN: 100000 POWDER TOPICAL at 16:29

## 2024-12-09 RX ADMIN — SODIUM BICARBONATE 650 MG: 650 TABLET ORAL at 16:27

## 2024-12-09 RX ADMIN — ISOSORBIDE MONONITRATE 60 MG: 60 TABLET, EXTENDED RELEASE ORAL at 09:37

## 2024-12-09 RX ADMIN — FUROSEMIDE 40 MG: 40 TABLET ORAL at 09:38

## 2024-12-09 NOTE — PROGRESS NOTES
Progress Note - Hospitalist   Name: Bimal Lugo 79 y.o. male I MRN: [id number]  Unit/Bed#: -01 I Date of Admission: 12/5/2024   Date of Service: 12/9/2024 I Hospital Day: 3    Assessment & Plan  Iron deficiency anemia due to chronic blood loss  Chronic, in setting of recurrent upper GI bleeds secondary to esophageal varices; last hospitalized 11/24-11/29 resulting in EGD with 1 band placed.   Current hgb 10.1  Recent hgb 7-8 last admission, previously baseline around 10  Serial labs given concern for recurrent bleed.  Dark stools  Patient with dark stools in the setting of known recurrent GI bleed, presents with concern for the same. No further bloody BM at least 48 hrs  GI consulted,   EGD 12/7 without bleeding  Inpatient VCE 12/8   Secondary esophageal varices with bleeding (HCC)  Secondary to cirrhosis in setting of alcohol abuse  Cirrhosis (HCC)  Chronic, secondary to alcohol abuse. Sober  Continue lasix/aldactone   Paracentesis 12/5 for 3300 mL   LFTs stable on presentation  CAD (coronary artery disease)  Chronic, no chest pain on admit  Continue statin, aspirin, imdur, and beta-blocker  Will need to weigh risks/benefits of continued antiplatelet therapy given recurrent GI bleeding   Chronic kidney disease, stage 3a (HCC)  Renal functions have overall progressed over the past year  Presently, stable  Monitor, avoid nephrotoxins as able  Chronic obstructive pulmonary disease (HCC)  Patient history of smoking breathing comfortably.    Continue combo inhaler  Diabetes mellitus, type 2 (HCC)  Blood Sugar Average: Last 72 hrs (P) 118.6  Well-controlled a/e/b A1c 5.6%  Continue Humalog SSI; hold standing 10-14u from home regimen  Continue Lantus 10u HS (consider 1/2 dose if remains NPO)   Blood glucose monitoring ACHS  Hypoglycemia protocol  Adjust as needed   Hypertension  Chronic, continue home meds  Monitor VS per unit protocol, Blood Pressure: 110/59   Acute blood loss anemia    Pancytopenia  (HCC)  Pancytopenia POA possibly due to alcoholic cirrhosis with chronic blood loss anemia a/e/b leukocytosis ( WBC 3.82 > 2.38), anemia (Hg 9.2 > 8.6 > 9.8), and thrombocytopenia (platelets 45 > 35) treated with GI consult, and serial lab monitoring     VTE Pharmacologic Prophylaxis: VTE Score: 3 Moderate Risk (Score 3-4) - Pharmacological DVT Prophylaxis Contraindicated. Sequential Compression Devices Ordered.    Mobility:   Basic Mobility Inpatient Raw Score: 20  JH-HLM Goal: 6: Walk 10 steps or more  JH-HLM Achieved: 5: Stand (1 or more minutes)  JH-HLM Goal NOT achieved. Continue with multidisciplinary rounding and encourage appropriate mobility to improve upon JH-HLM goals.    Patient Centered Rounds: I performed bedside rounds with nursing staff today.   Discussions with Specialists or Other Care Team Provider: GI and RN    Education and Discussions with Family / Patient: Updated  (wife) at bedside.    Current Length of Stay: 3 day(s)  Current Patient Status: Inpatient   Certification Statement: The patient will continue to require additional inpatient hospital stay due to awaiting inpatient VCE results because patient has high risk of recurrent bleed.   Discharge Plan: TBD depending on the results for VCE.     Code Status: Level 1 - Full Code    Subjective   Patient alert and oriented. Wife at bedside. Patient denies pain, discomfort and bleeding. Patient resting in bed. Call bell within reach and patient able to verbalize needs.     Objective :  Temp:  [97.1 °F (36.2 °C)-98.1 °F (36.7 °C)] 98.1 °F (36.7 °C)  HR:  [57-69] 60  BP: (110-146)/(51-64) 110/59  Resp:  [18] 18  SpO2:  [94 %-100 %] 100 %    Body mass index is 29.06 kg/m².     Input and Output Summary (last 24 hours):     Intake/Output Summary (Last 24 hours) at 12/9/2024 1041  Last data filed at 12/9/2024 0806  Gross per 24 hour   Intake --   Output 2125 ml   Net -2125 ml       Physical Exam  Constitutional:       Appearance: Normal  appearance.   HENT:      Head: Normocephalic.      Nose: Nose normal.      Mouth/Throat:      Mouth: Mucous membranes are moist.      Pharynx: Oropharynx is clear.   Cardiovascular:      Rate and Rhythm: Normal rate.   Pulmonary:      Effort: Pulmonary effort is normal. No respiratory distress.      Breath sounds: Normal breath sounds.   Abdominal:      General: Bowel sounds are normal.      Palpations: Abdomen is soft.      Tenderness: There is no abdominal tenderness.   Musculoskeletal:         General: No swelling.      Right lower leg: No edema.      Left lower leg: No edema.   Skin:     General: Skin is warm and dry.      Capillary Refill: Capillary refill takes 2 to 3 seconds.   Neurological:      Mental Status: He is alert and oriented to person, place, and time.      GCS: GCS eye subscore is 4. GCS verbal subscore is 5. GCS motor subscore is 6.      Sensory: Sensation is intact.   Psychiatric:         Attention and Perception: Attention and perception normal.         Mood and Affect: Mood and affect normal.         Speech: Speech normal.         Thought Content: Thought content normal.         Cognition and Memory: Cognition and memory normal.       Lines/Drains:      Lab Results: I have reviewed the following results:   Results from last 7 days   Lab Units 12/09/24 0513 12/06/24  0604 12/05/24 1916   WBC Thousand/uL 3.44*   < > 3.82*   HEMOGLOBIN g/dL 10.1*   < > 9.2*   HEMATOCRIT % 30.7*   < > 28.5*   PLATELETS Thousands/uL 41*   < > 45*   SEGS PCT %  --   --  68   LYMPHO PCT %  --   --  15   MONO PCT %  --   --  11   EOS PCT %  --   --  5    < > = values in this interval not displayed.     Results from last 7 days   Lab Units 12/09/24 0513 12/06/24  0604 12/05/24 1916   SODIUM mmol/L 134*   < > 134*   POTASSIUM mmol/L 3.9   < > 4.3   CHLORIDE mmol/L 100   < > 105   CO2 mmol/L 28   < > 23   BUN mg/dL 29*   < > 27*   CREATININE mg/dL 1.55*   < > 1.64*   ANION GAP mmol/L 6   < > 6   CALCIUM mg/dL 9.0   <  > 8.4   ALBUMIN g/dL  --   --  3.2*   TOTAL BILIRUBIN mg/dL  --   --  0.68   ALK PHOS U/L  --   --  89   ALT U/L  --   --  28   AST U/L  --   --  48*   GLUCOSE RANDOM mg/dL 103   < > 73    < > = values in this interval not displayed.     Results from last 7 days   Lab Units 12/05/24  1916   INR  1.23*     Results from last 7 days   Lab Units 12/09/24  0656 12/09/24  0614 12/08/24  2350 12/08/24  2041 12/08/24  1633 12/08/24  1107 12/08/24  0708 12/07/24  2139 12/07/24  1713 12/07/24  1242 12/07/24  0625 12/06/24  2211   POC GLUCOSE mg/dl 108 106 101 118 89 158* 122 119 198* 100 124 110               Recent Cultures (last 7 days):         Imaging Results Review: No pertinent imaging studies reviewed.  Other Study Results Review: No additional pertinent studies reviewed.    Last 24 Hours Medication List:     Current Facility-Administered Medications:     albuterol (PROVENTIL HFA,VENTOLIN HFA) inhaler 2 puff, Q4H PRN    aspirin chewable tablet 81 mg, Daily    atorvastatin (LIPITOR) tablet 40 mg, Early Morning    budesonide-formoterol (SYMBICORT) 160-4.5 mcg/act inhaler 2 puff, BID    carvedilol (COREG) tablet 6.25 mg, BID With Meals    fish oil capsule 1,000 mg, Daily    furosemide (LASIX) tablet 40 mg, BID (diuretic)    insulin glargine (LANTUS) subcutaneous injection 10 Units 0.1 mL, HS    insulin lispro (HumALOG/ADMELOG) 100 units/mL subcutaneous injection 1-6 Units, Q6H LUBA **AND** Fingerstick Glucose (POCT), Q6H    isosorbide mononitrate (IMDUR) 24 hr tablet 60 mg, Daily    montelukast (SINGULAIR) tablet 10 mg, Daily    multivitamin stress formula tablet 1 tablet, Daily    nystatin (MYCOSTATIN) powder, BID    pantoprazole (PROTONIX) injection 40 mg, Q12H LUBA    sodium bicarbonate tablet 650 mg, TID after meals    spironolactone (ALDACTONE) tablet 50 mg, Daily    Administrative Statements   Today, Patient Was Seen By: SUNDAY Bhatia  I have spent a total time of 40 minutes in caring for this  patient on the day of the visit/encounter including Patient and family education, Counseling / Coordination of care, Documenting in the medical record, Reviewing / ordering tests, medicine, procedures  , and Communicating with other healthcare professionals .    **Please Note: This note may have been constructed using a voice recognition system.**

## 2024-12-09 NOTE — ASSESSMENT & PLAN NOTE
Blood Sugar Average: Last 72 hrs (P) 118.6  Well-controlled a/e/b A1c 5.6%  Continue Humalog SSI; hold standing 10-14u from home regimen  Continue Lantus 10u HS (consider 1/2 dose if remains NPO)   Blood glucose monitoring ACHS  Hypoglycemia protocol  Adjust as needed

## 2024-12-09 NOTE — PLAN OF CARE
Problem: SAFETY ADULT  Goal: Patient will remain free of falls  Description: INTERVENTIONS:  - Educate patient/family on patient safety including physical limitations  - Instruct patient to call for assistance with activity   - Consult OT/PT to assist with strengthening/mobility   - Keep Call bell within reach  - Keep bed low and locked with side rails adjusted as appropriate  - Keep care items and personal belongings within reach  - Initiate and maintain comfort rounds  - Make Fall Risk Sign visible to staff  - Offer Toileting every 2 Hours, in advance of need  - Initiate/Maintain bed alarm  - Obtain necessary fall risk management equipment: bed alarm  - Apply yellow socks and bracelet for high fall risk patients  - Consider moving patient to room near nurses station  Outcome: Progressing  Goal: Maintain or return to baseline ADL function  Description: INTERVENTIONS:  -  Assess patient's ability to carry out ADLs; assess patient's baseline for ADL function and identify physical deficits which impact ability to perform ADLs (bathing, care of mouth/teeth, toileting, grooming, dressing, etc.)  - Assess/evaluate cause of self-care deficits   - Assess range of motion  - Assess patient's mobility; develop plan if impaired  - Assess patient's need for assistive devices and provide as appropriate  - Encourage maximum independence but intervene and supervise when necessary  - Involve family in performance of ADLs  - Assess for home care needs following discharge   - Consider OT consult to assist with ADL evaluation and planning for discharge  - Provide patient education as appropriate  Outcome: Progressing  Goal: Maintains/Returns to pre admission functional level  Description: INTERVENTIONS:  - Perform AM-PAC 6 Click Basic Mobility/ Daily Activity assessment daily.  - Set and communicate daily mobility goal to care team and patient/family/caregiver.   - Collaborate with rehabilitation services on mobility goals if  consulted  - Perform Range of Motion 4 times a day.  - Reposition patient every 2 hours.  - Dangle patient 3 times a day  - Stand patient 3 times a day  - Ambulate patient 3 times a day  - Out of bed to chair 3 times a day   - Out of bed for meals 3 times a day  - Out of bed for toileting  - Record patient progress and toleration of activity level   Outcome: Progressing     Problem: DISCHARGE PLANNING  Goal: Discharge to home or other facility with appropriate resources  Description: INTERVENTIONS:  - Identify barriers to discharge w/patient and caregiver  - Arrange for needed discharge resources and transportation as appropriate  - Identify discharge learning needs (meds, wound care, etc.)  - Arrange for interpretive services to assist at discharge as needed  - Refer to Case Management Department for coordinating discharge planning if the patient needs post-hospital services based on physician/advanced practitioner order or complex needs related to functional status, cognitive ability, or social support system  Outcome: Progressing     Problem: Knowledge Deficit  Goal: Patient/family/caregiver demonstrates understanding of disease process, treatment plan, medications, and discharge instructions  Description: Complete learning assessment and assess knowledge base.  Interventions:  - Provide teaching at level of understanding  - Provide teaching via preferred learning methods  Outcome: Progressing     Problem: GASTROINTESTINAL - ADULT  Goal: Minimal or absence of nausea and/or vomiting  Description: INTERVENTIONS:  - Administer IV fluids if ordered to ensure adequate hydration  - Maintain NPO status until nausea and vomiting are resolved  - Nasogastric tube if ordered  - Administer ordered antiemetic medications as needed  - Provide nonpharmacologic comfort measures as appropriate  - Advance diet as tolerated, if ordered  - Consider nutrition services referral to assist patient with adequate nutrition and appropriate  food choices  Outcome: Progressing  Goal: Maintains or returns to baseline bowel function  Description: INTERVENTIONS:  - Assess bowel function  - Encourage oral fluids to ensure adequate hydration  - Administer IV fluids if ordered to ensure adequate hydration  - Administer ordered medications as needed  - Encourage mobilization and activity  - Consider nutritional services referral to assist patient with adequate nutrition and appropriate food choices  Outcome: Progressing  Goal: Maintains adequate nutritional intake  Description: INTERVENTIONS:  - Monitor percentage of each meal consumed  - Identify factors contributing to decreased intake, treat as appropriate  - Assist with meals as needed  - Monitor I&O, weight, and lab values if indicated  - Obtain nutrition services referral as needed  Outcome: Progressing     Problem: HEMATOLOGIC - ADULT  Goal: Maintains hematologic stability  Description: INTERVENTIONS  - Assess for signs and symptoms of bleeding or hemorrhage  - Monitor labs  - Administer supportive blood products/factors as ordered and appropriate  Outcome: Progressing

## 2024-12-09 NOTE — ASSESSMENT & PLAN NOTE
Pancytopenia POA possibly due to alcoholic cirrhosis with chronic blood loss anemia a/e/b leukocytosis ( WBC 3.82 > 2.38), anemia (Hg 9.2 > 8.6 > 9.8), and thrombocytopenia (platelets 45 > 35) treated with GI consult, and serial lab monitoring

## 2024-12-09 NOTE — ASSESSMENT & PLAN NOTE
Chronic, in setting of recurrent upper GI bleeds secondary to esophageal varices; last hospitalized 11/24-11/29 resulting in EGD with 1 band placed.   Current hgb 10.1  Recent hgb 7-8 last admission, previously baseline around 10  Serial labs given concern for recurrent bleed.

## 2024-12-09 NOTE — CASE MANAGEMENT
Case Management Discharge Planning Note    Patient name Bimal Lugo  Location /-01 MRN [id number]  : 1945 Date 2024       Current Admission Date: 2024  Current Admission Diagnosis:Dark stools   Patient Active Problem List    Diagnosis Date Noted Date Diagnosed    Secondary esophageal varices with bleeding (HCC) 2024     Dark stools 2024     Iron deficiency anemia due to chronic blood loss 2024     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) 2024     Melena 2024     Hematemesis 2024     Acute blood loss anemia 2024     Acute kidney injury superimposed on stage 3a chronic kidney disease (HCC) 2024     High anion gap metabolic acidosis 2024     Electrolyte abnormality 2024     Hypertension 2024     Diabetes mellitus, type 2 (HCC) 2024     Sleep apnea 2024     Thrombocytopenia (HCC) 2024     Chronic obstructive pulmonary disease (HCC) 04/10/2024     History of aortic valve replacement 04/10/2024     Back pain 04/10/2024     CAD (coronary artery disease) 2024     Cirrhosis (HCC) 2024     Pancytopenia (HCC) 2024     Chronic kidney disease, stage 3a (HCC) 10/11/2021     PVD (peripheral vascular disease) ([facility]) 2018     Atherosclerosis of lower extremity with intermittent claudication (HCC) 2018     Chronic bronchitis with COPD (chronic obstructive pulmonary disease) ([facility]) 2017       LOS (days): 3  Geometric Mean LOS (GMLOS) (days): 3  Days to GMLOS:-0.1     OBJECTIVE:  Risk of Unplanned Readmission Score: 23.8         Current admission status: Inpatient   Preferred Pharmacy:   [facility] - [place] - [address]  [address] [facility] [place] [address]  Phone: [phone] Fax: [phone]    Primary Care Provider: Aly Crater MD    Primary Insurance: MEDICARE  Secondary Insurance: BLUE CROSS    DISCHARGE  DETAILS:    Discharge planning discussed with:: Patient and spouse at the bedside  Freedom of Choice: Yes  Comments - Freedom of Choice: CM reviewed IMM with patient and spouse at bedside. Patient reaffirms he does not was SNF or HHC  CM contacted family/caregiver?: Yes  Were Treatment Team discharge recommendations reviewed with patient/caregiver?: Yes  Did patient/caregiver verbalize understanding of patient care needs?: Yes  Were patient/caregiver advised of the risks associated with not following Treatment Team discharge recommendations?: Yes    Contacts  Patient Contacts: Nighat  Relationship to Patient:: Family  Contact Method: In Person  Reason/Outcome: Continuity of Care, Emergency Contact, Discharge Planning    Requested Home Health Care         Is the patient interested in HHC at discharge?: No                                                       IMM Given (Date):: 12/09/24  IMM Given to:: Patient  Family notified:: wife, Viki  Additional Comments: IMM and Medicare rights reviewed with patient at the bedside. Patient verbalized understanding and asked wife to sign original. Copy given to patient, signed original filed to medical records.

## 2024-12-09 NOTE — ANESTHESIA POSTPROCEDURE EVALUATION
Post-Op Assessment Note    CV Status:  Stable    Pain management: adequate       Mental Status:  Alert and awake   Hydration Status:  Euvolemic   PONV Controlled:  Controlled   Airway Patency:  Patent     Post Op Vitals Reviewed: Yes    No anethesia notable event occurred.            Last Filed PACU Vitals:  Vitals Value Taken Time   Temp 97.7 °F (36.5 °C) 12/07/24 1128   Pulse 51 12/07/24 1138   BP 96/50 12/07/24 1138   Resp 18 12/07/24 1138   SpO2 94 % 12/07/24 1138       Modified Trip:  No data recorded

## 2024-12-10 PROBLEM — D62 ACUTE BLOOD LOSS ANEMIA: Status: RESOLVED | Noted: 2024-11-24 | Resolved: 2024-12-10

## 2024-12-10 LAB
GLUCOSE SERPL-MCNC: 117 MG/DL (ref 65–140)
GLUCOSE SERPL-MCNC: 135 MG/DL (ref 65–140)
GLUCOSE SERPL-MCNC: 145 MG/DL (ref 65–140)
GLUCOSE SERPL-MCNC: 151 MG/DL (ref 65–140)

## 2024-12-10 PROCEDURE — 99232 SBSQ HOSP IP/OBS MODERATE 35: CPT

## 2024-12-10 PROCEDURE — 82948 REAGENT STRIP/BLOOD GLUCOSE: CPT

## 2024-12-10 RX ADMIN — INSULIN GLARGINE 10 UNITS: 100 INJECTION, SOLUTION SUBCUTANEOUS at 21:31

## 2024-12-10 RX ADMIN — BUDESONIDE AND FORMOTEROL FUMARATE DIHYDRATE 2 PUFF: 160; 4.5 AEROSOL RESPIRATORY (INHALATION) at 16:54

## 2024-12-10 RX ADMIN — MONTELUKAST 10 MG: 10 TABLET, FILM COATED ORAL at 08:06

## 2024-12-10 RX ADMIN — PANTOPRAZOLE SODIUM 40 MG: 40 INJECTION, POWDER, FOR SOLUTION INTRAVENOUS at 08:06

## 2024-12-10 RX ADMIN — SODIUM BICARBONATE 650 MG: 650 TABLET ORAL at 07:29

## 2024-12-10 RX ADMIN — SODIUM BICARBONATE 650 MG: 650 TABLET ORAL at 16:53

## 2024-12-10 RX ADMIN — CARVEDILOL 6.25 MG: 6.25 TABLET, FILM COATED ORAL at 16:53

## 2024-12-10 RX ADMIN — ATORVASTATIN CALCIUM 40 MG: 40 TABLET, FILM COATED ORAL at 06:13

## 2024-12-10 RX ADMIN — SODIUM BICARBONATE 650 MG: 650 TABLET ORAL at 12:43

## 2024-12-10 RX ADMIN — FUROSEMIDE 40 MG: 40 TABLET ORAL at 16:53

## 2024-12-10 RX ADMIN — ISOSORBIDE MONONITRATE 60 MG: 60 TABLET, EXTENDED RELEASE ORAL at 08:06

## 2024-12-10 RX ADMIN — CARVEDILOL 6.25 MG: 6.25 TABLET, FILM COATED ORAL at 07:28

## 2024-12-10 RX ADMIN — SPIRONOLACTONE 50 MG: 25 TABLET ORAL at 08:06

## 2024-12-10 RX ADMIN — NYSTATIN: 100000 POWDER TOPICAL at 16:54

## 2024-12-10 RX ADMIN — B-COMPLEX W/ C & FOLIC ACID TAB 1 TABLET: TAB at 08:06

## 2024-12-10 RX ADMIN — FUROSEMIDE 40 MG: 40 TABLET ORAL at 07:29

## 2024-12-10 RX ADMIN — ASPIRIN 81 MG: 81 TABLET, CHEWABLE ORAL at 08:06

## 2024-12-10 RX ADMIN — OMEGA-3 FATTY ACIDS CAP 1000 MG 1000 MG: 1000 CAP at 08:06

## 2024-12-10 RX ADMIN — NYSTATIN: 100000 POWDER TOPICAL at 08:07

## 2024-12-10 RX ADMIN — PANTOPRAZOLE SODIUM 40 MG: 40 INJECTION, POWDER, FOR SOLUTION INTRAVENOUS at 21:31

## 2024-12-10 RX ADMIN — BUDESONIDE AND FORMOTEROL FUMARATE DIHYDRATE 2 PUFF: 160; 4.5 AEROSOL RESPIRATORY (INHALATION) at 08:07

## 2024-12-10 NOTE — PLAN OF CARE
Problem: SAFETY ADULT  Goal: Patient will remain free of falls  Description: INTERVENTIONS:  - Educate patient/family on patient safety including physical limitations  - Instruct patient to call for assistance with activity   - Consult OT/PT to assist with strengthening/mobility   - Keep Call bell within reach  - Keep bed low and locked with side rails adjusted as appropriate  - Keep care items and personal belongings within reach  - Initiate and maintain comfort rounds  - Make Fall Risk Sign visible to staff  - Apply yellow socks and bracelet for high fall risk patients  - Consider moving patient to room near nurses station  Outcome: Progressing  Goal: Maintain or return to baseline ADL function  Description: INTERVENTIONS:  -  Assess patient's ability to carry out ADLs; assess patient's baseline for ADL function and identify physical deficits which impact ability to perform ADLs (bathing, care of mouth/teeth, toileting, grooming, dressing, etc.)  - Assess/evaluate cause of self-care deficits   - Assess range of motion  - Assess patient's mobility; develop plan if impaired  - Assess patient's need for assistive devices and provide as appropriate  - Encourage maximum independence but intervene and supervise when necessary  - Involve family in performance of ADLs  - Assess for home care needs following discharge   - Consider OT consult to assist with ADL evaluation and planning for discharge  - Provide patient education as appropriate  Outcome: Progressing  Goal: Maintains/Returns to pre admission functional level  Description: INTERVENTIONS:  - Perform AM-PAC 6 Click Basic Mobility/ Daily Activity assessment daily.  - Set and communicate daily mobility goal to care team and patient/family/caregiver.   - Collaborate with rehabilitation services on mobility goals if consulted  - Perform Range of Motion 4 times a day.  - Reposition patient every 2 hours.  - Dangle patient 3 times a day  - Stand patient 3 times a  day  - Ambulate patient 3 times a day  - Out of bed to chair 3 times a day   - Out of bed for meals 3 times a day  - Out of bed for toileting  - Record patient progress and toleration of activity level   Outcome: Progressing     Problem: DISCHARGE PLANNING  Goal: Discharge to home or other facility with appropriate resources  Description: INTERVENTIONS:  - Identify barriers to discharge w/patient and caregiver  - Arrange for needed discharge resources and transportation as appropriate  - Identify discharge learning needs (meds, wound care, etc.)  - Arrange for interpretive services to assist at discharge as needed  - Refer to Case Management Department for coordinating discharge planning if the patient needs post-hospital services based on physician/advanced practitioner order or complex needs related to functional status, cognitive ability, or social support system  Outcome: Progressing     Problem: Knowledge Deficit  Goal: Patient/family/caregiver demonstrates understanding of disease process, treatment plan, medications, and discharge instructions  Description: Complete learning assessment and assess knowledge base.  Interventions:  - Provide teaching at level of understanding  - Provide teaching via preferred learning methods  Outcome: Progressing     Problem: GASTROINTESTINAL - ADULT  Goal: Minimal or absence of nausea and/or vomiting  Description: INTERVENTIONS:  - Administer IV fluids if ordered to ensure adequate hydration  - Maintain NPO status until nausea and vomiting are resolved  - Nasogastric tube if ordered  - Administer ordered antiemetic medications as needed  - Provide nonpharmacologic comfort measures as appropriate  - Advance diet as tolerated, if ordered  - Consider nutrition services referral to assist patient with adequate nutrition and appropriate food choices  Outcome: Progressing  Goal: Maintains or returns to baseline bowel function  Description: INTERVENTIONS:  - Assess bowel function  -  Encourage oral fluids to ensure adequate hydration  - Administer IV fluids if ordered to ensure adequate hydration  - Administer ordered medications as needed  - Encourage mobilization and activity  - Consider nutritional services referral to assist patient with adequate nutrition and appropriate food choices  Outcome: Progressing  Goal: Maintains adequate nutritional intake  Description: INTERVENTIONS:  - Monitor percentage of each meal consumed  - Identify factors contributing to decreased intake, treat as appropriate  - Assist with meals as needed  - Monitor I&O, weight, and lab values if indicated  - Obtain nutrition services referral as needed  Outcome: Progressing     Problem: HEMATOLOGIC - ADULT  Goal: Maintains hematologic stability  Description: INTERVENTIONS  - Assess for signs and symptoms of bleeding or hemorrhage  - Monitor labs  - Administer supportive blood products/factors as ordered and appropriate  Outcome: Progressing

## 2024-12-10 NOTE — ASSESSMENT & PLAN NOTE
Blood Sugar Average: Last 72 hrs (P) 122.25  Well-controlled a/e/b A1c 5.6%  Continue Humalog SSI; hold standing 10-14u from home regimen  Continue Lantus 10u HS (consider 1/2 dose if remains NPO)   Blood glucose monitoring ACHS  Hypoglycemia protocol  Adjust as needed

## 2024-12-10 NOTE — ASSESSMENT & PLAN NOTE
Blood Sugar Average: Last 72 hrs (P) 121.4  Well-controlled a/e/b A1c 5.6%  Continue Humalog SSI; hold standing 10-14u from home regimen  Continue Lantus 10u HS (consider 1/2 dose if remains NPO)   Blood glucose monitoring ACHS  Hypoglycemia protocol  Adjust as needed

## 2024-12-10 NOTE — CASE MANAGEMENT
Case Management Progress Note    Patient name Bimal Lugo  Location /-01 MRN [id number]  : 1945 Date 12/10/2024       LOS (days): 4  Geometric Mean LOS (GMLOS) (days): 3  Days to GMLOS:-1.2        OBJECTIVE:        Current admission status: Inpatient  Preferred Pharmacy:   [facility] [place] - [address]  [address] [facility] [address]  Phone: [phone] Fax: [phone]    Primary Care Provider: Aly Carter MD    Primary Insurance: MEDICARE  Secondary Insurance: BLUE CROSS    PROGRESS NOTE:  Discussed patient's case in interdisciplinary rounds this morning with [facility] provider. Patient is not medically cleared for discharge- pending read and results of VCE. Anticipating discharge today vs tomorrow to home. Patient declined CM needs. CM will continue to follow.

## 2024-12-10 NOTE — PROGRESS NOTES
Progress Note - Hospitalist   Name: Bimal Lugo 79 y.o. male I MRN: [id number]  Unit/Bed#: -01 I Date of Admission: 12/5/2024   Date of Service: 12/10/2024 I Hospital Day: 4    Assessment & Plan  Dark stools  Patient with dark stools in the setting of known recurrent GI bleed, presents with concern for the same. No further bloody BM greater than 48 hrs  GI consulted,   EGD 12/7 without bleeding  Inpatient VCE 12/8   Acute blood loss anemia (Resolved: 12/10/2024)    Iron deficiency anemia due to chronic blood loss  Chronic, in setting of recurrent upper GI bleeds secondary to esophageal varices; last hospitalized 11/24-11/29 resulting in EGD with 1 band placed.   12/9/2024- hgb 10.1  Recent hgb 7-8 last admission, previously baseline around 10  Secondary esophageal varices with bleeding (HCC)  Secondary to cirrhosis in setting of alcohol abuse  Cirrhosis (HCC)  Chronic, secondary to alcohol abuse. Sober  Continue lasix/aldactone   Paracentesis 12/5 for 3300 mL   LFTs stable on presentation  CAD (coronary artery disease)  Chronic, no chest pain on admit  Continue statin, aspirin, imdur, and beta-blocker  Will need to weigh risks/benefits of continued antiplatelet therapy given recurrent GI bleeding   Chronic kidney disease, stage 3a (HCC)  Renal functions have overall progressed over the past year  Presently, stable  Monitor, avoid nephrotoxins as able  Chronic obstructive pulmonary disease (HCC)  Patient history of smoking breathing comfortably.    Continue combo inhaler  Diabetes mellitus, type 2 (HCC)  Blood Sugar Average: Last 72 hrs (P) 122.25  Well-controlled a/e/b A1c 5.6%  Continue Humalog SSI; hold standing 10-14u from home regimen  Continue Lantus 10u HS (consider 1/2 dose if remains NPO)   Blood glucose monitoring ACHS  Hypoglycemia protocol  Adjust as needed   Hypertension  Chronic, continue home meds  Monitor VS per unit protocol, Blood Pressure: 132/58   Pancytopenia (HCC)  Pancytopenia POA  possibly due to alcoholic cirrhosis with chronic blood loss anemia a/e/b leukocytosis ( WBC 3.82 > 2.38), anemia (Hg 9.2 > 8.6 > 9.8), and thrombocytopenia (platelets 45 > 35) treated with GI consult, and serial lab monitoring     VTE Pharmacologic Prophylaxis: VTE Score: 3 Moderate Risk (Score 3-4) - Pharmacological DVT Prophylaxis Contraindicated. Sequential Compression Devices Ordered.    Mobility:   Basic Mobility Inpatient Raw Score: 20  JH-HLM Goal: 6: Walk 10 steps or more  JH-HLM Achieved: 6: Walk 10 steps or more  JH-HLM Goal achieved. Continue to encourage appropriate mobility.    Patient Centered Rounds: I performed bedside rounds with nursing staff today.   Discussions with Specialists or Other Care Team Provider: GI, RN    Education and Discussions with Family / Patient:  Patient.     Current Length of Stay: 4 day(s)  Current Patient Status: Inpatient   Certification Statement: The patient will continue to require additional inpatient hospital stay due to VCE results.   Discharge Plan: Anticipate discharge tomorrow to home.    Code Status: Level 1 - Full Code    Subjective   Patient alert and oriented x 4. Patient OOB to chair. Patient denies discomfort, pain and signs of bleeding.     Objective :  Temp:  [96.3 °F (35.7 °C)-98.4 °F (36.9 °C)] 97.4 °F (36.3 °C)  HR:  [60-67] 60  BP: (112-151)/(49-58) 132/58  Resp:  [18] 18  SpO2:  [93 %-99 %] 98 %  O2 Device: None (Room air)    Body mass index is 29.06 kg/m².     Input and Output Summary (last 24 hours):     Intake/Output Summary (Last 24 hours) at 12/10/2024 1209  Last data filed at 12/10/2024 0728  Gross per 24 hour   Intake 480 ml   Output 1050 ml   Net -570 ml     Physical Exam  Vitals reviewed.   Constitutional:       General: He is not in acute distress.     Appearance: Normal appearance.   HENT:      Head: Normocephalic.      Nose: No congestion or rhinorrhea.      Mouth/Throat:      Mouth: Mucous membranes are moist.   Cardiovascular:      Rate  and Rhythm: Normal rate.   Pulmonary:      Effort: Pulmonary effort is normal. No respiratory distress.      Breath sounds: Normal breath sounds.   Abdominal:      General: Bowel sounds are normal.      Palpations: Abdomen is soft.      Tenderness: There is no abdominal tenderness.   Musculoskeletal:         General: No swelling.      Right lower leg: No edema.      Left lower leg: No edema.   Skin:     General: Skin is warm.      Capillary Refill: Capillary refill takes 2 to 3 seconds.   Neurological:      Mental Status: He is alert.      GCS: GCS eye subscore is 4. GCS verbal subscore is 5. GCS motor subscore is 6.      Motor: Motor function is intact.      Coordination: Coordination is intact.      Gait: Gait is intact.   Psychiatric:         Attention and Perception: Attention normal.         Mood and Affect: Mood normal.         Speech: Speech normal.         Behavior: Behavior normal. Behavior is cooperative.         Thought Content: Thought content normal.         Cognition and Memory: Cognition and memory normal.         Judgment: Judgment normal.       Lines/Drains:      Lab Results: I have reviewed the following results:   Results from last 7 days   Lab Units 12/09/24 0513 12/06/24  0604 12/05/24 1916   WBC Thousand/uL 3.44*   < > 3.82*   HEMOGLOBIN g/dL 10.1*   < > 9.2*   HEMATOCRIT % 30.7*   < > 28.5*   PLATELETS Thousands/uL 41*   < > 45*   SEGS PCT %  --   --  68   LYMPHO PCT %  --   --  15   MONO PCT %  --   --  11   EOS PCT %  --   --  5    < > = values in this interval not displayed.     Results from last 7 days   Lab Units 12/09/24  0513 12/06/24  0604 12/05/24 1916   SODIUM mmol/L 134*   < > 134*   POTASSIUM mmol/L 3.9   < > 4.3   CHLORIDE mmol/L 100   < > 105   CO2 mmol/L 28   < > 23   BUN mg/dL 29*   < > 27*   CREATININE mg/dL 1.55*   < > 1.64*   ANION GAP mmol/L 6   < > 6   CALCIUM mg/dL 9.0   < > 8.4   ALBUMIN g/dL  --   --  3.2*   TOTAL BILIRUBIN mg/dL  --   --  0.68   ALK PHOS U/L  --    --  89   ALT U/L  --   --  28   AST U/L  --   --  48*   GLUCOSE RANDOM mg/dL 103   < > 73    < > = values in this interval not displayed.     Results from last 7 days   Lab Units 12/05/24  1916   INR  1.23*     Results from last 7 days   Lab Units 12/10/24  1133 12/10/24  0733 12/09/24  2051 12/09/24  1641 12/09/24  1150 12/09/24  0656 12/09/24  0614 12/08/24  2350 12/08/24  2041 12/08/24  1633 12/08/24  1107 12/08/24  0708   POC GLUCOSE mg/dl 135 117 99 153* 109 108 106 101 118 89 158* 122             Recent Cultures (last 7 days):         Imaging Results Review: No pertinent imaging studies reviewed.  Other Study Results Review: No additional pertinent studies reviewed.    Last 24 Hours Medication List:     Current Facility-Administered Medications:     albuterol (PROVENTIL HFA,VENTOLIN HFA) inhaler 2 puff, Q4H PRN    aspirin chewable tablet 81 mg, Daily    atorvastatin (LIPITOR) tablet 40 mg, Early Morning    budesonide-formoterol (SYMBICORT) 160-4.5 mcg/act inhaler 2 puff, BID    carvedilol (COREG) tablet 6.25 mg, BID With Meals    fish oil capsule 1,000 mg, Daily    furosemide (LASIX) tablet 40 mg, BID (diuretic)    insulin glargine (LANTUS) subcutaneous injection 10 Units 0.1 mL, HS    insulin lispro (HumALOG/ADMELOG) 100 units/mL subcutaneous injection 1-6 Units, 4x Daily (PC & HS) **AND** Fingerstick Glucose (POCT), 4x Daily AC and at bedtime    isosorbide mononitrate (IMDUR) 24 hr tablet 60 mg, Daily    montelukast (SINGULAIR) tablet 10 mg, Daily    multivitamin stress formula tablet 1 tablet, Daily    nystatin (MYCOSTATIN) powder, BID    pantoprazole (PROTONIX) injection 40 mg, Q12H LUBA    sodium bicarbonate tablet 650 mg, TID after meals    spironolactone (ALDACTONE) tablet 50 mg, Daily    Administrative Statements   Today, Patient Was Seen By: SUNDAY Bhatia  I have spent a total time of 40 minutes in caring for this patient on the day of the visit/encounter including Patient and family  education, Counseling / Coordination of care, Documenting in the medical record, Reviewing / ordering tests, medicine, procedures  , Obtaining or reviewing history  , and Communicating with other healthcare professionals .    **Please Note: This note may have been constructed using a voice recognition system.**

## 2024-12-10 NOTE — ASSESSMENT & PLAN NOTE
Chronic, in setting of recurrent upper GI bleeds secondary to esophageal varices; last hospitalized 11/24-11/29 resulting in EGD with 1 band placed.   12/9/2024- hgb 10.1  Recent hgb 7-8 last admission, previously baseline around 10

## 2024-12-10 NOTE — PROGRESS NOTES
Pastoral Care Progress Note             12/10/24 0900   Clinical Encounter Type   Visited With Patient;Family   Routine Visit Introduction      provided support for pt after med staff notification of wife's medical emergency.  served as a supportive presence and offered future availability for support. Pt expressed gratitude for the support. Pt states he will be discharged today. No follow up needed as presently assessed.

## 2024-12-10 NOTE — ASSESSMENT & PLAN NOTE
Patient with dark stools in the setting of known recurrent GI bleed, presents with concern for the same. No further bloody BM greater than 48 hrs  GI consulted,   EGD 12/7 without bleeding  Inpatient VCE 12/8

## 2024-12-10 NOTE — QUICK NOTE
Small Bowel Capsule:    Severe Portal Hypertensive Gastropathy (PHG). Mild duodenitis and jejunitis. One possible small non-bleeding clean based ulcer in the proximal small bowel (likely in proximal jejunum). No active bleeding seen anywhere in GI tract.     Suspect dark stool and anemia from severe PHG.

## 2024-12-11 VITALS
WEIGHT: 174.6 LBS | SYSTOLIC BLOOD PRESSURE: 136 MMHG | TEMPERATURE: 98.2 F | HEART RATE: 66 BPM | BODY MASS INDEX: 29.09 KG/M2 | HEIGHT: 65 IN | RESPIRATION RATE: 18 BRPM | OXYGEN SATURATION: 98 % | DIASTOLIC BLOOD PRESSURE: 67 MMHG

## 2024-12-11 DIAGNOSIS — K92.1 MELENA: Primary | ICD-10-CM

## 2024-12-11 DIAGNOSIS — R19.5 DARK STOOLS: ICD-10-CM

## 2024-12-11 LAB
GLUCOSE SERPL-MCNC: 129 MG/DL (ref 65–140)
GLUCOSE SERPL-MCNC: 132 MG/DL (ref 65–140)
GLUCOSE SERPL-MCNC: 136 MG/DL (ref 65–140)

## 2024-12-11 PROCEDURE — 99239 HOSP IP/OBS DSCHRG MGMT >30: CPT

## 2024-12-11 PROCEDURE — 82948 REAGENT STRIP/BLOOD GLUCOSE: CPT

## 2024-12-11 PROCEDURE — 97163 PT EVAL HIGH COMPLEX 45 MIN: CPT

## 2024-12-11 PROCEDURE — 99232 SBSQ HOSP IP/OBS MODERATE 35: CPT | Performed by: INTERNAL MEDICINE

## 2024-12-11 RX ORDER — PANTOPRAZOLE SODIUM 40 MG/1
40 TABLET, DELAYED RELEASE ORAL
Qty: 60 TABLET | Refills: 0 | Status: SHIPPED | OUTPATIENT
Start: 2024-12-11

## 2024-12-11 RX ADMIN — MONTELUKAST 10 MG: 10 TABLET, FILM COATED ORAL at 08:44

## 2024-12-11 RX ADMIN — FUROSEMIDE 40 MG: 40 TABLET ORAL at 08:44

## 2024-12-11 RX ADMIN — SODIUM BICARBONATE 650 MG: 650 TABLET ORAL at 08:44

## 2024-12-11 RX ADMIN — NYSTATIN: 100000 POWDER TOPICAL at 12:10

## 2024-12-11 RX ADMIN — ATORVASTATIN CALCIUM 40 MG: 40 TABLET, FILM COATED ORAL at 06:00

## 2024-12-11 RX ADMIN — SPIRONOLACTONE 50 MG: 25 TABLET ORAL at 08:44

## 2024-12-11 RX ADMIN — B-COMPLEX W/ C & FOLIC ACID TAB 1 TABLET: TAB at 08:44

## 2024-12-11 RX ADMIN — SODIUM BICARBONATE 650 MG: 650 TABLET ORAL at 12:10

## 2024-12-11 RX ADMIN — CARVEDILOL 6.25 MG: 6.25 TABLET, FILM COATED ORAL at 08:44

## 2024-12-11 RX ADMIN — CARVEDILOL 6.25 MG: 6.25 TABLET, FILM COATED ORAL at 15:51

## 2024-12-11 RX ADMIN — PANTOPRAZOLE SODIUM 40 MG: 40 INJECTION, POWDER, FOR SOLUTION INTRAVENOUS at 08:46

## 2024-12-11 RX ADMIN — BUDESONIDE AND FORMOTEROL FUMARATE DIHYDRATE 2 PUFF: 160; 4.5 AEROSOL RESPIRATORY (INHALATION) at 08:53

## 2024-12-11 RX ADMIN — FUROSEMIDE 40 MG: 40 TABLET ORAL at 15:51

## 2024-12-11 RX ADMIN — SODIUM BICARBONATE 650 MG: 650 TABLET ORAL at 16:56

## 2024-12-11 RX ADMIN — ASPIRIN 81 MG: 81 TABLET, CHEWABLE ORAL at 08:44

## 2024-12-11 RX ADMIN — ISOSORBIDE MONONITRATE 60 MG: 60 TABLET, EXTENDED RELEASE ORAL at 08:44

## 2024-12-11 RX ADMIN — OMEGA-3 FATTY ACIDS CAP 1000 MG 1000 MG: 1000 CAP at 08:44

## 2024-12-11 NOTE — ASSESSMENT & PLAN NOTE
Blood Sugar Average: Last 72 hrs (P) [id number]  Well-controlled a/e/b A1c 5.6%  Continue Humalog SSI; hold standing 10-14u from home regimen  Continue Lantus 10u HS (consider 1/2 dose if remains NPO)   Blood glucose monitoring ACHS  Hypoglycemia protocol

## 2024-12-11 NOTE — CASE MANAGEMENT
Case Management Progress Note    Patient name Bimal Lugo  Location /-01 MRN [id number]  : 1945 Date 2024       LOS (days): 5  Geometric Mean LOS (GMLOS) (days): 3  Days to GMLOS:-2.1        OBJECTIVE:        Current admission status: Inpatient  Preferred Pharmacy:   [facility] [place] - [address] [facility]  [address] [facility] [address]  Phone: [phone] Fax: [phone]    Primary Care Provider: Aly Carter MD    Primary Insurance: MEDICARE  Secondary Insurance: BLUE CROSS    PROGRESS NOTE:  CM spoke with PT, who gave a level 2 evaluation. States he refused rehab with her, and wants to go home with C at best. States will call son to pick him up.  Called daughter Ashlie and left message to call back.   Updated wife, and ELEANOR Kang, NP Nina, RN Oswaldo.  Daughter called back and CM updated her on patient's decision. CM offered the Urgent SNF phone number and explained process. Will add number to discharge papers as well. Daughter will likely .

## 2024-12-11 NOTE — PLAN OF CARE
Problem: PHYSICAL THERAPY ADULT  Goal: Performs mobility at highest level of function for planned discharge setting.  See evaluation for individualized goals.  Description: Treatment/Interventions: Functional transfer training, LE strengthening/ROM, Therapeutic exercise, Elevations, Endurance training, Patient/family training, Equipment eval/education, Bed mobility, Gait training, Continued evaluation, OT  Equipment Recommended: Walker       See flowsheet documentation for full assessment, interventions and recommendations.  Note: Prognosis: Good  Problem List: Decreased strength, Impaired balance, Decreased endurance, Decreased mobility, Decreased safety awareness, Impaired judgement  Assessment: Bimal Lugo is a 79 y.o. male admitted to [facility] on 12/5/2024 for Dark stools, CKD, diabetes mellitus type 2, iron deficiency anemia due to chronic blood loss. Pt  has a past medical history of Chronic bronchitis (HCC), COPD (chronic obstructive pulmonary disease) ([facility]), Diabetes mellitus (HCC), Esophageal varices (HCC) (11 apr 2024), Hyperlipidemia, Hypertension, Obesity, and ARACELY (obstructive sleep apnea).. Order placed for PT eval and tx. PT was consulted and pt was seen on 12/11/2024 for mobility assessment and d/c planning. Chart review and two person identifiers were completed.   Currently pt presents with decreased strength , decreased static sitting balance , decreased dynamic sitting balance , decreased static standing balance, decreased dynamic standing balance , decreased gait speed, decreased step length , decreased muscular endurance , and decreased safety awareness . Due to these impairments, they will require assistance to perform bed mobility, sit to stand , ambulation, stair negotiation, and transfers. Pt is currently functioning at acontact guard assistance x1 level for transfers, contact guard assistance x1 level for ambulation with Rolling Walker, and contact guard assistance x1 for stair  climbing/elevations. These activity limitations significantly impact their ability to participate in previous home and community roles and responsibilities , ambulation in home, and ambulation in the community. The patient's AM-PAC Basic Mobility Inpatient Short Form Raw Score is 19. PT recommends level II moderate resource intensity. They will benefit from skilled therapy to to reduce the risk of falls, to allow for safe ambulation, and to maximize functional potential.  Barriers to Discharge: Inaccessible home environment, Decreased caregiver support     Rehab Resource Intensity Level, PT: II (Moderate Resource Intensity)    See flowsheet documentation for full assessment.

## 2024-12-11 NOTE — ASSESSMENT & PLAN NOTE
Renal functions have overall progressed over the past year  Presently, stable  Avoid nephrotoxins as able

## 2024-12-11 NOTE — ASSESSMENT & PLAN NOTE
Chronic, secondary to alcohol abuse. Sober  Continue lasix/aldactone.  Paracentesis 12/5 for 3300 mL   LFTs stable on presentation.

## 2024-12-11 NOTE — PHYSICAL THERAPY NOTE
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]  Varices of esophagus determined by endoscopy (HCC) [I85.00]    Problem List  Patient Active Problem List   Diagnosis    Atherosclerosis of lower extremity with intermittent claudication (HCC)    CAD (coronary artery disease)    Chronic bronchitis with COPD (chronic obstructive pulmonary disease) (HCC)    Chronic kidney disease, stage 3a (HCC)    PVD (peripheral vascular disease) (HCC)    Pancytopenia (HCC)    Cirrhosis (HCC)    Chronic obstructive pulmonary disease (HCC)    History of aortic valve replacement    Back pain    Thrombocytopenia (HCC)    Sleep apnea    Diabetes mellitus, type 2 (HCC)    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)    Melena    Hematemesis    Acute kidney injury superimposed on stage 3a chronic kidney disease (HCC)    High anion gap metabolic acidosis    Electrolyte abnormality    Hypertension    Secondary esophageal varices with bleeding (HCC)    Dark stools    Iron deficiency anemia due to chronic blood loss       Past Medical History  Past Medical History:   Diagnosis Date    Chronic bronchitis (HCC)     COPD (chronic obstructive pulmonary disease) (HCC)     Diabetes mellitus (HCC)     Esophageal varices (HCC) 11 apr 2024    Hyperlipidemia     Hypertension     Obesity     ARACELY (obstructive sleep apnea)        Past Surgical History  Past Surgical History:   Procedure Laterality Date    AORTIC VALVE REPLACEMENT      Jan 4 2024    IR PARACENTESIS  04/04/2024    IR PARACENTESIS  03/07/2024    IR PARACENTESIS  04/11/2024    IR PARACENTESIS  04/26/2024    IR PARACENTESIS  05/09/2024    IR PARACENTESIS  05/16/2024    IR PARACENTESIS  05/23/2024    IR PARACENTESIS  06/06/2024    IR PARACENTESIS  06/13/2024    IR  PARACENTESIS  06/20/2024    IR PARACENTESIS  06/27/2024    IR PARACENTESIS  07/03/2024    IR PARACENTESIS  07/11/2024    IR PARACENTESIS  07/18/2024    IR PARACENTESIS  07/24/2024    IR PARACENTESIS  08/01/2024    IR PARACENTESIS  8/8/2024    IR PARACENTESIS  8/15/2024    IR PARACENTESIS  8/22/2024    IR PARACENTESIS  8/29/2024    IR PARACENTESIS  9/5/2024    IR PARACENTESIS  9/12/2024    IR PARACENTESIS  9/19/2024    IR PARACENTESIS  10/3/2024    IR PARACENTESIS  9/26/2024    IR PARACENTESIS  10/10/2024    IR PARACENTESIS  10/17/2024    IR PARACENTESIS  10/24/2024    IR PARACENTESIS  10/31/2024    IR PARACENTESIS  11/7/2024    IR PARACENTESIS  11/14/2024    IR PARACENTESIS  11/21/2024    IR PARACENTESIS  11/27/2024    IR PARACENTESIS  12/5/2024    UPPER GASTROINTESTINAL ENDOSCOPY  11 apr 2024       Recent Imaging  No orders to display       Recent Vital Signs  Vitals:    12/10/24 1510 12/10/24 2130 12/11/24 0741 12/11/24 1551   BP: 137/55 127/60 130/65 136/67   Pulse:  68 58 66   Resp:  18     Temp: 98.4 °F (36.9 °C) 98.6 °F (37 °C) 98.2 °F (36.8 °C)    TempSrc:       SpO2:  96% 98%    Weight:       Height:              12/11/24 1432   PT Last Visit   PT Visit Date 12/11/24   Note Type   Note type Evaluation   Pain Assessment   Pain Assessment Tool 0-10   Pain Score No Pain   Restrictions/Precautions   Weight Bearing Precautions Per Order No   Braces or Orthoses Other (Comment)  (none)   Other Precautions Chair Alarm;Bed Alarm;Fall Risk   Home Living   Type of Home House   Home Layout One level;Stairs to enter with rails;Performs ADLs on one level  (4 SCARLETT)   Bathroom Shower/Tub Walk-in shower   Bathroom Toilet Raised   Bathroom Equipment Grab bars in shower;Shower chair;Grab bars around toilet   Bathroom Accessibility Accessible   Home Equipment Walker;Cane;Wheelchair-manual  (rollator)   Additional Comments only uses cane for ambulation   Prior Function   Level of [place] Independent with ADLs;Independent  "with functional mobility;Independent with IADLS   Lives With Spouse   Receives Help From Family  (son and DIL live next door)   IADLs Independent with driving;Independent with medication management;Family/Friend/Other provides meals   Falls in the last 6 months 1 to 4  (2 falls \"just the other week\", pt reports being able to stand back up after, \"I was stumbling over clothes on the floor\")   Vocational Retired  (electrian)   General   Family/Caregiver Present No   Cognition   Overall Cognitive Status WFL   Orientation Level Oriented X4   Memory Within functional limits   Following Commands Follows all commands and directions without difficulty   Comments Pt agreeable to PT evaluation   RLE Assessment   RLE Assessment WFL  (4/5)   LLE Assessment   LLE Assessment WFL  (4/5)   Vision-Basic Assessment   Current Vision Wears glasses only for reading   Coordination   Sensation WFL   Light Touch   RLE Light Touch Grossly intact   LLE Light Touch Grossly intact   Bed Mobility   Additional Comments Pt OOB at start and end of session   Transfers   Sit to Stand   (CGA)   Additional items Assist x 1;Armrests;Increased time required;Verbal cues   Stand to Sit   (CGA)   Additional items Assist x 1;Increased time required;Verbal cues;Armrests   Additional Comments with RW   Ambulation/Elevation   Gait pattern Decreased hip extension;Decreased heel strike;Decreased toe off;Short stride;Step through pattern   Gait Assistance   (CGA)   Additional items Assist x 1;Verbal cues   Assistive Device Rolling walker   Distance 80', 60'   Stair Management Assistance   (CGA)   Additional items Assist x 1;Verbal cues;Increased time required   Stair Management Technique Two rails;Alternating pattern;Foreward   Number of Stairs 3   Balance   Static Sitting Good   Dynamic Sitting Fair +   Static Standing Fair   Dynamic Standing Fair -   Ambulatory Fair -   Activity Tolerance   Activity Tolerance Patient tolerated treatment well   Nurse Made Aware " Spoke to RN   Assessment   Prognosis Good   Problem List Decreased strength;Impaired balance;Decreased endurance;Decreased mobility;Decreased safety awareness;Impaired judgement   Assessment Bimal Lugo is a 79 y.o. male admitted to [facility] on 12/5/2024 for Dark stools, CKD, diabetes mellitus type 2, iron deficiency anemia due to chronic blood loss. Pt  has a past medical history of Chronic bronchitis (HCC), COPD (chronic obstructive pulmonary disease) ([facility]), Diabetes mellitus (HCC), Esophageal varices (HCC) (11 apr 2024), Hyperlipidemia, Hypertension, Obesity, and ARACELY (obstructive sleep apnea).. Order placed for PT eval and tx. PT was consulted and pt was seen on 12/11/2024 for mobility assessment and d/c planning. Chart review and two person identifiers were completed.   Currently pt presents with decreased strength , decreased static sitting balance , decreased dynamic sitting balance , decreased static standing balance, decreased dynamic standing balance , decreased gait speed, decreased step length , decreased muscular endurance , and decreased safety awareness . Due to these impairments, they will require assistance to perform bed mobility, sit to stand , ambulation, stair negotiation, and transfers. Pt is currently functioning at acontact guard assistance x1 level for transfers, contact guard assistance x1 level for ambulation with Rolling Walker, and contact guard assistance x1 for stair climbing/elevations. These activity limitations significantly impact their ability to participate in previous home and community roles and responsibilities , ambulation in home, and ambulation in the community. The patient's AM-PAC Basic Mobility Inpatient Short Form Raw Score is 19. PT recommends level II moderate resource intensity. They will benefit from skilled therapy to to reduce the risk of falls, to allow for safe ambulation, and to maximize functional potential.   Barriers to Discharge Inaccessible home  environment;Decreased caregiver support   Goals   STG Expiration Date 12/21/24   Short Term Goal #1 Within 10 days patient will complete: 1) Bed mobility skills with modified independent assistance to facilitate safe return to previous living environment 2) Functional transfers with modified independent assistance to facilitate safe return to previous living environment  3) Ambulation with least restrictive AD modified independent assistance without LOB and stable vitals for safe ambulation home/ community distances. 4) Stair training up/down flight 4 step/s with appropriate rail/s and modified independent assistance for safe access to previous living environment. 5) Improve balance grades to fair + to reduce risk of falls. 6)Improve LE strength grades by 1 to increase independence w/ transfers and gait.  7) PT for ongoing pt and family education; DME needs and D/C planning to promote highest level of function in least restrictive environment.   Plan   Treatment/Interventions Functional transfer training;LE strengthening/ROM;Therapeutic exercise;Elevations;Endurance training;Patient/family training;Equipment eval/education;Bed mobility;Gait training;Continued evaluation;OT   Discharge Recommendation   Rehab Resource Intensity Level, PT II (Moderate Resource Intensity)   Equipment Recommended Walker   Walker Package Recommended Wheeled walker   AM-PAC Basic Mobility Inpatient   Turning in Flat Bed Without Bedrails 4   Lying on Back to Sitting on Edge of Flat Bed Without Bedrails 3   Moving Bed to Chair 3   Standing Up From Chair Using Arms 3   Walk in Room 3   Climb 3-5 Stairs With Railing 3   Basic Mobility Inpatient Raw Score 19   Basic Mobility Standardized Score 42.48   [facility] Highest Level Of Mobility   JH-HLM Goal 6: Walk 10 steps or more   JH-HLM Achieved 7: Walk 25 feet or more   End of Consult   Patient Position at End of Consult Bedside chair;Bed/Chair alarm activated;All needs within reach        Recommendations                                                                                                              Pt will benefit from continued skilled IP PT to address the above mentioned impairments in order to maximize recovery and increase functional independence when completing mobility and ADLs. See flow sheet for goals and POC.     PT Evaluation Time: [id number]    Nicholas Daley, PT, DPT

## 2024-12-11 NOTE — DISCHARGE INSTR - AVS FIRST PAGE
We have not been giving you the standing units of novolog since your admission here, but have been using lantus 10 u daily at night as per your prescription. We recommend monitoring your blood sugars before restarting the novolog.     Increased pantoprazole (protonix) to twice daily again. Follow up with gastroenterology and PCP.

## 2024-12-11 NOTE — PROGRESS NOTES
GI Progress Note - Bimal Lugo 79 y.o. male MRN: [id number]    Unit/Bed#: -01 Encounter: [id number]    Assessment and Plan:    Acute Blood Loss Anemia  GI Bleed  - Presented due to acute bleeding (unclear if it was red or melenic, pt unsure) with drop in Hb to 9.5 but has remained stable for several days now without recurrent bleeding  - EGD in 12/7 showed an ulcer at the prior EV banding site from November, no other varices but severe PHG noted  - Colonoscopy in May showed diverticulosis, internal hemorrhoids, and he had 3 polyps removed  - Inpatient pill camera yesterday showed mild duodenitis and jejunitis with a possible small jejunal ulcer but no active bleeding seen   - Suspect source of blood loss is from his severe PHG v could be a self limited diverticular bleed as he though the blood was primarily red when he presented  - Will plan for PPI course on discharge given the possible small ulcer noted in the jejunal, plan for protonix 40 mg BID x 2 months  - Counseled patient on monitoring stools going forward for any recurrent bleeding  - Will recheck CBC next week on Thursday  - If he has recurrent overt bleeding, he should have a EGD with push enteroscopy  - Okay for discharge from GI standpoint    Alcoholic Cirrhosis  Ascites  PHG  - MELD 17  - Sober  - Grade 0 HE  - EGD in November with banding of 1 small EV, EGD 12/7 without any other varices noted but severe PHG noted  - Has refractory ascites and undergoes paracentesis weekly, last done on 12/5  - HCC screen neg with CT in November    Subjective: He is feeling good today, he has not seen any blood in several days. He already passed the pill camera. He is not sure if on initial presentation if the blood was bright red or if it was dark as he thought it was red but his wife said it was dark. His wife is hospitalized currently as well.    Objective:     Vitals: Blood pressure 130/65, pulse 58, temperature 98.2 °F (36.8 °C), resp. rate 18, height 5'  "5\" (1.651 m), weight 79.2 kg (174 lb 9.7 oz), SpO2 98%.,Body mass index is 29.06 kg/m².      Intake/Output Summary (Last 24 hours) at 12/11/2024 1144  Last data filed at 12/11/2024 0501  Gross per 24 hour   Intake 240 ml   Output 400 ml   Net -160 ml       Physical Exam:     General Appearance: Alert, appears stated age and cooperative  Lungs: Clear to auscultation bilaterally, no rales or rhonchi, no labored breathing/accessory muscle use  Heart: Regular rate and rhythm, S1, S2 normal, no murmur, click, rub or gallop  Abdomen: Soft, non-tender, non-distended; bowel sounds normal; no masses or no organomegaly  Extremities: No cyanosis, edema    Invasive Devices       None                   Lab Results:  Results from last 7 days   Lab Units 12/09/24  0513 12/06/24  0604 12/05/24 1916   WBC Thousand/uL 3.44*   < > 3.82*   HEMOGLOBIN g/dL 10.1*   < > 9.2*   HEMATOCRIT % 30.7*   < > 28.5*   PLATELETS Thousands/uL 41*   < > 45*   SEGS PCT %  --   --  68   LYMPHO PCT %  --   --  15   MONO PCT %  --   --  11   EOS PCT %  --   --  5    < > = values in this interval not displayed.     Results from last 7 days   Lab Units 12/09/24  0513 12/06/24  0604 12/05/24 1916   POTASSIUM mmol/L 3.9   < > 4.3   CHLORIDE mmol/L 100   < > 105   CO2 mmol/L 28   < > 23   BUN mg/dL 29*   < > 27*   CREATININE mg/dL 1.55*   < > 1.64*   CALCIUM mg/dL 9.0   < > 8.4   ALK PHOS U/L  --   --  89   ALT U/L  --   --  28   AST U/L  --   --  48*    < > = values in this interval not displayed.     Results from last 7 days   Lab Units 12/05/24 1916   INR  1.23*           Imaging Studies: I have personally reviewed pertinent imaging studies.    EGD  Result Date: 12/7/2024  Impression: One small grade I varix in the lower third of the esophagus Single ulcer in the lower third of the esophagus Severe portal hypertensive gastropathy in the cardia, fundus of the stomach and body of the stomach The stomach appeared normal. RECOMMENDATION: 1.  Repeat EGD in 1 " year 2.  Resume diet  Channing Morocho, DO FACG, FACP     IR paracentesis  Result Date: 12/5/2024  Impression: Impression: Ultrasound-guided paracentesis. Signed, performed, and dictated by SUNDAY Guillen under the supervision of Dr. Howard. Workstation performed: [id number]

## 2024-12-11 NOTE — ASSESSMENT & PLAN NOTE
Patient with dark stools in the setting of known recurrent GI bleed, presents with concern for the same. No further bloody BM greater than 48 hrs  GI consulted during admission.   EGD 12/7 without bleeding  Inpatient VCE 12/8- Severe Portal Hypertensive Gastropathy (PHG). Mild duodenitis and jejunitis. One possible small non-bleeding clean based ulcer in the proximal small bowel (likely in proximal jejunum). No active bleeding seen anywhere in GI tract.   Continue protonix

## 2024-12-11 NOTE — DISCHARGE SUMMARY
Discharge Summary - Hospitalist   Name: Bimal Lugo 79 y.o. male I MRN: [id number]  Unit/Bed#: -01 I Date of Admission: 12/5/2024   Date of Service: 12/11/2024 I Hospital Day: 5     Assessment & Plan  Dark stools  Patient with dark stools in the setting of known recurrent GI bleed, presents with concern for the same. No further bloody BM greater than 48 hrs  GI consulted during admission.   EGD 12/7 without bleeding  Inpatient VCE 12/8- Severe Portal Hypertensive Gastropathy (PHG). Mild duodenitis and jejunitis. One possible small non-bleeding clean based ulcer in the proximal small bowel (likely in proximal jejunum). No active bleeding seen anywhere in GI tract.   Continue protonix  Iron deficiency anemia due to chronic blood loss  Chronic, in setting of recurrent upper GI bleeds secondary to esophageal varices; last hospitalized 11/24-11/29 resulting in EGD with 1 band placed.   Hgb stable.   Secondary esophageal varices with bleeding (HCC)  Secondary to cirrhosis in setting of alcohol abuse.  Cirrhosis (HCC)  Chronic, secondary to alcohol abuse. Sober  Continue lasix/aldactone.  Paracentesis 12/5 for 3300 mL   LFTs stable on presentation.  CAD (coronary artery disease)  Chronic, no chest pain on admit  Continue statin, aspirin, imdur, and beta-blocker  Will need to weigh risks/benefits of continued antiplatelet therapy given recurrent GI bleeding   Chronic kidney disease, stage 3a (HCC)  Renal functions have overall progressed over the past year  Presently, stable  Avoid nephrotoxins as able  Chronic obstructive pulmonary disease (HCC)  Patient history of smoking breathing comfortably.    Continue combo inhaler  Diabetes mellitus, type 2 (HCC)  Blood Sugar Average: Last 72 hrs (P) [id number]  Well-controlled a/e/b A1c 5.6%  Continue Humalog SSI; hold standing 10-14u from home regimen  Continue Lantus 10u HS (consider 1/2 dose if remains NPO)   Blood glucose monitoring ACHS  Hypoglycemia  Return call , patient will go to [facility] due to difficulties facility having processing labs    protocol  Hypertension  Chronic, continue home meds  Monitor VS per unit protocol, Blood Pressure: 130/65   Pancytopenia (HCC)  Pancytopenia POA possibly due to alcoholic cirrhosis with chronic blood loss anemia a/e/b leukocytosis ( WBC 3.82 > 2.38), anemia (Hg 9.2 > 8.6 > 9.8), and thrombocytopenia (platelets 45 > 35) treated with GI consult, and serial lab monitoring      Medical Problems       Resolved Problems  Date Reviewed: 9/11/2024          Resolved    Acute blood loss anemia 12/10/2024     Resolved by  SUNDAY Bhatia        Discharging Physician / Practitioner: SUNDAY Bhatia  PCP: Aly Carter MD  Admission Date:   Admission Orders (From admission, onward)       Ordered        12/06/24 1401  INPATIENT ADMISSION  Once            12/05/24 2103  Place in Observation  Once                          Discharge Date: 12/11/24    Consultations During Hospital Stay:  Gastroenterology    Procedures Performed:   12/5/2024- Paracentesis- Approximately 3300 cc' s of clear, yellow fluid was aspirated.   12/7/2024- EGD- 1 small grade 1 varix without bleeding, single small superficial ulcer in the lower third of the esophagus at the site of the previously banded varix, severe portal hypertensive gastropathy.  12/8/2024- VCE- Severe Portal Hypertensive Gastropathy (PHG). Mild duodenitis and jejunitis. One possible small non-bleeding clean based ulcer in the proximal small bowel (likely in proximal jejunum). No active bleeding seen anywhere in GI tract.   Hemoglobin Trend  12/5- 9.2, 12/6-8.6-9.8-9.0, 12/7-9.2-10.2-9.3, 12/8- 9.5. 12/9-10.1     Significant Findings / Test Results:   See above.     Incidental Findings:   None    Test Results Pending at Discharge (will require follow up):   None     Outpatient Tests Requested:  CBC    Complications: None    Reason for Admission: None    Hospital Course:   Bimal Lugo is a 79 y.o. male patient who originally presented to the  "hospital on 12/5/2024 due to dark stools. Patient has history of HTN, CAD, CKD, DM, esophageal varices, cirrhosis (currently sober), and recent GI bleeds. Patient had esophageal band placed the week prior to admission. At admission patient Hgb was 9.2, but fluctuated during the hospital stay, lowest 8.6. Hemoglobin monitored during hospital stay. Paracentesis done 12/5/2024 due to history of cirrhosis. Gastroenterology consulted. 12/7/2024 EGD done and 12/8/2024 VCE done.     Please see above list of diagnoses and related plan for additional information.     Condition at Discharge: good    Discharge Day Visit / Exam:   Subjective:  Patient alert and oriented x 4. OOB to chair. Patient denies pain and discomfort. Expresses readiness to go home.   Vitals: Blood Pressure: 130/65 (12/11/24 0741)  Pulse: 58 (12/11/24 0741)  Temperature: 98.2 °F (36.8 °C) (12/11/24 0741)  Temp Source: Temporal (12/07/24 1128)  Respirations: 18 (12/10/24 2130)  Height: 5' 5\" (165.1 cm) (12/05/24 2153)  Weight - Scale: 79.2 kg (174 lb 9.7 oz) (12/05/24 2153)  SpO2: 98 % (12/11/24 0741)  Physical Exam  Vitals reviewed.   Constitutional:       General: He is awake. He is not in acute distress.     Appearance: Normal appearance.   HENT:      Head: Normocephalic.      Right Ear: Hearing normal.      Left Ear: Hearing normal.      Nose: No congestion.      Mouth/Throat:      Mouth: Mucous membranes are moist.      Pharynx: Oropharynx is clear.   Eyes:      Pupils: Pupils are equal, round, and reactive to light.   Cardiovascular:      Rate and Rhythm: Normal rate.   Pulmonary:      Effort: Pulmonary effort is normal. No respiratory distress.      Breath sounds: Normal breath sounds.   Abdominal:      General: Bowel sounds are normal.      Palpations: Abdomen is soft.   Skin:     General: Skin is warm.      Capillary Refill: Capillary refill takes 2 to 3 seconds.   Neurological:      Mental Status: He is alert and easily aroused.      GCS: GCS eye " subscore is 4. GCS verbal subscore is 5. GCS motor subscore is 6.      Sensory: Sensation is intact.      Motor: Motor function is intact.      Comments: Uses can eat baseline.    Psychiatric:         Attention and Perception: Attention normal.         Mood and Affect: Mood normal.         Speech: Speech normal.         Behavior: Behavior normal. Behavior is cooperative.         Thought Content: Thought content normal.         Judgment: Judgment normal.          Discussion with Family:  Patient.     Discharge instructions/Information to patient and family:   See after visit summary for information provided to patient and family.      Provisions for Follow-Up Care:  See after visit summary for information related to follow-up care and any pertinent home health orders.      Mobility at time of Discharge:   Basic Mobility Inpatient Raw Score: 20  JH-HLM Goal: 6: Walk 10 steps or more  JH-HLM Achieved: 6: Walk 10 steps or more  HLM Goal achieved. Continue to encourage appropriate mobility.     Disposition:   Home    Planned Readmission: None    Discharge Medications:  See after visit summary for reconciled discharge medications provided to patient and/or family.      Administrative Statements   Discharge Statement:  I have spent a total time of 40 minutes in caring for this patient on the day of the visit/encounter. >30 minutes of time was spent on: Diagnostic results, Instructions for management, Patient and family education, Risk factor reductions, Counseling / Coordination of care, Documenting in the medical record, Reviewing / ordering tests, medicine, procedures  , and Communicating with other healthcare professionals .    **Please Note: This note may have been constructed using a voice recognition system**

## 2024-12-11 NOTE — ASSESSMENT & PLAN NOTE
Chronic, in setting of recurrent upper GI bleeds secondary to esophageal varices; last hospitalized 11/24-11/29 resulting in EGD with 1 band placed.   Hgb stable.

## 2024-12-11 NOTE — CASE MANAGEMENT
Case Management Progress Note    Patient name Bimal Lugo  Location /-01 MRN [id number]  : 1945 Date 2024       LOS (days): 5  Geometric Mean LOS (GMLOS) (days): 3  Days to GMLOS:-1.9        OBJECTIVE:        Current admission status: Inpatient  Preferred Pharmacy:   [facility] [place] - [address] [facility]  [address] [facility] [address]  Phone: [phone] Fax: [phone]    Primary Care Provider: Aly Carter MD    Primary Insurance: MEDICARE  Secondary Insurance: BLUE CROSS    PROGRESS NOTE:  CM called daughter Ashlie sawant. She is concerned with him coming home alone as his wife was admitted yesterday. She is asking for a PT/OT eval. She says if he's fine, she feels safe with him coming home alone with possibly a walker and perhaps a hospital bed if he qualifies. However, if he's a lvl 2 or higher, she's going to try to convince him to go to rehab. She states he has been falling at home.   CM reached out to ELEANOR Kang regarding order for PT/OT.

## 2024-12-12 ENCOUNTER — HOSPITAL ENCOUNTER (OUTPATIENT)
Dept: NON INVASIVE DIAGNOSTICS | Facility: HOSPITAL | Age: 79
Discharge: HOME | End: 2024-12-12
Payer: MEDICARE

## 2024-12-12 VITALS
SYSTOLIC BLOOD PRESSURE: 130 MMHG | HEART RATE: 65 BPM | RESPIRATION RATE: 20 BRPM | DIASTOLIC BLOOD PRESSURE: 59 MMHG | OXYGEN SATURATION: 97 %

## 2024-12-12 DIAGNOSIS — K70.31 ALCOHOLIC CIRRHOSIS OF LIVER WITH ASCITES (HCC): ICD-10-CM

## 2024-12-12 DIAGNOSIS — K70.31 ALCOHOLIC CIRRHOSIS OF LIVER WITH ASCITES (HCC): Primary | ICD-10-CM

## 2024-12-12 PROCEDURE — 49083 ABD PARACENTESIS W/IMAGING: CPT

## 2024-12-12 PROCEDURE — 49083 ABD PARACENTESIS W/IMAGING: CPT | Performed by: NURSE PRACTITIONER

## 2024-12-12 RX ORDER — LIDOCAINE WITH 8.4% SOD BICARB 0.9%(10ML)
SYRINGE (ML) INJECTION AS NEEDED
Status: COMPLETED | OUTPATIENT
Start: 2024-12-12 | End: 2024-12-12

## 2024-12-12 RX ADMIN — Medication 10 ML: at 09:00

## 2024-12-12 RX ADMIN — Medication 10 ML: at 08:39

## 2024-12-12 NOTE — BRIEF OP NOTE (RAD/CATH)
IR PARACENTESIS Procedure Note    PATIENT NAME: Bimal Lugo  : 1945  MRN: [id number]    Pre-op Diagnosis:   1. 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)      Provider:   SUNDAY Worley    Assistants:   None    Estimated Blood Loss: None    Findings: 800 ml clear yellow/daily ascites aspirated from RIGHT sided ultrasound guided paracentesis.     Specimens: None     Complications:  None immediate     Anesthesia: local    SUNDAY Worley     Date: 2024  Time: 9:38 AM

## 2024-12-19 ENCOUNTER — HOSPITAL ENCOUNTER (OUTPATIENT)
Dept: NON INVASIVE DIAGNOSTICS | Facility: HOSPITAL | Age: 79
Discharge: HOME | End: 2024-12-19
Payer: MEDICARE

## 2024-12-19 ENCOUNTER — APPOINTMENT (OUTPATIENT)
Dept: LAB | Facility: HOSPITAL | Age: 79
End: 2024-12-19
Payer: MEDICARE

## 2024-12-19 VITALS
OXYGEN SATURATION: 98 % | HEART RATE: 67 BPM | SYSTOLIC BLOOD PRESSURE: 132 MMHG | RESPIRATION RATE: 18 BRPM | DIASTOLIC BLOOD PRESSURE: 63 MMHG

## 2024-12-19 DIAGNOSIS — K70.31 ALCOHOLIC CIRRHOSIS OF LIVER WITH ASCITES (HCC): Primary | ICD-10-CM

## 2024-12-19 DIAGNOSIS — K74.60 CIRRHOSIS OF LIVER WITH ASCITES, UNSPECIFIED HEPATIC CIRRHOSIS TYPE  (HCC): ICD-10-CM

## 2024-12-19 DIAGNOSIS — K92.1 MELENA: ICD-10-CM

## 2024-12-19 DIAGNOSIS — K70.31 ALCOHOLIC CIRRHOSIS OF LIVER WITH ASCITES (HCC): ICD-10-CM

## 2024-12-19 DIAGNOSIS — R19.5 DARK STOOLS: ICD-10-CM

## 2024-12-19 DIAGNOSIS — R18.8 CIRRHOSIS OF LIVER WITH ASCITES, UNSPECIFIED HEPATIC CIRRHOSIS TYPE  (HCC): ICD-10-CM

## 2024-12-19 DIAGNOSIS — I85.00 ESOPHAGEAL VARICES WITHOUT BLEEDING, UNSPECIFIED ESOPHAGEAL VARICES TYPE (HCC): ICD-10-CM

## 2024-12-19 LAB
AFP-TM SERPL-MCNC: 2.43 NG/ML (ref 0–9)
ALBUMIN SERPL BCG-MCNC: 3.2 G/DL (ref 3.5–5)
ALP SERPL-CCNC: 99 U/L (ref 34–104)
ALT SERPL W P-5'-P-CCNC: 24 U/L (ref 7–52)
ANION GAP SERPL CALCULATED.3IONS-SCNC: 5 MMOL/L (ref 4–13)
AST SERPL W P-5'-P-CCNC: 38 U/L (ref 13–39)
BASOPHILS # BLD AUTO: 0.02 THOUSANDS/ΜL (ref 0–0.1)
BASOPHILS NFR BLD AUTO: 1 % (ref 0–1)
BILIRUB SERPL-MCNC: 1.13 MG/DL (ref 0.2–1)
BUN SERPL-MCNC: 22 MG/DL (ref 5–25)
CALCIUM ALBUM COR SERPL-MCNC: 9.6 MG/DL (ref 8.3–10.1)
CALCIUM SERPL-MCNC: 9 MG/DL (ref 8.4–10.2)
CHLORIDE SERPL-SCNC: 106 MMOL/L (ref 96–108)
CO2 SERPL-SCNC: 25 MMOL/L (ref 21–32)
CREAT SERPL-MCNC: 1.76 MG/DL (ref 0.6–1.3)
EOSINOPHIL # BLD AUTO: 0.19 THOUSAND/ΜL (ref 0–0.61)
EOSINOPHIL NFR BLD AUTO: 7 % (ref 0–6)
ERYTHROCYTE [DISTWIDTH] IN BLOOD BY AUTOMATED COUNT: 18.3 % (ref 11.6–15.1)
FERRITIN SERPL-MCNC: 54 NG/ML (ref 24–336)
GFR SERPL CREATININE-BSD FRML MDRD: 35 ML/MIN/1.73SQ M
GLUCOSE P FAST SERPL-MCNC: 109 MG/DL (ref 65–99)
HAV AB SER QL IA: REACTIVE
HCT VFR BLD AUTO: 32.9 % (ref 36.5–49.3)
HGB BLD-MCNC: 10.6 G/DL (ref 12–17)
IMM GRANULOCYTES # BLD AUTO: 0.01 THOUSAND/UL (ref 0–0.2)
IMM GRANULOCYTES NFR BLD AUTO: 0 % (ref 0–2)
INR PPP: 1.21 (ref 0.85–1.19)
IRON SATN MFR SERPL: 17 % (ref 15–50)
IRON SERPL-MCNC: 58 UG/DL (ref 50–212)
LYMPHOCYTES # BLD AUTO: 0.55 THOUSANDS/ΜL (ref 0.6–4.47)
LYMPHOCYTES NFR BLD AUTO: 20 % (ref 14–44)
MCH RBC QN AUTO: 31.7 PG (ref 26.8–34.3)
MCHC RBC AUTO-ENTMCNC: 32.2 G/DL (ref 31.4–37.4)
MCV RBC AUTO: 99 FL (ref 82–98)
MONOCYTES # BLD AUTO: 0.31 THOUSAND/ΜL (ref 0.17–1.22)
MONOCYTES NFR BLD AUTO: 11 % (ref 4–12)
NEUTROPHILS # BLD AUTO: 1.68 THOUSANDS/ΜL (ref 1.85–7.62)
NEUTS SEG NFR BLD AUTO: 61 % (ref 43–75)
NRBC BLD AUTO-RTO: 0 /100 WBCS
PLATELET # BLD AUTO: 48 THOUSANDS/UL (ref 149–390)
PMV BLD AUTO: 10.6 FL (ref 8.9–12.7)
POTASSIUM SERPL-SCNC: 3.9 MMOL/L (ref 3.5–5.3)
PROT SERPL-MCNC: 6.1 G/DL (ref 6.4–8.4)
PROTHROMBIN TIME: 16 SECONDS (ref 12.3–15)
RBC # BLD AUTO: 3.34 MILLION/UL (ref 3.88–5.62)
SODIUM SERPL-SCNC: 136 MMOL/L (ref 135–147)
TIBC SERPL-MCNC: 334.6 UG/DL (ref 250–450)
TRANSFERRIN SERPL-MCNC: 239 MG/DL (ref 203–362)
UIBC SERPL-MCNC: 277 UG/DL (ref 155–355)
WBC # BLD AUTO: 2.76 THOUSAND/UL (ref 4.31–10.16)

## 2024-12-19 PROCEDURE — 80053 COMPREHEN METABOLIC PANEL: CPT

## 2024-12-19 PROCEDURE — 85610 PROTHROMBIN TIME: CPT

## 2024-12-19 PROCEDURE — 82728 ASSAY OF FERRITIN: CPT

## 2024-12-19 PROCEDURE — 83540 ASSAY OF IRON: CPT

## 2024-12-19 PROCEDURE — 83550 IRON BINDING TEST: CPT

## 2024-12-19 PROCEDURE — 86708 HEPATITIS A ANTIBODY: CPT

## 2024-12-19 PROCEDURE — 49083 ABD PARACENTESIS W/IMAGING: CPT

## 2024-12-19 PROCEDURE — 82105 ALPHA-FETOPROTEIN SERUM: CPT

## 2024-12-19 PROCEDURE — 36415 COLL VENOUS BLD VENIPUNCTURE: CPT

## 2024-12-19 PROCEDURE — 85025 COMPLETE CBC W/AUTO DIFF WBC: CPT

## 2024-12-19 RX ORDER — LIDOCAINE WITH 8.4% SOD BICARB 0.9%(10ML)
SYRINGE (ML) INJECTION AS NEEDED
Status: COMPLETED | OUTPATIENT
Start: 2024-12-19 | End: 2024-12-19

## 2024-12-19 RX ADMIN — Medication 10 ML: at 09:17

## 2024-12-19 NOTE — BRIEF OP NOTE (RAD/CATH)
IR PARACENTESIS Procedure Note    PATIENT NAME: Bimal Lugo  : 1945  MRN: [id number]    Pre-op Diagnosis:   1. 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)        Surgeon:   SUNDAY Fierro  Assistants:     No qualified resident was available, Resident is only observing    Estimated Blood Loss: minimal  Findings: 4400 ml cloudy yellow ascites fluid, LLQ.    Specimens: none    Complications:  none immediate    Anesthesia: local    SUNDAY Fierro     Date: 2024  Time: 10:21 AM

## 2024-12-26 ENCOUNTER — HOSPITAL ENCOUNTER (OUTPATIENT)
Dept: NON INVASIVE DIAGNOSTICS | Facility: HOSPITAL | Age: 79
Discharge: HOME | End: 2024-12-26
Payer: MEDICARE

## 2024-12-26 VITALS
RESPIRATION RATE: 18 BRPM | OXYGEN SATURATION: 96 % | DIASTOLIC BLOOD PRESSURE: 63 MMHG | HEART RATE: 75 BPM | SYSTOLIC BLOOD PRESSURE: 129 MMHG

## 2024-12-26 DIAGNOSIS — K70.31 ALCOHOLIC CIRRHOSIS OF LIVER WITH ASCITES (HCC): ICD-10-CM

## 2024-12-26 PROCEDURE — 49083 ABD PARACENTESIS W/IMAGING: CPT

## 2024-12-26 RX ORDER — LIDOCAINE WITH 8.4% SOD BICARB 0.9%(10ML)
SYRINGE (ML) INJECTION AS NEEDED
Status: COMPLETED | OUTPATIENT
Start: 2024-12-26 | End: 2024-12-26

## 2024-12-26 RX ADMIN — Medication 10 ML: at 08:58

## 2024-12-26 NOTE — BRIEF OP NOTE (RAD/CATH)
IR PARACENTESIS Procedure Note    PATIENT NAME: Bimal Lugo  : 1945  MRN: [id number]    Pre-op Diagnosis:   1. 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)        Surgeon:   SUNDAY Fierro  Assistants:     No qualified resident was available, Resident is only observing    Estimated Blood Loss: minimal  Findings: 5000 ml clear yellow ascites fluid, LLQ.    Specimens: none    Complications:  none immediate    Anesthesia: local    SUNDAY Fierro     Date: 2024  Time: 9:29 AM

## 2025-01-02 ENCOUNTER — HOSPITAL ENCOUNTER (OUTPATIENT)
Dept: NON INVASIVE DIAGNOSTICS | Facility: HOSPITAL | Age: 80
Discharge: HOME | End: 2025-01-02
Payer: MEDICARE

## 2025-01-02 VITALS
OXYGEN SATURATION: 97 % | DIASTOLIC BLOOD PRESSURE: 70 MMHG | SYSTOLIC BLOOD PRESSURE: 167 MMHG | RESPIRATION RATE: 16 BRPM | HEART RATE: 67 BPM

## 2025-01-02 DIAGNOSIS — K70.31 ALCOHOLIC CIRRHOSIS OF LIVER WITH ASCITES (HCC): ICD-10-CM

## 2025-01-02 PROCEDURE — 49083 ABD PARACENTESIS W/IMAGING: CPT | Performed by: NURSE PRACTITIONER

## 2025-01-02 PROCEDURE — 49083 ABD PARACENTESIS W/IMAGING: CPT

## 2025-01-02 RX ORDER — LIDOCAINE WITH 8.4% SOD BICARB 0.9%(10ML)
SYRINGE (ML) INJECTION AS NEEDED
Status: COMPLETED | OUTPATIENT
Start: 2025-01-02 | End: 2025-01-02

## 2025-01-02 RX ADMIN — Medication 6 ML: at 10:29

## 2025-01-03 NOTE — BRIEF OP NOTE (RAD/CATH)
IR PARACENTESIS Procedure Note    PATIENT NAME: Bimal Lugo  : 1945  MRN: [id number]    Pre-op Diagnosis:   1. 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)        Surgeon:   SUNDAY Worley    Assistants:   None     Estimated Blood Loss: None     Findings: 5900 ml clear yellow ascites aspirated from RIGHT sided ultrasound guided paracentesis.      Specimens: None     Complications:  None immediate     Anesthesia: local    SUNDAY Worley     Date: 1/3/2025  Time: 11:27 AM

## 2025-01-09 ENCOUNTER — HOSPITAL ENCOUNTER (OUTPATIENT)
Dept: NON INVASIVE DIAGNOSTICS | Facility: HOSPITAL | Age: 80
Discharge: HOME | End: 2025-01-09
Payer: MEDICARE

## 2025-01-09 VITALS
OXYGEN SATURATION: 97 % | RESPIRATION RATE: 16 BRPM | DIASTOLIC BLOOD PRESSURE: 64 MMHG | HEART RATE: 68 BPM | SYSTOLIC BLOOD PRESSURE: 160 MMHG

## 2025-01-09 DIAGNOSIS — K70.31 ALCOHOLIC CIRRHOSIS OF LIVER WITH ASCITES (HCC): ICD-10-CM

## 2025-01-09 PROCEDURE — 49083 ABD PARACENTESIS W/IMAGING: CPT

## 2025-01-09 RX ORDER — LIDOCAINE WITH 8.4% SOD BICARB 0.9%(10ML)
SYRINGE (ML) INJECTION AS NEEDED
Status: COMPLETED | OUTPATIENT
Start: 2025-01-09 | End: 2025-01-09

## 2025-01-09 RX ADMIN — Medication 10 ML: at 10:03

## 2025-01-09 NOTE — BRIEF OP NOTE (RAD/CATH)
IR PARACENTESIS Procedure Note    PATIENT NAME: Bimal Lugo  : 1945  MRN: [id number]    Pre-op Diagnosis:   1. 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)        Surgeon:   SUNDAY Fierro  Assistants:     No qualified resident was available, Resident is only observing    Estimated Blood Loss: minimal  Findings: 5250 ml clear yellow ascites fluid, RLQ.    Specimens: none    Complications:  none immediate    Anesthesia: local    SUNDAY Fierro     Date: 2025  Time: 10:50 AM

## 2025-01-11 ENCOUNTER — RESULTS FOLLOW-UP (OUTPATIENT)
Age: 80
End: 2025-01-11

## 2025-01-16 ENCOUNTER — HOSPITAL ENCOUNTER (OUTPATIENT)
Dept: NON INVASIVE DIAGNOSTICS | Facility: HOSPITAL | Age: 80
Discharge: HOME | End: 2025-01-16
Payer: MEDICARE

## 2025-01-16 VITALS
RESPIRATION RATE: 20 BRPM | HEART RATE: 81 BPM | OXYGEN SATURATION: 98 % | SYSTOLIC BLOOD PRESSURE: 154 MMHG | DIASTOLIC BLOOD PRESSURE: 70 MMHG

## 2025-01-16 DIAGNOSIS — K70.31 ALCOHOLIC CIRRHOSIS OF LIVER WITH ASCITES (HCC): ICD-10-CM

## 2025-01-16 PROCEDURE — 49083 ABD PARACENTESIS W/IMAGING: CPT | Performed by: NURSE PRACTITIONER

## 2025-01-16 PROCEDURE — 49083 ABD PARACENTESIS W/IMAGING: CPT

## 2025-01-16 RX ORDER — LIDOCAINE WITH 8.4% SOD BICARB 0.9%(10ML)
SYRINGE (ML) INJECTION AS NEEDED
Status: COMPLETED | OUTPATIENT
Start: 2025-01-16 | End: 2025-01-16

## 2025-01-16 RX ADMIN — Medication 10 ML: at 09:41

## 2025-01-16 NOTE — BRIEF OP NOTE (RAD/CATH)
IR PARACENTESIS Procedure Note    PATIENT NAME: Bimal Lugo  : 1945  MRN: [id number]    Pre-op Diagnosis:   1. 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)        Surgeon:   SUNDAY Worley  Assistants:     No qualified resident was available, Resident is only observing    Estimated Blood Loss: None     Findings: 8400 ml clear yellow ascites aspirated from LEFT sided ultrasound guided paracentesis.    Specimens: None     Complications:  None immediate     Anesthesia: local    SUNDAY Worley     Date: 2025  Time: 11:17 AM

## 2025-01-23 ENCOUNTER — HOSPITAL ENCOUNTER (OUTPATIENT)
Dept: NON INVASIVE DIAGNOSTICS | Facility: HOSPITAL | Age: 80
Discharge: HOME | End: 2025-01-23
Payer: MEDICARE

## 2025-01-23 VITALS
DIASTOLIC BLOOD PRESSURE: 78 MMHG | HEART RATE: 70 BPM | RESPIRATION RATE: 15 BRPM | SYSTOLIC BLOOD PRESSURE: 177 MMHG | OXYGEN SATURATION: 98 %

## 2025-01-23 DIAGNOSIS — K70.31 ALCOHOLIC CIRRHOSIS OF LIVER WITH ASCITES (HCC): ICD-10-CM

## 2025-01-23 PROCEDURE — 49083 ABD PARACENTESIS W/IMAGING: CPT

## 2025-01-23 RX ORDER — LIDOCAINE WITH 8.4% SOD BICARB 0.9%(10ML)
SYRINGE (ML) INJECTION AS NEEDED
Status: COMPLETED | OUTPATIENT
Start: 2025-01-23 | End: 2025-01-23

## 2025-01-23 RX ADMIN — Medication 10 ML: at 10:17

## 2025-01-23 NOTE — BRIEF OP NOTE (RAD/CATH)
IR PARACENTESIS Procedure Note    PATIENT NAME: Bimal Lugo  : 1945  MRN: [id number]    Pre-op Diagnosis:   1. 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)        Surgeon:   SUNDAY Fierro  Assistants:     No qualified resident was available, Resident is only observing    Estimated Blood Loss: minimal  Findings: 9050 ml clear yellow ascites fluid, LLQ.    Patient declined Albumin infusion.     Specimens: none    Complications:  none immediate    Anesthesia: local    SUNDAY Fierro     Date: 2025  Time: 11:33 AM

## 2025-01-27 ENCOUNTER — APPOINTMENT (OUTPATIENT)
Dept: LAB | Facility: HOSPITAL | Age: 80
End: 2025-01-27
Payer: MEDICARE

## 2025-01-27 ENCOUNTER — PREP FOR PROCEDURE (OUTPATIENT)
Dept: GASTROENTEROLOGY | Facility: CLINIC | Age: 80
End: 2025-01-27

## 2025-01-27 ENCOUNTER — OFFICE VISIT (OUTPATIENT)
Dept: GASTROENTEROLOGY | Facility: CLINIC | Age: 80
End: 2025-01-27
Payer: MEDICARE

## 2025-01-27 VITALS
SYSTOLIC BLOOD PRESSURE: 148 MMHG | BODY MASS INDEX: 35.49 KG/M2 | OXYGEN SATURATION: 96 % | WEIGHT: 213 LBS | HEIGHT: 65 IN | TEMPERATURE: 98.2 F | HEART RATE: 78 BPM | DIASTOLIC BLOOD PRESSURE: 60 MMHG

## 2025-01-27 DIAGNOSIS — R18.8 CIRRHOSIS OF LIVER WITH ASCITES, UNSPECIFIED HEPATIC CIRRHOSIS TYPE  (HCC): Primary | ICD-10-CM

## 2025-01-27 DIAGNOSIS — K76.6 PORTAL HYPERTENSION (HCC): ICD-10-CM

## 2025-01-27 DIAGNOSIS — K72.90 DECOMPENSATED CIRRHOSIS (HCC): ICD-10-CM

## 2025-01-27 DIAGNOSIS — N18.9 CHRONIC KIDNEY DISEASE, UNSPECIFIED CKD STAGE: ICD-10-CM

## 2025-01-27 DIAGNOSIS — K74.60 CIRRHOSIS OF LIVER WITH ASCITES, UNSPECIFIED HEPATIC CIRRHOSIS TYPE  (HCC): Primary | ICD-10-CM

## 2025-01-27 DIAGNOSIS — I85.11 SECONDARY ESOPHAGEAL VARICES WITH BLEEDING (HCC): ICD-10-CM

## 2025-01-27 DIAGNOSIS — K76.0 METABOLIC DYSFUNCTION-ASSOCIATED STEATOTIC LIVER DISEASE (MASLD): ICD-10-CM

## 2025-01-27 DIAGNOSIS — K76.82 HEPATIC ENCEPHALOPATHY (HCC): ICD-10-CM

## 2025-01-27 DIAGNOSIS — D69.6 THROMBOCYTOPENIA (HCC): ICD-10-CM

## 2025-01-27 DIAGNOSIS — R18.8 CIRRHOSIS OF LIVER WITH ASCITES, UNSPECIFIED HEPATIC CIRRHOSIS TYPE  (HCC): ICD-10-CM

## 2025-01-27 DIAGNOSIS — K74.60 CIRRHOSIS OF LIVER WITH ASCITES, UNSPECIFIED HEPATIC CIRRHOSIS TYPE  (HCC): ICD-10-CM

## 2025-01-27 DIAGNOSIS — K74.60 DECOMPENSATED CIRRHOSIS (HCC): ICD-10-CM

## 2025-01-27 DIAGNOSIS — K72.10 END STAGE LIVER DISEASE (HCC): ICD-10-CM

## 2025-01-27 DIAGNOSIS — M62.84 SARCOPENIA: ICD-10-CM

## 2025-01-27 LAB
ALBUMIN SERPL BCG-MCNC: 3 G/DL (ref 3.5–5)
ALP SERPL-CCNC: 97 U/L (ref 34–104)
ALT SERPL W P-5'-P-CCNC: 19 U/L (ref 7–52)
ANION GAP SERPL CALCULATED.3IONS-SCNC: 6 MMOL/L (ref 4–13)
AST SERPL W P-5'-P-CCNC: 38 U/L (ref 13–39)
BILIRUB SERPL-MCNC: 0.94 MG/DL (ref 0.2–1)
BUN SERPL-MCNC: 33 MG/DL (ref 5–25)
CALCIUM ALBUM COR SERPL-MCNC: 9.8 MG/DL (ref 8.3–10.1)
CALCIUM SERPL-MCNC: 9 MG/DL (ref 8.4–10.2)
CHLORIDE SERPL-SCNC: 105 MMOL/L (ref 96–108)
CO2 SERPL-SCNC: 24 MMOL/L (ref 21–32)
CREAT SERPL-MCNC: 1.45 MG/DL (ref 0.6–1.3)
ERYTHROCYTE [DISTWIDTH] IN BLOOD BY AUTOMATED COUNT: 15.8 % (ref 11.6–15.1)
GFR SERPL CREATININE-BSD FRML MDRD: 45 ML/MIN/1.73SQ M
GLUCOSE P FAST SERPL-MCNC: 124 MG/DL (ref 65–99)
HCT VFR BLD AUTO: 35.6 % (ref 36.5–49.3)
HGB BLD-MCNC: 11.8 G/DL (ref 12–17)
INR PPP: 1.09 (ref 0.85–1.19)
MCH RBC QN AUTO: 32 PG (ref 26.8–34.3)
MCHC RBC AUTO-ENTMCNC: 33.1 G/DL (ref 31.4–37.4)
MCV RBC AUTO: 97 FL (ref 82–98)
PLATELET # BLD AUTO: 55 THOUSANDS/UL (ref 149–390)
PMV BLD AUTO: 9.6 FL (ref 8.9–12.7)
POTASSIUM SERPL-SCNC: 4.3 MMOL/L (ref 3.5–5.3)
PROT SERPL-MCNC: 6 G/DL (ref 6.4–8.4)
PROTHROMBIN TIME: 14.8 SECONDS (ref 12.3–15)
RBC # BLD AUTO: 3.69 MILLION/UL (ref 3.88–5.62)
SODIUM SERPL-SCNC: 135 MMOL/L (ref 135–147)
WBC # BLD AUTO: 4.63 THOUSAND/UL (ref 4.31–10.16)

## 2025-01-27 PROCEDURE — 99215 OFFICE O/P EST HI 40 MIN: CPT | Performed by: STUDENT IN AN ORGANIZED HEALTH CARE EDUCATION/TRAINING PROGRAM

## 2025-01-27 PROCEDURE — 80053 COMPREHEN METABOLIC PANEL: CPT

## 2025-01-27 PROCEDURE — 85610 PROTHROMBIN TIME: CPT

## 2025-01-27 PROCEDURE — 36415 COLL VENOUS BLD VENIPUNCTURE: CPT

## 2025-01-27 PROCEDURE — G2211 COMPLEX E/M VISIT ADD ON: HCPCS | Performed by: STUDENT IN AN ORGANIZED HEALTH CARE EDUCATION/TRAINING PROGRAM

## 2025-01-27 PROCEDURE — 85027 COMPLETE CBC AUTOMATED: CPT

## 2025-01-27 NOTE — PATIENT INSTRUCTIONS
Stop the sodium bicarbonate  Start albumin infusions with every paracentesis   Labs now  I will talk to nephrology about optimization of his diuretics   Goal protein intake 100 g daily

## 2025-01-27 NOTE — ASSESSMENT & PLAN NOTE
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 Current MELD-3.0 (13) (1/2025).      He was diagnosed with cirrhosis in 2023 based on imaging showing nodular morphology and portal hypertension. 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 He had his first liver decompensating event in March 2024 when he developed ascites after TAVR. He has had frequent hospitalizations for acute blood loss anemia due to EVBL and PHG this past year. Also carries a diagnosis of HE but has not been taking any lactulose with preserved mental status.    We discussed the natural history, prognosis, and complications of cirrhosis, including decompensation in the form of ascites, variceal bleeding, and hepatic encephalopathy as well as risk for development of HCC. We discussed that weight loss and aggressive modification of his metabolic risk factors are rodriguez in preventing progression of disease. While he does carry a history of EtOH-related cirrhosis, he has been abstinent for over 30 years and so this is unlikely to contributing to his current presentation.     He appears to be decompensated out of proportion to his MELD. He has a refractory requiring weekly LVPs as titration of diuretics have been limited by his renal function. Would repeat his ascitic fluid studies to confirm that his ascites is due to portal hypertension (vs. cardiac). I also discussed stopping sodium bicarb supplements which is likely promoting fluid retention and advised that he should pursue IV albumin on the CKD protocol for renal preservations. Will also refer to nephrology for additional recommendations for diuretic optimization in the context of his CKD. He is not a candidate for TIPS given high risk for HE in the context of his age and comorbidities.     Otherwise, he is UTD with his cirrhosis health maintenance. He should return to  clinic in 2-3 months or sooner if needed. Thank you for the opportunity to consult in his care.     1. Decompensated cirrhosis: MELD (3.0) 12  - Etiology: MASH (unlikely EtOH given that he has been abstinent for >30 years)  - Transplant evaluation not currently a candidate     2. Ascites   - Continue Lasix 40 mg twice daily  - Continue Aldactone 50 mg daily  - Continue once weekly paracentesis with IV albumin CKD protocol   - TBW with renal regarding diuretic recommendations   - Encouraged to adhere to a low-sodium (<2000 mg daily) diet.   - Consider SBP prophylaxis     3. EVBL  - Last EGD 12/2024 with small varices  - Continue carvedilol 6.25 mg BID for secondary prophylaxis  - Next EGD due 6/2025     4. Hepatic encephalopathy  - CTM off lactulose    5. HCC surveillance  - US ordered with AFP levels for 2/205    6. Nutrition and sarcopenia  - He was instructed to eat multiple small meals a day and a snack prior to bedtime to help prevent protein loss and sarcopenia    7. CKD  - Refer to renal   - Discontinue sodium bicarbonate supplements    8. Healthcare maintenance for patients with cirrhosis  -He was instructed to take no more than 2 grams of tylenol in 24 hours and no products containing NSAIDs, benzodiazapines, and narcotics.  -He was also instructed to avoid raw shellfish   -He should participate in daily exercise as to prevent loss of muscle mass  -He should abstain from all alcohol intake and was counseled on this.    -He is at risk for vitamin deficiencies and metabolic bone disease.   -HAV and HBV immunity will be checked and he should be vaccinated through his primary care provider if he is not immune.  -Additionally, he should receive a yearly flu shot and the pneumonia vaccine through his primary care provider.    Chica Horton MD  Division of Gastroenterology and Hepatology  [facility]  Orders:    CBC and Platelet; Future    Comprehensive metabolic panel; Future     Protime-INR; Future    US right upper quadrant; Future

## 2025-01-27 NOTE — PROGRESS NOTES
[facility] - Outpatient Consultation  Bimal Lugo 79 y.o. male MRN: [id number]  Encounter: [id number]    PCP:  Aly Carter MD, [phone]  Referring Provider:  No ref. 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 Current MELD-3.0 (13) (2025).      He was diagnosed with cirrhosis in  based on imaging showing nodular morphology and portal hypertension. 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 He had his first liver decompensating event in 2024 when he developed ascites after TAVR. He has had frequent hospitalizations for acute blood loss anemia due to EVBL and PHG this past year. Also carries a diagnosis of HE but has not been taking any lactulose with preserved mental status.    We discussed the natural history, prognosis, and complications of cirrhosis, including decompensation in the form of ascites, variceal bleeding, and hepatic encephalopathy as well as risk for development of HCC. We discussed that weight loss and aggressive modification of his metabolic risk factors are rodriguez in preventing progression of disease. While he does carry a history of EtOH-related cirrhosis, he has been abstinent for over 30 years and so this is unlikely to contributing to his current presentation.     He appears to be decompensated out of proportion to his MELD. He has a refractory requiring weekly LVPs as titration of diuretics have been limited by his renal function. Would repeat his ascitic fluid studies to confirm that his ascites is  due to portal hypertension (vs. cardiac). I also discussed stopping sodium bicarb supplements which is likely promoting fluid retention and advised that he should pursue IV albumin on the CKD protocol for renal preservations. Will also refer to nephrology for additional recommendations for diuretic optimization in the context of his CKD. He is not a candidate for TIPS given high risk for HE in the context of his age and comorbidities.     Otherwise, he is UTD with his cirrhosis health maintenance. He should return to clinic in 2-3 months or sooner if needed. Thank you for the opportunity to consult in his care.     1. Decompensated cirrhosis: MELD (3.0) 12  - Etiology: MASH (unlikely EtOH given that he has been abstinent for >30 years)  - Transplant evaluation not currently a candidate     2. Ascites   - Continue Lasix 40 mg twice daily  - Continue Aldactone 50 mg daily  - Continue once weekly paracentesis with IV albumin CKD protocol   - TBW with renal regarding diuretic recommendations   - Encouraged to adhere to a low-sodium (<2000 mg daily) diet.   - Consider SBP prophylaxis     3. EVBL  - Last EGD 12/2024 with small varices  - Continue carvedilol 6.25 mg BID for secondary prophylaxis  - Next EGD due 6/2025     4. Hepatic encephalopathy  - CTM off lactulose    5. HCC surveillance  - US ordered with AFP levels for 2/205    6. Nutrition and sarcopenia  - He was instructed to eat multiple small meals a day and a snack prior to bedtime to help prevent protein loss and sarcopenia    7. CKD  - Refer to renal   - Discontinue sodium bicarbonate supplements    8. Healthcare maintenance for patients with cirrhosis  -He was instructed to take no more than 2 grams of tylenol in 24 hours and no products containing NSAIDs, benzodiazapines, and narcotics.  -He was also instructed to avoid raw shellfish   -He should participate in daily exercise as to prevent loss of muscle mass  -He should abstain from all alcohol intake and  was counseled on this.    -He is at risk for vitamin deficiencies and metabolic bone disease.   -HAV and HBV immunity will be checked and he should be vaccinated through his primary care provider if he is not immune.  -Additionally, he should receive a yearly flu shot and the pneumonia vaccine through his primary care provider.    Chica Horton MD  Division of Gastroenterology and Hepatology  [facility]  Orders:    CBC and Platelet; Future    Comprehensive metabolic panel; Future    Protime-INR; Future    US right upper quadrant; Future        History of Present Illness   HPI  Bimal Lugo is a 79 y.o. male with history of significant for CAD, PVD, COPD, ARACELY, DM2, CKD3 and history of AVR (1/2024) who presents for EtOH-related cirrhosis d/b ascites, EVBL and HE who presents for follow up. He was last seen in August 2024.    Interval events  - Admitted in November with nausea/vomiting and bloody stools. EGD showed small varices and severe PHG/GAVE. Course was complicated by NICOLE  - Had similar presentation in December 2024. Repeat EGD showed small post-banding ulcer with severe PHG but no varices  - VCE showed severe PHG, mild duodenitis, jejunitis and ?small non-bleeding clean based ulcer in the proximal jejunum   - Undergoing weekly LVPs, last on 1/23 with removal of 9L   - Drinking 4 cans of coke zero daily    Extended liver history   Mattson is familiar to [facility] gastroenterology last seen by Grazyna Mtz PA-C. He was also previously following with [facility] hepatology. While following with [facility] he was noted to have hepatic nodularity on cross-sectional imaging c/f cirrhosis. Per chart review, he was formally diagnosed with cirrhosis in November 2023 after having had a subsequent ultrasound notable for cirrhotic morphology and portal hypertension as evidenced by splenomegaly and a recannulized umbilical vein. Thought to be secondary to MASH vs EtOH but reports sobriety for many  "years. Serologic work-up was unremarkable. He underwent a TAVR in January 2024 in [place].     He had his first liver decompensating event in [facility] 2024 when he as noted to have abdominal ascites and underwent LVP in March 2024 with removal 3.9 L. Fluid studies did show Tp 1.1 and albumin 0.5, although serum albumin was not drawn to calculate SAAG. He was started on diuretics for his ascites.    He does not recall being made aware of this diagnosis until he was hospitalized in April 2024 for symptomatic anemia (hgb 7.1) from a UGIB s/p EGD (4/10/2024) notable for 3 large varices with stigmata of recent bleeding (red paty sign) s/p banding (x 3) and grade D esophagitis with a discrete ulcer at the GE junction. Also notable for moderate PHG.     Since his discharge, he has had 2 additional EGDs for banding with his last being on 6/19/2024 showing 2 medium varices in the esophagus s/p banding (x 2) and PHG. He has also had a colonoscopy (5/7/2024) notable for 3 subcentimeter polyps, diverticulosis and small hemorrhoids. He continue to take pantoprazole 40 mg once daily for his esophagitis.      He also continues to struggle with ascites despite taking Lasix 40 mg twice daily and Aldactone 50 mg twice daily. He requires once weekly paracentesis. Admits to adhering to a low-sodium diet.      He has family from [place].   History obtained from: patient    Review of Systems   Constitutional:  Positive for appetite change.   HENT: Negative.     Eyes: Negative.    Respiratory: Negative.     Cardiovascular:  Positive for leg swelling.   Gastrointestinal:  Positive for abdominal distention.   Endocrine: Negative.    Genitourinary: Negative.    Musculoskeletal: Negative.    Skin: Negative.           Objective   /60 (BP Location: Left arm, Patient Position: Sitting, Cuff Size: Standard)   Pulse 78   Temp 98.2 °F (36.8 °C) (Tympanic)   Ht 5' 5\" (1.651 m)   Wt 96.6 kg (213 lb)   SpO2 96%   BMI 35.45 kg/m²    "   Physical Exam  Constitutional:       Appearance: Normal appearance. He is obese.   HENT:      Head: Normocephalic and atraumatic.      Mouth/Throat:      Mouth: Mucous membranes are moist.      Pharynx: Oropharynx is clear.   Eyes:      Conjunctiva/sclera: Conjunctivae normal.      Pupils: Pupils are equal, round, and reactive to light.   Abdominal:      General: Abdomen is flat. There is distension.      Palpations: Abdomen is soft.      Tenderness: There is no abdominal tenderness.   Musculoskeletal:         General: No swelling. Normal range of motion.      Right lower leg: Edema present.      Left lower leg: Edema present.   Skin:     General: Skin is warm and dry.      Coloration: Skin is not jaundiced.   Neurological:      General: No focal deficit present.      Mental Status: He is alert and oriented to person, place, and time.      Comments: No asterixis       LABS/RADIOLOGY/ENDOSCOPY:  Lab Results   Component Value Date    WBC 2.76 (L) 12/19/2024    HGB 10.6 (L) 12/19/2024    HCT 32.9 (L) 12/19/2024    PLT 48 (L) 12/19/2024    GLUF 109 (H) 12/19/2024    BUN 20 12/20/2024    CREATININE 1.8 (H) 12/20/2024    K 4 12/20/2024     12/20/2024    CO2 21 (L) 12/20/2024    BILIDIR 0.14 09/30/2024    ALKPHOS 120 12/20/2024    ALT 25 12/20/2024    AST 45 12/20/2024    CALCIUM 8.5 12/20/2024    EGFR 38 (L) 12/20/2024    TRIG 119 11/25/2024    HDL 18 (L) 11/25/2024    PT 14.8 (H) 05/31/2019    INR 1.21 (H) 12/19/2024    PTT 38 (H) 12/05/2024     EGD (12/2024)  One small grade I varix in the lower third of the esophagus; no bleeding was observed  Single small, superficial, round ulcer in the lower third of the esophagus. Ulcer at the site of the previous banded varix.  No stigmata present  Severe portal hypertensive gastropathy in the cardia, fundus of the stomach and body of the stomach; no bleeding was observed  The stomach appeared normal.     MELD 3.0: 15 at 12/20/2024  7:05 AM  MELD-Na: 14 at 12/20/2024  7:05  AM  Calculated from:  Serum Creatinine: 1.8 mg/dL at 12/20/2024  7:05 AM  Serum Sodium: 137 mmol/L at 12/20/2024  7:05 AM  Total Bilirubin: 0.7 mg/dL (Using min of 1 mg/dL) at 12/20/2024  7:05 AM  Serum Albumin: 3.2 g/dL at 12/20/2024  7:05 AM  INR(ratio): 1.21 at 12/19/2024 10:13 AM  Age at listing (hypothetical): 79 years  Sex: Male at 12/20/2024  7:05 AM

## 2025-01-30 ENCOUNTER — HOSPITAL ENCOUNTER (OUTPATIENT)
Dept: NON INVASIVE DIAGNOSTICS | Facility: HOSPITAL | Age: 80
Discharge: HOME | End: 2025-01-30
Payer: MEDICARE

## 2025-01-30 VITALS
OXYGEN SATURATION: 99 % | HEART RATE: 69 BPM | SYSTOLIC BLOOD PRESSURE: 152 MMHG | RESPIRATION RATE: 22 BRPM | DIASTOLIC BLOOD PRESSURE: 64 MMHG

## 2025-01-30 DIAGNOSIS — K70.31 ALCOHOLIC CIRRHOSIS OF LIVER WITH ASCITES (HCC): ICD-10-CM

## 2025-01-30 LAB
ALBUMIN FLD-MCNC: <1.5 G/DL
APPEARANCE FLD: NORMAL
BASOPHILS NFR FLD MANUAL: 1 %
COLOR FLD: NORMAL
EOSINOPHIL NFR FLD MANUAL: 2 %
HISTIOCYTES NFR FLD: 34 %
LYMPHOCYTES NFR BLD AUTO: 51 %
MONOCYTES NFR BLD AUTO: 1 %
NEUTS SEG NFR BLD AUTO: 11 %
PROT FLD-MCNC: <3 G/DL
RBC # FLD MANUAL: 590 /UL
SITE: NORMAL
TOTAL CELLS COUNTED SPEC: 100
TOTAL PROTEIN FLUID: 0.7 G/DL
WBC # FLD MANUAL: 83 /UL

## 2025-01-30 PROCEDURE — 84157 ASSAY OF PROTEIN OTHER: CPT | Performed by: STUDENT IN AN ORGANIZED HEALTH CARE EDUCATION/TRAINING PROGRAM

## 2025-01-30 PROCEDURE — 87070 CULTURE OTHR SPECIMN AEROBIC: CPT | Performed by: STUDENT IN AN ORGANIZED HEALTH CARE EDUCATION/TRAINING PROGRAM

## 2025-01-30 PROCEDURE — 49083 ABD PARACENTESIS W/IMAGING: CPT

## 2025-01-30 PROCEDURE — 87102 FUNGUS ISOLATION CULTURE: CPT | Performed by: STUDENT IN AN ORGANIZED HEALTH CARE EDUCATION/TRAINING PROGRAM

## 2025-01-30 PROCEDURE — 82042 OTHER SOURCE ALBUMIN QUAN EA: CPT | Performed by: STUDENT IN AN ORGANIZED HEALTH CARE EDUCATION/TRAINING PROGRAM

## 2025-01-30 PROCEDURE — 89051 BODY FLUID CELL COUNT: CPT | Performed by: STUDENT IN AN ORGANIZED HEALTH CARE EDUCATION/TRAINING PROGRAM

## 2025-01-30 PROCEDURE — 88305 TISSUE EXAM BY PATHOLOGIST: CPT | Performed by: PATHOLOGY

## 2025-01-30 PROCEDURE — 88112 CYTOPATH CELL ENHANCE TECH: CPT | Performed by: PATHOLOGY

## 2025-01-30 PROCEDURE — 87205 SMEAR GRAM STAIN: CPT | Performed by: STUDENT IN AN ORGANIZED HEALTH CARE EDUCATION/TRAINING PROGRAM

## 2025-01-30 PROCEDURE — 89050 BODY FLUID CELL COUNT: CPT | Performed by: STUDENT IN AN ORGANIZED HEALTH CARE EDUCATION/TRAINING PROGRAM

## 2025-01-30 RX ORDER — LIDOCAINE WITH 8.4% SOD BICARB 0.9%(10ML)
SYRINGE (ML) INJECTION AS NEEDED
Status: COMPLETED | OUTPATIENT
Start: 2025-01-30 | End: 2025-01-30

## 2025-01-30 RX ORDER — ALBUMIN (HUMAN) 12.5 G/50ML
SOLUTION INTRAVENOUS
Status: COMPLETED | OUTPATIENT
Start: 2025-01-30 | End: 2025-01-30

## 2025-01-30 RX ADMIN — ALBUMIN (HUMAN) 50 G: 12.5 SOLUTION INTRAVENOUS at 10:26

## 2025-01-30 RX ADMIN — Medication 10 ML: at 10:09

## 2025-01-30 RX ADMIN — ALBUMIN (HUMAN) 25 G: 12.5 SOLUTION INTRAVENOUS at 10:45

## 2025-01-30 NOTE — BRIEF OP NOTE (RAD/CATH)
IR PARACENTESIS Procedure Note    PATIENT NAME: Bimal Lugo  : 1945  MRN: [id number]    Pre-op Diagnosis:   1. 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)        Surgeon:   SUNDAY Fierro  Assistants:     No qualified resident was available, Resident is only observing    Estimated Blood Loss: minimal  Findings: 8070 ml cloudy yellow ascites fluid, LLQ. Albumin 75g infused.    Specimens: multiple sent as requested.    Complications:  none immediate    Anesthesia: local    SUNDAY Fierro     Date: 2025  Time: 11:33 AM

## 2025-02-02 LAB
BACTERIA SPEC BFLD CULT: NO GROWTH
GRAM STN SPEC: NORMAL

## 2025-02-03 LAB — FUNGUS SPEC CULT: NORMAL

## 2025-02-03 PROCEDURE — 88305 TISSUE EXAM BY PATHOLOGIST: CPT | Performed by: PATHOLOGY

## 2025-02-03 PROCEDURE — 88112 CYTOPATH CELL ENHANCE TECH: CPT | Performed by: PATHOLOGY

## 2025-02-06 ENCOUNTER — HOSPITAL ENCOUNTER (OUTPATIENT)
Dept: NON INVASIVE DIAGNOSTICS | Facility: HOSPITAL | Age: 80
Discharge: HOME | End: 2025-02-06
Payer: MEDICARE

## 2025-02-06 VITALS
HEART RATE: 80 BPM | SYSTOLIC BLOOD PRESSURE: 149 MMHG | OXYGEN SATURATION: 98 % | RESPIRATION RATE: 12 BRPM | DIASTOLIC BLOOD PRESSURE: 67 MMHG

## 2025-02-06 DIAGNOSIS — K70.31 ALCOHOLIC CIRRHOSIS OF LIVER WITH ASCITES (HCC): ICD-10-CM

## 2025-02-06 PROCEDURE — 49083 ABD PARACENTESIS W/IMAGING: CPT | Performed by: NURSE PRACTITIONER

## 2025-02-06 PROCEDURE — 49083 ABD PARACENTESIS W/IMAGING: CPT

## 2025-02-06 RX ORDER — LIDOCAINE WITH 8.4% SOD BICARB 0.9%(10ML)
SYRINGE (ML) INJECTION AS NEEDED
Status: COMPLETED | OUTPATIENT
Start: 2025-02-06 | End: 2025-02-06

## 2025-02-06 RX ORDER — ALBUMIN (HUMAN) 12.5 G/50ML
SOLUTION INTRAVENOUS
Status: COMPLETED | OUTPATIENT
Start: 2025-02-06 | End: 2025-02-06

## 2025-02-06 RX ADMIN — ALBUMIN (HUMAN) 75 G: 12.5 SOLUTION INTRAVENOUS at 10:44

## 2025-02-06 RX ADMIN — Medication 10 ML: at 10:11

## 2025-02-06 NOTE — BRIEF OP NOTE (RAD/CATH)
IR PARACENTESIS Procedure Note    PATIENT NAME: Bimal Lugo  : 1945  MRN: [id number]    Pre-op Diagnosis:   1. 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)        Provider:   SUNDAY Worley    Assistants:   None     Estimated Blood Loss: None     Findings: 8450 ml clear yellow ascites aspirated from LEFT sided ultrasound guided paracentesis. Albumin 75 g infused.     Specimens: None     Complications:  None immediate     Anesthesia: local    SUNDAY Worley     Date: 2025  Time: 3:10 PM

## 2025-02-10 ENCOUNTER — TELEPHONE (OUTPATIENT)
Dept: GASTROENTEROLOGY | Facility: CLINIC | Age: 80
End: 2025-02-10

## 2025-02-10 LAB — FUNGUS SPEC CULT: NORMAL

## 2025-02-11 ENCOUNTER — HOSPITAL ENCOUNTER (OUTPATIENT)
Dept: ULTRASOUND IMAGING | Facility: HOSPITAL | Age: 80
Discharge: HOME | End: 2025-02-11
Attending: STUDENT IN AN ORGANIZED HEALTH CARE EDUCATION/TRAINING PROGRAM
Payer: MEDICARE

## 2025-02-11 ENCOUNTER — RESULTS FOLLOW-UP (OUTPATIENT)
Age: 80
End: 2025-02-11

## 2025-02-11 DIAGNOSIS — K76.6 PORTAL HYPERTENSION (HCC): ICD-10-CM

## 2025-02-11 DIAGNOSIS — N18.9 CHRONIC KIDNEY DISEASE, UNSPECIFIED CKD STAGE: ICD-10-CM

## 2025-02-11 DIAGNOSIS — R18.8 CIRRHOSIS OF LIVER WITH ASCITES, UNSPECIFIED HEPATIC CIRRHOSIS TYPE  (HCC): Primary | ICD-10-CM

## 2025-02-11 DIAGNOSIS — K74.60 CIRRHOSIS OF LIVER WITH ASCITES, UNSPECIFIED HEPATIC CIRRHOSIS TYPE  (HCC): Primary | ICD-10-CM

## 2025-02-11 DIAGNOSIS — K74.60 CIRRHOSIS OF LIVER WITH ASCITES, UNSPECIFIED HEPATIC CIRRHOSIS TYPE  (HCC): ICD-10-CM

## 2025-02-11 DIAGNOSIS — R18.8 CIRRHOSIS OF LIVER WITH ASCITES, UNSPECIFIED HEPATIC CIRRHOSIS TYPE  (HCC): ICD-10-CM

## 2025-02-11 PROCEDURE — 76705 ECHO EXAM OF ABDOMEN: CPT

## 2025-02-13 ENCOUNTER — HOSPITAL ENCOUNTER (OUTPATIENT)
Dept: NON INVASIVE DIAGNOSTICS | Facility: HOSPITAL | Age: 80
Discharge: HOME | End: 2025-02-13
Payer: MEDICARE

## 2025-02-13 ENCOUNTER — APPOINTMENT (OUTPATIENT)
Dept: LAB | Facility: HOSPITAL | Age: 80
End: 2025-02-13
Payer: MEDICARE

## 2025-02-13 VITALS
HEART RATE: 67 BPM | DIASTOLIC BLOOD PRESSURE: 68 MMHG | RESPIRATION RATE: 14 BRPM | OXYGEN SATURATION: 98 % | SYSTOLIC BLOOD PRESSURE: 152 MMHG

## 2025-02-13 DIAGNOSIS — R18.8 CIRRHOSIS OF LIVER WITH ASCITES, UNSPECIFIED HEPATIC CIRRHOSIS TYPE  (HCC): ICD-10-CM

## 2025-02-13 DIAGNOSIS — N18.9 CHRONIC KIDNEY DISEASE, UNSPECIFIED CKD STAGE: ICD-10-CM

## 2025-02-13 DIAGNOSIS — K70.31 ALCOHOLIC CIRRHOSIS OF LIVER WITH ASCITES (HCC): ICD-10-CM

## 2025-02-13 DIAGNOSIS — K74.60 CIRRHOSIS OF LIVER WITH ASCITES, UNSPECIFIED HEPATIC CIRRHOSIS TYPE  (HCC): ICD-10-CM

## 2025-02-13 LAB
ALBUMIN SERPL BCG-MCNC: 3.9 G/DL (ref 3.5–5)
ALP SERPL-CCNC: 81 U/L (ref 34–104)
ALT SERPL W P-5'-P-CCNC: 14 U/L (ref 7–52)
ANION GAP SERPL CALCULATED.3IONS-SCNC: 7 MMOL/L (ref 4–13)
AST SERPL W P-5'-P-CCNC: 31 U/L (ref 13–39)
BILIRUB SERPL-MCNC: 1.19 MG/DL (ref 0.2–1)
BUN SERPL-MCNC: 25 MG/DL (ref 5–25)
CALCIUM SERPL-MCNC: 9.1 MG/DL (ref 8.4–10.2)
CHLORIDE SERPL-SCNC: 107 MMOL/L (ref 96–108)
CO2 SERPL-SCNC: 24 MMOL/L (ref 21–32)
CREAT SERPL-MCNC: 1.47 MG/DL (ref 0.6–1.3)
ERYTHROCYTE [DISTWIDTH] IN BLOOD BY AUTOMATED COUNT: 15.3 % (ref 11.6–15.1)
GFR SERPL CREATININE-BSD FRML MDRD: 44 ML/MIN/1.73SQ M
GLUCOSE P FAST SERPL-MCNC: 104 MG/DL (ref 65–99)
HCT VFR BLD AUTO: 32.3 % (ref 36.5–49.3)
HGB BLD-MCNC: 10.5 G/DL (ref 12–17)
INR PPP: 1.24 (ref 0.85–1.19)
MCH RBC QN AUTO: 31.7 PG (ref 26.8–34.3)
MCHC RBC AUTO-ENTMCNC: 32.5 G/DL (ref 31.4–37.4)
MCV RBC AUTO: 98 FL (ref 82–98)
PLATELET # BLD AUTO: 41 THOUSANDS/UL (ref 149–390)
PMV BLD AUTO: 10.1 FL (ref 8.9–12.7)
POTASSIUM SERPL-SCNC: 4.3 MMOL/L (ref 3.5–5.3)
PROT SERPL-MCNC: 6.3 G/DL (ref 6.4–8.4)
PROTHROMBIN TIME: 16.4 SECONDS (ref 12.3–15)
RBC # BLD AUTO: 3.31 MILLION/UL (ref 3.88–5.62)
SODIUM SERPL-SCNC: 138 MMOL/L (ref 135–147)
WBC # BLD AUTO: 2.62 THOUSAND/UL (ref 4.31–10.16)

## 2025-02-13 PROCEDURE — 36415 COLL VENOUS BLD VENIPUNCTURE: CPT

## 2025-02-13 PROCEDURE — 85027 COMPLETE CBC AUTOMATED: CPT

## 2025-02-13 PROCEDURE — 49083 ABD PARACENTESIS W/IMAGING: CPT | Performed by: NURSE PRACTITIONER

## 2025-02-13 PROCEDURE — 85610 PROTHROMBIN TIME: CPT

## 2025-02-13 PROCEDURE — 49083 ABD PARACENTESIS W/IMAGING: CPT

## 2025-02-13 PROCEDURE — 80053 COMPREHEN METABOLIC PANEL: CPT

## 2025-02-13 RX ORDER — ALBUMIN (HUMAN) 12.5 G/50ML
SOLUTION INTRAVENOUS
Status: COMPLETED | OUTPATIENT
Start: 2025-02-13 | End: 2025-02-13

## 2025-02-13 RX ORDER — LIDOCAINE WITH 8.4% SOD BICARB 0.9%(10ML)
SYRINGE (ML) INJECTION AS NEEDED
Status: COMPLETED | OUTPATIENT
Start: 2025-02-13 | End: 2025-02-13

## 2025-02-13 RX ADMIN — Medication 5 ML: at 09:40

## 2025-02-13 RX ADMIN — ALBUMIN (HUMAN) 50 G: 12.5 SOLUTION INTRAVENOUS at 09:54

## 2025-02-13 NOTE — BRIEF OP NOTE (RAD/CATH)
IR PARACENTESIS Procedure Note    PATIENT NAME: Bimal Lugo  : 1945  MRN: [id number]    Pre-op Diagnosis:   1. 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)      Provider:   SUNDAY Worley    Assistants:   None     Estimated Blood Loss: None     Findings: 6250 ml clear yellow ascites aspirated from LEFT sided ultrasound guided paracentesis. Albumin 50 g infused.    Specimens: None     Complications:  None immediate     Anesthesia: local    SUNDAY Worley     Date: 2025  Time: 1:28 PM

## 2025-02-16 ENCOUNTER — RESULTS FOLLOW-UP (OUTPATIENT)
Age: 80
End: 2025-02-16

## 2025-02-16 NOTE — RESULT ENCOUNTER NOTE
MELD 3.0: 13 at 2/13/2025 10:34 AM  MELD-Na: 13 at 2/13/2025 10:34 AM  Calculated from:  Serum Creatinine: 1.47 mg/dL at 2/13/2025 10:34 AM  Serum Sodium: 138 mmol/L (Using max of 137 mmol/L) at 2/13/2025 10:34 AM  Total Bilirubin: 1.19 mg/dL at 2/13/2025 10:34 AM  Serum Albumin: 3.9 g/dL (Using max of 3.5 g/dL) at 2/13/2025 10:34 AM  INR(ratio): 1.24 at 2/13/2025 10:34 AM  Age at listing (hypothetical): 79 years  Sex: Male at 2/13/2025 10:34 AM

## 2025-02-17 LAB — FUNGUS SPEC CULT: NORMAL

## 2025-02-20 ENCOUNTER — HOSPITAL ENCOUNTER (OUTPATIENT)
Dept: NON INVASIVE DIAGNOSTICS | Facility: HOSPITAL | Age: 80
Discharge: HOME | End: 2025-02-20
Payer: MEDICARE

## 2025-02-20 VITALS
RESPIRATION RATE: 18 BRPM | HEART RATE: 68 BPM | DIASTOLIC BLOOD PRESSURE: 75 MMHG | SYSTOLIC BLOOD PRESSURE: 176 MMHG | OXYGEN SATURATION: 96 %

## 2025-02-20 DIAGNOSIS — K70.31 ALCOHOLIC CIRRHOSIS OF LIVER WITH ASCITES (HCC): ICD-10-CM

## 2025-02-20 PROCEDURE — 49083 ABD PARACENTESIS W/IMAGING: CPT | Performed by: NURSE PRACTITIONER

## 2025-02-20 PROCEDURE — 49083 ABD PARACENTESIS W/IMAGING: CPT

## 2025-02-20 RX ORDER — ALBUMIN (HUMAN) 12.5 G/50ML
SOLUTION INTRAVENOUS
Status: COMPLETED | OUTPATIENT
Start: 2025-02-20 | End: 2025-02-20

## 2025-02-20 RX ORDER — LIDOCAINE WITH 8.4% SOD BICARB 0.9%(10ML)
SYRINGE (ML) INJECTION AS NEEDED
Status: COMPLETED | OUTPATIENT
Start: 2025-02-20 | End: 2025-02-20

## 2025-02-20 RX ADMIN — Medication 10 ML: at 09:58

## 2025-02-20 RX ADMIN — ALBUMIN (HUMAN) 50 G: 12.5 SOLUTION INTRAVENOUS at 10:20

## 2025-02-20 NOTE — BRIEF OP NOTE (RAD/CATH)
IR PARACENTESIS Procedure Note    PATIENT NAME: Bimal Lugo  : 1945  MRN: [id number]    Pre-op Diagnosis:   1. 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)        Provider:   SUNDAY Worley    Assistants:   None    Estimated Blood Loss: None     Findings: 5700 ml clear yellow ascites aspirated from left sided ultrasound-guided paracentesis.  Albumin 50 g infused.    Specimens: None    Complications: None immediate    Anesthesia: Local    SUNDAY Worley     Date: 2025  Time: 1:34 PM

## 2025-02-24 LAB — FUNGUS SPEC CULT: NORMAL

## 2025-02-27 ENCOUNTER — HOSPITAL ENCOUNTER (OUTPATIENT)
Dept: NON INVASIVE DIAGNOSTICS | Facility: HOSPITAL | Age: 80
Discharge: HOME | End: 2025-02-27
Payer: MEDICARE

## 2025-02-27 VITALS
RESPIRATION RATE: 16 BRPM | SYSTOLIC BLOOD PRESSURE: 145 MMHG | OXYGEN SATURATION: 97 % | HEART RATE: 72 BPM | DIASTOLIC BLOOD PRESSURE: 67 MMHG

## 2025-02-27 DIAGNOSIS — K70.31 ALCOHOLIC CIRRHOSIS OF LIVER WITH ASCITES (HCC): ICD-10-CM

## 2025-02-27 PROCEDURE — 49083 ABD PARACENTESIS W/IMAGING: CPT

## 2025-02-27 RX ORDER — LIDOCAINE WITH 8.4% SOD BICARB 0.9%(10ML)
SYRINGE (ML) INJECTION AS NEEDED
Status: COMPLETED | OUTPATIENT
Start: 2025-02-27 | End: 2025-02-27

## 2025-02-27 RX ORDER — ALBUMIN (HUMAN) 12.5 G/50ML
SOLUTION INTRAVENOUS
Status: COMPLETED | OUTPATIENT
Start: 2025-02-27 | End: 2025-02-27

## 2025-02-27 RX ADMIN — Medication 10 ML: at 09:40

## 2025-02-27 RX ADMIN — ALBUMIN (HUMAN) 50 G: 12.5 SOLUTION INTRAVENOUS at 10:39

## 2025-02-27 NOTE — BRIEF OP NOTE (RAD/CATH)
IR PARACENTESIS Procedure Note    PATIENT NAME: Bimal Lugo  : 1945  MRN: [id number]    Pre-op Diagnosis:   1. 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)        Surgeon:   SUNDAY Fierro  Assistants:     No qualified resident was available, Resident is only observing    Estimated Blood Loss: minimal  Findings: 6200 ml clear yellow ascites fluid, RLQ. Albumin 50g infused.    Specimens: none    Complications:  none immediate    Anesthesia: local    SUNDAY Fierro     Date: 2025  Time: 11:06 AM

## 2025-03-03 LAB — FUNGUS SPEC CULT: NORMAL

## 2025-03-04 ENCOUNTER — OFFICE VISIT (OUTPATIENT)
Dept: GASTROENTEROLOGY | Facility: CLINIC | Age: 80
End: 2025-03-04
Payer: MEDICARE

## 2025-03-04 VITALS
WEIGHT: 203 LBS | TEMPERATURE: 97.3 F | BODY MASS INDEX: 33.82 KG/M2 | OXYGEN SATURATION: 97 % | SYSTOLIC BLOOD PRESSURE: 156 MMHG | HEIGHT: 65 IN | HEART RATE: 68 BPM | DIASTOLIC BLOOD PRESSURE: 83 MMHG

## 2025-03-04 DIAGNOSIS — D69.6 THROMBOCYTOPENIA (HCC): ICD-10-CM

## 2025-03-04 DIAGNOSIS — K76.6 PORTAL HYPERTENSION (HCC): ICD-10-CM

## 2025-03-04 DIAGNOSIS — R18.8 CIRRHOSIS OF LIVER WITH ASCITES, UNSPECIFIED HEPATIC CIRRHOSIS TYPE  (HCC): Primary | ICD-10-CM

## 2025-03-04 DIAGNOSIS — K76.82 HEPATIC ENCEPHALOPATHY (HCC): ICD-10-CM

## 2025-03-04 DIAGNOSIS — I85.11 SECONDARY ESOPHAGEAL VARICES WITH BLEEDING (HCC): ICD-10-CM

## 2025-03-04 DIAGNOSIS — K74.60 CIRRHOSIS OF LIVER WITH ASCITES, UNSPECIFIED HEPATIC CIRRHOSIS TYPE  (HCC): Primary | ICD-10-CM

## 2025-03-04 PROCEDURE — 99214 OFFICE O/P EST MOD 30 MIN: CPT | Performed by: FAMILY MEDICINE

## 2025-03-04 RX ORDER — INSULIN GLARGINE 100 [IU]/ML
12 INJECTION, SOLUTION SUBCUTANEOUS DAILY
COMMUNITY
Start: 2024-12-27

## 2025-03-04 RX ORDER — NITROGLYCERIN 0.4 MG/1
TABLET SUBLINGUAL
COMMUNITY
Start: 2024-11-07

## 2025-03-04 RX ORDER — SODIUM CHLORIDE, SODIUM LACTATE, POTASSIUM CHLORIDE, CALCIUM CHLORIDE 600; 310; 30; 20 MG/100ML; MG/100ML; MG/100ML; MG/100ML
125 INJECTION, SOLUTION INTRAVENOUS CONTINUOUS
OUTPATIENT
Start: 2025-03-04

## 2025-03-04 RX ORDER — FERROUS SULFATE 325(65) MG
1 TABLET ORAL
COMMUNITY
Start: 2024-10-22

## 2025-03-04 NOTE — PROGRESS NOTES
Name: Bimal Lugo      : 1945      MRN: [id number]  Encounter Provider: Anamaria Hdez PA-C  Encounter Date: 3/4/2025   Encounter department: [facility] [place]  :  Assessment & Plan  Cirrhosis of liver with ascites, unspecified hepatic cirrhosis type  (HCC)    Summary: Patient is a 78 y.o. 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 Current MELD-3.0 (13).     Patient was seen to have cirrhotic morphology and evidence of portal hypertension on imaging since  although not formally diagnosed with cirrhosis until a hospitalization in 2024 for symptomatic anemia secondary to EVBL as well as grade D esophagitis with a discrete ulcer at the GE junction and moderate PHG seen on EGD. He was also found to have large volume abdominopelvic ascites on imaging for which she was started on Lasix and Aldactone and requires once weekly paracentesis.     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 Fortunately, he has been sober for many years. His liver disease is overall stable since his last appointment. No hospitalizations or decompensating events. He continues to require once weekly paracentesis w/ albumin replacement per CKD protocol despite Lasix/Aldactone. Up-titration of his diuretics has been limited due to his renal function.     He is otherwise up-to-date with routine healthcare maintenance for cirrhosis. He will be due for update MELD labs and an AFP level in mid-May. Additional management as below.      Ascites   - Continue Lasix 40 mg twice daily  - Continue Aldactone 50 mg daily.   - Continue once weekly paracentesis   - Albumin replacement per CKD protocol.   - Encouraged to adhere to a low-sodium (<2000 mg daily) diet.      Esophageal Varices   - Hx of EVBL (2024).   - EGD (2024) with 1 small esophageal varix, a single ulcer in the  lower 3rd of the esophagus and sever PHG.  - Continue carvedilol 6.25 mg BID for secondary prophylaxis  - Next EGD due 6/2025, ordered today.     Hepatic Encephalopathy   - Hx of grade 1 HE; AAOx3 w/o asterixis.   - Continue to monitor off lactulose.   - Patient aware of signs/sxs's of HE and advised to promptly inform provider if experiencing such.     HCC Screening   - RUQ US (2/11) without focal liver lesion and AFP normal.   - Patient cannot have an MRI due to metal clip for an aneurysm.   - Plan for repeat RUQ US in August 2025.   - AFP level to be drawn with his next set of routine labs.   - Continue imaging w/ an AFP level q6 months.      Nutritional Status  - Encouraged high-protein diet in addition to a high-protein snack qHS to prevent prolonged fasting.     Transplant Candidacy   - Defer given advanced age and co-morbidities.      CRC Screening  - Colonoscopy (5/7/2024) notable for 3 subcentimeter polyps, diverticulosis and small hemorrhoids.  - No further colonoscopies necessary for CRC screening given age.      Orders:  •  CBC and differential; Future  •  Comprehensive metabolic panel; Future  •  Protime-INR; Future  •  AFP tumor marker; Future  •  US right upper quadrant; Future  •  EGD; Future    Secondary esophageal varices with bleeding (HCC)  Refer to A/P above.   Orders:  •  EGD; Future    Hepatic encephalopathy (HCC)  Refer to A/P above.        Portal hypertension (HCC)  Refer to A/P above.        Thrombocytopenia (HCC)  Refer to A/P above.        Follow-up in 3 months or sooner if necessary.      History of Present Illness   HPI  Bimal Lugo is a 79 y.o. male with PMH significant for CAD, PVD, COPD, ARACELY, DM2, CKD3 and history of AVR (1/2024) who presents today for a follow-up regarding cirrhosis.     Interval events  - Last seen on 1/27/2025 by Dr. Horton.  - No hospitalizations or decompensating events since his last appointment.  - Labs (2/13) with elevated but stable renal function and  otherwise stable LFTs  - RUQ US (2/11) without focal liver lesion.  - Continues to have weekly paracentesis.    MELD 3.0: 13 at 2/13/2025 10:34 AM  MELD-Na: 13 at 2/13/2025 10:34 AM  Calculated from:  Serum Creatinine: 1.47 mg/dL at 2/13/2025 10:34 AM  Serum Sodium: 138 mmol/L (Using max of 137 mmol/L) at 2/13/2025 10:34 AM  Total Bilirubin: 1.19 mg/dL at 2/13/2025 10:34 AM  Serum Albumin: 3.9 g/dL (Using max of 3.5 g/dL) at 2/13/2025 10:34 AM  INR(ratio): 1.24 at 2/13/2025 10:34 AM  Age at listing (hypothetical): 79 years  Sex: Male at 2/13/2025 10:34 AM    Extended liver history   Mattson is familiar to [facility] gastroenterology. He was also previously following with [facility] hepatology. While following with [facility] he was noted to have hepatic nodularity on cross-sectional imaging c/f cirrhosis. Per chart review, he was formally diagnosed with cirrhosis in November 2023 after having had a subsequent ultrasound notable for cirrhotic morphology and portal hypertension as evidenced by splenomegaly and a recannulized umbilical vein. Thought to be secondary to MASH vs EtOH but reports sobriety for many years. Serologic work-up was unremarkable. He underwent a TAVR in January 2024 in [place].      He had his first liver decompensating event in [facility] 2024 when he as noted to have abdominal ascites and underwent LVP in March 2024 with removal 3.9 L. Fluid studies did show Tp 1.1 and albumin 0.5, although serum albumin was not drawn to calculate SAAG. He was started on diuretics for his ascites.     He does not recall being made aware of this diagnosis until he was hospitalized in April 2024 for symptomatic anemia (hgb 7.1) from a UGIB s/p EGD (4/10/2024) notable for 3 large varices with stigmata of recent bleeding (red paty sign) s/p banding (x 3) and grade D esophagitis with a discrete ulcer at the GE junction. Also notable for moderate PHG.      Since his discharge, he has had 2 additional EGDs for banding with his  last being on 6/19/2024 showing 2 medium varices in the esophagus s/p banding (x 2) and PHG. He has also had a colonoscopy (5/7/2024) notable for 3 subcentimeter polyps, diverticulosis and small hemorrhoids. He continue to take pantoprazole 40 mg once daily for his esophagitis.      He also continues to struggle with ascites despite taking Lasix 40 mg twice daily and Aldactone 50 mg twice daily. He requires once weekly paracentesis. Admits to adhering to a low-sodium diet.       History obtained from: patient    Review of Systems   All other systems reviewed and are negative.    Pertinent Medical History     Medical History Reviewed by provider this encounter:  Tobacco  Allergies  Meds  Problems  Med Hx  Surg Hx  Fam Hx     .  Past Medical History   Past Medical History:   Diagnosis Date   • Chronic bronchitis (HCC)    • COPD (chronic obstructive pulmonary disease) (HCC)    • Diabetes mellitus (HCC)    • Esophageal varices (HCC) 11 apr 2024   • Hyperlipidemia    • Hypertension    • Obesity    • ARACELY (obstructive sleep apnea)      Past Surgical History:   Procedure Laterality Date   • AORTIC VALVE REPLACEMENT      Jan 4 2024   • IR PARACENTESIS  04/04/2024   • IR PARACENTESIS  03/07/2024   • IR PARACENTESIS  04/11/2024   • IR PARACENTESIS  04/26/2024   • IR PARACENTESIS  05/09/2024   • IR PARACENTESIS  05/16/2024   • IR PARACENTESIS  05/23/2024   • IR PARACENTESIS  06/06/2024   • IR PARACENTESIS  06/13/2024   • IR PARACENTESIS  06/20/2024   • IR PARACENTESIS  06/27/2024   • IR PARACENTESIS  07/03/2024   • IR PARACENTESIS  07/11/2024   • IR PARACENTESIS  07/18/2024   • IR PARACENTESIS  07/24/2024   • IR PARACENTESIS  08/01/2024   • IR PARACENTESIS  8/8/2024   • IR PARACENTESIS  8/15/2024   • IR PARACENTESIS  8/22/2024   • IR PARACENTESIS  8/29/2024   • IR PARACENTESIS  9/5/2024   • IR PARACENTESIS  9/12/2024   • IR PARACENTESIS  9/19/2024   • IR PARACENTESIS  10/3/2024   • IR PARACENTESIS  9/26/2024   • IR  PARACENTESIS  10/10/2024   • IR PARACENTESIS  10/17/2024   • IR PARACENTESIS  10/24/2024   • IR PARACENTESIS  10/31/2024   • IR PARACENTESIS  11/7/2024   • IR PARACENTESIS  11/14/2024   • IR PARACENTESIS  11/21/2024   • IR PARACENTESIS  11/27/2024   • IR PARACENTESIS  12/5/2024   • IR PARACENTESIS  12/12/2024   • IR PARACENTESIS  12/19/2024   • IR PARACENTESIS  12/26/2024   • IR PARACENTESIS  1/2/2025   • IR PARACENTESIS  1/9/2025   • IR PARACENTESIS  1/16/2025   • IR PARACENTESIS  1/23/2025   • IR PARACENTESIS  1/30/2025   • IR PARACENTESIS  2/6/2025   • IR PARACENTESIS  2/13/2025   • IR PARACENTESIS  2/20/2025   • IR PARACENTESIS  2/27/2025   • UPPER GASTROINTESTINAL ENDOSCOPY  11 apr 2024     History reviewed. No pertinent family history.   reports that he quit smoking about 38 years ago. His smoking use included cigarettes. He started smoking about 73 years ago. He has a 175 pack-year smoking history. He has never used smokeless tobacco. He reports that he does not currently use alcohol after a past usage of about 24.0 standard drinks of alcohol per week. He reports that he does not currently use drugs.  Current Outpatient Medications   Medication Instructions   • albuterol (PROVENTIL HFA,VENTOLIN HFA) 90 mcg/act inhaler 2 puffs, Every 4 hours PRN   • ammonium lactate (LAC-HYDRIN) 12 % lotion 2 times daily   • aspirin 81 mg, Daily (early morning)   • atorvastatin (LIPITOR) 40 mg, Daily (early morning)   • carvedilol (COREG) 6.25 mg, Oral, 2 times daily with meals   • ferrous sulfate 325 (65 Fe) mg tablet 1 tablet, Oral, Daily with breakfast   • furosemide (LASIX) 40 mg, Oral, 2 times daily   • glucose 4-6 GM-MG 16 g, Daily PRN   • glucose blood test strip Check twice a day and as needed, dx E11.9,   • hydrocortisone (PROCTOSOL HC) 2.5 % rectal cream Insert into the rectum   • insulin aspart (NovoLOG FlexPen) 100 UNIT/ML injection pen 14 in a.m , 10units in p.m.   • Insulin Glargine Solostar 12 Units,  Subcutaneous, Daily   • isosorbide mononitrate (IMDUR) 30 mg 24 hr tablet    • lactulose (CHRONULAC) 10 g/15 mL solution 10mL PO BID   • Lancets (ONETOUCH ULTRASOFT) lancets Check twice a day and as needed, dx E11.9,   • lovastatin (MEVACOR) 10 MG tablet No dose, route, or frequency recorded.   • Misc. Devices MISC Does not apply   • montelukast (SINGULAIR) 10 mg, Daily   • Multiple Vitamin (DAILY VALUE MULTIVITAMIN) TABS Take by mouth   • nitroglycerin (NITROSTAT) 0.4 mg SL tablet Sublingual   • nystatin (MYCOSTATIN) powder Apply topically   • Omega-3 Fatty Acids (FISH OIL) 645 MG CAPS Take by mouth   • pantoprazole (PROTONIX) 40 mg, Oral, 2 times daily before meals   • spironolactone (ALDACTONE) 50 mg, Oral, Daily   • SYMBICORT 160-4.5 MCG/ACT inhaler 2 puffs, 2 times daily   • Tresiba FlexTouch 10 Units, Daily at bedtime     Allergies   Allergen Reactions   • Gemfibrozil Swelling and Rash   • Carbamazepine    • Carbamazepine, Eslicarbazepine, And Oxcarbazepine      swelling   • Oxycodone Other (See Comments)     Pt states he hallucinates      Current Outpatient Medications on File Prior to Visit   Medication Sig Dispense Refill   • albuterol (PROVENTIL HFA,VENTOLIN HFA) 90 mcg/act inhaler Inhale 2 puffs every 4 (four) hours as needed     • aspirin 81 MG tablet Take 81 mg by mouth daily in the early morning     • atorvastatin (LIPITOR) 40 mg tablet Take 40 mg by mouth daily in the early morning     • carvedilol (COREG) 6.25 mg tablet Take 1 tablet (6.25 mg total) by mouth 2 (two) times a day with meals 60 tablet 0   • ferrous sulfate 325 (65 Fe) mg tablet Take 1 tablet by mouth daily with breakfast     • furosemide (LASIX) 40 mg tablet Take 1 tablet (40 mg total) by mouth 2 (two) times a day 60 tablet 6   • glucose 4-6 GM-MG Chew 16 g daily as needed     • glucose blood test strip Check twice a day and as needed, dx E11.9,     • hydrocortisone (PROCTOSOL HC) 2.5 % rectal cream Insert into the rectum     • insulin  aspart (NovoLOG FlexPen) 100 UNIT/ML injection pen 14 in a.m , 10units in p.m. 15 mL 0   • insulin degludec (Tresiba FlexTouch) 100 units/mL injection pen Inject 10 Units under the skin daily at bedtime     • Insulin Glargine Solostar 100 UNIT/ML SOPN Inject 12 Units under the skin daily     • isosorbide mononitrate (IMDUR) 30 mg 24 hr tablet      • lactulose (CHRONULAC) 10 g/15 mL solution 10mL PO  mL 0   • Lancets (ONETOUCH ULTRASOFT) lancets Check twice a day and as needed, dx E11.9,     • montelukast (SINGULAIR) 10 mg tablet Take 10 mg by mouth daily     • Multiple Vitamin (DAILY VALUE MULTIVITAMIN) TABS Take by mouth     • nitroglycerin (NITROSTAT) 0.4 mg SL tablet Place under the tongue     • nystatin (MYCOSTATIN) powder Apply topically     • Omega-3 Fatty Acids (FISH OIL) 645 MG CAPS Take by mouth     • pantoprazole (PROTONIX) 40 mg tablet Take 1 tablet (40 mg total) by mouth 2 (two) times a day before meals 60 tablet 0   • spironolactone (ALDACTONE) 50 mg tablet Take 1 tablet (50 mg total) by mouth daily 60 tablet 0   • SYMBICORT 160-4.5 MCG/ACT inhaler Inhale 2 puffs 2 (two) times a day     • ammonium lactate (LAC-HYDRIN) 12 % lotion Apply topically 2 (two) times a day (Patient not taking: Reported on 2025)     • lovastatin (MEVACOR) 10 MG tablet  (Patient not taking: Reported on 3/4/2025)     • Misc. Devices MISC by Does not apply route       No current facility-administered medications on file prior to visit.      Social History     Tobacco Use   • Smoking status: Former     Current packs/day: 0.00     Average packs/day: 5.0 packs/day for 35.0 years (175.0 ttl pk-yrs)     Types: Cigarettes     Start date:      Quit date:      Years since quittin.1   • Smokeless tobacco: Never   • Tobacco comments:     Quit in  (approx)   Vaping Use   • Vaping status: Never Used   Substance and Sexual Activity   • Alcohol use: Not Currently     Alcohol/week: 24.0 standard drinks of alcohol      "Types: 24 Cans of beer per week     Comment: sober- quit 1987   • Drug use: Not Currently   • Sexual activity: Not Currently     Partners: Female     Birth control/protection: Abstinence, Condom Male        Objective   /83 (BP Location: Right arm, Patient Position: Sitting, Cuff Size: Standard)   Pulse 68   Temp (!) 97.3 °F (36.3 °C) (Tympanic)   Ht 5' 5\" (1.651 m)   Wt 92.1 kg (203 lb)   SpO2 97%   BMI 33.78 kg/m²      Physical Exam  Vitals and nursing note reviewed.   Constitutional:       General: He is not in acute distress.     Appearance: Normal appearance. He is well-developed. He is obese. He is not ill-appearing or toxic-appearing.      Comments: Walks with cane.    HENT:      Head: Normocephalic and atraumatic.   Eyes:      General: No scleral icterus.     Conjunctiva/sclera: Conjunctivae normal.   Cardiovascular:      Heart sounds: Normal heart sounds.   Pulmonary:      Effort: Pulmonary effort is normal. No respiratory distress.      Breath sounds: Normal breath sounds.   Abdominal:      General: Bowel sounds are normal. There is distension.      Palpations: Abdomen is soft. There is no mass.      Tenderness: There is no abdominal tenderness.   Musculoskeletal:      Right lower leg: No edema.      Left lower leg: No edema.   Skin:     General: Skin is warm and dry.      Coloration: Skin is not jaundiced.      Findings: No bruising or rash.   Neurological:      Mental Status: He is alert and oriented to person, place, and time. Mental status is at baseline.   Psychiatric:         Mood and Affect: Mood normal.         Behavior: Behavior normal.           "

## 2025-03-04 NOTE — ASSESSMENT & PLAN NOTE
Summary: Patient is a 78 y.o. 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 Current MELD-3.0 (13).     Patient was seen to have cirrhotic morphology and evidence of portal hypertension on imaging since 2023 although not formally diagnosed with cirrhosis until a hospitalization in April 2024 for symptomatic anemia secondary to EVBL as well as grade D esophagitis with a discrete ulcer at the GE junction and moderate PHG seen on EGD. He was also found to have large volume abdominopelvic ascites on imaging for which she was started on Lasix and Aldactone and requires once weekly paracentesis.     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 Fortunately, he has been sober for many years. His liver disease is overall stable since his last appointment. No hospitalizations or decompensating events. He continues to require once weekly paracentesis w/ albumin replacement per CKD protocol despite Lasix/Aldactone. Up-titration of his diuretics has been limited due to his renal function.     He is otherwise up-to-date with routine healthcare maintenance for cirrhosis. He will be due for update MELD labs and an AFP level in mid-May. Additional management as below.      Ascites   - Continue Lasix 40 mg twice daily  - Continue Aldactone 50 mg daily.   - Continue once weekly paracentesis   - Albumin replacement per CKD protocol.   - Encouraged to adhere to a low-sodium (<2000 mg daily) diet.      Esophageal Varices   - Hx of EVBL (4/2024).   - EGD (12/2024) with 1 small esophageal varix, a single ulcer in the lower 3rd of the esophagus and sever PHG.  - Continue carvedilol 6.25 mg BID for secondary prophylaxis  - Next EGD due 6/2025, ordered today.     Hepatic Encephalopathy   - Hx of grade 1 HE; AAOx3 w/o asterixis.   - Continue to monitor off lactulose.   - Patient aware of signs/sxs's of HE and advised to promptly  inform provider if experiencing such.     HCC Screening   - RUQ US (2/11) without focal liver lesion and AFP normal.   - Patient cannot have an MRI due to metal clip for an aneurysm.   - Plan for repeat RUQ US in August 2025.   - AFP level to be drawn with his next set of routine labs.   - Continue imaging w/ an AFP level q6 months.      Nutritional Status  - Encouraged high-protein diet in addition to a high-protein snack qHS to prevent prolonged fasting.     Transplant Candidacy   - Defer given advanced age and co-morbidities.      CRC Screening  - Colonoscopy (5/7/2024) notable for 3 subcentimeter polyps, diverticulosis and small hemorrhoids.  - No further colonoscopies necessary for CRC screening given age.      Orders:  •  CBC and differential; Future  •  Comprehensive metabolic panel; Future  •  Protime-INR; Future  •  AFP tumor marker; Future  •  US right upper quadrant; Future  •  EGD; Future

## 2025-03-06 ENCOUNTER — HOSPITAL ENCOUNTER (OUTPATIENT)
Dept: NON INVASIVE DIAGNOSTICS | Facility: HOSPITAL | Age: 80
Discharge: HOME | End: 2025-03-06
Payer: MEDICARE

## 2025-03-06 VITALS
SYSTOLIC BLOOD PRESSURE: 149 MMHG | OXYGEN SATURATION: 97 % | HEART RATE: 74 BPM | DIASTOLIC BLOOD PRESSURE: 70 MMHG | RESPIRATION RATE: 16 BRPM

## 2025-03-06 DIAGNOSIS — K70.31 ALCOHOLIC CIRRHOSIS OF LIVER WITH ASCITES (HCC): ICD-10-CM

## 2025-03-06 PROCEDURE — 49083 ABD PARACENTESIS W/IMAGING: CPT

## 2025-03-06 RX ORDER — ALBUMIN (HUMAN) 12.5 G/50ML
SOLUTION INTRAVENOUS
Status: COMPLETED | OUTPATIENT
Start: 2025-03-06 | End: 2025-03-06

## 2025-03-06 RX ORDER — LIDOCAINE WITH 8.4% SOD BICARB 0.9%(10ML)
SYRINGE (ML) INJECTION AS NEEDED
Status: COMPLETED | OUTPATIENT
Start: 2025-03-06 | End: 2025-03-06

## 2025-03-06 RX ADMIN — Medication 10 ML: at 11:22

## 2025-03-06 RX ADMIN — ALBUMIN (HUMAN) 50 G: 12.5 SOLUTION INTRAVENOUS at 11:59

## 2025-03-06 NOTE — BRIEF OP NOTE (RAD/CATH)
IR PARACENTESIS Procedure Note    PATIENT NAME: Bimal Lugo  : 1945  MRN: [id number]    Pre-op Diagnosis:   1. 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)        Surgeon:   SUNDAY Fierro  Assistants:     No qualified resident was available, Resident is only observing    Estimated Blood Loss: minimal  Findings: 6200 ml clear yellow ascites fluid, LLQ. Albumin 50g infused.    Specimens: none    Complications:  none immediate    Anesthesia: local    SUNDAY Fierro     Date: 3/6/2025  Time: 12:37 PM

## 2025-03-07 ENCOUNTER — TELEPHONE (OUTPATIENT)
Dept: NEPHROLOGY | Facility: CLINIC | Age: 80
End: 2025-03-07

## 2025-03-07 DIAGNOSIS — N18.30 STAGE 3 CHRONIC KIDNEY DISEASE, UNSPECIFIED WHETHER STAGE 3A OR 3B CKD (HCC): Primary | ICD-10-CM

## 2025-03-13 ENCOUNTER — HOSPITAL ENCOUNTER (OUTPATIENT)
Dept: NON INVASIVE DIAGNOSTICS | Facility: HOSPITAL | Age: 80
Discharge: HOME | End: 2025-03-13
Attending: STUDENT IN AN ORGANIZED HEALTH CARE EDUCATION/TRAINING PROGRAM
Payer: MEDICARE

## 2025-03-13 VITALS
DIASTOLIC BLOOD PRESSURE: 63 MMHG | OXYGEN SATURATION: 98 % | RESPIRATION RATE: 18 BRPM | SYSTOLIC BLOOD PRESSURE: 137 MMHG | HEART RATE: 55 BPM

## 2025-03-13 DIAGNOSIS — K74.60 CIRRHOSIS OF LIVER WITH ASCITES, UNSPECIFIED HEPATIC CIRRHOSIS TYPE  (HCC): ICD-10-CM

## 2025-03-13 DIAGNOSIS — R18.8 CIRRHOSIS OF LIVER WITH ASCITES, UNSPECIFIED HEPATIC CIRRHOSIS TYPE  (HCC): ICD-10-CM

## 2025-03-13 PROCEDURE — 49083 ABD PARACENTESIS W/IMAGING: CPT

## 2025-03-13 RX ORDER — ALBUMIN (HUMAN) 12.5 G/50ML
SOLUTION INTRAVENOUS
Status: COMPLETED | OUTPATIENT
Start: 2025-03-13 | End: 2025-03-13

## 2025-03-13 RX ORDER — LIDOCAINE WITH 8.4% SOD BICARB 0.9%(10ML)
SYRINGE (ML) INJECTION AS NEEDED
Status: COMPLETED | OUTPATIENT
Start: 2025-03-13 | End: 2025-03-13

## 2025-03-13 RX ADMIN — Medication 10 ML: at 10:52

## 2025-03-13 RX ADMIN — ALBUMIN (HUMAN) 50 G: 12.5 SOLUTION INTRAVENOUS at 11:04

## 2025-03-18 ENCOUNTER — TELEPHONE (OUTPATIENT)
Dept: NEPHROLOGY | Facility: CLINIC | Age: 80
End: 2025-03-18

## 2025-03-18 NOTE — TELEPHONE ENCOUNTER
I called patient to remind him about having non fasting lab work done Viki answered the phone .Per patient's wife Viki patient will go to have non fasting lab work on Thursday 03/20/2025 per Viki she stated patient has an appointment set up already for that day.

## 2025-03-20 ENCOUNTER — HOSPITAL ENCOUNTER (OUTPATIENT)
Dept: NON INVASIVE DIAGNOSTICS | Facility: HOSPITAL | Age: 80
Discharge: HOME | End: 2025-03-20
Payer: MEDICARE

## 2025-03-20 ENCOUNTER — OFFICE VISIT (OUTPATIENT)
Dept: NEPHROLOGY | Facility: CLINIC | Age: 80
End: 2025-03-20
Payer: MEDICARE

## 2025-03-20 VITALS
HEART RATE: 70 BPM | HEIGHT: 65 IN | RESPIRATION RATE: 16 BRPM | OXYGEN SATURATION: 97 % | SYSTOLIC BLOOD PRESSURE: 130 MMHG | BODY MASS INDEX: 32.82 KG/M2 | DIASTOLIC BLOOD PRESSURE: 60 MMHG | TEMPERATURE: 97.3 F | WEIGHT: 197 LBS

## 2025-03-20 VITALS
SYSTOLIC BLOOD PRESSURE: 163 MMHG | OXYGEN SATURATION: 98 % | DIASTOLIC BLOOD PRESSURE: 72 MMHG | HEART RATE: 89 BPM | RESPIRATION RATE: 18 BRPM

## 2025-03-20 DIAGNOSIS — K70.31 ALCOHOLIC CIRRHOSIS OF LIVER WITH ASCITES (HCC): ICD-10-CM

## 2025-03-20 DIAGNOSIS — I10 PRIMARY HYPERTENSION: ICD-10-CM

## 2025-03-20 DIAGNOSIS — N18.32 STAGE 3B CHRONIC KIDNEY DISEASE (HCC): ICD-10-CM

## 2025-03-20 DIAGNOSIS — E11.69 TYPE 2 DIABETES MELLITUS WITH OTHER SPECIFIED COMPLICATION, UNSPECIFIED WHETHER LONG TERM INSULIN USE (HCC): ICD-10-CM

## 2025-03-20 DIAGNOSIS — G47.33 OBSTRUCTIVE SLEEP APNEA SYNDROME: Primary | ICD-10-CM

## 2025-03-20 DIAGNOSIS — N18.9 CHRONIC KIDNEY DISEASE, UNSPECIFIED CKD STAGE: ICD-10-CM

## 2025-03-20 PROCEDURE — G2211 COMPLEX E/M VISIT ADD ON: HCPCS | Performed by: INTERNAL MEDICINE

## 2025-03-20 PROCEDURE — 49083 ABD PARACENTESIS W/IMAGING: CPT

## 2025-03-20 PROCEDURE — 49083 ABD PARACENTESIS W/IMAGING: CPT | Performed by: NURSE PRACTITIONER

## 2025-03-20 PROCEDURE — 99214 OFFICE O/P EST MOD 30 MIN: CPT | Performed by: INTERNAL MEDICINE

## 2025-03-20 RX ORDER — LIDOCAINE WITH 8.4% SOD BICARB 0.9%(10ML)
SYRINGE (ML) INJECTION AS NEEDED
Status: COMPLETED | OUTPATIENT
Start: 2025-03-20 | End: 2025-03-20

## 2025-03-20 RX ADMIN — Medication 10 ML: at 11:31

## 2025-03-20 NOTE — ASSESSMENT & PLAN NOTE
Lab Results   Component Value Date    HGBA1C 5.2 12/20/2024     Excellent glucose control while on insulin with most recent hemoglobin A1c of 5.2.

## 2025-03-20 NOTE — ASSESSMENT & PLAN NOTE
Established history of alcoholic liver cirrhosis with ascites requiring paracentesis on a weekly basis.  Patient closely follows up with Dr. Horton.  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  Recommended low-salt diet.  Currently patient is taking Lasix 40 mg once daily and spironolactone 50 mg once daily.  Instructed if he gains weight and develops edema or requiring frequent paracentesis within a week may consider increasing Lasix to 40 mg twice daily instead.

## 2025-03-20 NOTE — PROGRESS NOTES
Name: Bimal Lugo      : 1945      MRN: [id number]  Encounter Provider: Noe Ortega MD  Encounter Date: 3/20/2025   Encounter department: [facility]  :  Assessment & Plan  Stage 3b chronic kidney disease (HCC)  Lab Results   Component Value Date    EGFR 44 2025    EGFR 45 2025    EGFR 38 (L) 2024    CREATININE 1.47 (H) 2025    CREATININE 1.45 (H) 2025    CREATININE 1.8 (H) 2024     Etiology of CKD had been hepatorenal syndrome type II, episodes of acute kidney injury attributed to volume depletion and ascites resulting in intra-abdominal hypertension plus diabetic nephropathy plus hypertensive nephrosclerosis plus age-related nephron loss.  Most recent lab work performed in 2025 had revealed serum creatinine 1.4 mg/dL and EGFR of 44.  Avoidance of NSAIDs, contrast agents reiterated.  Urine albumin creatinine ratio performed in 2024 had revealed 17 mg/g of protein and acceptable  Imaging of the kidneys in the form of CT scan abdomen pelvis had revealed normal-sized kidneys without any tumor or cyst.         Obstructive sleep apnea syndrome  Patient has underlying obstructive sleep apnea and had been on CPAP for several years.       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)  Established history of alcoholic liver cirrhosis with ascites requiring paracentesis on a weekly basis.  Patient closely follows up with Dr. Horton.  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  Recommended low-salt diet.  Currently patient is taking Lasix 40 mg once daily and spironolactone 50 mg once daily.  Instructed if he gains weight and develops edema or requiring frequent paracentesis within a week may consider increasing Lasix to 40 mg twice daily instead.       Type 2 diabetes mellitus with other specified complication, unspecified whether long term insulin use (HCC)    Lab Results   Component Value Date    HGBA1C 5.2  "12/20/2024     Excellent glucose control while on insulin with most recent hemoglobin A1c of 5.2.       Primary hypertension  Blood pressure is on target at 130 mmHg systolic while on carvedilol.           History of Present Illness   HPI  Bimal Lugo is a 79 y.o. male who presents to renal office for management of CKD.  Patient has evidence of renal insufficiency as early as 2021.  Patient was seen by me in November 2024 when he presented with acute kidney injury on CKD with a creatinine of 3.4 mg/dL.  Patient has underlying alcoholic liver cirrhosis and had required paracentesis on a weekly basis.  Patient had been receiving albumin with paracentesis which had sustained his renal function.  Patient is scheduled to be taking Lasix 40 mg twice daily however for several months have been taking it once a day only.  Remains on low-salt diet.  History obtained from: patient    Review of Systems   Constitutional: Negative.    HENT: Negative.     Eyes: Negative.    Respiratory: Negative.     Cardiovascular: Negative.    Gastrointestinal:  Positive for abdominal distention.   Endocrine: Negative.    Genitourinary: Negative.    Musculoskeletal: Negative.    Skin: Negative.    Allergic/Immunologic: Negative.    Neurological: Negative.    Hematological: Negative.    All other systems reviewed and are negative.    Medical History Reviewed by provider this encounter:     .     Objective   /60 (Patient Position: Sitting, Cuff Size: Large)   Pulse 70   Temp (!) 97.3 °F (36.3 °C) (Temporal)   Resp 16   Ht 5' 5\" (1.651 m)   Wt 89.4 kg (197 lb)   SpO2 97%   BMI 32.78 kg/m²      Physical Exam  HENT:      Head: Normocephalic and atraumatic.   Eyes:      Pupils: Pupils are equal, round, and reactive to light.   Neck:      Vascular: No JVD.   Cardiovascular:      Rate and Rhythm: Normal rate and regular rhythm.      Heart sounds: Normal heart sounds. No murmur heard.     No friction rub.   Pulmonary:      Effort: Pulmonary " effort is normal.      Breath sounds: Normal breath sounds.   Abdominal:      General: Bowel sounds are normal. There is no distension.      Palpations: Abdomen is soft.      Tenderness: There is no abdominal tenderness. There is no rebound.   Musculoskeletal:         General: No tenderness.      Cervical back: Neck supple.   Skin:     General: Skin is dry.      Findings: No rash.   Neurological:      Mental Status: He is alert and oriented to person, place, and time.         Administrative Statements   I have spent a total time of 37 minutes in caring for this patient on the day of the visit/encounter including Diagnostic results, Prognosis, Risks and benefits of tx options, Instructions for management, Importance of tx compliance, Risk factor reductions, and Impressions.

## 2025-03-20 NOTE — BRIEF OP NOTE (RAD/CATH)
IR PARACENTESIS Procedure Note    PATIENT NAME: Bimal Lugo  : 1945  MRN: [id number]    Pre-op Diagnosis:   1. 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)        Provider:   SUNDAY Worley    Assistants:   None    Estimated Blood Loss: None     Findings: 4000 ml clear yellow ascites aspirated from LEFT sided ultrasound guided paracentesis.     Specimens: None     Complications:  None immediate     Anesthesia: local    SUNDAY Worley     Date: 3/20/2025  Time: 12:08 PM

## 2025-03-20 NOTE — ASSESSMENT & PLAN NOTE
Lab Results   Component Value Date    EGFR 44 02/13/2025    EGFR 45 01/27/2025    EGFR 38 (L) 12/20/2024    CREATININE 1.47 (H) 02/13/2025    CREATININE 1.45 (H) 01/27/2025    CREATININE 1.8 (H) 12/20/2024     Etiology of CKD had been hepatorenal syndrome type II, episodes of acute kidney injury attributed to volume depletion and ascites resulting in intra-abdominal hypertension plus diabetic nephropathy plus hypertensive nephrosclerosis plus age-related nephron loss.  Most recent lab work performed in February 2025 had revealed serum creatinine 1.4 mg/dL and EGFR of 44.  Avoidance of NSAIDs, contrast agents reiterated.  Urine albumin creatinine ratio performed in October 2024 had revealed 17 mg/g of protein and acceptable  Imaging of the kidneys in the form of CT scan abdomen pelvis had revealed normal-sized kidneys without any tumor or cyst.

## 2025-03-27 ENCOUNTER — HOSPITAL ENCOUNTER (OUTPATIENT)
Dept: NON INVASIVE DIAGNOSTICS | Facility: HOSPITAL | Age: 80
Discharge: HOME | End: 2025-03-27
Payer: MEDICARE

## 2025-03-27 VITALS
SYSTOLIC BLOOD PRESSURE: 157 MMHG | DIASTOLIC BLOOD PRESSURE: 66 MMHG | HEART RATE: 68 BPM | RESPIRATION RATE: 18 BRPM | OXYGEN SATURATION: 98 %

## 2025-03-27 DIAGNOSIS — K70.31 ALCOHOLIC CIRRHOSIS OF LIVER WITH ASCITES (HCC): ICD-10-CM

## 2025-03-27 PROCEDURE — 49083 ABD PARACENTESIS W/IMAGING: CPT | Performed by: NURSE PRACTITIONER

## 2025-03-27 PROCEDURE — 49083 ABD PARACENTESIS W/IMAGING: CPT

## 2025-03-27 RX ORDER — LIDOCAINE WITH 8.4% SOD BICARB 0.9%(10ML)
SYRINGE (ML) INJECTION AS NEEDED
Status: COMPLETED | OUTPATIENT
Start: 2025-03-27 | End: 2025-03-27

## 2025-03-27 RX ADMIN — Medication 10 ML: at 11:35

## 2025-03-27 NOTE — BRIEF OP NOTE (RAD/CATH)
IR PARACENTESIS Procedure Note    PATIENT NAME: Bimal Lugo  : 1945  MRN: [id number]    Pre-op Diagnosis:   1. 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)        Provider:   SUNDAY Worley    Assistants:   None     Estimated Blood Loss: None     Findings: 4200 mL of clear yellow ascites aspirated from right sided ultrasound-guided paracentesis.    Specimens: None    Complications: None immediate    Anesthesia: Local    SUNDAY Worley     Date: 3/27/2025  Time: 12:21 PM

## 2025-04-03 ENCOUNTER — HOSPITAL ENCOUNTER (OUTPATIENT)
Dept: NON INVASIVE DIAGNOSTICS | Facility: HOSPITAL | Age: 80
Discharge: HOME | End: 2025-04-03
Payer: MEDICARE

## 2025-04-03 VITALS
DIASTOLIC BLOOD PRESSURE: 55 MMHG | HEART RATE: 68 BPM | RESPIRATION RATE: 18 BRPM | OXYGEN SATURATION: 99 % | SYSTOLIC BLOOD PRESSURE: 113 MMHG

## 2025-04-03 DIAGNOSIS — K70.31 ALCOHOLIC CIRRHOSIS OF LIVER WITH ASCITES (HCC): ICD-10-CM

## 2025-04-03 PROCEDURE — 49083 ABD PARACENTESIS W/IMAGING: CPT

## 2025-04-03 RX ORDER — ALBUMIN (HUMAN) 12.5 G/50ML
SOLUTION INTRAVENOUS
Status: COMPLETED | OUTPATIENT
Start: 2025-04-03 | End: 2025-04-03

## 2025-04-03 RX ORDER — LIDOCAINE WITH 8.4% SOD BICARB 0.9%(10ML)
SYRINGE (ML) INJECTION AS NEEDED
Status: COMPLETED | OUTPATIENT
Start: 2025-04-03 | End: 2025-04-03

## 2025-04-03 RX ADMIN — Medication 10 ML: at 11:06

## 2025-04-03 RX ADMIN — ALBUMIN (HUMAN) 50 G: 12.5 SOLUTION INTRAVENOUS at 11:45

## 2025-04-03 NOTE — BRIEF OP NOTE (RAD/CATH)
IR PARACENTESIS Procedure Note    PATIENT NAME: Bimal Lugo  : 1945  MRN: [id number]    Pre-op Diagnosis:   1. 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)        Surgeon:   SUNDAY Fierro  Assistants:     No qualified resident was available, Resident is only observing    Estimated Blood Loss: minimal  Findings: 7200 ml pale serosanguineous ascites fluid, LLQ. Albumin 50g infused.    Specimens: none    Complications:  none immediate    Anesthesia: local    SUNDAY Fierro     Date: 4/3/2025  Time: 12:22 PM

## 2025-04-09 ENCOUNTER — OFFICE VISIT (OUTPATIENT)
Dept: GASTROENTEROLOGY | Facility: CLINIC | Age: 80
End: 2025-04-09
Payer: MEDICARE

## 2025-04-09 VITALS
TEMPERATURE: 97.5 F | HEART RATE: 75 BPM | BODY MASS INDEX: 33.66 KG/M2 | WEIGHT: 202 LBS | SYSTOLIC BLOOD PRESSURE: 128 MMHG | OXYGEN SATURATION: 96 % | DIASTOLIC BLOOD PRESSURE: 80 MMHG | HEIGHT: 65 IN

## 2025-04-09 DIAGNOSIS — R18.8 CIRRHOSIS OF LIVER WITH ASCITES, UNSPECIFIED HEPATIC CIRRHOSIS TYPE  (HCC): Primary | ICD-10-CM

## 2025-04-09 DIAGNOSIS — R11.0 NAUSEA: ICD-10-CM

## 2025-04-09 DIAGNOSIS — K74.60 CIRRHOSIS OF LIVER WITH ASCITES, UNSPECIFIED HEPATIC CIRRHOSIS TYPE  (HCC): Primary | ICD-10-CM

## 2025-04-09 PROCEDURE — 99213 OFFICE O/P EST LOW 20 MIN: CPT | Performed by: PHYSICIAN ASSISTANT

## 2025-04-09 RX ORDER — ONDANSETRON 4 MG/1
4 TABLET, FILM COATED ORAL EVERY 8 HOURS PRN
Qty: 60 TABLET | Refills: 0 | Status: SHIPPED | OUTPATIENT
Start: 2025-04-09

## 2025-04-09 NOTE — PROGRESS NOTES
Name: Bimal Lugo      : 1945      MRN: [id number]  Encounter Provider: Grazyna Mtz PA-C  Encounter Date: 2025   Encounter department: [facility] [place]  :  Assessment & Plan  Cirrhosis of liver with ascites, unspecified hepatic cirrhosis type  (HCC)  MASH/Etoh  No alcohol x 3 years    MELD 3.0: 13 at 2025 10:34 AM  MELD-Na: 13 at 2025 10:34 AM  Calculated from:  Serum Creatinine: 1.47 mg/dL at 2025 10:34 AM  Serum Sodium: 138 mmol/L (Using max of 137 mmol/L) at 2025 10:34 AM  Total Bilirubin: 1.19 mg/dL at 2025 10:34 AM  Serum Albumin: 3.9 g/dL (Using max of 3.5 g/dL) at 2025 10:34 AM  INR(ratio): 1.24 at 2025 10:34 AM  Age at listing (hypothetical): 79 years  Sex: Male at 2025 10:34 AM    Ascites: continues to be problematic.  Requiring LVP weekly with 5-7 liters removed. Getting albumin - had previously been refusing but understands now he must get this to protect his kidneys.  No LE edema.  On furosemide 40mg daily and Spironolactone 50mg daily.  Advised he can increase Furosemide to BID by nephrology which he will do.  He is trying to follow a low Na diet - advised <2g daily.     Varices:  Hx of banding 4/10/24, 24, 24.  Last EGD 24 with 1 small varix and PHG.  He is on maintenance Carvedilol 6.25 BID.  He reports intermittent black stools - most recently yesterday.  No hematemesis or red blood in stool.  Check CBC.  Last Hgb 9.8 on 3/20    HE: Hx of Grade 1 HE.  Could not tolerating Lactulose.  Currently AAOx3 and no asterixis on exam    HCC screening:  US 2/15/25 without concerning lesion. AFP 2.43 on 24    Transplant: not discussed due to age    HRS:  Follows with nephrology.  Receiving albumin infusions weekly after paracentesis.      Nausea  Intermittent  No vomiting or hematemesis  Seems to be worse day prior to paracentesis  Given Zofran to use PRN  Continue pantoprazole 40 mg twice  daily           History of Present Illness   HPI  Bimal Lugo is a 79 y.o. male with alcohol and CHAVEZ cirrhosis who presents for routine follow-up.  He continues to struggle with refractory ascites.  He goes for large-volume paracenteses weekly.  He is averaging between 5 and 7 L being removed each time.  He had previously been refusing albumin infusions but was explained the importance of this to now allows it.  He denies abdominal pain.  He does report nausea with intermittent vomiting and occasional diarrhea.  He reports over the past 2 days his stools have been black.  He admits this does happen from time to time.  He takes Pepto-Bismol for his nausea and is on iron supplement.  He has no hematemesis or red blood in his stool.  His last EGD was in December with 1 small varix and portal hypertensive gastropathy.  He maintains on carvedilol 6.25 mg twice daily.  He had previously been taking lactulose for encephalopathy management but could not tolerate it and so now is off this.  He has had no issues with confusion.  He is sleeping well.  He has not had any alcohol in 3 years.  He follows routinely with our hepatology department and has an appointment in August with Dr. Horton.      Review of Systems  Medical History Reviewed by provider this encounter:  Problems     .  Past Medical History   Past Medical History:   Diagnosis Date    Chronic bronchitis (HCC)     COPD (chronic obstructive pulmonary disease) (HCC)     Diabetes mellitus (HCC)     Esophageal varices (HCC) 11 apr 2024    Hyperlipidemia     Hypertension     Obesity     ARACELY (obstructive sleep apnea)      Past Surgical History:   Procedure Laterality Date    AORTIC VALVE REPLACEMENT      Jan 4 2024    IR PARACENTESIS  04/04/2024    IR PARACENTESIS  03/07/2024    IR PARACENTESIS  04/11/2024    IR PARACENTESIS  04/26/2024    IR PARACENTESIS  05/09/2024    IR PARACENTESIS  05/16/2024    IR PARACENTESIS  05/23/2024    IR PARACENTESIS  06/06/2024    IR  PARACENTESIS  06/13/2024    IR PARACENTESIS  06/20/2024    IR PARACENTESIS  06/27/2024    IR PARACENTESIS  07/03/2024    IR PARACENTESIS  07/11/2024    IR PARACENTESIS  07/18/2024    IR PARACENTESIS  07/24/2024    IR PARACENTESIS  08/01/2024    IR PARACENTESIS  8/8/2024    IR PARACENTESIS  8/15/2024    IR PARACENTESIS  8/22/2024    IR PARACENTESIS  8/29/2024    IR PARACENTESIS  9/5/2024    IR PARACENTESIS  9/12/2024    IR PARACENTESIS  9/19/2024    IR PARACENTESIS  10/3/2024    IR PARACENTESIS  9/26/2024    IR PARACENTESIS  10/10/2024    IR PARACENTESIS  10/17/2024    IR PARACENTESIS  10/24/2024    IR PARACENTESIS  10/31/2024    IR PARACENTESIS  11/7/2024    IR PARACENTESIS  11/14/2024    IR PARACENTESIS  11/21/2024    IR PARACENTESIS  11/27/2024    IR PARACENTESIS  12/5/2024    IR PARACENTESIS  12/12/2024    IR PARACENTESIS  12/19/2024    IR PARACENTESIS  12/26/2024    IR PARACENTESIS  1/2/2025    IR PARACENTESIS  1/9/2025    IR PARACENTESIS  1/16/2025    IR PARACENTESIS  1/23/2025    IR PARACENTESIS  1/30/2025    IR PARACENTESIS  2/6/2025    IR PARACENTESIS  2/13/2025    IR PARACENTESIS  2/20/2025    IR PARACENTESIS  2/27/2025    IR PARACENTESIS  3/13/2025    IR PARACENTESIS  3/6/2025    IR PARACENTESIS  3/20/2025    IR PARACENTESIS  3/27/2025    IR PARACENTESIS  4/3/2025    UPPER GASTROINTESTINAL ENDOSCOPY  11 apr 2024     History reviewed. No pertinent family history.   reports that he quit smoking about 38 years ago. His smoking use included cigarettes. He started smoking about 73 years ago. He has a 175 pack-year smoking history. He has never used smokeless tobacco. He reports that he does not currently use alcohol after a past usage of about 24.0 standard drinks of alcohol per week. He reports that he does not currently use drugs.  Current Outpatient Medications   Medication Instructions    albuterol (PROVENTIL HFA,VENTOLIN HFA) 90 mcg/act inhaler 2 puffs, Every 4 hours PRN    aspirin 81 mg, Daily (early  morning)    atorvastatin (LIPITOR) 40 mg, Daily (early morning)    carvedilol (COREG) 6.25 mg, Oral, 2 times daily with meals    ferrous sulfate 325 (65 Fe) mg tablet 1 tablet, Daily with breakfast    furosemide (LASIX) 40 mg, Oral, 2 times daily    glucose 4-6 GM-MG 16 g, Daily PRN    glucose blood test strip Check twice a day and as needed, dx E11.9,    hydrocortisone (PROCTOSOL HC) 2.5 % rectal cream Insert into the rectum    insulin aspart (NovoLOG FlexPen) 100 UNIT/ML injection pen 14 in a.m , 10units in p.m.    Insulin Glargine Solostar 12 Units, Daily    isosorbide mononitrate (IMDUR) 30 mg 24 hr tablet     Lancets (ONETOUCH ULTRASOFT) lancets Check twice a day and as needed, dx E11.9,    Misc. Devices MISC by Does not apply route    montelukast (SINGULAIR) 10 mg, Daily    Multiple Vitamin (DAILY VALUE MULTIVITAMIN) TABS Take by mouth    nitroglycerin (NITROSTAT) 0.4 mg SL tablet Place under the tongue    nystatin (MYCOSTATIN) powder Apply topically    Omega-3 Fatty Acids (FISH OIL) 645 MG CAPS Take by mouth    ondansetron (ZOFRAN) 4 mg, Oral, Every 8 hours PRN    pantoprazole (PROTONIX) 40 mg, Oral, 2 times daily before meals    spironolactone (ALDACTONE) 50 mg, Oral, Daily    Tresiba FlexTouch 10 Units, Daily at bedtime     Allergies   Allergen Reactions    Gemfibrozil Swelling and Rash    Carbamazepine     Carbamazepine, Eslicarbazepine, And Oxcarbazepine      swelling    Oxycodone Other (See Comments)     Pt states he hallucinates      Current Outpatient Medications on File Prior to Visit   Medication Sig Dispense Refill    albuterol (PROVENTIL HFA,VENTOLIN HFA) 90 mcg/act inhaler Inhale 2 puffs every 4 (four) hours as needed      aspirin 81 MG tablet Take 81 mg by mouth daily in the early morning      atorvastatin (LIPITOR) 40 mg tablet Take 40 mg by mouth daily in the early morning      carvedilol (COREG) 6.25 mg tablet Take 1 tablet (6.25 mg total) by mouth 2 (two) times a day with meals 60 tablet 0     ferrous sulfate 325 (65 Fe) mg tablet Take 1 tablet by mouth daily with breakfast      furosemide (LASIX) 40 mg tablet Take 1 tablet (40 mg total) by mouth 2 (two) times a day 60 tablet 6    glucose 4-6 GM-MG Chew 16 g daily as needed      glucose blood test strip Check twice a day and as needed, dx E11.9,      hydrocortisone (PROCTOSOL HC) 2.5 % rectal cream Insert into the rectum      insulin aspart (NovoLOG FlexPen) 100 UNIT/ML injection pen 14 in a.m , 10units in p.m. 15 mL 0    insulin degludec (Tresiba FlexTouch) 100 units/mL injection pen Inject 10 Units under the skin daily at bedtime      Insulin Glargine Solostar 100 UNIT/ML SOPN Inject 12 Units under the skin daily      isosorbide mononitrate (IMDUR) 30 mg 24 hr tablet       Lancets (ONETOUCH ULTRASOFT) lancets Check twice a day and as needed, dx E11.9,      Misc. Devices MISC by Does not apply route      montelukast (SINGULAIR) 10 mg tablet Take 10 mg by mouth daily      Multiple Vitamin (DAILY VALUE MULTIVITAMIN) TABS Take by mouth      nitroglycerin (NITROSTAT) 0.4 mg SL tablet Place under the tongue      nystatin (MYCOSTATIN) powder Apply topically      Omega-3 Fatty Acids (FISH OIL) 645 MG CAPS Take by mouth      pantoprazole (PROTONIX) 40 mg tablet Take 1 tablet (40 mg total) by mouth 2 (two) times a day before meals 60 tablet 0    spironolactone (ALDACTONE) 50 mg tablet Take 1 tablet (50 mg total) by mouth daily 60 tablet 0    [DISCONTINUED] lactulose (CHRONULAC) 10 g/15 mL solution 10mL PO  mL 0    [DISCONTINUED] ammonium lactate (LAC-HYDRIN) 12 % lotion Apply topically 2 (two) times a day (Patient not taking: Reported on 1/27/2025)      [DISCONTINUED] lovastatin (MEVACOR) 10 MG tablet  (Patient not taking: Reported on 3/4/2025)      [DISCONTINUED] SYMBICORT 160-4.5 MCG/ACT inhaler Inhale 2 puffs 2 (two) times a day (Patient not taking: Reported on 3/20/2025)       No current facility-administered medications on file prior to visit.     "  Social History     Tobacco Use    Smoking status: Former     Current packs/day: 0.00     Average packs/day: 5.0 packs/day for 35.0 years (175.0 ttl pk-yrs)     Types: Cigarettes     Start date:      Quit date:      Years since quittin.2    Smokeless tobacco: Never    Tobacco comments:     Quit in  (approx)   Vaping Use    Vaping status: Never Used   Substance and Sexual Activity    Alcohol use: Not Currently     Alcohol/week: 24.0 standard drinks of alcohol     Types: 24 Cans of beer per week     Comment: sober- quit     Drug use: Not Currently    Sexual activity: Not Currently     Partners: Female     Birth control/protection: Abstinence, Condom Male        Objective   /80 (BP Location: Right arm, Patient Position: Sitting, Cuff Size: Standard)   Pulse 75   Temp 97.5 °F (36.4 °C) (Temporal)   Ht 5' 5\" (1.651 m)   Wt 91.6 kg (202 lb)   SpO2 96%   BMI 33.61 kg/m²      Physical Exam  Vitals reviewed.   Constitutional:       Appearance: Normal appearance.   HENT:      Head: Normocephalic and atraumatic.   Eyes:      Extraocular Movements: Extraocular movements intact.      Pupils: Pupils are equal, round, and reactive to light.   Cardiovascular:      Rate and Rhythm: Normal rate and regular rhythm.   Abdominal:      General: Bowel sounds are normal. There is distension.      Palpations: Abdomen is soft. There is no mass.      Tenderness: There is no abdominal tenderness.   Skin:     General: Skin is warm and dry.      Coloration: Skin is not jaundiced.   Neurological:      General: No focal deficit present.      Mental Status: He is alert and oriented to person, place, and time.           "

## 2025-04-09 NOTE — ASSESSMENT & PLAN NOTE
MASH/Etoh  No alcohol x 3 years    MELD 3.0: 13 at 2/13/2025 10:34 AM  MELD-Na: 13 at 2/13/2025 10:34 AM  Calculated from:  Serum Creatinine: 1.47 mg/dL at 2/13/2025 10:34 AM  Serum Sodium: 138 mmol/L (Using max of 137 mmol/L) at 2/13/2025 10:34 AM  Total Bilirubin: 1.19 mg/dL at 2/13/2025 10:34 AM  Serum Albumin: 3.9 g/dL (Using max of 3.5 g/dL) at 2/13/2025 10:34 AM  INR(ratio): 1.24 at 2/13/2025 10:34 AM  Age at listing (hypothetical): 79 years  Sex: Male at 2/13/2025 10:34 AM    Ascites: continues to be problematic.  Requiring LVP weekly with 5-7 liters removed. Getting albumin - had previously been refusing but understands now he must get this to protect his kidneys.  No LE edema.  On furosemide 40mg daily and Spironolactone 50mg daily.  Advised he can increase Furosemide to BID by nephrology which he will do.  He is trying to follow a low Na diet - advised <2g daily.     Varices:  Hx of banding 4/10/24, 5/7/24, 6/19/24.  Last EGD 12/7/24 with 1 small varix and PHG.  He is on maintenance Carvedilol 6.25 BID.  He reports intermittent black stools - most recently yesterday.  No hematemesis or red blood in stool.  Check CBC.  Last Hgb 9.8 on 3/20    HE: Hx of Grade 1 HE.  Could not tolerating Lactulose.  Currently AAOx3 and no asterixis on exam    HCC screening:  US 2/15/25 without concerning lesion. AFP 2.43 on 12/19/24    Transplant: not discussed due to age    HRS:  Follows with nephrology.  Receiving albumin infusions weekly after paracentesis.

## 2025-04-10 ENCOUNTER — RESULTS FOLLOW-UP (OUTPATIENT)
Dept: GASTROENTEROLOGY | Facility: CLINIC | Age: 80
End: 2025-04-10

## 2025-04-10 ENCOUNTER — HOSPITAL ENCOUNTER (INPATIENT)
Facility: HOSPITAL | Age: 80
LOS: 1 days | Discharge: HOME | DRG: 378 | End: 2025-04-12
Attending: EMERGENCY MEDICINE | Admitting: INTERNAL MEDICINE
Payer: MEDICARE

## 2025-04-10 ENCOUNTER — APPOINTMENT (OUTPATIENT)
Dept: LAB | Facility: HOSPITAL | Age: 80
DRG: 378 | End: 2025-04-10
Payer: MEDICARE

## 2025-04-10 ENCOUNTER — HOSPITAL ENCOUNTER (OUTPATIENT)
Dept: NON INVASIVE DIAGNOSTICS | Facility: HOSPITAL | Age: 80
Discharge: HOME | End: 2025-04-10
Payer: MEDICARE

## 2025-04-10 VITALS
RESPIRATION RATE: 20 BRPM | HEART RATE: 69 BPM | SYSTOLIC BLOOD PRESSURE: 161 MMHG | OXYGEN SATURATION: 99 % | DIASTOLIC BLOOD PRESSURE: 68 MMHG

## 2025-04-10 DIAGNOSIS — K70.31 ALCOHOLIC CIRRHOSIS OF LIVER WITH ASCITES (HCC): ICD-10-CM

## 2025-04-10 DIAGNOSIS — K92.2 GI BLEED: Primary | ICD-10-CM

## 2025-04-10 DIAGNOSIS — R19.5 DARK STOOLS: ICD-10-CM

## 2025-04-10 DIAGNOSIS — D62 ACUTE BLOOD LOSS ANEMIA: ICD-10-CM

## 2025-04-10 DIAGNOSIS — K74.60 CIRRHOSIS OF LIVER WITH ASCITES, UNSPECIFIED HEPATIC CIRRHOSIS TYPE  (HCC): ICD-10-CM

## 2025-04-10 DIAGNOSIS — D50.0 IRON DEFICIENCY ANEMIA DUE TO CHRONIC BLOOD LOSS: ICD-10-CM

## 2025-04-10 DIAGNOSIS — R18.8 CIRRHOSIS OF LIVER WITH ASCITES, UNSPECIFIED HEPATIC CIRRHOSIS TYPE  (HCC): ICD-10-CM

## 2025-04-10 LAB
ABO GROUP BLD: NORMAL
ANION GAP SERPL CALCULATED.3IONS-SCNC: 6 MMOL/L (ref 4–13)
APTT PPP: 40 SECONDS (ref 23–34)
BLD GP AB SCN SERPL QL: NEGATIVE
BUN SERPL-MCNC: 35 MG/DL (ref 5–25)
CALCIUM SERPL-MCNC: 8.3 MG/DL (ref 8.4–10.2)
CHLORIDE SERPL-SCNC: 107 MMOL/L (ref 96–108)
CO2 SERPL-SCNC: 22 MMOL/L (ref 21–32)
CREAT SERPL-MCNC: 1.58 MG/DL (ref 0.6–1.3)
ERYTHROCYTE [DISTWIDTH] IN BLOOD BY AUTOMATED COUNT: 16 % (ref 11.6–15.1)
FERRITIN SERPL-MCNC: 27 NG/ML (ref 30–336)
GFR SERPL CREATININE-BSD FRML MDRD: 40 ML/MIN/1.73SQ M
GLUCOSE P FAST SERPL-MCNC: 148 MG/DL (ref 65–99)
GLUCOSE SERPL-MCNC: 111 MG/DL (ref 65–140)
HCT VFR BLD AUTO: 22.9 % (ref 36.5–49.3)
HGB BLD-MCNC: 7.3 G/DL (ref 12–17)
HGB BLD-MCNC: 7.7 G/DL (ref 12–17)
INR PPP: 1.19 (ref 0.85–1.19)
IRON SATN MFR SERPL: 10 % (ref 15–50)
IRON SERPL-MCNC: 34 UG/DL (ref 50–212)
MCH RBC QN AUTO: 32.6 PG (ref 26.8–34.3)
MCHC RBC AUTO-ENTMCNC: 31.9 G/DL (ref 31.4–37.4)
MCV RBC AUTO: 102 FL (ref 82–98)
PLATELET # BLD AUTO: 66 THOUSANDS/UL (ref 149–390)
PMV BLD AUTO: 11.1 FL (ref 8.9–12.7)
POTASSIUM SERPL-SCNC: 4.3 MMOL/L (ref 3.5–5.3)
PROTHROMBIN TIME: 15.8 SECONDS (ref 12.3–15)
RBC # BLD AUTO: 2.24 MILLION/UL (ref 3.88–5.62)
RH BLD: POSITIVE
SODIUM SERPL-SCNC: 135 MMOL/L (ref 135–147)
SPECIMEN EXPIRATION DATE: NORMAL
TIBC SERPL-MCNC: 324.8 UG/DL (ref 250–450)
TRANSFERRIN SERPL-MCNC: 232 MG/DL (ref 203–362)
UIBC SERPL-MCNC: 291 UG/DL (ref 155–355)
WBC # BLD AUTO: 4.7 THOUSAND/UL (ref 4.31–10.16)

## 2025-04-10 PROCEDURE — 86923 COMPATIBILITY TEST ELECTRIC: CPT

## 2025-04-10 PROCEDURE — 99223 1ST HOSP IP/OBS HIGH 75: CPT | Performed by: STUDENT IN AN ORGANIZED HEALTH CARE EDUCATION/TRAINING PROGRAM

## 2025-04-10 PROCEDURE — 83550 IRON BINDING TEST: CPT | Performed by: NURSE PRACTITIONER

## 2025-04-10 PROCEDURE — P9016 RBC LEUKOCYTES REDUCED: HCPCS

## 2025-04-10 PROCEDURE — 85018 HEMOGLOBIN: CPT | Performed by: NURSE PRACTITIONER

## 2025-04-10 PROCEDURE — 36415 COLL VENOUS BLD VENIPUNCTURE: CPT

## 2025-04-10 PROCEDURE — 49083 ABD PARACENTESIS W/IMAGING: CPT

## 2025-04-10 PROCEDURE — 85730 THROMBOPLASTIN TIME PARTIAL: CPT | Performed by: EMERGENCY MEDICINE

## 2025-04-10 PROCEDURE — 80048 BASIC METABOLIC PNL TOTAL CA: CPT

## 2025-04-10 PROCEDURE — 99284 EMERGENCY DEPT VISIT MOD MDM: CPT

## 2025-04-10 PROCEDURE — 30233N1 TRANSFUSION OF NONAUTOLOGOUS RED BLOOD CELLS INTO PERIPHERAL VEIN, PERCUTANEOUS APPROACH: ICD-10-PCS | Performed by: EMERGENCY MEDICINE

## 2025-04-10 PROCEDURE — 86850 RBC ANTIBODY SCREEN: CPT | Performed by: EMERGENCY MEDICINE

## 2025-04-10 PROCEDURE — 86900 BLOOD TYPING SEROLOGIC ABO: CPT | Performed by: EMERGENCY MEDICINE

## 2025-04-10 PROCEDURE — 36430 TRANSFUSION BLD/BLD COMPNT: CPT

## 2025-04-10 PROCEDURE — 82948 REAGENT STRIP/BLOOD GLUCOSE: CPT

## 2025-04-10 PROCEDURE — 85027 COMPLETE CBC AUTOMATED: CPT

## 2025-04-10 PROCEDURE — 82728 ASSAY OF FERRITIN: CPT | Performed by: NURSE PRACTITIONER

## 2025-04-10 PROCEDURE — 86901 BLOOD TYPING SEROLOGIC RH(D): CPT | Performed by: EMERGENCY MEDICINE

## 2025-04-10 PROCEDURE — 85610 PROTHROMBIN TIME: CPT | Performed by: EMERGENCY MEDICINE

## 2025-04-10 PROCEDURE — 99285 EMERGENCY DEPT VISIT HI MDM: CPT | Performed by: EMERGENCY MEDICINE

## 2025-04-10 PROCEDURE — 49083 ABD PARACENTESIS W/IMAGING: CPT | Performed by: NURSE PRACTITIONER

## 2025-04-10 PROCEDURE — 83540 ASSAY OF IRON: CPT | Performed by: NURSE PRACTITIONER

## 2025-04-10 PROCEDURE — 0W9G3ZZ DRAINAGE OF PERITONEAL CAVITY, PERCUTANEOUS APPROACH: ICD-10-PCS | Performed by: RADIOLOGY

## 2025-04-10 RX ORDER — LIDOCAINE WITH 8.4% SOD BICARB 0.9%(10ML)
SYRINGE (ML) INJECTION AS NEEDED
Status: COMPLETED | OUTPATIENT
Start: 2025-04-10 | End: 2025-04-10

## 2025-04-10 RX ORDER — SPIRONOLACTONE 25 MG/1
50 TABLET ORAL DAILY
Status: DISCONTINUED | OUTPATIENT
Start: 2025-04-11 | End: 2025-04-12 | Stop reason: HOSPADM

## 2025-04-10 RX ORDER — FUROSEMIDE 40 MG/1
40 TABLET ORAL 2 TIMES DAILY
Status: DISCONTINUED | OUTPATIENT
Start: 2025-04-10 | End: 2025-04-12 | Stop reason: HOSPADM

## 2025-04-10 RX ORDER — ISOSORBIDE MONONITRATE 30 MG/1
30 TABLET, EXTENDED RELEASE ORAL DAILY
Status: DISCONTINUED | OUTPATIENT
Start: 2025-04-11 | End: 2025-04-12 | Stop reason: HOSPADM

## 2025-04-10 RX ORDER — INSULIN LISPRO 100 [IU]/ML
1-5 INJECTION, SOLUTION INTRAVENOUS; SUBCUTANEOUS
Status: DISCONTINUED | OUTPATIENT
Start: 2025-04-11 | End: 2025-04-12 | Stop reason: HOSPADM

## 2025-04-10 RX ORDER — ATORVASTATIN CALCIUM 40 MG/1
40 TABLET, FILM COATED ORAL
Status: DISCONTINUED | OUTPATIENT
Start: 2025-04-11 | End: 2025-04-12 | Stop reason: HOSPADM

## 2025-04-10 RX ORDER — ALBUTEROL SULFATE 90 UG/1
2 INHALANT RESPIRATORY (INHALATION) EVERY 4 HOURS PRN
Status: DISCONTINUED | OUTPATIENT
Start: 2025-04-10 | End: 2025-04-12 | Stop reason: HOSPADM

## 2025-04-10 RX ORDER — MAGNESIUM HYDROXIDE/ALUMINUM HYDROXICE/SIMETHICONE 120; 1200; 1200 MG/30ML; MG/30ML; MG/30ML
30 SUSPENSION ORAL EVERY 6 HOURS PRN
Status: DISCONTINUED | OUTPATIENT
Start: 2025-04-10 | End: 2025-04-12 | Stop reason: HOSPADM

## 2025-04-10 RX ORDER — CARVEDILOL 6.25 MG/1
6.25 TABLET ORAL 2 TIMES DAILY WITH MEALS
Status: DISCONTINUED | OUTPATIENT
Start: 2025-04-11 | End: 2025-04-12 | Stop reason: HOSPADM

## 2025-04-10 RX ORDER — PANTOPRAZOLE SODIUM 40 MG/1
40 TABLET, DELAYED RELEASE ORAL
Status: DISCONTINUED | OUTPATIENT
Start: 2025-04-11 | End: 2025-04-12 | Stop reason: HOSPADM

## 2025-04-10 RX ORDER — SODIUM CHLORIDE 9 MG/ML
50 INJECTION, SOLUTION INTRAVENOUS ONCE
Status: COMPLETED | OUTPATIENT
Start: 2025-04-11 | End: 2025-04-10

## 2025-04-10 RX ORDER — INSULIN GLARGINE 100 [IU]/ML
6 INJECTION, SOLUTION SUBCUTANEOUS
Status: DISCONTINUED | OUTPATIENT
Start: 2025-04-10 | End: 2025-04-12 | Stop reason: HOSPADM

## 2025-04-10 RX ORDER — FERROUS SULFATE 325(65) MG
325 TABLET ORAL
Status: DISCONTINUED | OUTPATIENT
Start: 2025-04-11 | End: 2025-04-12 | Stop reason: HOSPADM

## 2025-04-10 RX ORDER — MONTELUKAST SODIUM 10 MG/1
10 TABLET ORAL DAILY
Status: DISCONTINUED | OUTPATIENT
Start: 2025-04-11 | End: 2025-04-12 | Stop reason: HOSPADM

## 2025-04-10 RX ORDER — ONDANSETRON 2 MG/ML
4 INJECTION INTRAMUSCULAR; INTRAVENOUS EVERY 6 HOURS PRN
Status: DISCONTINUED | OUTPATIENT
Start: 2025-04-10 | End: 2025-04-10

## 2025-04-10 RX ADMIN — Medication 10 ML: at 11:21

## 2025-04-10 RX ADMIN — SODIUM CHLORIDE 50 ML/HR: 0.9 INJECTION, SOLUTION INTRAVENOUS at 23:40

## 2025-04-10 RX ADMIN — SODIUM CHLORIDE 80 MG: 9 INJECTION, SOLUTION INTRAVENOUS at 14:56

## 2025-04-10 NOTE — ASSESSMENT & PLAN NOTE
Patient had outpatient labs revealing a hemoglobin of 7  Baseline hemoglobin 9-10  Giving 1 unit PRBC in the ED on 4/10

## 2025-04-10 NOTE — ASSESSMENT & PLAN NOTE
"Lab Results   Component Value Date    HGBA1C 5.2 03/20/2025       No results for input(s): \"POCGLU\" in the last 72 hours.    Blood Sugar Average: Last 72 hrs:  Patient is on Humalog 10 to 14 units in the morning and at night it appears at home; will hold this in the setting of being n.p.o.  Patient is also on Lantus 12 units nightly will half this dose to 6 units since patient will be n.p.o. at midnight  Clear liquid diet till midnight  Monitor blood sugars closely in the setting of n.p.o. status    "

## 2025-04-10 NOTE — ASSESSMENT & PLAN NOTE
Patient reports dark stools at home concern for GI bleed leading to anemia  GI consulted  Plan for EGD tomorrow 4/11  Clear liquid diet  N.p.o. at midnight  Iron panel ordered

## 2025-04-10 NOTE — ASSESSMENT & PLAN NOTE
Was getting outpatient paracentesis today for the ascites when outpatient labs revealed a hemoglobin of 7

## 2025-04-10 NOTE — ED PROVIDER NOTES
ED Disposition       None          Assessment & Plan       Medical Decision Making  Patient is an 79-year-old male past medical history of CKD, CAD, peripheral vascular disease, cirrhosis, COPD, diabetes, hypertension, hyperlipidemia, varices presenting with GI bleed.  Patient is well-appearing at bedside with stable vitals and in no acute distress.  He has no significant physical exam findings.  He does have acute drop in hemoglobin from several weeks ago by 3 g and notes dark stools.  Will admit for transfusion and GI consult.    Amount and/or Complexity of Data Reviewed  Labs: ordered.             Medications - No data to display    ED Risk Strat Scores                    No data recorded                            History of Present Illness       Chief Complaint   Patient presents with    Abnormal Lab     Referred to ER after paracentisis this morning due to HG 7.3 from today's lab results         Past Medical History:   Diagnosis Date    Chronic bronchitis (HCC)     COPD (chronic obstructive pulmonary disease) (HCC)     Diabetes mellitus (HCC)     Esophageal varices (HCC) 11 apr 2024    Hyperlipidemia     Hypertension     Obesity     ARACELY (obstructive sleep apnea)       Past Surgical History:   Procedure Laterality Date    AORTIC VALVE REPLACEMENT      Jan 4 2024    IR PARACENTESIS  04/04/2024    IR PARACENTESIS  03/07/2024    IR PARACENTESIS  04/11/2024    IR PARACENTESIS  04/26/2024    IR PARACENTESIS  05/09/2024    IR PARACENTESIS  05/16/2024    IR PARACENTESIS  05/23/2024    IR PARACENTESIS  06/06/2024    IR PARACENTESIS  06/13/2024    IR PARACENTESIS  06/20/2024    IR PARACENTESIS  06/27/2024    IR PARACENTESIS  07/03/2024    IR PARACENTESIS  07/11/2024    IR PARACENTESIS  07/18/2024    IR PARACENTESIS  07/24/2024    IR PARACENTESIS  08/01/2024    IR PARACENTESIS  8/8/2024    IR PARACENTESIS  8/15/2024    IR PARACENTESIS  8/22/2024    IR PARACENTESIS  8/29/2024    IR PARACENTESIS  9/5/2024    IR  PARACENTESIS  2024    IR PARACENTESIS  2024    IR PARACENTESIS  10/3/2024    IR PARACENTESIS  2024    IR PARACENTESIS  10/10/2024    IR PARACENTESIS  10/17/2024    IR PARACENTESIS  10/24/2024    IR PARACENTESIS  10/31/2024    IR PARACENTESIS  2024    IR PARACENTESIS  2024    IR PARACENTESIS  2024    IR PARACENTESIS  2024    IR PARACENTESIS  2024    IR PARACENTESIS  2024    IR PARACENTESIS  2024    IR PARACENTESIS  2024    IR PARACENTESIS  2025    IR PARACENTESIS  2025    IR PARACENTESIS  2025    IR PARACENTESIS  2025    IR PARACENTESIS  2025    IR PARACENTESIS  2025    IR PARACENTESIS  2025    IR PARACENTESIS  2025    IR PARACENTESIS  2025    IR PARACENTESIS  3/13/2025    IR PARACENTESIS  3/6/2025    IR PARACENTESIS  3/20/2025    IR PARACENTESIS  3/27/2025    IR PARACENTESIS  4/3/2025    UPPER GASTROINTESTINAL ENDOSCOPY  2024      History reviewed. No pertinent family history.   Social History     Tobacco Use    Smoking status: Former     Current packs/day: 0.00     Average packs/day: 5.0 packs/day for 35.0 years (175.0 ttl pk-yrs)     Types: Cigarettes     Start date:      Quit date:      Years since quittin.2    Smokeless tobacco: Never    Tobacco comments:     Quit in  (approx)   Vaping Use    Vaping status: Never Used   Substance Use Topics    Alcohol use: Not Currently     Alcohol/week: 24.0 standard drinks of alcohol     Types: 24 Cans of beer per week     Comment: sober- quit     Drug use: Not Currently      E-Cigarette/Vaping    E-Cigarette Use Never User       E-Cigarette/Vaping Substances    Nicotine No     THC No     CBD No     Flavoring No     Other No     Unknown No       I have reviewed and agree with the history as documented.     Patient is an 79-year-old male past medical history of CAD, CKD stage III, peripheral vascular disease, diabetes, hypertension, hyperlipidemia,  COPD, cirrhosis, varices, CAD presenting for anemia.  Patient was having scheduled paracentesis today when he received no vacation the labs drawn today showed an acute drop in hemoglobin, 7.3 down from 10.5 at the end of last month.  He does note dark stools for the last several days but thought maybe that was his iron or Pepto-Bismol.  Notes generalized abdominal pain which was improved with medication given at GI office yesterday and does note mild diarrhea.  Notes nausea and intermittent dry heaves which he states are mild.  Denies any chest pain, shortness of breath but does note lightheadedness.  Denies any fevers, rashes, dysuria.        Review of Systems   All other systems reviewed and are negative.          Objective       ED Triage Vitals   Temperature Pulse Blood Pressure Respirations SpO2 Patient Position - Orthostatic VS   04/10/25 1215 04/10/25 1215 04/10/25 1215 04/10/25 1215 04/10/25 1215 04/10/25 1215   97.6 °F (36.4 °C) 67 143/66 20 100 % Sitting      Temp src Heart Rate Source BP Location FiO2 (%) Pain Score    -- 04/10/25 1315 04/10/25 1215 -- 04/10/25 1215     Monitor Left arm  No Pain      Vitals      Date and Time Temp Pulse SpO2 Resp BP Pain Score FACES Pain Rating User   04/10/25 1315 -- 69 99 % 20 156/69 -- -- GB   04/10/25 1215 97.6 °F (36.4 °C) 67 100 % 20 143/66 No Pain -- JS            Physical Exam  Vitals reviewed.   Constitutional:       General: He is not in acute distress.     Appearance: Normal appearance. He is not ill-appearing.   HENT:      Mouth/Throat:      Mouth: Mucous membranes are moist.   Eyes:      Comments: Pale conjunctiva   Cardiovascular:      Rate and Rhythm: Normal rate and regular rhythm.      Pulses: Normal pulses.      Heart sounds: Normal heart sounds.   Pulmonary:      Effort: Pulmonary effort is normal.      Breath sounds: Normal breath sounds.   Abdominal:      General: Abdomen is flat.      Palpations: Abdomen is soft.      Tenderness: There is no  abdominal tenderness.   Musculoskeletal:         General: No swelling. Normal range of motion.      Cervical back: Neck supple.      Right lower leg: No edema.      Left lower leg: No edema.   Skin:     General: Skin is warm and dry.   Neurological:      General: No focal deficit present.      Mental Status: He is alert.   Psychiatric:         Mood and Affect: Mood normal.         Results Reviewed       Procedure Component Value Units Date/Time    Protime-INR [[id number]]     Lab Status: No result Specimen: Blood     APTT [[id number]]     Lab Status: No result Specimen: Blood             No orders to display       Procedures    ED Medication and Procedure Management   Prior to Admission Medications   Prescriptions Last Dose Informant Patient Reported? Taking?   Insulin Glargine Solostar 100 UNIT/ML SOPN  Self, Spouse/Significant Other Yes No   Sig: Inject 12 Units under the skin daily   Lancets (ONETOUCH ULTRASOFT) lancets  Self, Spouse/Significant Other Yes No   Sig: Check twice a day and as needed, dx E11.9,   Misc. Devices MISC  Self, Spouse/Significant Other Yes No   Sig: by Does not apply route   Multiple Vitamin (DAILY VALUE MULTIVITAMIN) TABS  Self, Spouse/Significant Other Yes No   Sig: Take by mouth   Omega-3 Fatty Acids (FISH OIL) 645 MG CAPS  Self, Spouse/Significant Other Yes No   Sig: Take by mouth   albuterol (PROVENTIL HFA,VENTOLIN HFA) 90 mcg/act inhaler  Self, Spouse/Significant Other Yes No   Sig: Inhale 2 puffs every 4 (four) hours as needed   aspirin 81 MG tablet  Self, Spouse/Significant Other Yes No   Sig: Take 81 mg by mouth daily in the early morning   atorvastatin (LIPITOR) 40 mg tablet  Self, Spouse/Significant Other Yes No   Sig: Take 40 mg by mouth daily in the early morning   carvedilol (COREG) 6.25 mg tablet  Self, Spouse/Significant Other No No   Sig: Take 1 tablet (6.25 mg total) by mouth 2 (two) times a day with meals   ferrous sulfate 325 (65 Fe) mg tablet  Self, Spouse/Significant  Other Yes No   Sig: Take 1 tablet by mouth daily with breakfast   furosemide (LASIX) 40 mg tablet  Self, Spouse/Significant Other No No   Sig: Take 1 tablet (40 mg total) by mouth 2 (two) times a day   glucose 4-6 GM-MG  Self, Spouse/Significant Other Yes No   Sig: Chew 16 g daily as needed   glucose blood test strip  Self, Spouse/Significant Other Yes No   Sig: Check twice a day and as needed, dx E11.9,   hydrocortisone (PROCTOSOL HC) 2.5 % rectal cream  Self, Spouse/Significant Other Yes No   Sig: Insert into the rectum   insulin aspart (NovoLOG FlexPen) 100 UNIT/ML injection pen  Self, Spouse/Significant Other No No   Si in a.m , 10units in p.m.   insulin degludec (Tresiba FlexTouch) 100 units/mL injection pen  Self, Spouse/Significant Other Yes No   Sig: Inject 10 Units under the skin daily at bedtime   isosorbide mononitrate (IMDUR) 30 mg 24 hr tablet  Self, Spouse/Significant Other Yes No   montelukast (SINGULAIR) 10 mg tablet  Self, Spouse/Significant Other Yes No   Sig: Take 10 mg by mouth daily   nitroglycerin (NITROSTAT) 0.4 mg SL tablet  Self, Spouse/Significant Other Yes No   Sig: Place under the tongue   nystatin (MYCOSTATIN) powder  Self, Spouse/Significant Other Yes No   Sig: Apply topically   ondansetron (ZOFRAN) 4 mg tablet   No No   Sig: Take 1 tablet (4 mg total) by mouth every 8 (eight) hours as needed for nausea or vomiting   pantoprazole (PROTONIX) 40 mg tablet  Self, Spouse/Significant Other No No   Sig: Take 1 tablet (40 mg total) by mouth 2 (two) times a day before meals   spironolactone (ALDACTONE) 50 mg tablet  Self, Spouse/Significant Other No No   Sig: Take 1 tablet (50 mg total) by mouth daily      Facility-Administered Medications: None     Patient's Medications   Discharge Prescriptions    No medications on file     No discharge procedures on file.  ED SEPSIS DOCUMENTATION            Merline Chan DO  04/10/25 0751

## 2025-04-10 NOTE — H&P
"H&P - Hospitalist   Name: Bimal Lugo 79 y.o. male I MRN: [id number]  Unit/Bed#: ED 28 I Date of Admission: 4/10/2025   Date of Service: 4/10/2025 I Hospital Day: 0     Assessment & Plan  Dark stools  Patient reports dark stools at home concern for GI bleed leading to anemia  GI consulted  Plan for EGD tomorrow 4/11  Clear liquid diet  N.p.o. at midnight  Iron panel ordered  Acute blood loss anemia  Patient had outpatient labs revealing a hemoglobin of 7  Baseline hemoglobin 9-10  Giving 1 unit PRBC in the ED on 4/10  Diabetes mellitus, type 2 (HCC)  Lab Results   Component Value Date    HGBA1C 5.2 03/20/2025       No results for input(s): \"POCGLU\" in the last 72 hours.    Blood Sugar Average: Last 72 hrs:  Patient is on Humalog 10 to 14 units in the morning and at night it appears at home; will hold this in the setting of being n.p.o.  Patient is also on Lantus 12 units nightly will half this dose to 6 units since patient will be n.p.o. at midnight  Clear liquid diet till midnight  Monitor blood sugars closely in the setting of n.p.o. status    CAD (coronary artery disease)  Typically maintained on aspirin and Imdur  Holding aspirin in the setting of GI bleed  Chronic kidney disease, stage 3a (HCC)  Lab Results   Component Value Date    EGFR 40 04/10/2025    EGFR 40 (L) 03/20/2025    EGFR 44 02/13/2025    CREATININE 1.58 (H) 04/10/2025    CREATININE 1.7 (H) 03/20/2025    CREATININE 1.47 (H) 02/13/2025   Baseline seems to be 1.4-1.8  Within baseline  Continue to monitor  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)  Was getting outpatient paracentesis today for the ascites when outpatient labs revealed a hemoglobin of 7      VTE Pharmacologic Prophylaxis:   Moderate Risk (Score 3-4) - Pharmacological DVT Prophylaxis Contraindicated. Sequential Compression Devices Ordered.  Code Status: Prior   Discussion with family: Updated  (wife) at bedside.    Anticipated Length of Stay: Patient will be " admitted on an observation basis with an anticipated length of stay of less than 2 midnights secondary to GI evaluation and EGD.    History of Present Illness   Chief Complaint:    Chief Complaint   Patient presents with    Abnormal Lab     Referred to ER after paracentisis this morning due to HG 7.3 from today's lab results         Bimal Lugo is a 79 y.o. male with a PMH of   has a past medical history of Chronic bronchitis (HCC), COPD (chronic obstructive pulmonary disease) (HCC), Diabetes mellitus (HCC), Esophageal varices (HCC), Hyperlipidemia, Hypertension, Obesity, and ARACELY (obstructive sleep apnea).  who presents with abnormal lab value of hemoglobin 7.3 and reporting dark stools at home.  Patient has a history of GI bleeds, alcohol use, and cirrhosis.  He has no significant physical complaints or concerns he was advised to come to the hospital from outpatient lab work for paracentesis.  He does report dark stools but otherwise offers no acute complaints    Review of Systems   Gastrointestinal:  Positive for blood in stool.   All other systems reviewed and are negative.      Historical Information   Past Medical History:   Diagnosis Date    Chronic bronchitis (HCC)     COPD (chronic obstructive pulmonary disease) (HCC)     Diabetes mellitus (HCC)     Esophageal varices (HCC) 11 apr 2024    Hyperlipidemia     Hypertension     Obesity     ARACELY (obstructive sleep apnea)      Past Surgical History:   Procedure Laterality Date    AORTIC VALVE REPLACEMENT      Jan 4 2024    IR PARACENTESIS  04/04/2024    IR PARACENTESIS  03/07/2024    IR PARACENTESIS  04/11/2024    IR PARACENTESIS  04/26/2024    IR PARACENTESIS  05/09/2024    IR PARACENTESIS  05/16/2024    IR PARACENTESIS  05/23/2024    IR PARACENTESIS  06/06/2024    IR PARACENTESIS  06/13/2024    IR PARACENTESIS  06/20/2024    IR PARACENTESIS  06/27/2024    IR PARACENTESIS  07/03/2024    IR PARACENTESIS  07/11/2024    IR PARACENTESIS  07/18/2024    IR PARACENTESIS   2024    IR PARACENTESIS  2024    IR PARACENTESIS  2024    IR PARACENTESIS  8/15/2024    IR PARACENTESIS  2024    IR PARACENTESIS  2024    IR PARACENTESIS  2024    IR PARACENTESIS  2024    IR PARACENTESIS  2024    IR PARACENTESIS  10/3/2024    IR PARACENTESIS  2024    IR PARACENTESIS  10/10/2024    IR PARACENTESIS  10/17/2024    IR PARACENTESIS  10/24/2024    IR PARACENTESIS  10/31/2024    IR PARACENTESIS  2024    IR PARACENTESIS  2024    IR PARACENTESIS  2024    IR PARACENTESIS  2024    IR PARACENTESIS  2024    IR PARACENTESIS  2024    IR PARACENTESIS  2024    IR PARACENTESIS  2024    IR PARACENTESIS  2025    IR PARACENTESIS  2025    IR PARACENTESIS  2025    IR PARACENTESIS  2025    IR PARACENTESIS  2025    IR PARACENTESIS  2025    IR PARACENTESIS  2025    IR PARACENTESIS  2025    IR PARACENTESIS  2025    IR PARACENTESIS  3/13/2025    IR PARACENTESIS  3/6/2025    IR PARACENTESIS  3/20/2025    IR PARACENTESIS  3/27/2025    IR PARACENTESIS  4/3/2025    UPPER GASTROINTESTINAL ENDOSCOPY  2024     Social History     Tobacco Use    Smoking status: Former     Current packs/day: 0.00     Average packs/day: 5.0 packs/day for 35.0 years (175.0 ttl pk-yrs)     Types: Cigarettes     Start date:      Quit date:      Years since quittin.2    Smokeless tobacco: Never    Tobacco comments:     Quit in  (approx)   Vaping Use    Vaping status: Never Used   Substance and Sexual Activity    Alcohol use: Not Currently     Alcohol/week: 24.0 standard drinks of alcohol     Types: 24 Cans of beer per week     Comment: sober- quit     Drug use: Not Currently    Sexual activity: Not Currently     Partners: Female     Birth control/protection: Abstinence, Condom Male     E-Cigarette/Vaping    E-Cigarette Use Never User      E-Cigarette/Vaping Substances    Nicotine No     THC No      CBD No     Flavoring No     Other No     Unknown No      History reviewed. No pertinent family history.  Social History:  Marital Status: /Civil Union   Occupation: N/A  Patient Pre-hospital Living Situation: Home  Patient Pre-hospital Level of Mobility: walks  Patient Pre-hospital Diet Restrictions: Diabetic    Meds/Allergies   I have reviewed home medications using recent Epic encounter.  Prior to Admission medications    Medication Sig Start Date End Date Taking? Authorizing Provider   albuterol (PROVENTIL HFA,VENTOLIN HFA) 90 mcg/act inhaler Inhale 2 puffs every 4 (four) hours as needed 6/16/15   Historical Provider, MD   aspirin 81 MG tablet Take 81 mg by mouth daily in the early morning    Historical Provider, MD   atorvastatin (LIPITOR) 40 mg tablet Take 40 mg by mouth daily in the early morning    Historical Provider, MD   carvedilol (COREG) 6.25 mg tablet Take 1 tablet (6.25 mg total) by mouth 2 (two) times a day with meals 11/29/24   Evert Galvan MD   ferrous sulfate 325 (65 Fe) mg tablet Take 1 tablet by mouth daily with breakfast 10/22/24   Historical Provider, MD   furosemide (LASIX) 40 mg tablet Take 1 tablet (40 mg total) by mouth 2 (two) times a day 11/18/24   Grazyna Mtz PA-C   glucose 4-6 GM-MG Chew 16 g daily as needed 2/16/24   Historical Provider, MD   glucose blood test strip Check twice a day and as needed, dx E11.9, 3/18/16   Historical Provider, MD   hydrocortisone (PROCTOSOL HC) 2.5 % rectal cream Insert into the rectum    Historical Provider, MD   insulin aspart (NovoLOG FlexPen) 100 UNIT/ML injection pen 14 in a.m , 10units in p.m. 11/29/24   Evert Galvan MD   insulin degludec (Tresiba FlexTouch) 100 units/mL injection pen Inject 10 Units under the skin daily at bedtime    Historical Provider, MD   Insulin Glargine Solostar 100 UNIT/ML SOPN Inject 12 Units under the skin daily 12/27/24   Historical Provider, MD   isosorbide mononitrate (IMDUR) 30 mg 24 hr tablet  8/12/24    Historical Provider, MD   Lancets (ONETOUCH ULTRASOFT) lancets Check twice a day and as needed, dx E11.9, 3/18/16   Historical Provider, MD   Misc. Devices MISC by Does not apply route    Historical Provider, MD   montelukast (SINGULAIR) 10 mg tablet Take 10 mg by mouth daily 9/9/24   Historical Provider, MD   Multiple Vitamin (DAILY VALUE MULTIVITAMIN) TABS Take by mouth    Historical Provider, MD   nitroglycerin (NITROSTAT) 0.4 mg SL tablet Place under the tongue 11/7/24   Historical Provider, MD   nystatin (MYCOSTATIN) powder Apply topically 1/2/24   Historical Provider, MD   Omega-3 Fatty Acids (FISH OIL) 645 MG CAPS Take by mouth    Historical Provider, MD   ondansetron (ZOFRAN) 4 mg tablet Take 1 tablet (4 mg total) by mouth every 8 (eight) hours as needed for nausea or vomiting 4/9/25   Grazyna Mtz PA-C   pantoprazole (PROTONIX) 40 mg tablet Take 1 tablet (40 mg total) by mouth 2 (two) times a day before meals 12/11/24   Pearl Ritter PA-C   spironolactone (ALDACTONE) 50 mg tablet Take 1 tablet (50 mg total) by mouth daily 11/29/24   Evert Galvan MD     Allergies   Allergen Reactions    Gemfibrozil Swelling and Rash    Carbamazepine     Carbamazepine, Eslicarbazepine, And Oxcarbazepine      swelling    Oxycodone Other (See Comments)     Pt states he hallucinates       Objective :  Temp:  [97.6 °F (36.4 °C)] 97.6 °F (36.4 °C)  HR:  [67-76] 69  BP: (129-161)/(60-76) 156/69  Resp:  [20] 20  SpO2:  [97 %-100 %] 99 %  O2 Device: None (Room air)    Physical Exam  Vitals reviewed.   Constitutional:       General: He is not in acute distress.     Appearance: He is obese. He is not ill-appearing.   Cardiovascular:      Rate and Rhythm: Normal rate.   Pulmonary:      Effort: Pulmonary effort is normal. No respiratory distress.   Musculoskeletal:      Cervical back: Normal range of motion.   Skin:     General: Skin is warm and dry.      Coloration: Skin is pale.   Neurological:      Mental Status: He is alert.           Lines/Drains:            Lab Results: I have reviewed the following results:  Results from last 7 days   Lab Units 04/10/25  1029   WBC Thousand/uL 4.70   HEMOGLOBIN g/dL 7.3*   HEMATOCRIT % 22.9*   PLATELETS Thousands/uL 66*     Results from last 7 days   Lab Units 04/10/25  1029   SODIUM mmol/L 135   POTASSIUM mmol/L 4.3   CHLORIDE mmol/L 107   CO2 mmol/L 22   BUN mg/dL 35*   CREATININE mg/dL 1.58*   ANION GAP mmol/L 6   CALCIUM mg/dL 8.3*     Results from last 7 days   Lab Units 04/10/25  1344   INR  1.19         Lab Results   Component Value Date    HGBA1C 5.2 03/20/2025    HGBA1C 5.2 12/20/2024    HGBA1C 5.6 11/25/2024           Imaging Results Review: No pertinent imaging studies reviewed.  Other Study Results Review: No additional pertinent studies reviewed.    Administrative Statements   I have spent a total time of 55 minutes in caring for this patient on the day of the visit/encounter including Diagnostic results, Prognosis, Importance of tx compliance, Counseling / Coordination of care, Reviewing/placing orders in the medical record (including tests, medications, and/or procedures), Obtaining or reviewing history  , and Communicating with other healthcare professionals .    ** Please Note: This note has been constructed using a voice recognition system. **

## 2025-04-10 NOTE — BRIEF OP NOTE (RAD/CATH)
IR PARACENTESIS Procedure Note    PATIENT NAME: Bimal Lugo  : 1945  MRN: [id number]    Pre-op Diagnosis:   1. 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)        Provider:   SUNDAY Worley    Assistants:   None    Estimated Blood Loss: None     Findings: 3650 mL of clear yellow ascites aspirated from right sided, ultrasound-guided paracentesis.    Specimens: None    Complications: None immediate    Anesthesia: Local      During paracentesis patient reports black tarry stools.  Patient with noted drop in hemoglobin to 7.3.  Previous hemoglobin performed 2025 noted at 10.5.  Patient with complaints of nausea otherwise is asymptomatic.  He denies chest pain, shortness of breath or dizziness.  Patient taken to emergency department for further evaluation/workup.    SUNDAY Worley     Date: 4/10/2025  Time: 2:42 PM

## 2025-04-10 NOTE — ASSESSMENT & PLAN NOTE
Lab Results   Component Value Date    EGFR 40 04/10/2025    EGFR 40 (L) 03/20/2025    EGFR 44 02/13/2025    CREATININE 1.58 (H) 04/10/2025    CREATININE 1.7 (H) 03/20/2025    CREATININE 1.47 (H) 02/13/2025   Baseline seems to be 1.4-1.8  Within baseline  Continue to monitor

## 2025-04-11 ENCOUNTER — ANESTHESIA (OUTPATIENT)
Dept: GASTROENTEROLOGY | Facility: HOSPITAL | Age: 80
End: 2025-04-11
Payer: MEDICARE

## 2025-04-11 ENCOUNTER — ANESTHESIA EVENT (OUTPATIENT)
Dept: GASTROENTEROLOGY | Facility: HOSPITAL | Age: 80
End: 2025-04-11
Payer: MEDICARE

## 2025-04-11 ENCOUNTER — APPOINTMENT (OUTPATIENT)
Dept: GASTROENTEROLOGY | Facility: HOSPITAL | Age: 80
DRG: 378 | End: 2025-04-11
Attending: INTERNAL MEDICINE
Payer: MEDICARE

## 2025-04-11 LAB
ABO GROUP BLD BPU: NORMAL
BPU ID: NORMAL
CROSSMATCH: NORMAL
GLUCOSE SERPL-MCNC: 106 MG/DL (ref 65–140)
GLUCOSE SERPL-MCNC: 115 MG/DL (ref 65–140)
GLUCOSE SERPL-MCNC: 132 MG/DL (ref 65–140)
GLUCOSE SERPL-MCNC: 134 MG/DL (ref 65–140)
HGB BLD-MCNC: 8 G/DL (ref 12–17)
HGB BLD-MCNC: 8.4 G/DL (ref 12–17)
UNIT DISPENSE STATUS: NORMAL
UNIT PRODUCT CODE: NORMAL
UNIT PRODUCT VOLUME: 300 ML
UNIT RH: NORMAL

## 2025-04-11 PROCEDURE — 0DJ08ZZ INSPECTION OF UPPER INTESTINAL TRACT, VIA NATURAL OR ARTIFICIAL OPENING ENDOSCOPIC: ICD-10-PCS | Performed by: INTERNAL MEDICINE

## 2025-04-11 PROCEDURE — 99214 OFFICE O/P EST MOD 30 MIN: CPT | Performed by: INTERNAL MEDICINE

## 2025-04-11 PROCEDURE — NC001 PR NO CHARGE: Performed by: INTERNAL MEDICINE

## 2025-04-11 PROCEDURE — 82948 REAGENT STRIP/BLOOD GLUCOSE: CPT

## 2025-04-11 PROCEDURE — 85018 HEMOGLOBIN: CPT | Performed by: NURSE PRACTITIONER

## 2025-04-11 PROCEDURE — 43235 EGD DIAGNOSTIC BRUSH WASH: CPT | Performed by: INTERNAL MEDICINE

## 2025-04-11 PROCEDURE — 99232 SBSQ HOSP IP/OBS MODERATE 35: CPT | Performed by: NURSE PRACTITIONER

## 2025-04-11 RX ORDER — PROPOFOL 10 MG/ML
INJECTION, EMULSION INTRAVENOUS AS NEEDED
Status: DISCONTINUED | OUTPATIENT
Start: 2025-04-11 | End: 2025-04-11

## 2025-04-11 RX ORDER — PHENYLEPHRINE HCL IN 0.9% NACL 1 MG/10 ML
SYRINGE (ML) INTRAVENOUS AS NEEDED
Status: DISCONTINUED | OUTPATIENT
Start: 2025-04-11 | End: 2025-04-11

## 2025-04-11 RX ORDER — SODIUM CHLORIDE, SODIUM LACTATE, POTASSIUM CHLORIDE, CALCIUM CHLORIDE 600; 310; 30; 20 MG/100ML; MG/100ML; MG/100ML; MG/100ML
INJECTION, SOLUTION INTRAVENOUS CONTINUOUS PRN
Status: DISCONTINUED | OUTPATIENT
Start: 2025-04-11 | End: 2025-04-11

## 2025-04-11 RX ORDER — DOCUSATE SODIUM 100 MG/1
100 CAPSULE, LIQUID FILLED ORAL 2 TIMES DAILY
Status: DISCONTINUED | OUTPATIENT
Start: 2025-04-11 | End: 2025-04-12 | Stop reason: HOSPADM

## 2025-04-11 RX ORDER — SODIUM CHLORIDE, SODIUM LACTATE, POTASSIUM CHLORIDE, CALCIUM CHLORIDE 600; 310; 30; 20 MG/100ML; MG/100ML; MG/100ML; MG/100ML
125 INJECTION, SOLUTION INTRAVENOUS CONTINUOUS
Status: DISCONTINUED | OUTPATIENT
Start: 2025-04-11 | End: 2025-04-11 | Stop reason: CLARIF

## 2025-04-11 RX ORDER — LIDOCAINE HYDROCHLORIDE 20 MG/ML
INJECTION, SOLUTION EPIDURAL; INFILTRATION; INTRACAUDAL; PERINEURAL AS NEEDED
Status: DISCONTINUED | OUTPATIENT
Start: 2025-04-11 | End: 2025-04-11

## 2025-04-11 RX ADMIN — SODIUM CHLORIDE, SODIUM LACTATE, POTASSIUM CHLORIDE, AND CALCIUM CHLORIDE: .6; .31; .03; .02 INJECTION, SOLUTION INTRAVENOUS at 13:02

## 2025-04-11 RX ADMIN — CARVEDILOL 6.25 MG: 6.25 TABLET, FILM COATED ORAL at 09:04

## 2025-04-11 RX ADMIN — PANTOPRAZOLE SODIUM 40 MG: 40 TABLET, DELAYED RELEASE ORAL at 09:04

## 2025-04-11 RX ADMIN — PROPOFOL 30 MG: 10 INJECTION, EMULSION INTRAVENOUS at 13:09

## 2025-04-11 RX ADMIN — LIDOCAINE HYDROCHLORIDE 5 ML: 20 INJECTION, SOLUTION EPIDURAL; INFILTRATION; INTRACAUDAL; PERINEURAL at 13:08

## 2025-04-11 RX ADMIN — PROPOFOL 80 MG: 10 INJECTION, EMULSION INTRAVENOUS at 13:08

## 2025-04-11 RX ADMIN — FERROUS SULFATE TAB 325 MG (65 MG ELEMENTAL FE) 325 MG: 325 (65 FE) TAB at 09:04

## 2025-04-11 RX ADMIN — Medication 100 MCG: at 13:10

## 2025-04-11 RX ADMIN — ISOSORBIDE MONONITRATE 30 MG: 30 TABLET, EXTENDED RELEASE ORAL at 09:34

## 2025-04-11 RX ADMIN — PANTOPRAZOLE SODIUM 40 MG: 40 TABLET, DELAYED RELEASE ORAL at 16:59

## 2025-04-11 RX ADMIN — ATORVASTATIN CALCIUM 40 MG: 40 TABLET, FILM COATED ORAL at 09:44

## 2025-04-11 RX ADMIN — Medication 200 MCG: at 13:15

## 2025-04-11 RX ADMIN — FUROSEMIDE 40 MG: 40 TABLET ORAL at 16:59

## 2025-04-11 RX ADMIN — SPIRONOLACTONE 50 MG: 25 TABLET ORAL at 14:57

## 2025-04-11 RX ADMIN — CARVEDILOL 6.25 MG: 6.25 TABLET, FILM COATED ORAL at 16:59

## 2025-04-11 RX ADMIN — IRON SUCROSE 300 MG: 20 INJECTION, SOLUTION INTRAVENOUS at 17:02

## 2025-04-11 RX ADMIN — Medication 100 MCG: at 13:12

## 2025-04-11 RX ADMIN — INSULIN GLARGINE 6 UNITS: 100 INJECTION, SOLUTION SUBCUTANEOUS at 21:03

## 2025-04-11 RX ADMIN — DOCUSATE SODIUM 100 MG: 100 CAPSULE, LIQUID FILLED ORAL at 17:00

## 2025-04-11 RX ADMIN — MONTELUKAST 10 MG: 10 TABLET, FILM COATED ORAL at 09:35

## 2025-04-11 NOTE — ASSESSMENT & PLAN NOTE
Patient reports dark stools at home concern for GI bleed leading to anemia  GI consulted  Plan for EGD tomorrow 4/11  N.p.o.  Iron panel ordered

## 2025-04-11 NOTE — ASSESSMENT & PLAN NOTE
MELD 3.0: 13 at 2/13/2025 10:34 AM  MELD-Na: 13 at 2/13/2025 10:34 AM  Calculated from:  Serum Creatinine: 1.47 mg/dL at 2/13/2025 10:34 AM  Serum Sodium: 138 mmol/L (Using max of 137 mmol/L) at 2/13/2025 10:34 AM  Total Bilirubin: 1.19 mg/dL at 2/13/2025 10:34 AM  Serum Albumin: 3.9 g/dL (Using max of 3.5 g/dL) at 2/13/2025 10:34 AM  INR(ratio): 1.24 at 2/13/2025 10:34 AM  Age at listing (hypothetical): 79 years  Sex: Male at 2/13/2025 10:34 AM    Follows with hepatology and our office for cirrhosis.  He is not a liver transplant candidate secondary to low MELD and age.  Continue outpatient follow-up.

## 2025-04-11 NOTE — PROGRESS NOTES
Progress Note - Hospitalist   Name: Bimal Lugo 79 y.o. male I MRN: [id number]  Unit/Bed#: -01 I Date of Admission: 4/10/2025   Date of Service: 4/11/2025 I Hospital Day: 0    Assessment & Plan  Dark stools  Patient reports dark stools at home concern for GI bleed leading to anemia  GI consulted  Plan for EGD tomorrow 4/11  N.p.o.  Iron panel ordered  Acute blood loss anemia  Patient had outpatient labs revealing a hemoglobin of 7  Baseline hemoglobin 9-10  Giving 1 unit PRBC in the ED on 4/10  Diabetes mellitus, type 2 (HCC)  Lab Results   Component Value Date    HGBA1C 5.2 03/20/2025       Recent Labs     04/10/25  2109 04/11/25  0804   POCGLU 111 106       Blood Sugar Average: Last 72 hrs:  (P) 108.5Patient is on Humalog 10 to 14 units in the morning and at night it appears at home; will hold this in the setting of being n.p.o.  Patient is also on Lantus 12 units nightly will half this dose to 6 units since patient will be n.p.o. at midnight  Clear liquid diet till midnight  Monitor blood sugars closely in the setting of n.p.o. status  CAD (coronary artery disease)  Typically maintained on aspirin and Imdur  Holding aspirin in the setting of GI bleed  Chronic kidney disease, stage 3a (HCC)  Lab Results   Component Value Date    EGFR 40 04/10/2025    EGFR 40 (L) 03/20/2025    EGFR 44 02/13/2025    CREATININE 1.58 (H) 04/10/2025    CREATININE 1.7 (H) 03/20/2025    CREATININE 1.47 (H) 02/13/2025   Baseline seems to be 1.4-1.8  Within baseline  Continue to monitor  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)  Was getting outpatient paracentesis today for the ascites when outpatient labs revealed a hemoglobin of 7    VTE Pharmacologic Prophylaxis:   Moderate Risk (Score 3-4) - Pharmacological DVT Prophylaxis Contraindicated. Sequential Compression Devices Ordered.    Mobility:   Basic Mobility Inpatient Raw Score: 20  JH-HLM Goal: 6: Walk 10 steps or more  JH-HLM Achieved: 7: Walk 25 feet or more  JH-HLM  Goal achieved. Continue to encourage appropriate mobility.    Patient Centered Rounds: I performed bedside rounds with nursing staff today.   Discussions with Specialists or Other Care Team Provider: Reviewed notes    Education and Discussions with Family / Patient: Wife at bedside    Current Length of Stay: 0 day(s)  Current Patient Status: Observation   Certification Statement: The patient will continue to require additional inpatient hospital stay due to EGD  Discharge Plan: Anticipate discharge tomorrow to home.    Code Status: Level 1 - Full Code    Subjective   Denies any chest pain chest tightness shortness of breath or difficulty breathing reports he did have some trouble sleeping but otherwise offers no acute complaints feels tired but otherwise fine denies any additional bloody bowel movements while here    Objective :  Temp:  [97.5 °F (36.4 °C)-98.6 °F (37 °C)] 98.2 °F (36.8 °C)  HR:  [64-76] 70  BP: (129-176)/(46-80) 142/61  Resp:  [17-22] 21  SpO2:  [96 %-100 %] 98 %  O2 Device: None (Room air)    Body mass index is 30.96 kg/m².     Input and Output Summary (last 24 hours):     Intake/Output Summary (Last 24 hours) at 4/11/2025 0857  Last data filed at 4/10/2025 1720  Gross per 24 hour   Intake 400 ml   Output --   Net 400 ml       Physical Exam  Vitals reviewed.   Constitutional:       General: He is not in acute distress.     Appearance: He is normal weight.   Cardiovascular:      Rate and Rhythm: Normal rate.   Pulmonary:      Effort: Pulmonary effort is normal. No respiratory distress.   Musculoskeletal:         General: Normal range of motion.   Skin:     General: Skin is warm.   Neurological:      General: No focal deficit present.      Mental Status: He is alert. Mental status is at baseline.       Lines/Drains:              Lab Results: I have reviewed the following results:   Results from last 7 days   Lab Units 04/10/25  2330 04/10/25  1029   WBC Thousand/uL  --  4.70   HEMOGLOBIN g/dL 7.7*  7.3*   HEMATOCRIT %  --  22.9*   PLATELETS Thousands/uL  --  66*     Results from last 7 days   Lab Units 04/10/25  1029   SODIUM mmol/L 135   POTASSIUM mmol/L 4.3   CHLORIDE mmol/L 107   CO2 mmol/L 22   BUN mg/dL 35*   CREATININE mg/dL 1.58*   ANION GAP mmol/L 6   CALCIUM mg/dL 8.3*     Results from last 7 days   Lab Units 04/10/25  1344   INR  1.19     Results from last 7 days   Lab Units 04/11/25  0804 04/10/25  2109   POC GLUCOSE mg/dl 106 111               Recent Cultures (last 7 days):         Imaging Results Review: No pertinent imaging studies reviewed.  Other Study Results Review: No additional pertinent studies reviewed.    Last 24 Hours Medication List:     Current Facility-Administered Medications:     albuterol (PROVENTIL HFA,VENTOLIN HFA) inhaler 2 puff, Q4H PRN    aluminum-magnesium hydroxide-simethicone (MAALOX) oral suspension 30 mL, Q6H PRN    atorvastatin (LIPITOR) tablet 40 mg, Early Morning    carvedilol (COREG) tablet 6.25 mg, BID With Meals    ferrous sulfate tablet 325 mg, Daily With Breakfast    furosemide (LASIX) tablet 40 mg, BID    insulin glargine (LANTUS) subcutaneous injection 6 Units 0.06 mL, HS    insulin lispro (HumALOG/ADMELOG) 100 units/mL subcutaneous injection 1-5 Units, TID AC **AND** Fingerstick Glucose (POCT), TID AC    isosorbide mononitrate (IMDUR) 24 hr tablet 30 mg, Daily    montelukast (SINGULAIR) tablet 10 mg, Daily    pantoprazole (PROTONIX) EC tablet 40 mg, BID AC    spironolactone (ALDACTONE) tablet 50 mg, Daily    trimethobenzamide (TIGAN) IM injection 200 mg, Q6H PRN    Administrative Statements   Today, Patient Was Seen By: SUNDAY Ellis  I have spent a total time of 45 minutes in caring for this patient on the day of the visit/encounter including Diagnostic results, Prognosis, Patient and family education, Impressions, Documenting in the medical record, and Reviewing/placing orders in the medical record (including tests, medications, and/or  procedures).    **Please Note: This note may have been constructed using a voice recognition system.**

## 2025-04-11 NOTE — ASSESSMENT & PLAN NOTE
Patient with history of severe portal hypertensive gastropathy, severe esophagitis and peptic ulcer disease.  We will plan for EGD today to investigate.  -Spoke with bedside nursing, and and she will get hemoglobin this morning  -Continue to monitor hemoglobin closely transfuse as necessary goal hemoglobin of 7-8  -PPI IV twice daily  -NPO until after endoscopy  -Further recommendations

## 2025-04-11 NOTE — ANESTHESIA POSTPROCEDURE EVALUATION
Post-Op Assessment Note    CV Status:  Stable  Pain Score: 0    Pain management: adequate       Mental Status:  Sleepy and arousable   Hydration Status:  Euvolemic   PONV Controlled:  Controlled   Airway Patency:  Patent     Post Op Vitals Reviewed: Yes    No anethesia notable event occurred.    Staff: CRNA       Last Filed PACU Vitals:  Vitals Value Taken Time   Temp     Pulse     BP     Resp     SpO2

## 2025-04-11 NOTE — CASE MANAGEMENT
Case Management Assessment & Discharge Planning Note    Patient name Bimal Lugo  Location /-01 MRN [id number]  : 1945 Date 2025       Current Admission Date: 4/10/2025  Current Admission Diagnosis:Dark stools   Patient Active Problem List    Diagnosis Date Noted Date Diagnosed    Stage 3b chronic kidney disease (HCC) 2025     Secondary esophageal varices with bleeding (HCC) 2024     Dark stools 2024     Iron deficiency anemia due to chronic blood loss 2024     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) 2024     Melena 2024     Hematemesis 2024     Acute blood loss anemia 2024     Acute kidney injury superimposed on stage 3a chronic kidney disease (HCC) 2024     High anion gap metabolic acidosis 2024     Electrolyte abnormality 2024     Hypertension 2024     Diabetes mellitus, type 2 (HCC) 2024     Sleep apnea 2024     Thrombocytopenia (HCC) 2024     Chronic obstructive pulmonary disease (HCC) 04/10/2024     History of aortic valve replacement 04/10/2024     Back pain 04/10/2024     CAD (coronary artery disease) 2024     Cirrhosis (HCC) 2024     Pancytopenia ([facility]) 2024     Chronic kidney disease, stage 3a (HCC) 10/11/2021     PVD (peripheral vascular disease) ([facility]) 2018     Atherosclerosis of lower extremity with intermittent claudication ([facility]) 2018     Chronic bronchitis with COPD (chronic obstructive pulmonary disease) ([facility]) 2017       LOS (days): 0  Geometric Mean LOS (GMLOS) (days):   Days to GMLOS:     OBJECTIVE:         Current admission status: Observation       Preferred Pharmacy:   [facility] - [place] - [address]  [address] [facility] [place] [address]  Phone: [phone] Fax: [phone]    Primary Care Provider: Aly Carter MD    Primary Insurance: MEDICARE  Secondary Insurance: BLUE  CROSS    ASSESSMENT:  Active Health Care Proxies       Viki Lugo Health Care Representative - Spouse   Primary Phone: [phone] (Mobile)                 Advance Directives  Does patient have a Health Care POA?: No  Was patient offered paperwork?: Yes (Paperwork reviewed and provided)  Does patient currently have a Health Care decision maker?: Yes, please see Health Care Proxy section  Does patient have Advance Directives?: No  Was patient offered paperwork?: Yes (see above)  Primary Contact: spouse, Viki     Readmission Root Cause  30 Day Readmission: No    Patient Information  Admitted from:: Home  Mental Status: Alert  During Assessment patient was accompanied by: Spouse  Assessment information provided by:: Spouse (patient was having IV placed during this time)  Primary Caregiver: Self  Support Systems: Spouse/significant other, Daughter  County of Residence: [place]  What city do you live in?: [place]  Home entry access options. Select all that apply.: Stairs  Number of steps to enter home.: 4  Type of Current Residence: [facility]  Living Arrangements: Lives w/ Spouse/significant other    Activities of Daily Living Prior to Admission  Functional Status: Independent  Completes ADLs independently?: Yes  Ambulates independently?: Yes  Does patient use assisted devices?: Yes  Assisted Devices (DME) used: Straight Cane  Does patient currently own DME?: Yes  What DME does the patient currently own?: Straight Cane, Bedside Commode, Walker, Wheelchair  Does patient have a history of Outpatient Therapy (PT/OT)?: Yes (OP cardiac rehab)  Does the patient have a history of Short-Term Rehab?: No  Does patient have a history of HHC?: No     Patient Information Continued  Does patient have prescription coverage?: Yes  Can the patient afford their medications and any related supplies (such as glucometers or test strips)?: Yes  Does patient receive dialysis treatments?: No  Does patient have a history of substance abuse?:  Yes  Historical substance use preference: Alcohol/ETOH  Is patient currently in treatment for substance abuse?: N/A - sober  Does patient have a history of Mental Health Diagnosis?: No     Means of Transportation  Means of Transport to Appts:: Drives Self    DISCHARGE DETAILS:    Discharge planning discussed with:: patient's spouse at bedside  Freedom of Choice: Yes  Comments - Freedom of Choice: CM met with patient and spouse at bedside to introduce self/role. Patient does not have POA but identifies his spouse as his health care decision maker. Paperwork provided at patient's spouse's request. No other CM needs identified at this time. CM Dept will continue to follow.  CM contacted family/caregiver?: Yes  Were Treatment Team discharge recommendations reviewed with patient/caregiver?: Yes  Did patient/caregiver verbalize understanding of patient care needs?: Yes  Were patient/caregiver advised of the risks associated with not following Treatment Team discharge recommendations?: Yes    Contacts  Patient Contacts: Nighat  Relationship to Patient:: Family  Contact Method: In Person  Reason/Outcome: Continuity of Care, Discharge Planning    Requested Home Health Care         Is the patient interested in HHC at discharge?: No    DME Referral Provided  Referral made for DME?: No    Other Referral/Resources/Interventions Provided:  Interventions: Advanced Directives     Treatment Team Recommendation: Home  Discharge Destination Plan:: Home  Transport at Discharge : Self (his car is in the parking lot)        Additional Comments: Patient admitted following weekly paracentesis. He has been going weekly since last April and is very independent at baseline and will drive himself and spouse home at [place].

## 2025-04-11 NOTE — ANESTHESIA POSTPROCEDURE EVALUATION
Post-Op Assessment Note    CV Status:  Stable    Pain management: adequate       Mental Status:  Awake   Hydration Status:  Stable   PONV Controlled:  None   Airway Patency:  Patent     Post Op Vitals Reviewed: Yes    No anethesia notable event occurred.    Staff: Anesthesiologist           Last Filed PACU Vitals:  Vitals Value Taken Time   Temp 97.8 °F (36.6 °C) 04/11/25 1320   Pulse 66 04/11/25 1335   /54 04/11/25 1335   Resp 18 04/11/25 1335   SpO2 92 % 04/11/25 1335       Modified Trip:     Vitals Value Taken Time   Activity 2 04/11/25 1335   Respiration 2 04/11/25 1335   Circulation 2 04/11/25 1335   Consciousness 2 04/11/25 1335   Oxygen Saturation 2 04/11/25 1335     Modified Trip Score: 10

## 2025-04-11 NOTE — PLAN OF CARE
Problem: PAIN - ADULT  Goal: Verbalizes/displays adequate comfort level or baseline comfort level  Description: Interventions:- Encourage patient to monitor pain and request assistance- Assess pain using appropriate pain scale- Administer analgesics based on type and severity of pain and evaluate response- Implement non-pharmacological measures as appropriate and evaluate response- Consider cultural and social influences on pain and pain management- Notify physician/advanced practitioner if interventions unsuccessful or patient reports new pain  Outcome: Progressing     Problem: INFECTION - ADULT  Goal: Absence or prevention of progression during hospitalization  Description: INTERVENTIONS:- Assess and monitor for signs and symptoms of infection- Monitor lab/diagnostic results- Monitor all insertion sites, i.e. indwelling lines, tubes, and drains- Monitor endotracheal if appropriate and nasal secretions for changes in amount and color- [place] appropriate cooling/warming therapies per order- Administer medications as ordered- Instruct and encourage patient and family to use good hand hygiene technique- Identify and instruct in appropriate isolation precautions for identified infection/condition  Outcome: Progressing  Goal: Absence of fever/infection during neutropenic period  Description: INTERVENTIONS:- Monitor WBC  Outcome: Progressing     Problem: SAFETY ADULT  Goal: Patient will remain free of falls  Description: INTERVENTIONS:- Educate patient/family on patient safety including physical limitations- Instruct patient to call for assistance with activity - Consult OT/PT to assist with strengthening/mobility - Keep Call bell within reach- Keep bed low and locked with side rails adjusted as appropriate- Keep care items and personal belongings within reach- Initiate and maintain comfort rounds- Make Fall Risk Sign visible to staff-   Outcome: Progressing  Goal: Maintain or return to baseline ADL  function  Description: INTERVENTIONS:-  Assess patient's ability to carry out ADLs; assess patient's baseline for ADL function and identify physical deficits which impact ability to perform ADLs (bathing, care of mouth/teeth, toileting, grooming, dressing, etc.)- Assess/evaluate cause of self-care deficits - Assess range of motion- Assess patient's mobility; develop plan if impaired- Assess patient's need for assistive devices and provide as appropriate- Encourage maximum independence but intervene and supervise when necessary- Involve family in performance of ADLs- Assess for home care needs following discharge - Consider OT consult to assist with ADL evaluation and planning for discharge- Provide patient education as appropriate  Outcome: Progressing  Goal: Maintains/Returns to pre admission functional level  Description: INTERVENTIONS:- Perform AM-PAC 6 Click Basic Mobility/ Daily Activity assessment daily.- Set and communicate daily mobility goal to care team and patient/family/caregiver. - Collaborate with rehabilitation services on mobility goals if consulted-      Problem: DISCHARGE PLANNING  Goal: Discharge to home or other facility with appropriate resources  Description: INTERVENTIONS:- Identify barriers to discharge w/patient and caregiver- Arrange for needed discharge resources and transportation as appropriate- Identify discharge learning needs (meds, wound care, etc.)- Arrange for interpretive services to assist at discharge as needed- Refer to Case Management Department for coordinating discharge planning if the patient needs post-hospital services based on physician/advanced practitioner order or complex needs related to functional status, cognitive ability, or social support system  Outcome: Progressing     Problem: Knowledge Deficit  Goal: Patient/family/caregiver demonstrates understanding of disease process, treatment plan, medications, and discharge instructions  Description: Complete learning  assessment and assess knowledge base.Interventions:- Provide teaching at level of understanding- Provide teaching via preferred learning methods  Outcome: Progressing

## 2025-04-11 NOTE — CONSULTS
Consultation - Gastroenterology   Name: Bimal Lugo 79 y.o. male I MRN: [id number]  Unit/Bed#: -01 I Date of Admission: 4/10/2025   Date of Service: 4/11/2025 I Hospital Day: 0   Consults  Physician Requesting Evaluation: Anival Obrien,*   Reason for Evaluation / Principal Problem: Acute blood loss anemia    Assessment & Plan  Acute blood loss anemia  Patient with history of severe portal hypertensive gastropathy, severe esophagitis and peptic ulcer disease.  We will plan for EGD today to investigate.  -Spoke with bedside nursing, and and she will get hemoglobin this morning  -Continue to monitor hemoglobin closely transfuse as necessary goal hemoglobin of 7-8  -PPI IV twice daily  -NPO until after endoscopy  -Further recommendations  Dark stools  See plan above.   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)  MELD 3.0: 13 at 2/13/2025 10:34 AM  MELD-Na: 13 at 2/13/2025 10:34 AM  Calculated from:  Serum Creatinine: 1.47 mg/dL at 2/13/2025 10:34 AM  Serum Sodium: 138 mmol/L (Using max of 137 mmol/L) at 2/13/2025 10:34 AM  Total Bilirubin: 1.19 mg/dL at 2/13/2025 10:34 AM  Serum Albumin: 3.9 g/dL (Using max of 3.5 g/dL) at 2/13/2025 10:34 AM  INR(ratio): 1.24 at 2/13/2025 10:34 AM  Age at listing (hypothetical): 79 years  Sex: Male at 2/13/2025 10:34 AM    Follows with hepatology and our office for cirrhosis.  He is not a liver transplant candidate secondary to low MELD and age.  Continue outpatient follow-up.  CAD (coronary artery disease)  On 81 mg aspirin.  Patient will be seen and examined by Dr. Roy.    History of Present Illness   HPI:  Bimal Lugo is a 79 y.o. 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  Patient reports associated weakness.  Patient reports  that his symptoms started with indigestion was taking Pepto-Bismol along with his iron pills.  When he saw the black stool, he had thought it could be possibly secondary to the medication.  He was then prescribed Carafate as an outpatient, which he reports helped his symptoms.  However, continued with the dark stool and outpatient hemoglobin resulted at 7.3    Hemoglobin 7.3 on admission.  Baseline hemoglobin around 9-10.  BUN 35, creatinine 1.58.  Platelet count 66,000.  INR 1.1.    Patient was seen in the bedside this morning, fortunately hemodynamically stable.  He has no new complaints at this time.    Last EGD was in December 2024 performed by Dr. Morocho revealing a single ulcer in the esophagus, severe portal hypertensive gastropathy and 1 small varix.  Last colonoscopy was in May 2024 revealing 3 small polyps and diverticulosis.  Small hemorrhoids were also noted.    Review of Systems   Constitutional:  Negative for appetite change, chills, diaphoresis, fatigue and unexpected weight change.   HENT:  Negative for sore throat and trouble swallowing.    Eyes:  Negative for discharge and redness.   Respiratory:  Negative for cough, shortness of breath and wheezing.    Cardiovascular:  Negative for chest pain and palpitations.   Gastrointestinal:  Positive for blood in stool. Negative for abdominal pain, anal bleeding, constipation, diarrhea, nausea, rectal pain and vomiting.   Endocrine: Negative for cold intolerance and heat intolerance.   Musculoskeletal:  Negative for joint swelling and myalgias.   Skin:  Negative for pallor and rash.   Neurological:  Negative for dizziness, tremors, weakness, light-headedness, numbness and headaches.   Hematological:  Negative for adenopathy. Does not bruise/bleed easily.   Psychiatric/Behavioral:  Negative for behavioral problems, confusion, dysphoric mood and sleep disturbance. The patient is not nervous/anxious.      Medical History Review: I have reviewed the patient's  PMH, PSH, Social History, Family History, Meds, and Allergies     Objective :  Temp:  [97.5 °F (36.4 °C)-98.6 °F (37 °C)] 98.2 °F (36.8 °C)  HR:  [64-76] 70  BP: (129-176)/(46-80) 142/61  Resp:  [17-22] 21  SpO2:  [96 %-100 %] 98 %  O2 Device: None (Room air)    Physical Exam  Constitutional:       General: He is not in acute distress.     Appearance: He is well-developed. He is not diaphoretic.   HENT:      Head: Normocephalic and atraumatic.   Eyes:      General: No scleral icterus.     Conjunctiva/sclera: Conjunctivae normal.      Pupils: Pupils are equal, round, and reactive to light.   Neck:      Thyroid: No thyromegaly.      Trachea: No tracheal deviation.   Pulmonary:      Effort: Pulmonary effort is normal.   Abdominal:      General: Bowel sounds are normal. There is no distension.      Palpations: Abdomen is soft. Abdomen is not rigid. There is no mass.      Tenderness: There is no abdominal tenderness. There is no guarding or rebound.   Musculoskeletal:      Cervical back: Neck supple.   Lymphadenopathy:      Cervical: No cervical adenopathy.   Skin:     General: Skin is warm and dry.      Findings: No erythema or rash.   Neurological:      Mental Status: He is alert and oriented to person, place, and time.   Psychiatric:         Behavior: Behavior normal.         Lab Results: I have reviewed the following results:

## 2025-04-11 NOTE — ANESTHESIA PREPROCEDURE EVALUATION
Procedure:  EGD    Referred to ED and admitted 4/9/25 after abnormal lab resulted, Hgb 7.3.  Transfused 1 unit PRBCs on admission with Hgb 8.4 this AM.    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)   (+) Cirrhosis (HCC)   (+) Hematemesis      /RENAL   (+) Acute kidney injury superimposed on stage 3a chronic kidney disease (HCC)   (+) Chronic kidney disease, stage 3a (HCC)   (+) Stage 3b chronic kidney disease (HCC)      HEMATOLOGY   (+) Acute blood loss anemia   (+) Iron deficiency anemia due to chronic blood loss   (+) Pancytopenia (HCC)   (+) Thrombocytopenia (HCC)      MUSCULOSKELETAL   (+) Back pain      PULMONARY   (+) Chronic obstructive pulmonary disease (HCC)   (+) Sleep apnea        Physical Exam    Airway    Mallampati score: II  TM Distance: >3 FB  Neck ROM: limited     Dental        Cardiovascular  Rhythm: regular, Rate: normal    Pulmonary   Breath sounds clear to auscultation    Other Findings        Anesthesia Plan  ASA Score- 3     Anesthesia Type- IV sedation with anesthesia with ASA Monitors.         Additional Monitors:     Airway Plan:     Comment: I discussed the risks and benefits of IV sedation anesthesia including the possibility of the need to convert to general anesthesia and the potential risk of awareness.  The patient was given the opportunity to ask questions, which were answered..       Plan Factors-Exercise tolerance (METS): <4  METS.Exercise comment: Functional status limited 2/2 back pain.    Chart reviewed. EKG reviewed.  Existing labs reviewed.                   Induction- intravenous.    Postoperative Plan-     Perioperative Resuscitation Plan - Level 1 - Full Code.       Informed Consent- Anesthetic plan and risks discussed with patient.  I personally reviewed this patient with the CRNA. Discussed and agreed on the Anesthesia Plan with the CRNA..      NPO Status:  Vitals Value Taken Time   Date of last liquid 04/11/25 04/11/25 1157   Time of last liquid 0900 04/11/25 1157   Date of last solid 04/09/25 04/11/25 1157   Time of last solid 1700 04/11/25 1157       TTE 11/25/24:    Interpretation Summary    Left Ventricle: Left ventricular cavity size is normal. Wall thickness is increased.  Off axis views unable to accurately measure LV wall thickness The left ventricular ejection fraction is 55% by visual estimation. Systolic function is normal. Although no diagnostic regional wall motion abnormality was identified, this possibility cannot be completely excluded on the basis of this study. Diastolic function is moderately abnormal, consistent with grade II (pseudonormal) relaxation.    Right Ventricle: Systolic function is mildly reduced.    IVS: There is abnormal septal motion consistent with post-operative status.    Left Atrium: The atrium is dilated.    Right Atrium: The atrium is dilated.    Aortic Valve: There is a bioprosthetic valve. The prosthetic valve appears well-seated. The gradient recorded across the prosthetic aortic valve is within the expected range.    Mitral Valve: There is mild annular calcification.    Tricuspid Valve: There is mild regurgitation. Triscuspid Valve Regurgitation Peak Gradient is 35.0 mmHg.    Technically difficult study even with Definity.  Unable to obtain subcostal.    Lab Results   Component Value Date    WBC 4.70 04/10/2025    HGB 8.4 (L) 04/11/2025    HCT 22.9 (L) 04/10/2025     (H)  04/10/2025    PLT 66 (L) 04/10/2025     Lab Results   Component Value Date    SODIUM 135 04/10/2025    K 4.3 04/10/2025     04/10/2025    CO2 22 04/10/2025    BUN 35 (H) 04/10/2025    CREATININE 1.58 (H) 04/10/2025    GLUC 139 (H) 03/20/2025    CALCIUM 8.3 (L) 04/10/2025     Lab Results   Component Value Date    ALT 19 03/20/2025    AST 35 03/20/2025    ALKPHOS 118 03/20/2025     Lab Results   Component Value Date    INR 1.19 04/10/2025    INR 1.24 (H) 02/13/2025    INR 1.09 01/27/2025    PROTIME 15.8 (H) 04/10/2025    PROTIME 16.4 (H) 02/13/2025    PROTIME 14.8 01/27/2025     Lab Results   Component Value Date    HGBA1C 5.2 03/20/2025

## 2025-04-11 NOTE — ASSESSMENT & PLAN NOTE
Lab Results   Component Value Date    HGBA1C 5.2 03/20/2025       Recent Labs     04/10/25  2109 04/11/25  0804   POCGLU 111 106       Blood Sugar Average: Last 72 hrs:  (P) 108.5Patient is on Humalog 10 to 14 units in the morning and at night it appears at home; will hold this in the setting of being n.p.o.  Patient is also on Lantus 12 units nightly will half this dose to 6 units since patient will be n.p.o. at midnight  Clear liquid diet till midnight  Monitor blood sugars closely in the setting of n.p.o. status

## 2025-04-12 VITALS
SYSTOLIC BLOOD PRESSURE: 138 MMHG | BODY MASS INDEX: 31 KG/M2 | HEIGHT: 65 IN | TEMPERATURE: 98.4 F | HEART RATE: 67 BPM | WEIGHT: 186.07 LBS | DIASTOLIC BLOOD PRESSURE: 65 MMHG | RESPIRATION RATE: 19 BRPM | OXYGEN SATURATION: 93 %

## 2025-04-12 LAB
ANION GAP SERPL CALCULATED.3IONS-SCNC: 7 MMOL/L (ref 4–13)
BUN SERPL-MCNC: 26 MG/DL (ref 5–25)
CALCIUM SERPL-MCNC: 8.2 MG/DL (ref 8.4–10.2)
CHLORIDE SERPL-SCNC: 108 MMOL/L (ref 96–108)
CO2 SERPL-SCNC: 21 MMOL/L (ref 21–32)
CREAT SERPL-MCNC: 1.6 MG/DL (ref 0.6–1.3)
ERYTHROCYTE [DISTWIDTH] IN BLOOD BY AUTOMATED COUNT: 16.2 % (ref 11.6–15.1)
GFR SERPL CREATININE-BSD FRML MDRD: 40 ML/MIN/1.73SQ M
GLUCOSE SERPL-MCNC: 108 MG/DL (ref 65–140)
GLUCOSE SERPL-MCNC: 168 MG/DL (ref 65–140)
GLUCOSE SERPL-MCNC: 92 MG/DL (ref 65–140)
HCT VFR BLD AUTO: 24.8 % (ref 36.5–49.3)
HGB BLD-MCNC: 7.8 G/DL (ref 12–17)
HGB BLD-MCNC: 8.2 G/DL (ref 12–17)
HGB BLD-MCNC: 8.5 G/DL (ref 12–17)
MAGNESIUM SERPL-MCNC: 1.9 MG/DL (ref 1.9–2.7)
MCH RBC QN AUTO: 32.7 PG (ref 26.8–34.3)
MCHC RBC AUTO-ENTMCNC: 33.1 G/DL (ref 31.4–37.4)
MCV RBC AUTO: 99 FL (ref 82–98)
PLATELET # BLD AUTO: 44 THOUSANDS/UL (ref 149–390)
PMV BLD AUTO: 12 FL (ref 8.9–12.7)
POTASSIUM SERPL-SCNC: 3.9 MMOL/L (ref 3.5–5.3)
RBC # BLD AUTO: 2.51 MILLION/UL (ref 3.88–5.62)
SODIUM SERPL-SCNC: 136 MMOL/L (ref 135–147)
WBC # BLD AUTO: 2.64 THOUSAND/UL (ref 4.31–10.16)

## 2025-04-12 PROCEDURE — 85027 COMPLETE CBC AUTOMATED: CPT | Performed by: NURSE PRACTITIONER

## 2025-04-12 PROCEDURE — 99239 HOSP IP/OBS DSCHRG MGMT >30: CPT | Performed by: NURSE PRACTITIONER

## 2025-04-12 PROCEDURE — 82948 REAGENT STRIP/BLOOD GLUCOSE: CPT

## 2025-04-12 PROCEDURE — 80048 BASIC METABOLIC PNL TOTAL CA: CPT | Performed by: NURSE PRACTITIONER

## 2025-04-12 PROCEDURE — 85018 HEMOGLOBIN: CPT | Performed by: NURSE PRACTITIONER

## 2025-04-12 PROCEDURE — 83735 ASSAY OF MAGNESIUM: CPT | Performed by: NURSE PRACTITIONER

## 2025-04-12 RX ADMIN — DOCUSATE SODIUM 100 MG: 100 CAPSULE, LIQUID FILLED ORAL at 08:59

## 2025-04-12 RX ADMIN — PANTOPRAZOLE SODIUM 40 MG: 40 TABLET, DELAYED RELEASE ORAL at 09:02

## 2025-04-12 RX ADMIN — ATORVASTATIN CALCIUM 40 MG: 40 TABLET, FILM COATED ORAL at 05:00

## 2025-04-12 RX ADMIN — FUROSEMIDE 40 MG: 40 TABLET ORAL at 08:59

## 2025-04-12 RX ADMIN — CARVEDILOL 6.25 MG: 6.25 TABLET, FILM COATED ORAL at 08:59

## 2025-04-12 RX ADMIN — ISOSORBIDE MONONITRATE 30 MG: 30 TABLET, EXTENDED RELEASE ORAL at 08:59

## 2025-04-12 RX ADMIN — MONTELUKAST 10 MG: 10 TABLET, FILM COATED ORAL at 08:59

## 2025-04-12 RX ADMIN — SPIRONOLACTONE 50 MG: 25 TABLET ORAL at 08:59

## 2025-04-12 RX ADMIN — INSULIN LISPRO 1 UNITS: 100 INJECTION, SOLUTION INTRAVENOUS; SUBCUTANEOUS at 11:48

## 2025-04-12 RX ADMIN — IRON SUCROSE 200 MG: 20 INJECTION, SOLUTION INTRAVENOUS at 11:47

## 2025-04-12 RX ADMIN — FERROUS SULFATE TAB 325 MG (65 MG ELEMENTAL FE) 325 MG: 325 (65 FE) TAB at 08:59

## 2025-04-12 NOTE — PLAN OF CARE
Problem: PAIN - ADULT  Goal: Verbalizes/displays adequate comfort level or baseline comfort level  Description: Interventions:- Encourage patient to monitor pain and request assistance- Assess pain using appropriate pain scale- Administer analgesics based on type and severity of pain and evaluate response- Implement non-pharmacological measures as appropriate and evaluate response- Consider cultural and social influences on pain and pain management- Notify physician/advanced practitioner if interventions unsuccessful or patient reports new pain  Outcome: Progressing     Problem: INFECTION - ADULT  Goal: Absence or prevention of progression during hospitalization  Description: INTERVENTIONS:- Assess and monitor for signs and symptoms of infection- Monitor lab/diagnostic results- Monitor all insertion sites, i.e. indwelling lines, tubes, and drains- Monitor endotracheal if appropriate and nasal secretions for changes in amount and color- Sweet appropriate cooling/warming therapies per order- Administer medications as ordered- Instruct and encourage patient and family to use good hand hygiene technique- Identify and instruct in appropriate isolation precautions for identified infection/condition  Outcome: Progressing  Goal: Absence of fever/infection during neutropenic period  Description: INTERVENTIONS:- Monitor WBC  Outcome: Progressing     Problem: SAFETY ADULT  Goal: Patient will remain free of falls  Description: INTERVENTIONS:- Educate patient/family on patient safety including physical limitations- Instruct patient to call for assistance with activity - Consult OT/PT to assist with strengthening/mobility - Keep Call bell within reach- Keep bed low and locked with side rails adjusted as appropriate- Keep care items and personal belongings within reach- Initiate and maintain comfort rounds- Make Fall Risk Sign visible to staff- Offer Toileting every  Hours, in advance of need- Initiate/Maintain alarm- Obtain  necessary fall risk management equipment: - Apply yellow socks and bracelet for high fall risk patients- Consider moving patient to room near nurses station  Outcome: Progressing  Goal: Maintain or return to baseline ADL function  Description: INTERVENTIONS:-  Assess patient's ability to carry out ADLs; assess patient's baseline for ADL function and identify physical deficits which impact ability to perform ADLs (bathing, care of mouth/teeth, toileting, grooming, dressing, etc.)- Assess/evaluate cause of self-care deficits - Assess range of motion- Assess patient's mobility; develop plan if impaired- Assess patient's need for assistive devices and provide as appropriate- Encourage maximum independence but intervene and supervise when necessary- Involve family in performance of ADLs- Assess for home care needs following discharge - Consider OT consult to assist with ADL evaluation and planning for discharge- Provide patient education as appropriate  Outcome: Progressing  Goal: Maintains/Returns to pre admission functional level  Description: INTERVENTIONS:- Perform AM-PAC 6 Click Basic Mobility/ Daily Activity assessment daily.- Set and communicate daily mobility goal to care team and patient/family/caregiver. - Collaborate with rehabilitation services on mobility goals if consulted- Perform Range of Motion  times a day.- Reposition patient every  hours.- Dangle patient  times a day- Stand patient  times a day- Ambulate patient  times a day- Out of bed to chair  times a day - Out of bed for meals  times a day- Out of bed for toileting- Record patient progress and toleration of activity level   Outcome: Progressing     Problem: DISCHARGE PLANNING  Goal: Discharge to home or other facility with appropriate resources  Description: INTERVENTIONS:- Identify barriers to discharge w/patient and caregiver- Arrange for needed discharge resources and transportation as appropriate- Identify discharge learning needs (meds, wound  care, etc.)- Arrange for interpretive services to assist at discharge as needed- Refer to Case Management Department for coordinating discharge planning if the patient needs post-hospital services based on physician/advanced practitioner order or complex needs related to functional status, cognitive ability, or social support system  Outcome: Progressing     Problem: Knowledge Deficit  Goal: Patient/family/caregiver demonstrates understanding of disease process, treatment plan, medications, and discharge instructions  Description: Complete learning assessment and assess knowledge base.Interventions:- Provide teaching at level of understanding- Provide teaching via preferred learning methods  Outcome: Progressing     Problem: GASTROINTESTINAL - ADULT  Goal: Maintains or returns to baseline bowel function  Description: INTERVENTIONS:- Assess bowel function- Encourage oral fluids to ensure adequate hydration- Administer IV fluids if ordered to ensure adequate hydration- Administer ordered medications as needed- Encourage mobilization and activity- Consider nutritional services referral to assist patient with adequate nutrition and appropriate food choices  Outcome: Progressing     Problem: GENITOURINARY - ADULT  Goal: Maintains or returns to baseline urinary function  Description: INTERVENTIONS:- Assess urinary function- Encourage oral fluids to ensure adequate hydration if ordered- Administer IV fluids as ordered to ensure adequate hydration- Administer ordered medications as needed- Offer frequent toileting- Follow urinary retention protocol if ordered  Outcome: Progressing

## 2025-04-12 NOTE — ASSESSMENT & PLAN NOTE
Lab Results   Component Value Date    EGFR 40 04/12/2025    EGFR 40 04/10/2025    EGFR 40 (L) 03/20/2025    CREATININE 1.60 (H) 04/12/2025    CREATININE 1.58 (H) 04/10/2025    CREATININE 1.7 (H) 03/20/2025   Baseline seems to be 1.4-1.8  Within baseline  Continue to monitor

## 2025-04-12 NOTE — ASSESSMENT & PLAN NOTE
Patient reports dark stools at home concern for GI bleed leading to anemia  GI consulted  EGD revealed Severe and mosaic portal hypertensive gastropathy in the fundus of the stomach and body of the stomach; there was stigmata of recent hemorrhage   No good treatment for this aside from beta-blocker which patient is already on, continue  Tolerating regular diet  Iron panel revealed iron deficiency, received 1 dose of Venofer, continue regular iron supplementation outpatient  Recommend iron infusions outpatient  Will place referral to hematology

## 2025-04-12 NOTE — ASSESSMENT & PLAN NOTE
Lab Results   Component Value Date    HGBA1C 5.2 03/20/2025       Recent Labs     04/11/25  1638 04/11/25  2048 04/12/25  0723 04/12/25  1145   POCGLU 115 134 108 168*       Blood Sugar Average: Last 72 hrs:  (P) [id number] is on Humalog 10 to 14 units in the morning and at night it appears at home; will hold this in the setting of being n.p.o.  Patient is also on Lantus 12 units nightly will half this dose to 6 units since patient will be n.p.o. at midnight  Clear liquid diet till midnight  Monitor blood sugars closely in the setting of n.p.o. status

## 2025-04-12 NOTE — ASSESSMENT & PLAN NOTE
Patient had outpatient labs revealing a hemoglobin of 7  Baseline hemoglobin 9-10  Giving 1 unit PRBC in the ED on 4/10     Lab Results   Component Value Date    HGB 8.5 (L) 04/12/2025    HGB 8.2 (L) 04/12/2025    HGB 7.8 (L) 04/11/2025

## 2025-04-12 NOTE — DISCHARGE SUMMARY
Discharge Summary - Hospitalist   Name: Bimal Lugo 79 y.o. male I MRN: [id number]  Unit/Bed#: -01 I Date of Admission: 4/10/2025   Date of Service: 4/12/2025 I Hospital Day: 1     Assessment & Plan  Dark stools  Patient reports dark stools at home concern for GI bleed leading to anemia  GI consulted  EGD revealed Severe and mosaic portal hypertensive gastropathy in the fundus of the stomach and body of the stomach; there was stigmata of recent hemorrhage   No good treatment for this aside from beta-blocker which patient is already on, continue  Tolerating regular diet  Iron panel revealed iron deficiency, received 1 dose of Venofer, continue regular iron supplementation outpatient  Recommend iron infusions outpatient  Will place referral to hematology  Acute blood loss anemia  Patient had outpatient labs revealing a hemoglobin of 7  Baseline hemoglobin 9-10  Giving 1 unit PRBC in the ED on 4/10     Lab Results   Component Value Date    HGB 8.5 (L) 04/12/2025    HGB 8.2 (L) 04/12/2025    HGB 7.8 (L) 04/11/2025       Diabetes mellitus, type 2 (HCC)  Lab Results   Component Value Date    HGBA1C 5.2 03/20/2025       Recent Labs     04/11/25  1638 04/11/25  2048 04/12/25  0723 04/12/25  1145   POCGLU 115 134 108 168*       Blood Sugar Average: Last 72 hrs:  (P) [id number] is on Humalog 10 to 14 units in the morning and at night it appears at home; will hold this in the setting of being n.p.o.  Patient is also on Lantus 12 units nightly will half this dose to 6 units since patient will be n.p.o. at midnight  Clear liquid diet till midnight  Monitor blood sugars closely in the setting of n.p.o. status  CAD (coronary artery disease)  Typically maintained on aspirin and Imdur  Holding aspirin in the setting of GI bleed  Chronic kidney disease, stage 3a (HCC)  Lab Results   Component Value Date    EGFR 40 04/12/2025    EGFR 40 04/10/2025    EGFR 40 (L) 03/20/2025    CREATININE 1.60 (H) 04/12/2025     CREATININE 1.58 (H) 04/10/2025    CREATININE 1.7 (H) 03/20/2025   Baseline seems to be 1.4-1.8  Within baseline  Continue to monitor  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)  Was getting outpatient paracentesis today for the ascites when outpatient labs revealed a hemoglobin of 7    Discharging Physician / Practitioner: SUNDAY Ellis  PCP: Aly Carter MD  Admission Date:   Admission Orders (From admission, onward)       Ordered        04/11/25 1503  INPATIENT ADMISSION  Once            04/10/25 1344  Place in Observation  Once                          Discharge Date: 04/12/25    Medical Problems       Resolved Problems  Date Reviewed: 4/9/2025   None         Consultations During Hospital Stay:  IP CONSULT TO GASTROENTEROLOGY  IP CONSULT TO GASTROENTEROLOGY    Procedures Performed:   EGD  Addendum Date: 4/11/2025  Critical access hospital Endoscopy [address] [facility] [address] [phone] DATE OF SERVICE: 4/11/25 PHYSICIAN(S): Attending: Dominique Roy MD Fellow: No Staff Documented INDICATION: Acute blood loss anemia POST-OP DIAGNOSIS: See the impression below. PREPROCEDURE: Informed consent was obtained for the procedure, including sedation.  Risks of perforation, hemorrhage, adverse drug reaction and aspiration were discussed. The patient was placed in the left lateral decubitus position. Patient was explained about the risks and benefits of the procedure. Risks including but not limited to bleeding, infection, and perforation were explained in detail. Also explained about less than 100% sensitivity with the exam and other alternatives. PROCEDURE: EGD DETAILS OF PROCEDURE: Patient was taken to the procedure room where a time out was performed to confirm correct patient and correct procedure. The patient underwent monitored anesthesia care, which was administered by an anesthesia professional. The patient's blood pressure, heart rate, level of consciousness,  respirations, oxygen, ECG and ETCO2 were monitored throughout the procedure. The scope was introduced through the mouth and advanced to the second part of the duodenum. Retroflexion was performed in the fundus. The patient experienced no blood loss. The procedure was not difficult. The patient tolerated the procedure well. There were no apparent adverse events. ANESTHESIA INFORMATION: ASA: III Anesthesia Type: IV Sedation with Anesthesia MEDICATIONS: No administrations occurring from 1302 to 1314 on 04/11/25 FINDINGS: Single small varix in the esophagus; no bleeding was observed Severe, diffuse and mosaic portal hypertensive gastropathy in the fundus of the stomach and body of the stomach; there was stigmata of recent hemorrhage, and blood was scattered The 1st part of the duodenum and 2nd part of the duodenum appeared normal. SPECIMENS: * No specimens in log * IMPRESSION: Single small varix in the esophagus Severe and mosaic portal hypertensive gastropathy in the fundus of the stomach and body of the stomach; there was stigmata of recent hemorrhage The 1st part of the duodenum and 2nd part of the duodenum appeared normal. RECOMMENDATION: Anemia likely secondary to severe portal hypertensive gastropathy.  Unfortunate is no good treatments for this aside from beta-blocker to decrease portal hypertension and regular iron supplementation.  Recommend iron infusions as outpatient.  Will give 1 dose of Venofer now.  Outpatient capsule to rule out small bowel AVMs.  Okay to restart diet.  Okay for discharge from gastroenterology standpoint.  Gastroenterology will sign off.  Outpatient follow-up with hepatology.    Dominique Roy MD     Result Date: 4/11/2025  Single small varix in the esophagus Severe and mosaic portal hypertensive gastropathy in the fundus of the stomach and body of the stomach; there was stigmata of recent hemorrhage The 1st part of the duodenum and 2nd part of the duodenum appeared normal.  RECOMMENDATION: Anemia likely secondary to severe portal hypertensive gastropathy.  Unfortunate is no good treatments for this aside from beta-blocker to decrease portal hypertension and regular iron supplementation.  Recommend iron infusions as outpatient.  Will give 1 dose of Venofer now.  Okay to restart diet.  Okay for discharge from gastroenterology standpoint.  Gastroenterology will sign off.  Outpatient follow-up with hepatology.    Dominique Roy MD     IR paracentesis  Result Date: 4/11/2025  Impression: Ultrasound-guided right paracentesis. Signed, performed, and dictated by SUNDAY Guillen under the supervision of Dr. Prakash. Workstation performed: [id number]       Significant Findings / Test Results:   ABOVE    Incidental Findings:   no     Test Results Pending at Discharge (will require follow up):   no     Outpatient Tests Requested:  no    Complications:  no    Past Medical History:   Diagnosis Date    Chronic bronchitis (HCC)     COPD (chronic obstructive pulmonary disease) (HCC)     Diabetes mellitus (HCC)     Esophageal varices (HCC) 11 apr 2024    Hyperlipidemia     Hypertension     Obesity     ARACELY (obstructive sleep apnea)        Reason for Admission: Abnormal Lab (Referred to ER after paracentisis this morning due to HG 7.3 from today's lab results  )       Hospital Course:     Bimal Lugo is a 79 y.o. male patient with past medical history of  has a past medical history of Chronic bronchitis (HCC), COPD (chronic obstructive pulmonary disease) (HCC), Diabetes mellitus (HCC), Esophageal varices (HCC), Hyperlipidemia, Hypertension, Obesity, and ARACELY (obstructive sleep apnea). who originally presented to the hospital on 4/10/2025 due to Abnormal Lab (Referred to ER after paracentisis this morning due to HG 7.3 from today's lab results  ) patient had outpatient labs prior to his paracentesis completed paracentesis and then was found to have a hemoglobin of 7.3 and his baseline is around 10 so he  "was advised to go to the hospital for admission patient was evaluated by GI underwent EGD findings are above recommend outpatient follow-up with hematology and iron and PPI    Please see above list of diagnoses and related plan for additional information.     Condition at Discharge: stable     Discharge Day Visit / Exam:     Subjective: Patient is lying in bed resting comfortably offers no acute complaints no abdominal pain tolerating his diet wife at bedside  Vitals: Blood Pressure: 138/65 (04/12/25 0725)  Pulse: 67 (04/11/25 2221)  Temperature: 98.4 °F (36.9 °C) (04/12/25 0725)  Temp Source: Oral (04/12/25 0725)  Respirations: 19 (04/12/25 0725)  Height: 5' 5\" (165.1 cm) (04/10/25 1919)  Weight - Scale: 84.4 kg (186 lb 1.1 oz) (04/10/25 1919)  SpO2: 93 % (04/12/25 1100)  Exam:   Physical Exam  Vitals reviewed.   Constitutional:       General: He is not in acute distress.     Appearance: He is obese. He is not ill-appearing.   Cardiovascular:      Rate and Rhythm: Normal rate.      Pulses: Normal pulses.   Pulmonary:      Effort: No respiratory distress.   Abdominal:      Palpations: Abdomen is soft.   Musculoskeletal:         General: Normal range of motion.   Skin:     General: Skin is warm and dry.      Coloration: Skin is pale.   Neurological:      General: No focal deficit present.      Mental Status: He is alert. Mental status is at baseline.   Psychiatric:         Mood and Affect: Mood normal.         Thought Content: Thought content normal.       Discussion with Family: Wife at bedside    Discharge instructions/Information to patient and family:   See after visit summary for information provided to patient and family.      Provisions for Follow-Up Care:  See after visit summary for information related to follow-up care and any pertinent home health orders.      Disposition:     Home    Planned Readmission: no     Discharge Statement:  I spent 65 minutes discharging the patient. This time was spent on the " day of discharge. I had direct contact with the patient on the day of discharge. Greater than 50% of the total time was spent examining patient, answering all patient questions, arranging and discussing plan of care with patient as well as directly providing post-discharge instructions.  Additional time then spent on discharge activities.    Discharge Medications:  See after visit summary for reconciled discharge medications provided to patient and family.      ** Please Note: This note has been constructed using a voice recognition system **

## 2025-04-12 NOTE — PLAN OF CARE
Problem: INFECTION - ADULT  Goal: Absence or prevention of progression during hospitalization  Description: INTERVENTIONS:- Assess and monitor for signs and symptoms of infection- Monitor lab/diagnostic results- Monitor all insertion sites, i.e. indwelling lines, tubes, and drains- Monitor endotracheal if appropriate and nasal secretions for changes in amount and color- [place] appropriate cooling/warming therapies per order- Administer medications as ordered- Instruct and encourage patient and family to use good hand hygiene technique- Identify and instruct in appropriate isolation precautions for identified infection/condition  Outcome: Progressing     Problem: PAIN - ADULT  Goal: Verbalizes/displays adequate comfort level or baseline comfort level  Description: Interventions:- Encourage patient to monitor pain and request assistance- Assess pain using appropriate pain scale- Administer analgesics based on type and severity of pain and evaluate response- Implement non-pharmacological measures as appropriate and evaluate response- Consider cultural and social influences on pain and pain management- Notify physician/advanced practitioner if interventions unsuccessful or patient reports new pain  Outcome: Progressing

## 2025-04-17 ENCOUNTER — HOSPITAL ENCOUNTER (OUTPATIENT)
Dept: NON INVASIVE DIAGNOSTICS | Facility: HOSPITAL | Age: 80
Discharge: HOME | End: 2025-04-17
Payer: MEDICARE

## 2025-04-17 VITALS
OXYGEN SATURATION: 100 % | HEART RATE: 71 BPM | DIASTOLIC BLOOD PRESSURE: 64 MMHG | SYSTOLIC BLOOD PRESSURE: 138 MMHG | RESPIRATION RATE: 18 BRPM

## 2025-04-17 DIAGNOSIS — K70.31 ALCOHOLIC CIRRHOSIS OF LIVER WITH ASCITES (HCC): ICD-10-CM

## 2025-04-17 PROCEDURE — 49083 ABD PARACENTESIS W/IMAGING: CPT

## 2025-04-17 RX ORDER — LIDOCAINE WITH 8.4% SOD BICARB 0.9%(10ML)
SYRINGE (ML) INJECTION AS NEEDED
Status: COMPLETED | OUTPATIENT
Start: 2025-04-17 | End: 2025-04-17

## 2025-04-17 RX ORDER — ALBUMIN (HUMAN) 12.5 G/50ML
SOLUTION INTRAVENOUS
Status: COMPLETED | OUTPATIENT
Start: 2025-04-17 | End: 2025-04-17

## 2025-04-17 RX ADMIN — ALBUMIN (HUMAN) 50 G: 12.5 SOLUTION INTRAVENOUS at 11:58

## 2025-04-17 RX ADMIN — Medication 10 ML: at 11:18

## 2025-04-18 ENCOUNTER — TRANSCRIBE ORDERS (OUTPATIENT)
Dept: RADIOLOGY | Facility: HOSPITAL | Age: 80
End: 2025-04-18

## 2025-04-18 DIAGNOSIS — K70.31 ALCOHOLIC CIRRHOSIS OF LIVER WITH ASCITES (HCC): Primary | ICD-10-CM

## 2025-04-24 ENCOUNTER — HOSPITAL ENCOUNTER (OUTPATIENT)
Dept: NON INVASIVE DIAGNOSTICS | Facility: HOSPITAL | Age: 80
Discharge: HOME | End: 2025-04-24
Payer: MEDICARE

## 2025-04-24 VITALS
DIASTOLIC BLOOD PRESSURE: 60 MMHG | RESPIRATION RATE: 16 BRPM | HEART RATE: 69 BPM | OXYGEN SATURATION: 96 % | SYSTOLIC BLOOD PRESSURE: 133 MMHG

## 2025-04-24 DIAGNOSIS — K70.31 ALCOHOLIC CIRRHOSIS OF LIVER WITH ASCITES (HCC): ICD-10-CM

## 2025-04-24 PROCEDURE — 49083 ABD PARACENTESIS W/IMAGING: CPT | Performed by: RADIOLOGY

## 2025-04-24 PROCEDURE — 49083 ABD PARACENTESIS W/IMAGING: CPT

## 2025-04-24 RX ORDER — ALBUMIN (HUMAN) 12.5 G/50ML
SOLUTION INTRAVENOUS
Status: COMPLETED | OUTPATIENT
Start: 2025-04-24 | End: 2025-04-24

## 2025-04-24 RX ADMIN — ALBUMIN (HUMAN) 50 G: 12.5 SOLUTION INTRAVENOUS at 11:47

## 2025-04-24 NOTE — BRIEF OP NOTE (RAD/CATH)
IR PARACENTESIS Procedure Note    PATIENT NAME: Bimal Lugo  : 1945  MRN: [id number]    Pre-op Diagnosis:   1. 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)        Surgeon:   SUNDAY Fierro  Assistants:     No qualified resident was available, Resident is only observing    Estimated Blood Loss: minimal  Findings: 4800 ml yellow ascites fluid, RLQ. Albumin 50g infused.    Specimens: none     Complications:  none immediate    Anesthesia: local    SUNDAY Fierro     Date: 2025  Time: 11:55 AM

## 2025-04-25 ENCOUNTER — TELEPHONE (OUTPATIENT)
Age: 80
End: 2025-04-25

## 2025-04-25 NOTE — TELEPHONE ENCOUNTER
Patient's wife who is listed on the communication consent calling in today to see if patient still need EGD. States that he was suppose to schedule one but ended up in the hospital and had an emergent one done.  She would like to confirm if he needs another on or is that ok.

## 2025-04-29 ENCOUNTER — TELEPHONE (OUTPATIENT)
Dept: HEMATOLOGY ONCOLOGY | Facility: CLINIC | Age: 80
End: 2025-04-29

## 2025-04-29 NOTE — TELEPHONE ENCOUNTER
Called to notify that 5/1 appt would be virtual    Spouse requested in person appt    Provided new date and time     Call comp.

## 2025-04-30 ENCOUNTER — HOSPITAL ENCOUNTER (OUTPATIENT)
Dept: NON INVASIVE DIAGNOSTICS | Facility: HOSPITAL | Age: 80
Discharge: HOME | End: 2025-04-30
Attending: PHYSICIAN ASSISTANT
Payer: MEDICARE

## 2025-04-30 VITALS
OXYGEN SATURATION: 100 % | RESPIRATION RATE: 20 BRPM | DIASTOLIC BLOOD PRESSURE: 69 MMHG | SYSTOLIC BLOOD PRESSURE: 162 MMHG | HEART RATE: 61 BPM

## 2025-04-30 DIAGNOSIS — K70.31 ALCOHOLIC CIRRHOSIS OF LIVER WITH ASCITES (HCC): ICD-10-CM

## 2025-04-30 PROCEDURE — 49083 ABD PARACENTESIS W/IMAGING: CPT

## 2025-04-30 PROCEDURE — 49083 ABD PARACENTESIS W/IMAGING: CPT | Performed by: NURSE PRACTITIONER

## 2025-04-30 RX ORDER — ALBUMIN (HUMAN) 12.5 G/50ML
SOLUTION INTRAVENOUS
Status: COMPLETED | OUTPATIENT
Start: 2025-04-30 | End: 2025-04-30

## 2025-04-30 RX ORDER — LIDOCAINE WITH 8.4% SOD BICARB 0.9%(10ML)
SYRINGE (ML) INJECTION AS NEEDED
Status: COMPLETED | OUTPATIENT
Start: 2025-04-30 | End: 2025-04-30

## 2025-04-30 RX ADMIN — Medication 10 ML: at 09:52

## 2025-04-30 RX ADMIN — ALBUMIN (HUMAN) 50 G: 12.5 SOLUTION INTRAVENOUS at 10:26

## 2025-04-30 NOTE — BRIEF OP NOTE (RAD/CATH)
IR PARACENTESIS Procedure Note    PATIENT NAME: Bimal Lugo  : 1945  MRN: [id number]    Pre-op Diagnosis:   1. 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)        Provider:   SUNDAY Worley    Assistants:   None     Estimated Blood Loss:     Findings: 5600 mL of clear yellow ascites aspirated from left sided, ultrasound-guided paracentesis.  Albumin 50 g infused    Specimens: None    Complications:  None immediate     Anesthesia: local    SUNDAY Worley     Date: 2025  Time: 10:40 AM

## 2025-05-08 ENCOUNTER — HOSPITAL ENCOUNTER (OUTPATIENT)
Dept: NON INVASIVE DIAGNOSTICS | Facility: HOSPITAL | Age: 80
Discharge: HOME | End: 2025-05-08
Attending: PHYSICIAN ASSISTANT
Payer: MEDICARE

## 2025-05-08 VITALS
HEART RATE: 61 BPM | DIASTOLIC BLOOD PRESSURE: 60 MMHG | SYSTOLIC BLOOD PRESSURE: 136 MMHG | OXYGEN SATURATION: 98 % | RESPIRATION RATE: 18 BRPM

## 2025-05-08 DIAGNOSIS — K70.31 ALCOHOLIC CIRRHOSIS OF LIVER WITH ASCITES (HCC): ICD-10-CM

## 2025-05-08 PROCEDURE — 49083 ABD PARACENTESIS W/IMAGING: CPT

## 2025-05-08 RX ORDER — LIDOCAINE WITH 8.4% SOD BICARB 0.9%(10ML)
SYRINGE (ML) INJECTION AS NEEDED
Status: COMPLETED | OUTPATIENT
Start: 2025-05-08 | End: 2025-05-08

## 2025-05-08 RX ADMIN — Medication 10 ML: at 10:17

## 2025-05-08 NOTE — BRIEF OP NOTE (RAD/CATH)
IR PARACENTESIS Procedure Note    PATIENT NAME: Bimal Lugo  : 1945  MRN: [id number]    Pre-op Diagnosis:   1. 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)        Surgeon:   SUNDAY Fierro  Assistants:     No qualified resident was available, Resident is only observing    Estimated Blood Loss: minimal  Findings: 4900 ml clear yellow ascites fluid, LLQ.     Specimens: none    Complications:  none immediate    Anesthesia: local    SUNDAY Fierro     Date: 2025  Time: 11:14 AM

## 2025-05-14 ENCOUNTER — APPOINTMENT (EMERGENCY)
Dept: CT IMAGING | Facility: HOSPITAL | Age: 80
End: 2025-05-14
Payer: MEDICARE

## 2025-05-14 ENCOUNTER — HOSPITAL ENCOUNTER (EMERGENCY)
Facility: HOSPITAL | Age: 80
Discharge: HOME | End: 2025-05-14
Attending: EMERGENCY MEDICINE
Payer: MEDICARE

## 2025-05-14 VITALS
HEART RATE: 86 BPM | TEMPERATURE: 97.9 F | DIASTOLIC BLOOD PRESSURE: 70 MMHG | OXYGEN SATURATION: 98 % | RESPIRATION RATE: 21 BRPM | SYSTOLIC BLOOD PRESSURE: 167 MMHG

## 2025-05-14 DIAGNOSIS — D64.9 ANEMIA: Primary | ICD-10-CM

## 2025-05-14 DIAGNOSIS — K70.31 ALCOHOLIC CIRRHOSIS OF LIVER WITH ASCITES (HCC): ICD-10-CM

## 2025-05-14 DIAGNOSIS — K92.1 MELENA: ICD-10-CM

## 2025-05-14 LAB
ALBUMIN SERPL BCG-MCNC: 3.1 G/DL (ref 3.5–5)
ALP SERPL-CCNC: 66 U/L (ref 34–104)
ALT SERPL W P-5'-P-CCNC: 13 U/L (ref 7–52)
ANION GAP SERPL CALCULATED.3IONS-SCNC: 9 MMOL/L (ref 4–13)
AST SERPL W P-5'-P-CCNC: 28 U/L (ref 13–39)
ATRIAL RATE: 74 BPM
BASOPHILS # BLD AUTO: 0.07 THOUSANDS/ÂΜL (ref 0–0.1)
BASOPHILS NFR BLD AUTO: 1 % (ref 0–1)
BILIRUB SERPL-MCNC: 1.45 MG/DL (ref 0.2–1)
BUN SERPL-MCNC: 48 MG/DL (ref 5–25)
CALCIUM ALBUM COR SERPL-MCNC: 9.4 MG/DL (ref 8.3–10.1)
CALCIUM SERPL-MCNC: 8.7 MG/DL (ref 8.4–10.2)
CHLORIDE SERPL-SCNC: 105 MMOL/L (ref 96–108)
CO2 SERPL-SCNC: 21 MMOL/L (ref 21–32)
CREAT SERPL-MCNC: 1.71 MG/DL (ref 0.6–1.3)
EOSINOPHIL # BLD AUTO: 0.27 THOUSAND/ÂΜL (ref 0–0.61)
EOSINOPHIL NFR BLD AUTO: 3 % (ref 0–6)
ERYTHROCYTE [DISTWIDTH] IN BLOOD BY AUTOMATED COUNT: 15.9 % (ref 11.6–15.1)
GFR SERPL CREATININE-BSD FRML MDRD: 36 ML/MIN/1.73SQ M
GLUCOSE SERPL-MCNC: 171 MG/DL (ref 65–140)
HCT VFR BLD AUTO: 26.9 % (ref 36.5–49.3)
HGB BLD-MCNC: 8.7 G/DL (ref 12–17)
IMM GRANULOCYTES # BLD AUTO: 0.03 THOUSAND/UL (ref 0–0.2)
IMM GRANULOCYTES NFR BLD AUTO: 0 % (ref 0–2)
LIPASE SERPL-CCNC: 41 U/L (ref 11–82)
LYMPHOCYTES # BLD AUTO: 0.47 THOUSANDS/ÂΜL (ref 0.6–4.47)
LYMPHOCYTES NFR BLD AUTO: 6 % (ref 14–44)
MCH RBC QN AUTO: 32.8 PG (ref 26.8–34.3)
MCHC RBC AUTO-ENTMCNC: 32.3 G/DL (ref 31.4–37.4)
MCV RBC AUTO: 102 FL (ref 82–98)
MONOCYTES # BLD AUTO: 0.46 THOUSAND/ÂΜL (ref 0.17–1.22)
MONOCYTES NFR BLD AUTO: 6 % (ref 4–12)
NEUTROPHILS # BLD AUTO: 6.81 THOUSANDS/ÂΜL (ref 1.85–7.62)
NEUTS SEG NFR BLD AUTO: 84 % (ref 43–75)
NRBC BLD AUTO-RTO: 0 /100 WBCS
PLATELET # BLD AUTO: 58 THOUSANDS/UL (ref 149–390)
PMV BLD AUTO: 10.6 FL (ref 8.9–12.7)
POTASSIUM SERPL-SCNC: 4 MMOL/L (ref 3.5–5.3)
PROT SERPL-MCNC: 5.9 G/DL (ref 6.4–8.4)
QRS AXIS: 15 DEGREES
QRSD INTERVAL: 136 MS
QT INTERVAL: 470 MS
QTC INTERVAL: 521 MS
RBC # BLD AUTO: 2.65 MILLION/UL (ref 3.88–5.62)
SODIUM SERPL-SCNC: 135 MMOL/L (ref 135–147)
T WAVE AXIS: 79 DEGREES
VENTRICULAR RATE: 74 BPM
WBC # BLD AUTO: 8.11 THOUSAND/UL (ref 4.31–10.16)

## 2025-05-14 PROCEDURE — 83690 ASSAY OF LIPASE: CPT | Performed by: EMERGENCY MEDICINE

## 2025-05-14 PROCEDURE — 85025 COMPLETE CBC W/AUTO DIFF WBC: CPT | Performed by: EMERGENCY MEDICINE

## 2025-05-14 PROCEDURE — 96374 THER/PROPH/DIAG INJ IV PUSH: CPT

## 2025-05-14 PROCEDURE — 99284 EMERGENCY DEPT VISIT MOD MDM: CPT

## 2025-05-14 PROCEDURE — 93005 ELECTROCARDIOGRAM TRACING: CPT

## 2025-05-14 PROCEDURE — 36415 COLL VENOUS BLD VENIPUNCTURE: CPT | Performed by: EMERGENCY MEDICINE

## 2025-05-14 PROCEDURE — 74177 CT ABD & PELVIS W/CONTRAST: CPT

## 2025-05-14 PROCEDURE — 80053 COMPREHEN METABOLIC PANEL: CPT | Performed by: EMERGENCY MEDICINE

## 2025-05-14 PROCEDURE — 99285 EMERGENCY DEPT VISIT HI MDM: CPT | Performed by: EMERGENCY MEDICINE

## 2025-05-14 PROCEDURE — 93010 ELECTROCARDIOGRAM REPORT: CPT | Performed by: INTERNAL MEDICINE

## 2025-05-14 PROCEDURE — 96361 HYDRATE IV INFUSION ADD-ON: CPT

## 2025-05-14 RX ORDER — PANTOPRAZOLE SODIUM 40 MG/1
40 TABLET, DELAYED RELEASE ORAL
Qty: 180 TABLET | Refills: 3 | Status: SHIPPED | OUTPATIENT
Start: 2025-05-14

## 2025-05-14 RX ORDER — PANTOPRAZOLE SODIUM 40 MG/10ML
40 INJECTION, POWDER, LYOPHILIZED, FOR SOLUTION INTRAVENOUS ONCE
Status: COMPLETED | OUTPATIENT
Start: 2025-05-14 | End: 2025-05-14

## 2025-05-14 RX ADMIN — PANTOPRAZOLE SODIUM 40 MG: 40 INJECTION, POWDER, FOR SOLUTION INTRAVENOUS at 12:04

## 2025-05-14 RX ADMIN — IOHEXOL 100 ML: 350 INJECTION, SOLUTION INTRAVENOUS at 12:46

## 2025-05-14 RX ADMIN — SODIUM CHLORIDE 500 ML: 0.9 INJECTION, SOLUTION INTRAVENOUS at 13:02

## 2025-05-14 NOTE — ED PROVIDER NOTES
Time reflects when diagnosis was documented in both MDM as applicable and the Disposition within this note       Time User Action Codes Description Comment    5/14/2025 12:19 PM Arya Fried Add [D64.9] Anemia     5/14/2025 12:19 PM Arya Fried Add [K92.1] Melena     5/14/2025 12:19 PM Arya Fried Add [K70.31] 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)           ED Disposition       ED Disposition   Discharge    Condition   Stable    Date/Time   Wed May 14, 2025  2:21 PM    Comment   Bimal Reynosot discharge to home/self care.                   Assessment & Plan       Medical Decision Making  79-year-old male history of COPD, CAD, diabetes, hypertension, cirrhosis on aspirin presenting with concern for melena.  Plan for labs including abdominal labs.  CT imaging.  Will give dose of IV Protonix.  Reassess.    Labs interpreted by me notable for hemoglobin of 8.7.  CT imaging no significant acute process.  Patient reports that he was taking Protonix twice a day but the prescription ran out so he has only been taking it once a day.  Prescription sent to pharmacy.  Dietary recommendations.  Discussed results and recommendations. Advised follow up PCP and GI. Medication recommendations. Given instructions and return precautions. Patient/family at bedside acknowledged understanding of all written and verbal instructions and return precautions. Discharged.     Amount and/or Complexity of Data Reviewed  Labs: ordered.  Radiology: ordered.    Risk  Prescription drug management.             Medications   pantoprazole (PROTONIX) injection 40 mg (40 mg Intravenous Given 5/14/25 1204)   sodium chloride 0.9 % bolus 500 mL (0 mL Intravenous Stopped 5/14/25 1402)   iohexol (OMNIPAQUE) 350 MG/ML injection (MULTI-DOSE) 100 mL (100 mL Intravenous Given 5/14/25 1246)       ED Risk Strat Scores                    No data recorded        SBIRT 22yo+      Flowsheet Row Most Recent Value   Initial Alcohol Screen: US AUDIT-C     1. How  often do you have a drink containing alcohol? 0 Filed at: 05/14/2025 1430   2. How many drinks containing alcohol do you have on a typical day you are drinking?  0 Filed at: 05/14/2025 1430   3b. FEMALE Any Age, or MALE 65+: How often do you have 4 or more drinks on one occassion? 0 Filed at: 05/14/2025 1430   Audit-C Score 0 Filed at: 05/14/2025 1430   GRAHAM: How many times in the past year have you...    Used an illegal drug or used a prescription medication for non-medical reasons? Never Filed at: 05/14/2025 1430                            History of Present Illness       Chief Complaint   Patient presents with    Rectal Bleeding     States bloody bms since Monday takes baby asa denies abd pain        Past Medical History:   Diagnosis Date    Chronic bronchitis (HCC)     COPD (chronic obstructive pulmonary disease) (HCC)     Diabetes mellitus (HCC)     Esophageal varices (HCC) 11 apr 2024    Hyperlipidemia     Hypertension     Obesity     ARACELY (obstructive sleep apnea)       Past Surgical History:   Procedure Laterality Date    AORTIC VALVE REPLACEMENT      Jan 4 2024    IR PARACENTESIS  04/04/2024    IR PARACENTESIS  03/07/2024    IR PARACENTESIS  04/11/2024    IR PARACENTESIS  04/26/2024    IR PARACENTESIS  05/09/2024    IR PARACENTESIS  05/16/2024    IR PARACENTESIS  05/23/2024    IR PARACENTESIS  06/06/2024    IR PARACENTESIS  06/13/2024    IR PARACENTESIS  06/20/2024    IR PARACENTESIS  06/27/2024    IR PARACENTESIS  07/03/2024    IR PARACENTESIS  07/11/2024    IR PARACENTESIS  07/18/2024    IR PARACENTESIS  07/24/2024    IR PARACENTESIS  08/01/2024    IR PARACENTESIS  8/8/2024    IR PARACENTESIS  8/15/2024    IR PARACENTESIS  8/22/2024    IR PARACENTESIS  8/29/2024    IR PARACENTESIS  9/5/2024    IR PARACENTESIS  9/12/2024    IR PARACENTESIS  9/19/2024    IR PARACENTESIS  10/3/2024    IR PARACENTESIS  9/26/2024    IR PARACENTESIS  10/10/2024    IR PARACENTESIS  10/17/2024    IR PARACENTESIS  10/24/2024    IR  PARACENTESIS  10/31/2024    IR PARACENTESIS  11/7/2024    IR PARACENTESIS  11/14/2024    IR PARACENTESIS  11/21/2024    IR PARACENTESIS  11/27/2024    IR PARACENTESIS  12/5/2024    IR PARACENTESIS  12/12/2024    IR PARACENTESIS  12/19/2024    IR PARACENTESIS  12/26/2024    IR PARACENTESIS  1/2/2025    IR PARACENTESIS  1/9/2025    IR PARACENTESIS  1/16/2025    IR PARACENTESIS  1/23/2025    IR PARACENTESIS  1/30/2025    IR PARACENTESIS  2/6/2025    IR PARACENTESIS  2/13/2025    IR PARACENTESIS  2/20/2025    IR PARACENTESIS  2/27/2025    IR PARACENTESIS  3/13/2025    IR PARACENTESIS  3/6/2025    IR PARACENTESIS  3/20/2025    IR PARACENTESIS  3/27/2025    IR PARACENTESIS  4/3/2025    IR PARACENTESIS  4/10/2025    IR PARACENTESIS  4/17/2025    IR PARACENTESIS  4/24/2025    IR PARACENTESIS  4/30/2025    IR PARACENTESIS  5/8/2025    UPPER GASTROINTESTINAL ENDOSCOPY  11 apr 2024      No family history on file.   Social History[1]   E-Cigarette/Vaping    E-Cigarette Use Never User       E-Cigarette/Vaping Substances    Nicotine No     THC No     CBD No     Flavoring No     Other No     Unknown No       I have reviewed and agree with the history as documented.     79-year-old male history of COPD, CAD, diabetes, hypertension, cirrhosis on aspirin presenting with concern for melena.  Patient reports dark sticky tarry stools since Monday.  Reports history of similar attributed to upper GI bleed.  Denies abdominal pain.  Reports intermittent nausea without vomiting.  No current nausea after taking Zofran this morning.  Reports general weakness and fatigue.  Denies any bright red blood.  Denies any other complaints.  Chart reviewed.    Past Medical History:  No date: Chronic bronchitis (HCC)  No date: COPD (chronic obstructive pulmonary disease) (HCC)  No date: Diabetes mellitus (HCC)  11 apr 2024: Esophageal varices (HCC)  No date: Hyperlipidemia  No date: Hypertension  No date: Obesity  No date: ARACELY (obstructive sleep  apnea)  Family History: non-contributory  Social History          Review of Systems   Constitutional:  Negative for appetite change, chills, diaphoresis, fever and unexpected weight change.   HENT:  Negative for congestion and rhinorrhea.    Eyes:  Negative for photophobia and visual disturbance.   Respiratory:  Negative for cough, chest tightness and shortness of breath.    Cardiovascular:  Negative for chest pain, palpitations and leg swelling.   Gastrointestinal:  Positive for blood in stool. Negative for abdominal distention, abdominal pain, constipation, diarrhea, nausea and vomiting.   Genitourinary:  Negative for dysuria and hematuria.   Musculoskeletal:  Negative for back pain, joint swelling, neck pain and neck stiffness.   Skin:  Negative for color change, pallor, rash and wound.   Neurological:  Negative for dizziness, syncope, weakness, light-headedness and headaches.   Psychiatric/Behavioral:  Negative for agitation.    All other systems reviewed and are negative.          Objective       ED Triage Vitals [05/14/25 1103]   Temperature Pulse Blood Pressure Respirations SpO2 Patient Position - Orthostatic VS   97.7 °F (36.5 °C) 93 (!) 172/78 19 100 % Sitting      Temp Source Heart Rate Source BP Location FiO2 (%) Pain Score    Temporal Monitor Left arm -- --      Vitals      Date and Time Temp Pulse SpO2 Resp BP Pain Score FACES Pain Rating User   05/14/25 1430 97.9 °F (36.6 °C) 86 98 % 21 167/70 -- --    05/14/25 1400 -- 85 100 % 13 164/72 -- --    05/14/25 1330 -- 84 100 % 19 167/74 -- --    05/14/25 1230 -- 77 99 % 22 161/86 -- --    05/14/25 1200 -- 78 99 % 21 157/74 -- --    05/14/25 1103 97.7 °F (36.5 °C) 93 100 % 19 172/78 -- -- GP            Physical Exam  Vitals and nursing note reviewed.   Constitutional:       General: He is not in acute distress.     Appearance: Normal appearance. He is well-developed. He is not ill-appearing, toxic-appearing or diaphoretic.   HENT:      Head:  Normocephalic and atraumatic.      Nose: Nose normal. No congestion or rhinorrhea.      Mouth/Throat:      Mouth: Mucous membranes are moist.      Pharynx: Oropharynx is clear. No oropharyngeal exudate or posterior oropharyngeal erythema.     Eyes:      General: No scleral icterus.        Right eye: No discharge.         Left eye: No discharge.      Extraocular Movements: Extraocular movements intact.      Conjunctiva/sclera: Conjunctivae normal.      Pupils: Pupils are equal, round, and reactive to light.     Neck:      Vascular: No JVD.      Trachea: No tracheal deviation.      Comments: Supple. Normal range of motion.   Cardiovascular:      Rate and Rhythm: Normal rate and regular rhythm.      Heart sounds: Normal heart sounds. No murmur heard.     No friction rub. No gallop.      Comments: Normal rate and regular rhythm  Pulmonary:      Effort: Pulmonary effort is normal. No respiratory distress.      Breath sounds: Normal breath sounds. No stridor. No wheezing or rales.      Comments: Clear to auscultation bilaterally  Chest:      Chest wall: No tenderness.   Abdominal:      General: Bowel sounds are normal. There is no distension.      Palpations: Abdomen is soft.      Tenderness: There is no abdominal tenderness. There is no right CVA tenderness, left CVA tenderness, guarding or rebound.      Comments: Soft, nontender, nondistended.  Normal bowel sounds throughout     Musculoskeletal:         General: No swelling, tenderness, deformity or signs of injury. Normal range of motion.      Cervical back: Normal range of motion and neck supple. No rigidity. No muscular tenderness.      Right lower leg: No edema.      Left lower leg: No edema.   Lymphadenopathy:      Cervical: No cervical adenopathy.     Skin:     General: Skin is warm and dry.      Coloration: Skin is not pale.      Findings: No erythema or rash.     Neurological:      General: No focal deficit present.      Mental Status: He is alert. Mental status  is at baseline.      Sensory: No sensory deficit.      Motor: No weakness or abnormal muscle tone.      Coordination: Coordination normal.      Gait: Gait normal.      Comments: Alert.  Strength and sensation grossly intact.  Ambulatory without difficulty at baseline.    Psychiatric:         Behavior: Behavior normal.         Thought Content: Thought content normal.         Results Reviewed       Procedure Component Value Units Date/Time    Comprehensive metabolic panel [[id number]]  (Abnormal) Collected: 05/14/25 1155    Lab Status: Final result Specimen: Blood from Arm, Left Updated: 05/14/25 1215     Sodium 135 mmol/L      Potassium 4.0 mmol/L      Chloride 105 mmol/L      CO2 21 mmol/L      ANION GAP 9 mmol/L      BUN 48 mg/dL      Creatinine 1.71 mg/dL      Glucose 171 mg/dL      Calcium 8.7 mg/dL      Corrected Calcium 9.4 mg/dL      AST 28 U/L      ALT 13 U/L      Alkaline Phosphatase 66 U/L      Total Protein 5.9 g/dL      Albumin 3.1 g/dL      Total Bilirubin 1.45 mg/dL      eGFR 36 ml/min/1.73sq m     Narrative:      National Kid[employer] guidelines for Chronic Kidney Disease (CKD):     Stage 1 with normal or high GFR (GFR > 90 mL/min/1.73 square meters)    Stage 2 Mild CKD (GFR = 60-89 mL/min/1.73 square meters)    Stage 3A Moderate CKD (GFR = 45-59 mL/min/1.73 square meters)    Stage 3B Moderate CKD (GFR = 30-44 mL/min/1.73 square meters)    Stage 4 Severe CKD (GFR = 15-29 mL/min/1.73 square meters)    Stage 5 End Stage CKD (GFR <15 mL/min/1.73 square meters)  Note: GFR calculation is accurate only with a steady state creatinine    Lipase [[id number]]  (Normal) Collected: 05/14/25 1155    Lab Status: Final result Specimen: Blood from Arm, Left Updated: 05/14/25 1215     Lipase 41 u/L     CBC and differential [[id number]]  (Abnormal) Collected: 05/14/25 1155    Lab Status: Final result Specimen: Blood from Arm, Left Updated: 05/14/25 1207     WBC 8.11 Thousand/uL      RBC 2.65 Million/uL       Hemoglobin 8.7 g/dL      Hematocrit 26.9 %       fL      MCH 32.8 pg      MCHC 32.3 g/dL      RDW 15.9 %      MPV 10.6 fL      Platelets 58 Thousands/uL      nRBC 0 /100 WBCs      Segmented % 84 %      Immature Grans % 0 %      Lymphocytes % 6 %      Monocytes % 6 %      Eosinophils Relative 3 %      Basophils Relative 1 %      Absolute Neutrophils 6.81 Thousands/µL      Absolute Immature Grans 0.03 Thousand/uL      Absolute Lymphocytes 0.47 Thousands/µL      Absolute Monocytes 0.46 Thousand/µL      Eosinophils Absolute 0.27 Thousand/µL      Basophils Absolute 0.07 Thousands/µL             CT abdomen pelvis with contrast   Final Interpretation by Luis Angel Koch MD (05/14 1416)      1.  Cirrhosis with stigmata of portal hypertension including moderate volume ascites, splenomegaly, and varices.   2.  Diffuse colonic wall thickening is compatible with a colitis and may be on the basis of the portal hypertensive colopathy with infectious or inflammatory etiologies possible but less likely.   3.  Mild gastric wall thickening could be on the basis of gastritis. No focal ulceration identified.   4.  Chronic findings, detailed above.      The study was marked in EPIC for immediate notification.      Workstation performed: [id number]             Procedures    ED Medication and Procedure Management   Prior to Admission Medications   Prescriptions Last Dose Informant Patient Reported? Taking?   Insulin Glargine Solostar 100 UNIT/ML SOPN  Self, Spouse/Significant Other Yes No   Sig: Inject 12 Units under the skin daily   Lancets (ONETOUCH ULTRASOFT) lancets  Self, Spouse/Significant Other Yes No   Sig: Check twice a day and as needed, dx E11.9,   Misc. Devices MISC  Self, Spouse/Significant Other Yes No   Sig: by Does not apply route   Multiple Vitamin (DAILY VALUE MULTIVITAMIN) TABS  Self, Spouse/Significant Other Yes No   Sig: Take by mouth   Omega-3 Fatty Acids (FISH OIL) 645 MG CAPS  Self, Spouse/Significant Other  Yes No   Sig: Take by mouth   albuterol (PROVENTIL HFA,VENTOLIN HFA) 90 mcg/act inhaler  Self, Spouse/Significant Other Yes No   Sig: Inhale 2 puffs every 4 (four) hours as needed   aspirin 81 MG tablet  Self, Spouse/Significant Other Yes No   Sig: Take 81 mg by mouth daily in the early morning   atorvastatin (LIPITOR) 40 mg tablet  Self, Spouse/Significant Other Yes No   Sig: Take 40 mg by mouth daily in the early morning   carvedilol (COREG) 6.25 mg tablet  Self, Spouse/Significant Other No No   Sig: Take 1 tablet (6.25 mg total) by mouth 2 (two) times a day with meals   ferrous sulfate 325 (65 Fe) mg tablet  Self, Spouse/Significant Other Yes No   Sig: Take 1 tablet by mouth daily with breakfast   furosemide (LASIX) 40 mg tablet  Self, Spouse/Significant Other No No   Sig: Take 1 tablet (40 mg total) by mouth 2 (two) times a day   glucose 4-6 GM-MG  Self, Spouse/Significant Other Yes No   Sig: Chew 16 g daily as needed   glucose blood test strip  Self, Spouse/Significant Other Yes No   Sig: Check twice a day and as needed, dx E11.9,   hydrocortisone (PROCTOSOL HC) 2.5 % rectal cream  Self, Spouse/Significant Other Yes No   Sig: Insert into the rectum   insulin aspart (NovoLOG FlexPen) 100 UNIT/ML injection pen  Self, Spouse/Significant Other No No   Si in a.m , 10units in p.m.   insulin degludec (Tresiba FlexTouch) 100 units/mL injection pen  Self, Spouse/Significant Other Yes No   Sig: Inject 10 Units under the skin daily at bedtime   isosorbide mononitrate (IMDUR) 30 mg 24 hr tablet  Self, Spouse/Significant Other Yes No   montelukast (SINGULAIR) 10 mg tablet  Self, Spouse/Significant Other Yes No   Sig: Take 10 mg by mouth daily   nitroglycerin (NITROSTAT) 0.4 mg SL tablet  Self, Spouse/Significant Other Yes No   Sig: Place under the tongue   nystatin (MYCOSTATIN) powder  Self, Spouse/Significant Other Yes No   Sig: Apply topically   ondansetron (ZOFRAN) 4 mg tablet   No No   Sig: Take 1 tablet (4 mg  total) by mouth every 8 (eight) hours as needed for nausea or vomiting   pantoprazole (PROTONIX) 40 mg tablet  Self, Spouse/Significant Other No No   Sig: Take 1 tablet (40 mg total) by mouth 2 (two) times a day before meals   pantoprazole (PROTONIX) 40 mg tablet   No Yes   Sig: Take 1 tablet (40 mg total) by mouth 2 (two) times a day before meals   spironolactone (ALDACTONE) 50 mg tablet  Self, Spouse/Significant Other No No   Sig: Take 1 tablet (50 mg total) by mouth daily      Facility-Administered Medications: None     Discharge Medication List as of 5/14/2025  2:22 PM        CONTINUE these medications which have CHANGED    Details   pantoprazole (PROTONIX) 40 mg tablet Take 1 tablet (40 mg total) by mouth 2 (two) times a day before meals, Starting Wed 5/14/2025, Normal           CONTINUE these medications which have NOT CHANGED    Details   albuterol (PROVENTIL HFA,VENTOLIN HFA) 90 mcg/act inhaler Inhale 2 puffs every 4 (four) hours as needed, Starting Tue 6/16/2015, Historical Med      aspirin 81 MG tablet Take 81 mg by mouth daily in the early morning, Historical Med      atorvastatin (LIPITOR) 40 mg tablet Take 40 mg by mouth daily in the early morning, Historical Med      carvedilol (COREG) 6.25 mg tablet Take 1 tablet (6.25 mg total) by mouth 2 (two) times a day with meals, Starting Fri 11/29/2024, Normal      ferrous sulfate 325 (65 Fe) mg tablet Take 1 tablet by mouth daily with breakfast, Starting Tue 10/22/2024, Historical Med      furosemide (LASIX) 40 mg tablet Take 1 tablet (40 mg total) by mouth 2 (two) times a day, Starting Mon 11/18/2024, Normal      glucose 4-6 GM-MG Chew 16 g daily as needed, Starting Fri 2/16/2024, Historical Med      glucose blood test strip Check twice a day and as needed, dx E11.9,, Historical Med      hydrocortisone (PROCTOSOL HC) 2.5 % rectal cream Insert into the rectum, Historical Med      insulin aspart (NovoLOG FlexPen) 100 UNIT/ML injection pen 14 in a.m , 10units in  p.m., Normal      insulin degludec (Tresiba FlexTouch) 100 units/mL injection pen Inject 10 Units under the skin daily at bedtime, Historical Med      Insulin Glargine Solostar 100 UNIT/ML SOPN Inject 12 Units under the skin daily, Starting Fri 12/27/2024, Historical Med      isosorbide mononitrate (IMDUR) 30 mg 24 hr tablet Historical Med      Lancets (ONETOUCH ULTRASOFT) lancets Check twice a day and as needed, dx E11.9,, Historical Med      Misc. Devices MISC by Does not apply route, Historical Med      montelukast (SINGULAIR) 10 mg tablet Take 10 mg by mouth daily, Starting Mon 9/9/2024, Historical Med      Multiple Vitamin (DAILY VALUE MULTIVITAMIN) TABS Take by mouth, Historical Med      nitroglycerin (NITROSTAT) 0.4 mg SL tablet Place under the tongue, Starting Thu 11/7/2024, Historical Med      nystatin (MYCOSTATIN) powder Apply topically, Starting Tue 1/2/2024, Historical Med      Omega-3 Fatty Acids (FISH OIL) 645 MG CAPS Take by mouth, Historical Med      ondansetron (ZOFRAN) 4 mg tablet Take 1 tablet (4 mg total) by mouth every 8 (eight) hours as needed for nausea or vomiting, Starting Wed 4/9/2025, Normal      spironolactone (ALDACTONE) 50 mg tablet Take 1 tablet (50 mg total) by mouth daily, Starting Fri 11/29/2024, Normal           No discharge procedures on file.  ED SEPSIS DOCUMENTATION   Time reflects when diagnosis was documented in both MDM as applicable and the Disposition within this note       Time User Action Codes Description Comment    5/14/2025 12:19 PM Arya Fried [D64.9] Anemia     5/14/2025 12:19 PM Arya Fried Add [K92.1] Melena     5/14/2025 12:19 PM Arya Fried [K70.31] 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)                      [1]   Social History  Tobacco Use    Smoking status: Former     Current packs/day: 0.00     Average packs/day: 5.0 packs/day for 35.0 years (175.0 ttl pk-yrs)     Types: Cigarettes     Start date: 1952     Quit date: 1987     Years since  quittin.3    Smokeless tobacco: Never    Tobacco comments:     Quit in  (approx)   Vaping Use    Vaping status: Never Used   Substance Use Topics    Alcohol use: Not Currently     Alcohol/week: 24.0 standard drinks of alcohol     Types: 24 Cans of beer per week     Comment: sober- quit     Drug use: Not Currently        Arya Fried MD  [phone]

## 2025-05-14 NOTE — DISCHARGE INSTRUCTIONS
Please follow up PCP and GI. Recommend tylenol 650 mg every 6 hours as needed for pain. Please return for severe chest pain, significant shortness of breath, severely worsening symptoms, or any other concerning signs or symptoms. Please refer to the following documents for additional instructions and return precautions.

## 2025-05-15 ENCOUNTER — HOSPITAL ENCOUNTER (OUTPATIENT)
Dept: NON INVASIVE DIAGNOSTICS | Facility: HOSPITAL | Age: 80
Discharge: HOME | End: 2025-05-15
Attending: PHYSICIAN ASSISTANT
Payer: MEDICARE

## 2025-05-15 VITALS
HEART RATE: 72 BPM | DIASTOLIC BLOOD PRESSURE: 63 MMHG | OXYGEN SATURATION: 97 % | SYSTOLIC BLOOD PRESSURE: 133 MMHG | RESPIRATION RATE: 16 BRPM

## 2025-05-15 DIAGNOSIS — K70.31 ALCOHOLIC CIRRHOSIS OF LIVER WITH ASCITES (HCC): ICD-10-CM

## 2025-05-15 PROCEDURE — 49083 ABD PARACENTESIS W/IMAGING: CPT

## 2025-05-15 RX ORDER — LIDOCAINE WITH 8.4% SOD BICARB 0.9%(10ML)
SYRINGE (ML) INJECTION AS NEEDED
Status: COMPLETED | OUTPATIENT
Start: 2025-05-15 | End: 2025-05-15

## 2025-05-15 RX ADMIN — Medication 5 ML: at 09:50

## 2025-05-15 NOTE — BRIEF OP NOTE (RAD/CATH)
IR PARACENTESIS Procedure Note    PATIENT NAME: Bimal Lugo  : 1945  MRN: [id number]    Pre-op Diagnosis:   1. 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)        Surgeon:   SUNDAY Fierro  Assistants:     No qualified resident was available, Resident is only observing    Estimated Blood Loss: minimal  Findings: 3800 ml clear yellow ascites fluid, RLQ.    Specimens: none    Complications:  none immediate    Anesthesia: local    SUNDAY Fierro     Date: 5/15/2025  Time: 12:09 PM

## 2025-05-19 DIAGNOSIS — K74.60 CIRRHOSIS OF LIVER WITH ASCITES, UNSPECIFIED HEPATIC CIRRHOSIS TYPE  (HCC): ICD-10-CM

## 2025-05-19 DIAGNOSIS — R18.8 CIRRHOSIS OF LIVER WITH ASCITES, UNSPECIFIED HEPATIC CIRRHOSIS TYPE  (HCC): ICD-10-CM

## 2025-05-19 RX ORDER — ONDANSETRON 4 MG/1
4 TABLET, FILM COATED ORAL EVERY 8 HOURS PRN
Qty: 60 TABLET | Refills: 0 | Status: SHIPPED | OUTPATIENT
Start: 2025-05-19

## 2025-05-19 NOTE — TELEPHONE ENCOUNTER
Patients GI provider: RALPH Mtz    Number to return call: [phone]    Reason for call: Pt calling to ask for a refill on the ondansetron (ZOFRAN) 4 mg tablet   Pt would like it called to [facility] pharmacy in [place]    Scheduled procedure/appointment date if applicable: Apt/8/19/25

## 2025-05-22 ENCOUNTER — TELEPHONE (OUTPATIENT)
Age: 80
End: 2025-05-22

## 2025-05-22 ENCOUNTER — HOSPITAL ENCOUNTER (OUTPATIENT)
Dept: NON INVASIVE DIAGNOSTICS | Facility: HOSPITAL | Age: 80
Discharge: HOME | End: 2025-05-22
Attending: PHYSICIAN ASSISTANT
Payer: MEDICARE

## 2025-05-22 VITALS
DIASTOLIC BLOOD PRESSURE: 66 MMHG | SYSTOLIC BLOOD PRESSURE: 147 MMHG | HEART RATE: 71 BPM | OXYGEN SATURATION: 98 % | RESPIRATION RATE: 16 BRPM

## 2025-05-22 DIAGNOSIS — K70.31 ALCOHOLIC CIRRHOSIS OF LIVER WITH ASCITES (HCC): ICD-10-CM

## 2025-05-22 PROCEDURE — 49083 ABD PARACENTESIS W/IMAGING: CPT

## 2025-05-22 RX ORDER — ALBUMIN (HUMAN) 12.5 G/50ML
SOLUTION INTRAVENOUS
Status: COMPLETED | OUTPATIENT
Start: 2025-05-22 | End: 2025-05-22

## 2025-05-22 RX ORDER — LIDOCAINE WITH 8.4% SOD BICARB 0.9%(10ML)
SYRINGE (ML) INJECTION AS NEEDED
Status: COMPLETED | OUTPATIENT
Start: 2025-05-22 | End: 2025-05-22

## 2025-05-22 RX ADMIN — ALBUMIN (HUMAN) 50 G: 12.5 SOLUTION INTRAVENOUS at 11:56

## 2025-05-22 RX ADMIN — Medication 8 ML: at 11:06

## 2025-05-22 NOTE — BRIEF OP NOTE (RAD/CATH)
IR PARACENTESIS Procedure Note    PATIENT NAME: Bimal Lugo  : 1945  MRN: [id number]    Pre-op Diagnosis:   1. 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)        Provider:   SUNDAY Worley    Assistants:   None    Estimated Blood Loss: None     Findings: 7000 mL of cloudy yellow ascites aspirated from left sided, ultrasound-guided paracentesis.  Albumin 50 g infused    Specimens: None    Complications: None immediate    Anesthesia: Local    SUNDAY Worley     Date: 2025  Time: 12:18 PM

## 2025-05-22 NOTE — TELEPHONE ENCOUNTER
Patients GI provider:  NIHARIKA Mtz      Number to return call: [phone]    Reason for call: Pt's wife Viki(no consent on file) calling to let us know that when Srinivasan was at his Paracentesis appt today, they told them he could not schedule his next until new orders are put in. He gets these weekly so she needs us to put in several for him please. She is asking we give a call back once ordered so that she can call IR and get him scheduled in.

## 2025-05-23 ENCOUNTER — TELEPHONE (OUTPATIENT)
Dept: HEMATOLOGY ONCOLOGY | Facility: CLINIC | Age: 80
End: 2025-05-23

## 2025-05-23 ENCOUNTER — OFFICE VISIT (OUTPATIENT)
Dept: HEMATOLOGY ONCOLOGY | Facility: CLINIC | Age: 80
End: 2025-05-23
Attending: NURSE PRACTITIONER
Payer: MEDICARE

## 2025-05-23 VITALS
HEIGHT: 65 IN | DIASTOLIC BLOOD PRESSURE: 68 MMHG | TEMPERATURE: 97.7 F | OXYGEN SATURATION: 98 % | WEIGHT: 194 LBS | HEART RATE: 68 BPM | BODY MASS INDEX: 32.32 KG/M2 | SYSTOLIC BLOOD PRESSURE: 136 MMHG | RESPIRATION RATE: 17 BRPM

## 2025-05-23 DIAGNOSIS — D69.6 THROMBOCYTOPENIA (HCC): ICD-10-CM

## 2025-05-23 DIAGNOSIS — D50.0 IRON DEFICIENCY ANEMIA DUE TO CHRONIC BLOOD LOSS: Primary | ICD-10-CM

## 2025-05-23 PROCEDURE — 99204 OFFICE O/P NEW MOD 45 MIN: CPT | Performed by: PHYSICIAN ASSISTANT

## 2025-05-23 RX ORDER — SODIUM CHLORIDE 9 MG/ML
20 INJECTION, SOLUTION INTRAVENOUS ONCE
OUTPATIENT
Start: 2025-05-23

## 2025-05-23 NOTE — ASSESSMENT & PLAN NOTE
Chronic, PLT baseline 50-60,000 since 2020.   Secondary to cirrhosis, splenomegaly  Monitor  Transfuse for platelet count less than 50,000 with active bleeding  Orders:    CBC and differential; Standing    Iron Panel (Includes Ferritin, Iron Sat%, Iron, and TIBC); Future

## 2025-05-23 NOTE — PROGRESS NOTES
Name: Bimal Lugo      : 1945      MRN: [id number]  Encounter Provider: Yasmin Barraza PA-C  Encounter Date: 2025   Encounter department: [facility] [place]  :  Assessment & Plan  Iron deficiency anemia due to chronic blood loss  Secondary to GIB  Given Bimal Lugo's iron deficiency anemia, I recommend IV iron supplementation with IV Venofer.   I reviewed the reasoning, process, and side effect profile of Venofer, which includes but is not limited to nausea, headache, hypotension, tattooing of the skin, and an anaphylactic reaction.  I ordered IV iron sucrose (Venofer) 200mg weekly x 5.   Standing CBC monthly x 3   Iron panel in 3m   Orders:    CBC and differential; Standing    Iron Panel (Includes Ferritin, Iron Sat%, Iron, and TIBC); Future    Thrombocytopenia (HCC)  Chronic, PLT baseline 50-60,000 since .   Secondary to cirrhosis, splenomegaly  Monitor  Transfuse for platelet count less than 50,000 with active bleeding  Orders:    CBC and differential; Standing    Iron Panel (Includes Ferritin, Iron Sat%, Iron, and TIBC); Future      Assessment & Plan        Return in about 3 months (around 2025) for Office Visit.    History of Present Illness   Chief Complaint   Patient presents with    Consult     History of Present Illness  79-year-old male with past medical history of CAD, COPD, CKD, PVD, cirrhosis, COPD, type 2 diabetes, alcoholic cirrhosis of the liver with ascites, HTN, pancytopenia who has been referred to our clinic for evaluation of iron deficiency anemia following hospitalization in 2025.    On chart review, patient was referred to the ER 0/10/2025 for hemoglobin of 7.3.  This was obtained after performing paracentesis, prior baseline hemoglobin was around 10 g/dL.  Iron panel was conducted which was consistent with iron deficiency anemia and ferritin of 27.  His white blood count was noted to be normal 4.70K.   "Thrombocytopenia was present with platelet count of 66.  He appears to have chronic thrombocytopenia since 2018 with most recent platelet baseline averaging around 50-60,000 since 2020.     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  There was severe, diffuse and mosaic portal hypertension gastropathy in the fundus of the stomach and body of the stomach with evidence of recent hemorrhage and scattered blood.  He received IV Venofer 200 mg x 1 dose and was referred to hematology for additional IV iron treatments.    Social   Cancer history     Pertinent Medical History     05/23/25: no hematochezia, melena or hematuria. No other bleeding or bruising      Review of Systems   Constitutional:  Positive for fatigue.   Respiratory:  Positive for shortness of breath (with exertion).    Gastrointestinal:  Negative for blood in stool.        No melena      Skin:  Negative for rash.   Neurological:  Positive for light-headedness (in am). Negative for dizziness.   All other systems reviewed and are negative.          Objective   /68 (BP Location: Left arm, Patient Position: Sitting, Cuff Size: Adult)   Pulse 68   Temp 97.7 °F (36.5 °C) (Temporal)   Resp 17   Ht 5' 5\" (1.651 m)   Wt 88 kg (194 lb)   SpO2 98%   BMI 32.28 kg/m²     Physical Exam  Vitals reviewed.   HENT:      Head: Normocephalic.     Cardiovascular:      Rate and Rhythm: Normal rate and regular rhythm.   Pulmonary:      Effort: Pulmonary effort is normal.     Musculoskeletal:      Cervical back: Neck supple.     Skin:     Findings: No rash.     Neurological:      Mental Status: He is alert.       Physical Exam      Results    Labs: I have reviewed the following labs:  Results for orders placed or performed during the hospital encounter of 05/14/25   CBC and differential   Result Value Ref Range    WBC 8.11 4.31 - 10.16 Thousand/uL    RBC 2.65 (L) 3.88 - 5.62 Million/uL    " Hemoglobin 8.7 (L) 12.0 - 17.0 g/dL    Hematocrit 26.9 (L) 36.5 - 49.3 %     (H) 82 - 98 fL    MCH 32.8 26.8 - 34.3 pg    MCHC 32.3 31.4 - 37.4 g/dL    RDW 15.9 (H) 11.6 - 15.1 %    MPV 10.6 8.9 - 12.7 fL    Platelets 58 (L) 149 - 390 Thousands/uL    nRBC 0 /100 WBCs    Segmented % 84 (H) 43 - 75 %    Immature Grans % 0 0 - 2 %    Lymphocytes % 6 (L) 14 - 44 %    Monocytes % 6 4 - 12 %    Eosinophils Relative 3 0 - 6 %    Basophils Relative 1 0 - 1 %    Absolute Neutrophils 6.81 1.85 - 7.62 Thousands/µL    Absolute Immature Grans 0.03 0.00 - 0.20 Thousand/uL    Absolute Lymphocytes 0.47 (L) 0.60 - 4.47 Thousands/µL    Absolute Monocytes 0.46 0.17 - 1.22 Thousand/µL    Eosinophils Absolute 0.27 0.00 - 0.61 Thousand/µL    Basophils Absolute 0.07 0.00 - 0.10 Thousands/µL   Comprehensive metabolic panel   Result Value Ref Range    Sodium 135 135 - 147 mmol/L    Potassium 4.0 3.5 - 5.3 mmol/L    Chloride 105 96 - 108 mmol/L    CO2 21 21 - 32 mmol/L    ANION GAP 9 4 - 13 mmol/L    BUN 48 (H) 5 - 25 mg/dL    Creatinine 1.71 (H) 0.60 - 1.30 mg/dL    Glucose 171 (H) 65 - 140 mg/dL    Calcium 8.7 8.4 - 10.2 mg/dL    Corrected Calcium 9.4 8.3 - 10.1 mg/dL    AST 28 13 - 39 U/L    ALT 13 7 - 52 U/L    Alkaline Phosphatase 66 34 - 104 U/L    Total Protein 5.9 (L) 6.4 - 8.4 g/dL    Albumin 3.1 (L) 3.5 - 5.0 g/dL    Total Bilirubin 1.45 (H) 0.20 - 1.00 mg/dL    eGFR 36 ml/min/1.73sq m   Result Value Ref Range    Lipase 41 11 - 82 u/L

## 2025-05-23 NOTE — ASSESSMENT & PLAN NOTE
Secondary to GIB  Given Bimal Lugo's iron deficiency anemia, I recommend IV iron supplementation with IV Venofer.   I reviewed the reasoning, process, and side effect profile of Venofer, which includes but is not limited to nausea, headache, hypotension, tattooing of the skin, and an anaphylactic reaction.  I ordered IV iron sucrose (Venofer) 200mg weekly x 5.   Standing CBC monthly x 3   Iron panel in 3m   Orders:    CBC and differential; Standing    Iron Panel (Includes Ferritin, Iron Sat%, Iron, and TIBC); Future

## 2025-05-29 ENCOUNTER — HOSPITAL ENCOUNTER (OUTPATIENT)
Dept: NON INVASIVE DIAGNOSTICS | Facility: HOSPITAL | Age: 80
Discharge: HOME | End: 2025-05-29
Attending: PHYSICIAN ASSISTANT
Payer: MEDICARE

## 2025-05-29 VITALS
RESPIRATION RATE: 20 BRPM | HEART RATE: 71 BPM | OXYGEN SATURATION: 98 % | SYSTOLIC BLOOD PRESSURE: 132 MMHG | DIASTOLIC BLOOD PRESSURE: 61 MMHG

## 2025-05-29 DIAGNOSIS — K70.31 ALCOHOLIC CIRRHOSIS OF LIVER WITH ASCITES (HCC): ICD-10-CM

## 2025-05-29 DIAGNOSIS — D62 ACUTE BLOOD LOSS ANEMIA: ICD-10-CM

## 2025-05-29 DIAGNOSIS — D50.0 IRON DEFICIENCY ANEMIA DUE TO CHRONIC BLOOD LOSS: ICD-10-CM

## 2025-05-29 DIAGNOSIS — K70.31 ALCOHOLIC CIRRHOSIS OF LIVER WITH ASCITES (HCC): Primary | ICD-10-CM

## 2025-05-29 PROCEDURE — 49083 ABD PARACENTESIS W/IMAGING: CPT

## 2025-05-29 RX ORDER — ALBUMIN (HUMAN) 12.5 G/50ML
SOLUTION INTRAVENOUS
Status: COMPLETED | OUTPATIENT
Start: 2025-05-29 | End: 2025-05-29

## 2025-05-29 RX ORDER — LIDOCAINE WITH 8.4% SOD BICARB 0.9%(10ML)
SYRINGE (ML) INJECTION AS NEEDED
Status: COMPLETED | OUTPATIENT
Start: 2025-05-29 | End: 2025-05-29

## 2025-05-29 RX ADMIN — ALBUMIN (HUMAN) 50 G: 12.5 SOLUTION INTRAVENOUS at 13:43

## 2025-05-29 RX ADMIN — Medication 10 ML: at 13:13

## 2025-05-29 NOTE — BRIEF OP NOTE (RAD/CATH)
IR PARACENTESIS Procedure Note    PATIENT NAME: Bimal Lugo  : 1945  MRN: [id number]    Pre-op Diagnosis:   1. 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)        Surgeon:   SUNDAY Fierro  Assistants:     No qualified resident was available, Resident is only observing    Estimated Blood Loss: minimal  Findings: 6700 ml cloudy yellow ascites fluid, LLQ. Albumin 50g infused.    Specimens: none    Complications:  none immediate    Anesthesia: local    SUNDAY Fierro     Date: 2025  Time: 2:11 PM

## 2025-05-29 NOTE — PROGRESS NOTES
Patient will be getting weekly iron infusions x 5 weeks. Patient is also a weekly para patient that requires Albumin infusions.  Patient with gfr 36.   Ok for midline placement per Dr. Ortega.  Will plan for line placement next week.   Physical Exam    Vital Signs: I have reviewed the initial vital signs  Constitutional: well-nourished, appears stated age, no acute distress  EENT: Conjunctiva pink, Sclera clear, PERRLA, EOMI. Mucous membranes moist, no exudates or lesions noted, uvula midline.  Cardiovascular: S1 and S2 present, regular rate, regular rhythm.   Respiratory: unlabored respiratory effort, clear to auscultation bilaterally no wheezing, rales or rhonchi  Gastrointestinal: soft, non-tender abdomen. No guarding or rebound tenderness  Musculoskeletal: supple nontender neck, no midline tenderness, no joint pain. -cvat b/l  Integumentary: warm, dry, no rash  Psychiatric: appropriate mood, appropriate affect

## 2025-06-05 ENCOUNTER — HOSPITAL ENCOUNTER (OUTPATIENT)
Dept: NON INVASIVE DIAGNOSTICS | Facility: HOSPITAL | Age: 80
Discharge: HOME | End: 2025-06-05
Payer: MEDICARE

## 2025-06-05 ENCOUNTER — HOSPITAL ENCOUNTER (OUTPATIENT)
Dept: NON INVASIVE DIAGNOSTICS | Facility: HOSPITAL | Age: 80
Discharge: HOME | End: 2025-06-05
Attending: PHYSICIAN ASSISTANT
Payer: MEDICARE

## 2025-06-05 VITALS
HEART RATE: 73 BPM | OXYGEN SATURATION: 100 % | DIASTOLIC BLOOD PRESSURE: 68 MMHG | RESPIRATION RATE: 16 BRPM | SYSTOLIC BLOOD PRESSURE: 153 MMHG

## 2025-06-05 DIAGNOSIS — D50.0 IRON DEFICIENCY ANEMIA DUE TO CHRONIC BLOOD LOSS: ICD-10-CM

## 2025-06-05 DIAGNOSIS — K70.31 ALCOHOLIC CIRRHOSIS OF LIVER WITH ASCITES (HCC): ICD-10-CM

## 2025-06-05 DIAGNOSIS — D62 ACUTE BLOOD LOSS ANEMIA: ICD-10-CM

## 2025-06-05 PROCEDURE — C1751 CATH, INF, PER/CENT/MIDLINE: HCPCS

## 2025-06-05 PROCEDURE — 49083 ABD PARACENTESIS W/IMAGING: CPT

## 2025-06-05 PROCEDURE — 76937 US GUIDE VASCULAR ACCESS: CPT

## 2025-06-05 PROCEDURE — 36410 VNPNXR 3YR/> PHY/QHP DX/THER: CPT

## 2025-06-05 RX ORDER — ALBUMIN (HUMAN) 12.5 G/50ML
SOLUTION INTRAVENOUS
Status: COMPLETED | OUTPATIENT
Start: 2025-06-05 | End: 2025-06-05

## 2025-06-05 RX ORDER — LIDOCAINE WITH 8.4% SOD BICARB 0.9%(10ML)
SYRINGE (ML) INJECTION AS NEEDED
Status: COMPLETED | OUTPATIENT
Start: 2025-06-05 | End: 2025-06-05

## 2025-06-05 RX ORDER — LIDOCAINE HYDROCHLORIDE 10 MG/ML
INJECTION, SOLUTION EPIDURAL; INFILTRATION; INTRACAUDAL; PERINEURAL AS NEEDED
Status: COMPLETED | OUTPATIENT
Start: 2025-06-05 | End: 2025-06-05

## 2025-06-05 RX ADMIN — Medication 10 ML: at 10:02

## 2025-06-05 RX ADMIN — LIDOCAINE HYDROCHLORIDE 5 ML: 10 INJECTION, SOLUTION EPIDURAL; INFILTRATION; INTRACAUDAL at 10:22

## 2025-06-05 RX ADMIN — ALBUMIN (HUMAN) 50 G: 12.5 SOLUTION INTRAVENOUS at 11:01

## 2025-06-05 NOTE — BRIEF OP NOTE (RAD/CATH)
IR PARACENTESIS Procedure Note    PATIENT NAME: Bimal Lugo  : 1945  MRN: [id number]    Pre-op Diagnosis:   1. 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)        Surgeon:   SUNDAY Fierro  Assistants:     No qualified resident was available, Resident is only observing    Estimated Blood Loss: minimal  Findings: 5800 ml serous ascites fluid, RLQ. Albumin 50g infused.    Specimens: none    Complications:  none immediate    Anesthesia: local    SUNDAY Fierro     Date: 2025  Time: 11:42 AM

## 2025-06-10 ENCOUNTER — TELEPHONE (OUTPATIENT)
Dept: INFUSION CENTER | Facility: CLINIC | Age: 80
End: 2025-06-10

## 2025-06-10 NOTE — TELEPHONE ENCOUNTER
NEW PATIENT PHONE CALL    Patient contacted, voicemail left, reminded of appointment time, policies and procedures reviewed with patient.     Appointment:  6/11/25 @ 7072

## 2025-06-12 ENCOUNTER — HOSPITAL ENCOUNTER (OUTPATIENT)
Dept: INFUSION CENTER | Facility: CLINIC | Age: 80
Discharge: HOME | End: 2025-06-12
Attending: INTERNAL MEDICINE
Payer: MEDICARE

## 2025-06-12 ENCOUNTER — HOSPITAL ENCOUNTER (OUTPATIENT)
Dept: NON INVASIVE DIAGNOSTICS | Facility: HOSPITAL | Age: 80
Discharge: HOME | End: 2025-06-12
Attending: PHYSICIAN ASSISTANT
Payer: MEDICARE

## 2025-06-12 VITALS
DIASTOLIC BLOOD PRESSURE: 66 MMHG | RESPIRATION RATE: 20 BRPM | SYSTOLIC BLOOD PRESSURE: 157 MMHG | HEART RATE: 67 BPM | OXYGEN SATURATION: 100 %

## 2025-06-12 VITALS
RESPIRATION RATE: 18 BRPM | HEART RATE: 73 BPM | SYSTOLIC BLOOD PRESSURE: 138 MMHG | DIASTOLIC BLOOD PRESSURE: 65 MMHG | TEMPERATURE: 97.5 F

## 2025-06-12 DIAGNOSIS — D50.0 IRON DEFICIENCY ANEMIA DUE TO CHRONIC BLOOD LOSS: Primary | ICD-10-CM

## 2025-06-12 DIAGNOSIS — K70.31 ALCOHOLIC CIRRHOSIS OF LIVER WITH ASCITES (HCC): ICD-10-CM

## 2025-06-12 PROCEDURE — 49083 ABD PARACENTESIS W/IMAGING: CPT

## 2025-06-12 PROCEDURE — 96365 THER/PROPH/DIAG IV INF INIT: CPT

## 2025-06-12 RX ORDER — LIDOCAINE WITH 8.4% SOD BICARB 0.9%(10ML)
SYRINGE (ML) INJECTION AS NEEDED
Status: COMPLETED | OUTPATIENT
Start: 2025-06-12 | End: 2025-06-12

## 2025-06-12 RX ORDER — SODIUM CHLORIDE 9 MG/ML
20 INJECTION, SOLUTION INTRAVENOUS ONCE
Status: COMPLETED | OUTPATIENT
Start: 2025-06-12 | End: 2025-06-12

## 2025-06-12 RX ORDER — SODIUM CHLORIDE 9 MG/ML
20 INJECTION, SOLUTION INTRAVENOUS ONCE
Status: CANCELLED | OUTPATIENT
Start: 2025-06-20

## 2025-06-12 RX ORDER — ALBUMIN (HUMAN) 12.5 G/50ML
SOLUTION INTRAVENOUS
Status: COMPLETED | OUTPATIENT
Start: 2025-06-12 | End: 2025-06-12

## 2025-06-12 RX ADMIN — SODIUM CHLORIDE 20 ML/HR: 9 INJECTION, SOLUTION INTRAVENOUS at 09:38

## 2025-06-12 RX ADMIN — Medication 10 ML: at 11:17

## 2025-06-12 RX ADMIN — IRON SUCROSE 200 MG: 20 INJECTION, SOLUTION INTRAVENOUS at 09:38

## 2025-06-12 RX ADMIN — ALBUMIN (HUMAN) 50 G: 12.5 SOLUTION INTRAVENOUS at 11:53

## 2025-06-12 NOTE — PROGRESS NOTES
Pt into clinic for Venofer. Pt offers no complaints.     Tolerated infusion without reaction. Midline dressing changed, needle free valve changed with a passive disinfecting cap in place.     Pt aware of next appointment on 6/20/25 at 4pm. AVS declined, calendar printed.

## 2025-06-12 NOTE — BRIEF OP NOTE (RAD/CATH)
IR PARACENTESIS Procedure Note    PATIENT NAME: Bimal Lugo  : 1945  MRN: [id number]    Pre-op Diagnosis:   1. 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)        Surgeon:   SUNDAY Fierro  Assistants:     No qualified resident was available, Resident is only observing    Estimated Blood Loss: minimal  Findings: 7400 ml clear yellow ascites fluid, LLQ. Albumin 50g infused.    Specimens: none    Complications:  none immediate    Anesthesia: local    SUNDAY Fierro     Date: 2025  Time: 12:02 PM

## 2025-06-19 ENCOUNTER — HOSPITAL ENCOUNTER (OUTPATIENT)
Dept: NON INVASIVE DIAGNOSTICS | Facility: HOSPITAL | Age: 80
Discharge: HOME | End: 2025-06-19
Attending: PHYSICIAN ASSISTANT | Admitting: STUDENT IN AN ORGANIZED HEALTH CARE EDUCATION/TRAINING PROGRAM
Payer: MEDICARE

## 2025-06-19 VITALS
OXYGEN SATURATION: 99 % | SYSTOLIC BLOOD PRESSURE: 138 MMHG | RESPIRATION RATE: 16 BRPM | DIASTOLIC BLOOD PRESSURE: 60 MMHG | HEART RATE: 76 BPM

## 2025-06-19 DIAGNOSIS — D50.0 IRON DEFICIENCY ANEMIA DUE TO CHRONIC BLOOD LOSS: Primary | ICD-10-CM

## 2025-06-19 DIAGNOSIS — K70.31 ALCOHOLIC CIRRHOSIS OF LIVER WITH ASCITES (HCC): ICD-10-CM

## 2025-06-19 DIAGNOSIS — D62 ACUTE BLOOD LOSS ANEMIA: ICD-10-CM

## 2025-06-19 PROCEDURE — 49083 ABD PARACENTESIS W/IMAGING: CPT

## 2025-06-19 RX ORDER — ALBUMIN (HUMAN) 12.5 G/50ML
SOLUTION INTRAVENOUS
Status: COMPLETED | OUTPATIENT
Start: 2025-06-19 | End: 2025-06-19

## 2025-06-19 RX ORDER — LIDOCAINE WITH 8.4% SOD BICARB 0.9%(10ML)
SYRINGE (ML) INJECTION AS NEEDED
Status: COMPLETED | OUTPATIENT
Start: 2025-06-19 | End: 2025-06-19

## 2025-06-19 RX ADMIN — Medication 10 ML: at 09:52

## 2025-06-19 RX ADMIN — ALBUMIN (HUMAN) 50 G: 12.5 SOLUTION INTRAVENOUS at 10:45

## 2025-06-19 NOTE — BRIEF OP NOTE (RAD/CATH)
IR PARACENTESIS Procedure Note    PATIENT NAME: Bimal Lugo  : 1945  MRN: [id number]    Pre-op Diagnosis:   1. 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)        Surgeon:   SUNDAY Fierro  Assistants:     No qualified resident was available, Resident is only observing    Estimated Blood Loss: minimal  Findings: 5100 ml clear yellow ascites fluid, RLQ. Albumin 50g infused.    Specimens: none    Complications:  none immediate    Anesthesia: local    SUNDAY Fierro     Date: 2025  Time: 12:20 PM

## 2025-06-20 ENCOUNTER — HOSPITAL ENCOUNTER (OUTPATIENT)
Dept: INFUSION CENTER | Facility: CLINIC | Age: 80
End: 2025-06-20
Attending: INTERNAL MEDICINE
Payer: MEDICARE

## 2025-06-20 VITALS
HEART RATE: 76 BPM | SYSTOLIC BLOOD PRESSURE: 155 MMHG | DIASTOLIC BLOOD PRESSURE: 80 MMHG | RESPIRATION RATE: 18 BRPM | TEMPERATURE: 97.8 F

## 2025-06-20 DIAGNOSIS — D50.0 IRON DEFICIENCY ANEMIA DUE TO CHRONIC BLOOD LOSS: Primary | ICD-10-CM

## 2025-06-20 PROCEDURE — 96365 THER/PROPH/DIAG IV INF INIT: CPT

## 2025-06-20 RX ORDER — SODIUM CHLORIDE 9 MG/ML
20 INJECTION, SOLUTION INTRAVENOUS ONCE
Status: CANCELLED | OUTPATIENT
Start: 2025-06-26

## 2025-06-20 RX ORDER — SODIUM CHLORIDE 9 MG/ML
20 INJECTION, SOLUTION INTRAVENOUS ONCE
Status: COMPLETED | OUTPATIENT
Start: 2025-06-20 | End: 2025-06-20

## 2025-06-20 RX ADMIN — IRON SUCROSE 200 MG: 20 INJECTION, SOLUTION INTRAVENOUS at 15:44

## 2025-06-20 RX ADMIN — SODIUM CHLORIDE 20 ML/HR: 9 INJECTION, SOLUTION INTRAVENOUS at 15:44

## 2025-06-20 NOTE — PROGRESS NOTES
Tolerated remaining infusion without complications R midline flushed per protocol and passive disinfecting cap applied. Walked out in stable condition. AVS given

## 2025-06-20 NOTE — PROGRESS NOTES
Patient arrives to infusion center for IV Venofer infusion. Patient offers no complaints today. PIV established, patient tolerating infusion at this time. Care handoff given to Jerad LOPEZ    Next appointment: 6/26/25 @ 1400

## 2025-06-26 ENCOUNTER — HOSPITAL ENCOUNTER (OUTPATIENT)
Dept: INFUSION CENTER | Facility: CLINIC | Age: 80
Discharge: HOME | End: 2025-06-26
Attending: INTERNAL MEDICINE
Payer: MEDICARE

## 2025-06-26 ENCOUNTER — HOSPITAL ENCOUNTER (OUTPATIENT)
Dept: NON INVASIVE DIAGNOSTICS | Facility: HOSPITAL | Age: 80
Discharge: HOME | End: 2025-06-26
Payer: MEDICARE

## 2025-06-26 ENCOUNTER — HOSPITAL ENCOUNTER (OUTPATIENT)
Dept: NON INVASIVE DIAGNOSTICS | Facility: HOSPITAL | Age: 80
Discharge: HOME | End: 2025-06-26
Attending: PHYSICIAN ASSISTANT
Payer: MEDICARE

## 2025-06-26 VITALS
SYSTOLIC BLOOD PRESSURE: 149 MMHG | HEART RATE: 79 BPM | RESPIRATION RATE: 18 BRPM | TEMPERATURE: 97 F | DIASTOLIC BLOOD PRESSURE: 66 MMHG

## 2025-06-26 VITALS
HEART RATE: 69 BPM | OXYGEN SATURATION: 98 % | DIASTOLIC BLOOD PRESSURE: 67 MMHG | RESPIRATION RATE: 20 BRPM | SYSTOLIC BLOOD PRESSURE: 151 MMHG

## 2025-06-26 DIAGNOSIS — D50.0 IRON DEFICIENCY ANEMIA DUE TO CHRONIC BLOOD LOSS: Primary | ICD-10-CM

## 2025-06-26 DIAGNOSIS — K70.31 ALCOHOLIC CIRRHOSIS OF LIVER WITH ASCITES (HCC): ICD-10-CM

## 2025-06-26 DIAGNOSIS — D62 ACUTE BLOOD LOSS ANEMIA: ICD-10-CM

## 2025-06-26 DIAGNOSIS — D50.0 IRON DEFICIENCY ANEMIA DUE TO CHRONIC BLOOD LOSS: ICD-10-CM

## 2025-06-26 PROCEDURE — NC001 PR NO CHARGE

## 2025-06-26 PROCEDURE — C1751 CATH, INF, PER/CENT/MIDLINE: HCPCS

## 2025-06-26 PROCEDURE — 36410 VNPNXR 3YR/> PHY/QHP DX/THER: CPT

## 2025-06-26 PROCEDURE — 49083 ABD PARACENTESIS W/IMAGING: CPT

## 2025-06-26 PROCEDURE — 96365 THER/PROPH/DIAG IV INF INIT: CPT

## 2025-06-26 RX ORDER — SODIUM CHLORIDE 9 MG/ML
20 INJECTION, SOLUTION INTRAVENOUS ONCE
Status: COMPLETED | OUTPATIENT
Start: 2025-06-26 | End: 2025-06-26

## 2025-06-26 RX ORDER — SODIUM CHLORIDE 9 MG/ML
20 INJECTION, SOLUTION INTRAVENOUS ONCE
Status: CANCELLED | OUTPATIENT
Start: 2025-07-03

## 2025-06-26 RX ORDER — LIDOCAINE WITH 8.4% SOD BICARB 0.9%(10ML)
SYRINGE (ML) INJECTION AS NEEDED
Status: COMPLETED | OUTPATIENT
Start: 2025-06-26 | End: 2025-06-26

## 2025-06-26 RX ORDER — ALBUMIN (HUMAN) 12.5 G/50ML
SOLUTION INTRAVENOUS
Status: COMPLETED | OUTPATIENT
Start: 2025-06-26 | End: 2025-06-26

## 2025-06-26 RX ADMIN — ALBUMIN (HUMAN) 50 G: 12.5 SOLUTION INTRAVENOUS at 10:56

## 2025-06-26 RX ADMIN — SODIUM CHLORIDE 20 ML/HR: 9 INJECTION, SOLUTION INTRAVENOUS at 13:59

## 2025-06-26 RX ADMIN — IRON SUCROSE 200 MG: 20 INJECTION, SOLUTION INTRAVENOUS at 13:59

## 2025-06-26 RX ADMIN — Medication 10 ML: at 10:15

## 2025-06-26 NOTE — PROCEDURES
Venous Access Line Insertion    Date/Time: 6/26/2025 11:08 AM    Performed by: SUNDAY Fierro  Authorized by: SUNDAY Fierro    Patient location:  IR  Other Assisting Provider: No    Consent:     Consent obtained:  Verbal    Consent given by:  Patient    Risks discussed:  Incorrect placement, arterial puncture, bleeding, infection and nerve damage    Alternatives discussed:  Alternative treatment  Universal protocol:     Procedure explained and questions answered to patient or proxy's satisfaction: yes      Immediately prior to procedure, a time out was called: yes      Relevant documents present and verified: yes      Test results available and properly labeled: yes      Radiology Images displayed and confirmed.  If images not available, report reviewed: yes      Required blood products, implants, devices, and special equipment available: yes      Site/side marked: yes      Patient identity confirmed:  Verbally with patient, hospital-assigned identification number and provided demographic data  Pre-procedure details:     Hand hygiene: Hand hygiene performed prior to insertion      Sterile barrier technique: All elements of maximal sterile technique followed      Skin preparation:  2% chlorhexidine    Skin preparation agent: Skin preparation agent completely dried prior to procedure    Procedure details:     Complex Venous Access Line Type: Midline      Complex Venous Access Line Indications: other (comment)      Complex Venous Access Line Indications comment:  Iron infusions    Orientation:  Right    Location:  Basilic    Catheter size:  4 Fr    Total catheter length (cm):  20    Catheter out on skin (cm):  0    Arm circumference:  28    Patient evaluated for contraindications to access (i.e. fistula, thrombosis, etc): Yes      Approach: percutaneous technique used      Patient position:  Flat    Ultrasound image availability:  Not saved (over the wire exchange)    Sterile ultrasound techniques:  Sterile gel and sterile probe covers were used      Number of attempts:  1    Successful placement: yes    Anesthesia (see MAR for exact dosages):     Anesthesia method:  None  Post-procedure details:     Post-procedure:  Dressing applied and securement device placed    Assessment:  Blood return through all ports    Post-procedure complications: none      Patient tolerance of procedure:  Tolerated well, no immediate complications

## 2025-06-26 NOTE — PROGRESS NOTES
Patient presents for Venofer infusion. Patient denies any recent antibiotic use or signs of infection. Patient offers no complaints. Patient tolerated infusion well. AVS printed. Next appointment reviewed for 7/3/25 at 12PM.

## 2025-06-26 NOTE — BRIEF OP NOTE (RAD/CATH)
IR PARACENTESIS Procedure Note    PATIENT NAME: Bimal Lugo  : 1945  MRN: [id number]    Pre-op Diagnosis:   1. 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)        Surgeon:   SUNDAY Fierro  Assistants:     No qualified resident was available, Resident is only observing    Estimated Blood Loss: minimal  Findings: 7050 ml clear yellow ascites fluid, LLQ. Albumin 50g infused.    Specimens: none    Complications:  none immediate    Anesthesia: local    SUNDAY Fierro     Date: 2025  Time: 12:27 PM

## 2025-07-03 ENCOUNTER — HOSPITAL ENCOUNTER (OUTPATIENT)
Dept: NON INVASIVE DIAGNOSTICS | Facility: HOSPITAL | Age: 80
Discharge: HOME | End: 2025-07-03
Attending: PHYSICIAN ASSISTANT | Admitting: RADIOLOGY
Payer: MEDICARE

## 2025-07-03 ENCOUNTER — HOSPITAL ENCOUNTER (OUTPATIENT)
Dept: INFUSION CENTER | Facility: CLINIC | Age: 80
Discharge: HOME | End: 2025-07-03
Attending: INTERNAL MEDICINE
Payer: MEDICARE

## 2025-07-03 VITALS
RESPIRATION RATE: 16 BRPM | DIASTOLIC BLOOD PRESSURE: 68 MMHG | OXYGEN SATURATION: 98 % | HEART RATE: 75 BPM | SYSTOLIC BLOOD PRESSURE: 146 MMHG

## 2025-07-03 VITALS
DIASTOLIC BLOOD PRESSURE: 64 MMHG | TEMPERATURE: 96.9 F | HEART RATE: 68 BPM | SYSTOLIC BLOOD PRESSURE: 143 MMHG | RESPIRATION RATE: 18 BRPM

## 2025-07-03 DIAGNOSIS — K70.31 ALCOHOLIC CIRRHOSIS OF LIVER WITH ASCITES (HCC): ICD-10-CM

## 2025-07-03 DIAGNOSIS — D50.0 IRON DEFICIENCY ANEMIA DUE TO CHRONIC BLOOD LOSS: Primary | ICD-10-CM

## 2025-07-03 PROCEDURE — 49083 ABD PARACENTESIS W/IMAGING: CPT

## 2025-07-03 PROCEDURE — 96365 THER/PROPH/DIAG IV INF INIT: CPT

## 2025-07-03 PROCEDURE — 49083 ABD PARACENTESIS W/IMAGING: CPT | Performed by: NURSE PRACTITIONER

## 2025-07-03 RX ORDER — SODIUM CHLORIDE 9 MG/ML
20 INJECTION, SOLUTION INTRAVENOUS ONCE
Status: CANCELLED | OUTPATIENT
Start: 2025-07-10

## 2025-07-03 RX ORDER — SODIUM CHLORIDE 9 MG/ML
20 INJECTION, SOLUTION INTRAVENOUS ONCE
Status: COMPLETED | OUTPATIENT
Start: 2025-07-03 | End: 2025-07-03

## 2025-07-03 RX ORDER — ALBUMIN (HUMAN) 12.5 G/50ML
SOLUTION INTRAVENOUS
Status: COMPLETED | OUTPATIENT
Start: 2025-07-03 | End: 2025-07-03

## 2025-07-03 RX ORDER — LIDOCAINE WITH 8.4% SOD BICARB 0.9%(10ML)
SYRINGE (ML) INJECTION AS NEEDED
Status: COMPLETED | OUTPATIENT
Start: 2025-07-03 | End: 2025-07-03

## 2025-07-03 RX ADMIN — IRON SUCROSE 200 MG: 20 INJECTION, SOLUTION INTRAVENOUS at 11:39

## 2025-07-03 RX ADMIN — ALBUMIN (HUMAN) 50 G: 12.5 SOLUTION INTRAVENOUS at 10:26

## 2025-07-03 RX ADMIN — SODIUM CHLORIDE 20 ML/HR: 9 INJECTION, SOLUTION INTRAVENOUS at 11:39

## 2025-07-03 RX ADMIN — Medication 6 ML: at 10:12

## 2025-07-03 NOTE — BRIEF OP NOTE (RAD/CATH)
IR PARACENTESIS Procedure Note    PATIENT NAME: Bimal Lugo  : 1945  MRN: [id number]    Pre-op Diagnosis:   1. 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)        Surgeon:   SUNDAY Worley  Assistants:   None    Estimated Blood Loss: None    Findings: 5350 mL of clear yellow ascites aspirated from left sided, ultrasound-guided paracentesis.  Albumin 50 g infused.    Specimens: None    Complications: None immediate    Anesthesia: Local    SUNDAY Worley     Date: 7/3/2025  Time: 11:18 AM

## 2025-07-03 NOTE — PROGRESS NOTES
Pt presents to clinic for Venofer infusion. Pt offers no complaints. Tolerated infusion without complications. PICC flushed well before and after infusion. Passive disinfecting cap applied at completion of infusion. Pt aware of next appointment on 7/10/25 at 1300. AVS declined.

## 2025-07-07 ENCOUNTER — TELEPHONE (OUTPATIENT)
Age: 80
End: 2025-07-07

## 2025-07-07 NOTE — TELEPHONE ENCOUNTER
Patients GI provider:  NIHARIKA Mtz/ Dr Horton    Number to return call: [phone]    Reason for call: Pt calling to say he has been called to jury duty for 8/12/25 and said there is no way with his condition he could do it. The pt is asking for a medical note to excuse him from jury duty. Please reach back out to the pt     Scheduled procedure/appointment date if applicable: Apt/8/19/25

## 2025-07-07 NOTE — TELEPHONE ENCOUNTER
Called pt and spoke with pt's wife Nighat ( on consent) and  advised on letter ready to .    Nighat stated that will  letter on Thursday 7/10/2025

## 2025-07-10 ENCOUNTER — TELEPHONE (OUTPATIENT)
Dept: INFUSION CENTER | Facility: CLINIC | Age: 80
End: 2025-07-10

## 2025-07-10 ENCOUNTER — HOSPITAL ENCOUNTER (OUTPATIENT)
Dept: NON INVASIVE DIAGNOSTICS | Facility: HOSPITAL | Age: 80
Discharge: HOME | End: 2025-07-10
Attending: PHYSICIAN ASSISTANT
Payer: MEDICARE

## 2025-07-10 ENCOUNTER — HOSPITAL ENCOUNTER (OUTPATIENT)
Dept: INFUSION CENTER | Facility: CLINIC | Age: 80
Discharge: HOME | End: 2025-07-10
Attending: INTERNAL MEDICINE
Payer: MEDICARE

## 2025-07-10 VITALS
RESPIRATION RATE: 17 BRPM | SYSTOLIC BLOOD PRESSURE: 176 MMHG | HEART RATE: 66 BPM | DIASTOLIC BLOOD PRESSURE: 77 MMHG | OXYGEN SATURATION: 96 %

## 2025-07-10 VITALS — HEART RATE: 70 BPM | DIASTOLIC BLOOD PRESSURE: 69 MMHG | SYSTOLIC BLOOD PRESSURE: 156 MMHG | RESPIRATION RATE: 17 BRPM

## 2025-07-10 DIAGNOSIS — D50.0 IRON DEFICIENCY ANEMIA DUE TO CHRONIC BLOOD LOSS: Primary | ICD-10-CM

## 2025-07-10 DIAGNOSIS — K70.31 ALCOHOLIC CIRRHOSIS OF LIVER WITH ASCITES (HCC): ICD-10-CM

## 2025-07-10 PROCEDURE — 49083 ABD PARACENTESIS W/IMAGING: CPT | Performed by: NURSE PRACTITIONER

## 2025-07-10 PROCEDURE — 49083 ABD PARACENTESIS W/IMAGING: CPT

## 2025-07-10 PROCEDURE — 96365 THER/PROPH/DIAG IV INF INIT: CPT

## 2025-07-10 RX ORDER — SODIUM CHLORIDE 9 MG/ML
20 INJECTION, SOLUTION INTRAVENOUS ONCE
Status: COMPLETED | OUTPATIENT
Start: 2025-07-10 | End: 2025-07-10

## 2025-07-10 RX ORDER — SODIUM CHLORIDE 9 MG/ML
20 INJECTION, SOLUTION INTRAVENOUS ONCE
Status: CANCELLED | OUTPATIENT
Start: 2025-07-10

## 2025-07-10 RX ORDER — LIDOCAINE WITH 8.4% SOD BICARB 0.9%(10ML)
SYRINGE (ML) INJECTION AS NEEDED
Status: COMPLETED | OUTPATIENT
Start: 2025-07-10 | End: 2025-07-10

## 2025-07-10 RX ADMIN — Medication 10 ML: at 11:22

## 2025-07-10 RX ADMIN — SODIUM CHLORIDE 20 ML/HR: 9 INJECTION, SOLUTION INTRAVENOUS at 13:08

## 2025-07-10 RX ADMIN — IRON SUCROSE 200 MG: 20 INJECTION, SOLUTION INTRAVENOUS at 13:07

## 2025-07-10 NOTE — BRIEF OP NOTE (RAD/CATH)
IR PARACENTESIS Procedure Note    PATIENT NAME: Bimal Lugo  : 1945  MRN: [id number]    Pre-op Diagnosis:   1. 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)        Surgeon:   SUNDAY Worley    Assistants:   None    Estimated Blood Loss: None    Findings: 4550 mL of clear yellow ascites aspirated from right sided, ultrasound-guided paracentesis.    Specimens: None    Complications: None immediate    Anesthesia: Local    SUNDAY Worley     Date: 7/10/2025  Time: 1:19 PM

## 2025-07-10 NOTE — TELEPHONE ENCOUNTER
Today is Srinivasan's last venofer infusion. When doing his dressing change, I asked if he flushes the line at home, he said he does not and that it has only been flushed weekly at infusion for his venofer infusions.     He stated that he only gets albumin replacement when his output from paracentesis is above 5000ml. The patient's wife Nighat stated that Sojna told them they should be getting albumin replacement weekly regardless.    Who is managing the ordering of home supplies for this patient to flush the midline? It requires flushing every 12 hours and the green passive disinfectant caps after flushing.  Who is doing weekly dressing changes? Can IR manage this moving forward since he there weekly?  The patient needs clarification of albumin replacement schedule- as him and his family have been told conflicting things.  If he doesn't require albumin replacement, does the patient need the midline? Is a port more appropriate?    He is now finished with infusion, if someone can please follow up with him and his wife Nighat on these matters, it would be greatly appreciated.

## 2025-07-14 DIAGNOSIS — N17.9 ACUTE KIDNEY INJURY SUPERIMPOSED ON STAGE 3A CHRONIC KIDNEY DISEASE (HCC): ICD-10-CM

## 2025-07-14 DIAGNOSIS — K70.31 ALCOHOLIC CIRRHOSIS OF LIVER WITH ASCITES (HCC): Primary | ICD-10-CM

## 2025-07-14 DIAGNOSIS — N18.31 ACUTE KIDNEY INJURY SUPERIMPOSED ON STAGE 3A CHRONIC KIDNEY DISEASE (HCC): ICD-10-CM

## 2025-07-14 RX ORDER — SODIUM CHLORIDE 9 MG/ML
20 INJECTION, SOLUTION INTRAVENOUS ONCE
OUTPATIENT
Start: 2025-07-14

## 2025-07-14 NOTE — TELEPHONE ENCOUNTER
Spoke with Lizz in IR. Pt is scheduled for paracentesis and is noted to have albumin infusion per CKD protocol post procedure.

## 2025-07-17 ENCOUNTER — HOSPITAL ENCOUNTER (OUTPATIENT)
Dept: NON INVASIVE DIAGNOSTICS | Facility: HOSPITAL | Age: 80
Discharge: HOME | End: 2025-07-17
Attending: PHYSICIAN ASSISTANT
Payer: MEDICARE

## 2025-07-17 ENCOUNTER — HOSPITAL ENCOUNTER (OUTPATIENT)
Dept: NON INVASIVE DIAGNOSTICS | Facility: HOSPITAL | Age: 80
Discharge: HOME | End: 2025-07-17
Payer: MEDICARE

## 2025-07-17 VITALS — SYSTOLIC BLOOD PRESSURE: 153 MMHG | DIASTOLIC BLOOD PRESSURE: 69 MMHG

## 2025-07-17 DIAGNOSIS — K70.31 ALCOHOLIC CIRRHOSIS OF LIVER WITH ASCITES (HCC): ICD-10-CM

## 2025-07-17 PROCEDURE — 49083 ABD PARACENTESIS W/IMAGING: CPT

## 2025-07-17 PROCEDURE — 0W9G3ZZ DRAINAGE OF PERITONEAL CAVITY, PERCUTANEOUS APPROACH: ICD-10-PCS | Performed by: STUDENT IN AN ORGANIZED HEALTH CARE EDUCATION/TRAINING PROGRAM

## 2025-07-17 PROCEDURE — C1751 CATH, INF, PER/CENT/MIDLINE: HCPCS

## 2025-07-17 PROCEDURE — 36410 VNPNXR 3YR/> PHY/QHP DX/THER: CPT

## 2025-07-17 PROCEDURE — NC001 PR NO CHARGE

## 2025-07-17 PROCEDURE — 37799 UNLISTED PX VASCULAR SURGERY: CPT

## 2025-07-17 RX ORDER — LIDOCAINE WITH 8.4% SOD BICARB 0.9%(10ML)
SYRINGE (ML) INJECTION AS NEEDED
Status: COMPLETED | OUTPATIENT
Start: 2025-07-17 | End: 2025-07-17

## 2025-07-17 RX ORDER — ALBUMIN (HUMAN) 12.5 G/50ML
SOLUTION INTRAVENOUS
Status: COMPLETED | OUTPATIENT
Start: 2025-07-17 | End: 2025-07-17

## 2025-07-17 RX ADMIN — Medication 10 ML: at 09:54

## 2025-07-17 RX ADMIN — Medication 5 ML: at 10:13

## 2025-07-17 RX ADMIN — ALBUMIN (HUMAN) 50 G: 12.5 SOLUTION INTRAVENOUS at 10:28

## 2025-07-17 NOTE — PROCEDURES
Venous Access Line Insertion    Date/Time: 7/17/2025 10:57 AM    Performed by: SUNDAY Fierro  Authorized by: SUNDAY Fierro    Patient location:  IR  Other Assisting Provider: No    Consent:     Consent obtained:  Verbal    Consent given by:  Patient    Risks discussed:  Incorrect placement, infection, bleeding and arterial puncture    Alternatives discussed:  No treatment and alternative treatment  Universal protocol:     Procedure explained and questions answered to patient or proxy's satisfaction: yes      Immediately prior to procedure, a time out was called: yes      Relevant documents present and verified: yes      Test results available and properly labeled: yes      Radiology Images displayed and confirmed.  If images not available, report reviewed: yes      Required blood products, implants, devices, and special equipment available: yes      Site/side marked: yes      Patient identity confirmed:  Verbally with patient, arm band, provided demographic data and hospital-assigned identification number  Pre-procedure details:     Hand hygiene: Hand hygiene performed prior to insertion      Sterile barrier technique: All elements of maximal sterile technique followed      Skin preparation:  ChloraPrep    Skin preparation agent: Skin preparation agent completely dried prior to procedure    Procedure details:     Complex Venous Access Line Type: Midline      Complex Venous Access Line Indications: replacement and no peripheral vascular access      Complex Venous Access Line Indications comment:  Iron infusions    Orientation:  Right    Location:  Basilic    Catheter size:  4 Fr    Total catheter length (cm):  20    Catheter out on skin (cm):  0    Patient evaluated for contraindications to access (i.e. fistula, thrombosis, etc): Yes      Approach: percutaneous technique used      Patient position:  Flat    Ultrasound image availability:  Not saved    Sterile ultrasound techniques: Sterile gel and  sterile probe covers were used      Number of attempts:  1    Successful placement: yes    Anesthesia (see MAR for exact dosages):     Anesthesia method:  None  Post-procedure details:     Post-procedure:  Dressing applied and securement device placed    Assessment:  Blood return through all ports    Post-procedure complications: none      Patient tolerance of procedure:  Tolerated well, no immediate complications

## 2025-07-17 NOTE — BRIEF OP NOTE (RAD/CATH)
IR PARACENTESIS Procedure Note    PATIENT NAME: Bimal Lugo  : 1945  MRN: [id number]    Pre-op Diagnosis:   1. 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)        Surgeon:   SUNDAY Fierro  Assistants:     No qualified resident was available, Resident is only observing    Estimated Blood Loss: minimal  Findings: 5750 ml clear yellow ascites fluid, LLQ. Albumin 50g infused.    Specimens: none    Complications:  none immediate    Anesthesia: local    SUNDAY Fierro     Date: 2025  Time: 10:59 AM

## 2025-07-19 ENCOUNTER — HOSPITAL ENCOUNTER (INPATIENT)
Facility: HOSPITAL | Age: 80
LOS: 3 days | Discharge: HOME | DRG: 441 | End: 2025-07-23
Admitting: FAMILY MEDICINE
Payer: MEDICARE

## 2025-07-19 DIAGNOSIS — D62 ACUTE BLOOD LOSS ANEMIA: ICD-10-CM

## 2025-07-19 DIAGNOSIS — E78.5 HLD (HYPERLIPIDEMIA): ICD-10-CM

## 2025-07-19 DIAGNOSIS — I10 HTN (HYPERTENSION): ICD-10-CM

## 2025-07-19 DIAGNOSIS — K70.30 ALCOHOLIC CIRRHOSIS (HCC): ICD-10-CM

## 2025-07-19 DIAGNOSIS — E11.9 DM (DIABETES MELLITUS) (HCC): ICD-10-CM

## 2025-07-19 DIAGNOSIS — K92.2 GI BLEED: Primary | ICD-10-CM

## 2025-07-19 PROBLEM — R18.8 CIRRHOSIS OF LIVER WITH ASCITES  (HCC): Status: ACTIVE | Noted: 2024-04-09

## 2025-07-19 LAB
ALBUMIN SERPL BCG-MCNC: 3.4 G/DL (ref 3.5–5)
ALP SERPL-CCNC: 63 U/L (ref 34–104)
ALT SERPL W P-5'-P-CCNC: 11 U/L (ref 7–52)
ANION GAP SERPL CALCULATED.3IONS-SCNC: 6 MMOL/L (ref 4–13)
AST SERPL W P-5'-P-CCNC: 20 U/L (ref 13–39)
BASOPHILS # BLD AUTO: 0.04 THOUSANDS/ÂΜL (ref 0–0.1)
BASOPHILS NFR BLD AUTO: 1 % (ref 0–1)
BILIRUB SERPL-MCNC: 1.02 MG/DL (ref 0.2–1)
BUN SERPL-MCNC: 35 MG/DL (ref 5–25)
CALCIUM ALBUM COR SERPL-MCNC: 9.3 MG/DL (ref 8.3–10.1)
CALCIUM SERPL-MCNC: 8.8 MG/DL (ref 8.4–10.2)
CHLORIDE SERPL-SCNC: 105 MMOL/L (ref 96–108)
CO2 SERPL-SCNC: 24 MMOL/L (ref 21–32)
CREAT SERPL-MCNC: 1.54 MG/DL (ref 0.6–1.3)
EOSINOPHIL # BLD AUTO: 0.15 THOUSAND/ÂΜL (ref 0–0.61)
EOSINOPHIL NFR BLD AUTO: 3 % (ref 0–6)
ERYTHROCYTE [DISTWIDTH] IN BLOOD BY AUTOMATED COUNT: 16.2 % (ref 11.6–15.1)
GFR SERPL CREATININE-BSD FRML MDRD: 41 ML/MIN/1.73SQ M
GLUCOSE SERPL-MCNC: 151 MG/DL (ref 65–140)
GLUCOSE SERPL-MCNC: 159 MG/DL (ref 65–140)
GLUCOSE SERPL-MCNC: 176 MG/DL (ref 65–140)
HCT VFR BLD AUTO: 28.8 % (ref 36.5–49.3)
HGB BLD-MCNC: 9.8 G/DL (ref 12–17)
IMM GRANULOCYTES # BLD AUTO: 0.02 THOUSAND/UL (ref 0–0.2)
IMM GRANULOCYTES NFR BLD AUTO: 0 % (ref 0–2)
LIPASE SERPL-CCNC: 33 U/L (ref 11–82)
LYMPHOCYTES # BLD AUTO: 0.63 THOUSANDS/ÂΜL (ref 0.6–4.47)
LYMPHOCYTES NFR BLD AUTO: 13 % (ref 14–44)
MCH RBC QN AUTO: 33.2 PG (ref 26.8–34.3)
MCHC RBC AUTO-ENTMCNC: 34 G/DL (ref 31.4–37.4)
MCV RBC AUTO: 98 FL (ref 82–98)
MONOCYTES # BLD AUTO: 0.36 THOUSAND/ÂΜL (ref 0.17–1.22)
MONOCYTES NFR BLD AUTO: 8 % (ref 4–12)
NEUTROPHILS # BLD AUTO: 3.51 THOUSANDS/ÂΜL (ref 1.85–7.62)
NEUTS SEG NFR BLD AUTO: 75 % (ref 43–75)
NRBC BLD AUTO-RTO: 0 /100 WBCS
PLATELET # BLD AUTO: 58 THOUSANDS/UL (ref 149–390)
PMV BLD AUTO: 10 FL (ref 8.9–12.7)
POTASSIUM SERPL-SCNC: 4.3 MMOL/L (ref 3.5–5.3)
PROT SERPL-MCNC: 5.7 G/DL (ref 6.4–8.4)
RBC # BLD AUTO: 2.95 MILLION/UL (ref 3.88–5.62)
SODIUM SERPL-SCNC: 135 MMOL/L (ref 135–147)
WBC # BLD AUTO: 4.71 THOUSAND/UL (ref 4.31–10.16)

## 2025-07-19 PROCEDURE — 99285 EMERGENCY DEPT VISIT HI MDM: CPT

## 2025-07-19 PROCEDURE — 36415 COLL VENOUS BLD VENIPUNCTURE: CPT

## 2025-07-19 PROCEDURE — 80053 COMPREHEN METABOLIC PANEL: CPT

## 2025-07-19 PROCEDURE — 82948 REAGENT STRIP/BLOOD GLUCOSE: CPT

## 2025-07-19 PROCEDURE — 85025 COMPLETE CBC W/AUTO DIFF WBC: CPT

## 2025-07-19 PROCEDURE — 83690 ASSAY OF LIPASE: CPT

## 2025-07-19 PROCEDURE — 83036 HEMOGLOBIN GLYCOSYLATED A1C: CPT

## 2025-07-19 PROCEDURE — 99223 1ST HOSP IP/OBS HIGH 75: CPT

## 2025-07-19 RX ORDER — MONTELUKAST SODIUM 10 MG/1
10 TABLET ORAL DAILY
Status: DISCONTINUED | OUTPATIENT
Start: 2025-07-20 | End: 2025-07-23 | Stop reason: HOSPADM

## 2025-07-19 RX ORDER — SPIRONOLACTONE 25 MG/1
50 TABLET ORAL DAILY
Status: DISCONTINUED | OUTPATIENT
Start: 2025-07-20 | End: 2025-07-23 | Stop reason: HOSPADM

## 2025-07-19 RX ORDER — PANTOPRAZOLE SODIUM 40 MG/10ML
40 INJECTION, POWDER, LYOPHILIZED, FOR SOLUTION INTRAVENOUS ONCE
Status: COMPLETED | OUTPATIENT
Start: 2025-07-19 | End: 2025-07-19

## 2025-07-19 RX ORDER — PANTOPRAZOLE SODIUM 40 MG/1
40 TABLET, DELAYED RELEASE ORAL
Status: DISCONTINUED | OUTPATIENT
Start: 2025-07-19 | End: 2025-07-23 | Stop reason: HOSPADM

## 2025-07-19 RX ORDER — ISOSORBIDE MONONITRATE 30 MG/1
30 TABLET, EXTENDED RELEASE ORAL DAILY
Status: DISCONTINUED | OUTPATIENT
Start: 2025-07-20 | End: 2025-07-23 | Stop reason: HOSPADM

## 2025-07-19 RX ORDER — INSULIN GLARGINE 100 [IU]/ML
10 INJECTION, SOLUTION SUBCUTANEOUS
Status: DISCONTINUED | OUTPATIENT
Start: 2025-07-19 | End: 2025-07-23 | Stop reason: HOSPADM

## 2025-07-19 RX ORDER — INSULIN LISPRO 100 [IU]/ML
10 INJECTION, SOLUTION INTRAVENOUS; SUBCUTANEOUS
Status: DISCONTINUED | OUTPATIENT
Start: 2025-07-19 | End: 2025-07-23 | Stop reason: HOSPADM

## 2025-07-19 RX ORDER — FUROSEMIDE 40 MG/1
40 TABLET ORAL DAILY
Status: DISCONTINUED | OUTPATIENT
Start: 2025-07-20 | End: 2025-07-23 | Stop reason: HOSPADM

## 2025-07-19 RX ORDER — INSULIN LISPRO 100 [IU]/ML
1-6 INJECTION, SOLUTION INTRAVENOUS; SUBCUTANEOUS
Status: DISCONTINUED | OUTPATIENT
Start: 2025-07-19 | End: 2025-07-23 | Stop reason: HOSPADM

## 2025-07-19 RX ORDER — CARVEDILOL 6.25 MG/1
6.25 TABLET ORAL 2 TIMES DAILY WITH MEALS
Status: DISCONTINUED | OUTPATIENT
Start: 2025-07-19 | End: 2025-07-23 | Stop reason: HOSPADM

## 2025-07-19 RX ORDER — ATORVASTATIN CALCIUM 40 MG/1
40 TABLET, FILM COATED ORAL
Status: DISCONTINUED | OUTPATIENT
Start: 2025-07-20 | End: 2025-07-23 | Stop reason: HOSPADM

## 2025-07-19 RX ADMIN — PANTOPRAZOLE SODIUM 40 MG: 40 INJECTION, POWDER, FOR SOLUTION INTRAVENOUS at 14:49

## 2025-07-19 RX ADMIN — PANTOPRAZOLE SODIUM 40 MG: 40 TABLET, DELAYED RELEASE ORAL at 16:36

## 2025-07-19 RX ADMIN — INSULIN LISPRO 1 UNITS: 100 INJECTION, SOLUTION INTRAVENOUS; SUBCUTANEOUS at 16:38

## 2025-07-19 RX ADMIN — CARVEDILOL 6.25 MG: 6.25 TABLET, FILM COATED ORAL at 16:36

## 2025-07-19 RX ADMIN — INSULIN GLARGINE 10 UNITS: 100 INJECTION, SOLUTION SUBCUTANEOUS at 22:05

## 2025-07-19 RX ADMIN — INSULIN LISPRO 10 UNITS: 100 INJECTION, SOLUTION INTRAVENOUS; SUBCUTANEOUS at 16:38

## 2025-07-19 NOTE — ASSESSMENT & PLAN NOTE
Chronic thrombocytopenia in the setting of liver cirrhosis.  Will hold DVT given platelets below 50 and also due to concern for GI bleeding.

## 2025-07-19 NOTE — PLAN OF CARE
Problem: PAIN - ADULT  Goal: Verbalizes/displays adequate comfort level or baseline comfort level  Description: Interventions:  - Encourage patient to monitor pain and request assistance  - Assess pain using appropriate pain scale  - Administer analgesics as ordered based on type and severity of pain and evaluate response  - Implement non-pharmacological measures as appropriate and evaluate response  - Consider cultural and social influences on pain and pain management  - Notify physician/advanced practitioner if interventions unsuccessful or patient reports new pain  - Educate patient/family on pain management process including their role and importance of  reporting pain   - Provide non-pharmacologic/complimentary pain relief interventions  Outcome: Progressing     Problem: INFECTION - ADULT  Goal: Absence or prevention of progression during hospitalization  Description: INTERVENTIONS:  - Assess and monitor for signs and symptoms of infection  - Monitor lab/diagnostic results  - Monitor all insertion sites, i.e. indwelling lines, tubes, and drains  - Monitor endotracheal if appropriate and nasal secretions for changes in amount and color  - [place] appropriate cooling/warming therapies per order  - Administer medications as ordered  - Instruct and encourage patient and family to use good hand hygiene technique  - Identify and instruct in appropriate isolation precautions for identified infection/condition  Outcome: Progressing  Goal: Absence of fever/infection during neutropenic period  Description: INTERVENTIONS:  - Monitor WBC  - Perform strict hand hygiene  - Limit to healthy visitors only  - No plants, dried, fresh or silk flowers with marie in patient room  Outcome: Progressing     Problem: SAFETY ADULT  Goal: Patient will remain free of falls  Description: INTERVENTIONS:  - Educate patient/family on patient safety including physical limitations  - Instruct patient to call for assistance with activity   -  Consider consulting OT/PT to assist with strengthening/mobility based on AM PAC & JH-HLM score  - Consult OT/PT to assist with strengthening/mobility   - Keep Call bell within reach  - Keep bed low and locked with side rails adjusted as appropriate  - Keep care items and personal belongings within reach  - Initiate and maintain comfort rounds  - Make Fall Risk Sign visible to staff  - Offer Toileting every  Hours, in advance of need  - Initiate/Maintain alarm  - Obtain necessary fall risk management equipment:   - Apply yellow socks and bracelet for high fall risk patients  - Consider moving patient to room near nurses station  Outcome: Progressing  Goal: Maintain or return to baseline ADL function  Description: INTERVENTIONS:  -  Assess patient's ability to carry out ADLs; assess patient's baseline for ADL function and identify physical deficits which impact ability to perform ADLs (bathing, care of mouth/teeth, toileting, grooming, dressing, etc.)  - Assess/evaluate cause of self-care deficits   - Assess range of motion  - Assess patient's mobility; develop plan if impaired  - Assess patient's need for assistive devices and provide as appropriate  - Encourage maximum independence but intervene and supervise when necessary  - Involve family in performance of ADLs  - Assess for home care needs following discharge   - Consider OT consult to assist with ADL evaluation and planning for discharge  - Provide patient education as appropriate  - Monitor functional capacity and physical performance, use of AM PAC & JH-HLM   - Monitor gait, balance and fatigue with ambulation    Outcome: Progressing  Goal: Maintains/Returns to pre admission functional level  Description: INTERVENTIONS:  - Perform AM-PAC 6 Click Basic Mobility/ Daily Activity assessment daily.  - Set and communicate daily mobility goal to care team and patient/family/caregiver.   - Collaborate with rehabilitation services on mobility goals if consulted  -  Perform Range of Motion  times a day.  - Reposition patient every  hours.  - Dangle patient  times a day  - Stand patient  times a day  - Ambulate patient  times a day  - Out of bed to chair  times a day   - Out of bed for meals times a day  - Out of bed for toileting  - Record patient progress and toleration of activity level   Outcome: Progressing     Problem: DISCHARGE PLANNING  Goal: Discharge to home or other facility with appropriate resources  Description: INTERVENTIONS:  - Identify barriers to discharge w/patient and caregiver  - Arrange for needed discharge resources and transportation as appropriate  - Identify discharge learning needs (meds, wound care, etc.)  - Arrange for interpretive services to assist at discharge as needed  - Refer to Case Management Department for coordinating discharge planning if the patient needs post-hospital services based on physician/advanced practitioner order or complex needs related to functional status, cognitive ability, or social support system  Outcome: Progressing     Problem: Knowledge Deficit  Goal: Patient/family/caregiver demonstrates understanding of disease process, treatment plan, medications, and discharge instructions  Description: Complete learning assessment and assess knowledge base.  Interventions:  - Provide teaching at level of understanding  - Provide teaching via preferred learning methods  Outcome: Progressing

## 2025-07-19 NOTE — ASSESSMENT & PLAN NOTE
"Lab Results   Component Value Date    HGBA1C 5.2 03/20/2025       No results for input(s): \"POCGLU\" in the last 72 hours.    Blood Sugar Average: Last 72 hrs:    Patient admits to taking basal bolus insulin with Tresiba 10 units at bedtime and NovoLog 10 units with meals  Continue home regimen.  Initiate sliding scale insulin  Accu-Cheks 4 times a day  "

## 2025-07-19 NOTE — ED PROVIDER NOTES
Time reflects when diagnosis was documented in both MDM as applicable and the Disposition within this note       Time User Action Codes Description Comment    7/19/2025  2:26 PM Krivchenia, Aamir Add [K92.2] GI bleed     7/19/2025  2:26 PM Krivchenia, Aamir Add [I10] HTN (hypertension)     7/19/2025  2:26 PM Krivchenia, Aamir Add [E78.5] HLD (hyperlipidemia)     7/19/2025  2:26 PM Krivchenia, Aamir Add [E11.9] DM (diabetes mellitus) (HCC)     7/19/2025  2:26 PM Krivchenia, Aamir Add [K70.30] Alcoholic cirrhosis (HCC)     7/20/2025  8:53 AM Grazyna Mtz Add [D62] Acute blood loss anemia           ED Disposition       ED Disposition   Admit    Condition   Stable    Date/Time   Sat Jul 19, 2025  2:26 PM    Comment   Case was discussed with Dr. Cao and the patient's admission status was agreed to be Admission Status: observation status to the service of Dr. Cao .               Assessment & Plan       Medical Decision Making  79-year-old male with history of alcoholic cirrhosis, CAD, PAD, CKD, type 2 diabetes, and hypertension presenting today for evaluation of 1 day of melena and hematochezia associated with lightheadedness.    Differential: Upper GI bleed, anemia, hemorrhoids, NICOLE, cirrhosis    Plan: First nurse labs obtained which show hemoglobin of 9.8 which is improved from his most recent hemoglobin of 8.7 about 2 months ago, however unclear if this prevents any drop as this was during recovery from a prior GI bleed episode with acute on chronic anemia.  Patient screening blood work is otherwise unremarkable.  Patient's case discussed with the on-call GI PA who agrees with admission for observing hemoglobins with plans for EGD if he has a hemoglobin drop.  Patient will be admitted to hospitalist service.  Will plan to start Protonix.    Amount and/or Complexity of Data Reviewed  Labs:  Decision-making details documented in ED Course.    Risk  Prescription drug management.  Decision regarding  hospitalization.        ED Course as of 07/20/25 1308   Sat Jul 19, 2025   1338 Hemoglobin(!): 9.8  Improved from labs 1 month ago   1338 Creatinine(!): 1.54  Baseline   1338 BUN(!): 35       Medications   pantoprazole (PROTONIX) injection 40 mg (40 mg Intravenous Given 7/19/25 1449)       ED Risk Strat Scores                    No data recorded        SBIRT 22yo+      Flowsheet Row Most Recent Value   Initial Alcohol Screen: US AUDIT-C     1. How often do you have a drink containing alcohol? 0 Filed at: 07/19/2025 1211   2. How many drinks containing alcohol do you have on a typical day you are drinking?  0 Filed at: 07/19/2025 1211   3b. FEMALE Any Age, or MALE 65+: How often do you have 4 or more drinks on one occassion? 0 Filed at: 07/19/2025 1211   Audit-C Score 0 Filed at: 07/19/2025 1211   GRAHAM: How many times in the past year have you...    Used an illegal drug or used a prescription medication for non-medical reasons? Never Filed at: 07/19/2025 1211                            History of Present Illness       Chief Complaint   Patient presents with    Rectal Bleeding     Pt states he began having blood in his stool this morning. Hx of same.        Past Medical History[1]   Past Surgical History[2]   Family History[3]   Social History[4]   E-Cigarette/Vaping    E-Cigarette Use Never User       E-Cigarette/Vaping Substances    Nicotine No     THC No     CBD No     Flavoring No     Other No     Unknown No       I have reviewed and agree with the history as documented.     Patient is an 79-year-old male with history of alcoholic cirrhosis, CAD, PAD, CKD, type diabetes, hypertension presenting today for evaluation of rectal bleeding.  Patient states that this morning he had onset of an episode of dark tarry stool with mixed and dark red blood and bright red blood in the toilet bowl.  He states that this happened a couple times now today.  Patient has a history of this, and per chart review in April 2025 he was  admitted for an upper GI bleed, on EGD was found to have hypertensive portal gastropathy with no active bleeding.  Patient did have acute drop in hemoglobin at that time.  He has been on iron infusions recently but had a last infusion 2 Thursdays ago.  He currently states he has some lightheadedness but denies any fevers, abdominal pain, urinary issues, or constipation.  He does state he has some nausea earlier today which resolved after taking Zofran.  Of note, patient does get weekly paracenteses for cirrhotic liver disease.        Review of Systems   Constitutional:  Negative for chills and fever.   HENT:  Negative for congestion, rhinorrhea and sore throat.    Eyes:  Negative for pain and redness.   Respiratory:  Negative for cough and shortness of breath.    Cardiovascular:  Negative for chest pain and palpitations.   Gastrointestinal:  Positive for blood in stool and nausea. Negative for abdominal pain, constipation, diarrhea and vomiting.   Genitourinary:  Negative for dysuria and hematuria.   Musculoskeletal:  Negative for back pain and neck pain.   Skin:  Negative for pallor and rash.   Neurological:  Positive for light-headedness. Negative for weakness and numbness.   All other systems reviewed and are negative.          Objective       ED Triage Vitals   Temperature Pulse Blood Pressure Respirations SpO2 Patient Position - Orthostatic VS   07/19/25 1210 07/19/25 1210 07/19/25 1210 07/19/25 1210 07/19/25 1210 07/19/25 1210   98.4 °F (36.9 °C) 69 152/66 20 99 % Sitting      Temp Source Heart Rate Source BP Location FiO2 (%) Pain Score    07/19/25 1210 07/19/25 1210 07/19/25 1210 -- 07/19/25 1500    Temporal Monitor Left arm  No Pain      Vitals      Date and Time Temp Pulse SpO2 Resp BP Pain Score FACES Pain Rating User   07/20/25 0900 -- -- 99 % -- -- 5 -- CW   07/20/25 0729 -- -- -- 19 146/65 -- -- VR   07/20/25 0729 98 °F (36.7 °C) 58 99 % -- -- -- -- DII   07/19/25 2115 -- 63 -- -- 135/59 -- -- GP    07/19/25 2007 -- -- -- -- -- No Pain -- EF   07/19/25 1627 -- -- -- -- 155/69 -- -- SH   07/19/25 1627 97.6 °F (36.4 °C) 66 99 % 19 -- -- -- DII   07/19/25 1627 -- -- 99 % -- -- -- -- CW   07/19/25 1500 -- -- -- -- -- No Pain -- CW   07/19/25 1500 -- 69 100 % 20 133/62 -- -- JS   07/19/25 1210 98.4 °F (36.9 °C) 69 99 % 20 152/66 -- -- AS            Physical Exam  Vitals and nursing note reviewed.   Constitutional:       General: He is not in acute distress.     Appearance: Normal appearance. He is well-developed. He is obese. He is not ill-appearing, toxic-appearing or diaphoretic.   HENT:      Head: Normocephalic and atraumatic.      Right Ear: External ear normal.      Left Ear: External ear normal.      Nose: Nose normal. No congestion or rhinorrhea.      Mouth/Throat:      Mouth: Mucous membranes are moist.     Eyes:      General: No scleral icterus.        Right eye: No discharge.         Left eye: No discharge.      Conjunctiva/sclera: Conjunctivae normal.       Cardiovascular:      Rate and Rhythm: Normal rate and regular rhythm.      Pulses: Normal pulses.      Heart sounds: Normal heart sounds.   Pulmonary:      Effort: Pulmonary effort is normal.      Breath sounds: Normal breath sounds.   Abdominal:      General: Abdomen is flat. There is distension.      Palpations: Abdomen is soft.      Tenderness: There is no abdominal tenderness. There is no right CVA tenderness, left CVA tenderness, guarding or rebound.     Musculoskeletal:         General: Normal range of motion.      Cervical back: Normal range of motion and neck supple. No rigidity or tenderness.      Right lower leg: No edema.      Left lower leg: No edema.     Skin:     General: Skin is warm and dry.      Capillary Refill: Capillary refill takes less than 2 seconds.      Coloration: Skin is not jaundiced or pale.     Neurological:      General: No focal deficit present.      Mental Status: He is alert and oriented to person, place, and time.      Psychiatric:         Mood and Affect: Mood normal.         Behavior: Behavior normal.         Results Reviewed       Procedure Component Value Units Date/Time    Comprehensive metabolic panel [[id number]]  (Abnormal) Collected: 07/19/25 1215    Lab Status: Final result Specimen: Blood from Arm, Left Updated: 07/19/25 1246     Sodium 135 mmol/L      Potassium 4.3 mmol/L      Chloride 105 mmol/L      CO2 24 mmol/L      ANION GAP 6 mmol/L      BUN 35 mg/dL      Creatinine 1.54 mg/dL      Glucose 159 mg/dL      Calcium 8.8 mg/dL      Corrected Calcium 9.3 mg/dL      AST 20 U/L      ALT 11 U/L      Alkaline Phosphatase 63 U/L      Total Protein 5.7 g/dL      Albumin 3.4 g/dL      Total Bilirubin 1.02 mg/dL      eGFR 41 ml/min/1.73sq m     Narrative:      [employer] guidelines for Chronic Kidney Disease (CKD):     Stage 1 with normal or high GFR (GFR > 90 mL/min/1.73 square meters)    Stage 2 Mild CKD (GFR = 60-89 mL/min/1.73 square meters)    Stage 3A Moderate CKD (GFR = 45-59 mL/min/1.73 square meters)    Stage 3B Moderate CKD (GFR = 30-44 mL/min/1.73 square meters)    Stage 4 Severe CKD (GFR = 15-29 mL/min/1.73 square meters)    Stage 5 End Stage CKD (GFR <15 mL/min/1.73 square meters)  Note: GFR calculation is accurate only with a steady state creatinine    Lipase [[id number]]  (Normal) Collected: 07/19/25 1215    Lab Status: Final result Specimen: Blood from Arm, Left Updated: 07/19/25 1246     Lipase 33 u/L     CBC and differential [[id number]]  (Abnormal) Collected: 07/19/25 1215    Lab Status: Final result Specimen: Blood from Arm, Left Updated: 07/19/25 1239     WBC 4.71 Thousand/uL      RBC 2.95 Million/uL      Hemoglobin 9.8 g/dL      Hematocrit 28.8 %      MCV 98 fL      MCH 33.2 pg      MCHC 34.0 g/dL      RDW 16.2 %      MPV 10.0 fL      Platelets 58 Thousands/uL      nRBC 0 /100 WBCs      Segmented % 75 %      Immature Grans % 0 %      Lymphocytes % 13 %      Monocytes % 8 %       Eosinophils Relative 3 %      Basophils Relative 1 %      Absolute Neutrophils 3.51 Thousands/µL      Absolute Immature Grans 0.02 Thousand/uL      Absolute Lymphocytes 0.63 Thousands/µL      Absolute Monocytes 0.36 Thousand/µL      Eosinophils Absolute 0.15 Thousand/µL      Basophils Absolute 0.04 Thousands/µL             No orders to display       Procedures    ED Medication and Procedure Management   Prior to Admission Medications   Prescriptions Last Dose Informant Patient Reported? Taking?   Insulin Glargine Solostar 100 UNIT/ML SOPN Not Taking Self, Spouse/Significant Other Yes No   Sig: Inject 12 Units under the skin daily   Patient not taking: Reported on 7/19/2025   Lancets (ONETOUCH ULTRASOFT) lancets 7/18/2025 Self, Spouse/Significant Other Yes Yes   Misc. Devices MISC 7/18/2025 Self, Spouse/Significant Other Yes Yes   Sig: Use   Multiple Vitamin (DAILY VALUE MULTIVITAMIN) TABS 7/18/2025 Self, Spouse/Significant Other Yes Yes   Sig: Take by mouth   Omega-3 Fatty Acids (FISH OIL) 645 MG CAPS 7/18/2025 Morning Self, Spouse/Significant Other Yes Yes   Sig: Take by mouth   albuterol (PROVENTIL HFA,VENTOLIN HFA) 90 mcg/act inhaler  Self, Spouse/Significant Other Yes No   Sig: Inhale 2 puffs every 4 (four) hours as needed   aspirin 81 MG tablet 7/18/2025 Morning Self, Spouse/Significant Other Yes Yes   Sig: Take 81 mg by mouth daily in the early morning   atorvastatin (LIPITOR) 40 mg tablet 7/18/2025 Morning Self, Spouse/Significant Other Yes Yes   Sig: Take 40 mg by mouth daily in the early morning   carvedilol (COREG) 6.25 mg tablet 7/18/2025 Evening Self, Spouse/Significant Other No Yes   Sig: Take 1 tablet (6.25 mg total) by mouth 2 (two) times a day with meals   ferrous sulfate 325 (65 Fe) mg tablet Unknown Self, Spouse/Significant Other Yes No   Sig: Take 1 tablet by mouth daily with breakfast   furosemide (LASIX) 40 mg tablet 7/18/2025 Morning Self, Spouse/Significant Other No Yes   Sig: Take 1 tablet  (40 mg total) by mouth 2 (two) times a day   Patient taking differently: Take 40 mg by mouth daily   glucose 4-6 GM-MG 2025 Self, Spouse/Significant Other Yes Yes   Sig: Chew 16 g daily as needed   glucose blood test strip 2025 Self, Spouse/Significant Other Yes Yes   hydrocortisone (PROCTOSOL HC) 2.5 % rectal cream 2025 Self, Spouse/Significant Other Yes Yes   Sig: Insert into the rectum   insulin aspart (NovoLOG FlexPen) 100 UNIT/ML injection pen 2025 Self, Spouse/Significant Other No Yes   Si in a.m , 10units in p.m.   insulin degludec (Tresiba FlexTouch) 100 units/mL injection pen 2025 Self, Spouse/Significant Other Yes Yes   Sig: Inject 10 Units under the skin daily at bedtime   isosorbide mononitrate (IMDUR) 30 mg 24 hr tablet 2025 Morning Self, Spouse/Significant Other Yes Yes   montelukast (SINGULAIR) 10 mg tablet 2025 Morning Self, Spouse/Significant Other Yes Yes   Sig: Take 10 mg by mouth in the morning.   nitroglycerin (NITROSTAT) 0.4 mg SL tablet More than a month Self, Spouse/Significant Other Yes No   Sig: Place under the tongue   nystatin (MYCOSTATIN) powder Past Month Self, Spouse/Significant Other Yes Yes   Sig: Apply topically   ondansetron (ZOFRAN) 4 mg tablet 2025 Morning  No Yes   Sig: Take 1 tablet (4 mg total) by mouth every 8 (eight) hours as needed for nausea or vomiting   pantoprazole (PROTONIX) 40 mg tablet 2025 Morning  No Yes   Sig: Take 1 tablet (40 mg total) by mouth 2 (two) times a day before meals   spironolactone (ALDACTONE) 50 mg tablet 2025 Morning Self, Spouse/Significant Other No Yes   Sig: Take 1 tablet (50 mg total) by mouth daily      Facility-Administered Medications: None     Current Discharge Medication List        CONTINUE these medications which have NOT CHANGED    Details   aspirin 81 MG tablet Take 81 mg by mouth daily in the early morning      atorvastatin (LIPITOR) 40 mg tablet Take 40 mg by mouth daily in the  early morning      carvedilol (COREG) 6.25 mg tablet Take 1 tablet (6.25 mg total) by mouth 2 (two) times a day with meals  Qty: 60 tablet, Refills: 0    Associated Diagnoses: GI bleed; Acute kidney injury superimposed on stage 3a chronic kidney disease (HCC); Diabetes mellitus, type 2 (HCC)      furosemide (LASIX) 40 mg tablet Take 1 tablet (40 mg total) by mouth 2 (two) times a day  Qty: 60 tablet, Refills: 6    Associated Diagnoses: 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)      glucose 4-6 GM-MG Chew 16 g daily as needed      glucose blood test strip       hydrocortisone (PROCTOSOL HC) 2.5 % rectal cream Insert into the rectum      insulin aspart (NovoLOG FlexPen) 100 UNIT/ML injection pen 14 in a.m , 10units in p.m.  Qty: 15 mL, Refills: 0    Associated Diagnoses: GI bleed; Acute kidney injury superimposed on stage 3a chronic kidney disease (HCC); Diabetes mellitus, type 2 (HCC)      insulin degludec (Tresiba FlexTouch) 100 units/mL injection pen Inject 10 Units under the skin daily at bedtime      isosorbide mononitrate (IMDUR) 30 mg 24 hr tablet       Lancets (ONETOUCH ULTRASOFT) lancets       Misc. Devices MISC Use      montelukast (SINGULAIR) 10 mg tablet Take 10 mg by mouth in the morning.      Multiple Vitamin (DAILY VALUE MULTIVITAMIN) TABS Take by mouth      nystatin (MYCOSTATIN) powder Apply topically      Omega-3 Fatty Acids (FISH OIL) 645 MG CAPS Take by mouth      ondansetron (ZOFRAN) 4 mg tablet Take 1 tablet (4 mg total) by mouth every 8 (eight) hours as needed for nausea or vomiting  Qty: 60 tablet, Refills: 0    Associated Diagnoses: Cirrhosis of liver with ascites, unspecified hepatic cirrhosis type  (HCC)      pantoprazole (PROTONIX) 40 mg tablet Take 1 tablet (40 mg total) by mouth 2 (two) times a day before meals  Qty: 180 tablet, Refills: 3    Associated Diagnoses: 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)      spironolactone (ALDACTONE) 50 mg tablet Take 1 tablet (50 mg total)  by mouth daily  Qty: 60 tablet, Refills: 0    Associated Diagnoses: 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)      albuterol (PROVENTIL HFA,VENTOLIN HFA) 90 mcg/act inhaler Inhale 2 puffs every 4 (four) hours as needed      ferrous sulfate 325 (65 Fe) mg tablet Take 1 tablet by mouth daily with breakfast      Insulin Glargine Solostar 100 UNIT/ML SOPN Inject 12 Units under the skin daily      nitroglycerin (NITROSTAT) 0.4 mg SL tablet Place under the tongue           No discharge procedures on file.  ED SEPSIS DOCUMENTATION   Time reflects when diagnosis was documented in both MDM as applicable and the Disposition within this note       Time User Action Codes Description Comment    7/19/2025  2:26 PM Krivchenia Aamir Add [K92.2] GI bleed     7/19/2025  2:26 PM Krivchenia Aamir Add [I10] HTN (hypertension)     7/19/2025  2:26 PM Krivchenia Aamir Add [E78.5] HLD (hyperlipidemia)     7/19/2025  2:26 PM Krivchenia Aamir Add [E11.9] DM (diabetes mellitus) (HCC)     7/19/2025  2:26 PM Krivchenia, Aamir Add [K70.30] Alcoholic cirrhosis (HCC)     7/20/2025  8:53 AM Grazyna Mtz Add [D62] Acute blood loss anemia                      [1]   Past Medical History:  Diagnosis Date    Chronic bronchitis (HCC)     COPD (chronic obstructive pulmonary disease) (HCC)     Diabetes mellitus (HCC)     Esophageal varices (HCC) 11 apr 2024    Hyperlipidemia     Hypertension     Obesity     ARACELY (obstructive sleep apnea)    [2]   Past Surgical History:  Procedure Laterality Date    AORTIC VALVE REPLACEMENT      Jan 4 2024    IR MIDLINE PLACEMENT  6/5/2025    IR MIDLINE PLACEMENT  6/26/2025    IR MIDLINE PLACEMENT  7/17/2025    IR PARACENTESIS  04/04/2024    IR PARACENTESIS  03/07/2024    IR PARACENTESIS  04/11/2024    IR PARACENTESIS  04/26/2024    IR PARACENTESIS  05/09/2024    IR PARACENTESIS  05/16/2024    IR PARACENTESIS  05/23/2024    IR PARACENTESIS  06/06/2024    IR PARACENTESIS  06/13/2024    IR PARACENTESIS  06/20/2024     IR PARACENTESIS  06/27/2024    IR PARACENTESIS  07/03/2024    IR PARACENTESIS  07/11/2024    IR PARACENTESIS  07/18/2024    IR PARACENTESIS  07/24/2024    IR PARACENTESIS  08/01/2024    IR PARACENTESIS  8/8/2024    IR PARACENTESIS  8/15/2024    IR PARACENTESIS  8/22/2024    IR PARACENTESIS  8/29/2024    IR PARACENTESIS  9/5/2024    IR PARACENTESIS  9/12/2024    IR PARACENTESIS  9/19/2024    IR PARACENTESIS  10/3/2024    IR PARACENTESIS  9/26/2024    IR PARACENTESIS  10/10/2024    IR PARACENTESIS  10/17/2024    IR PARACENTESIS  10/24/2024    IR PARACENTESIS  10/31/2024    IR PARACENTESIS  11/7/2024    IR PARACENTESIS  11/14/2024    IR PARACENTESIS  11/21/2024    IR PARACENTESIS  11/27/2024    IR PARACENTESIS  12/5/2024    IR PARACENTESIS  12/12/2024    IR PARACENTESIS  12/19/2024    IR PARACENTESIS  12/26/2024    IR PARACENTESIS  1/2/2025    IR PARACENTESIS  1/9/2025    IR PARACENTESIS  1/16/2025    IR PARACENTESIS  1/23/2025    IR PARACENTESIS  1/30/2025    IR PARACENTESIS  2/6/2025    IR PARACENTESIS  2/13/2025    IR PARACENTESIS  2/20/2025    IR PARACENTESIS  2/27/2025    IR PARACENTESIS  3/13/2025    IR PARACENTESIS  3/6/2025    IR PARACENTESIS  3/20/2025    IR PARACENTESIS  3/27/2025    IR PARACENTESIS  4/3/2025    IR PARACENTESIS  4/10/2025    IR PARACENTESIS  4/17/2025    IR PARACENTESIS  4/24/2025    IR PARACENTESIS  4/30/2025    IR PARACENTESIS  5/8/2025    IR PARACENTESIS  5/15/2025    IR PARACENTESIS  5/22/2025    IR PARACENTESIS  5/29/2025    IR PARACENTESIS  6/5/2025    IR PARACENTESIS  6/12/2025    IR PARACENTESIS  6/19/2025    IR PARACENTESIS  6/26/2025    IR PARACENTESIS  7/3/2025    IR PARACENTESIS  7/10/2025    IR PARACENTESIS  7/17/2025    UPPER GASTROINTESTINAL ENDOSCOPY  11 apr 2024   [3] No family history on file.  [4]   Social History  Tobacco Use    Smoking status: Former     Current packs/day: 0.00     Average packs/day: 5.0 packs/day for 35.0 years (175.0 ttl pk-yrs)     Types: Cigarettes      Start date:      Quit date:      Years since quittin.5    Smokeless tobacco: Never    Tobacco comments:     Quit in  (approx)   Vaping Use    Vaping status: Never Used   Substance Use Topics    Alcohol use: Not Currently     Alcohol/week: 24.0 standard drinks of alcohol     Types: 24 Cans of beer per week     Comment: sober- quit     Drug use: Not Currently        Aamir Wilson MD  25 [id number]

## 2025-07-19 NOTE — ASSESSMENT & PLAN NOTE
Patient denies chest pain or shortness of breath.  Normally on aspirin 81 mg and atorvastatin.  Patient did not take aspirin in the a.m..  Will hold for today  Consider resuming aspirin if hemoglobin remains stable in the next 24-48 hours.  Continue with atorvastatin

## 2025-07-19 NOTE — ASSESSMENT & PLAN NOTE
She admits having melena associated with dark red bowel movements this morning x 2.  This is a recurrent issue for the patient.  Known history of cirrhotic gastropathy that was noted during the last EGD from April 2025.  Patient also history of esophageal varices s/p banding.  Patient received IV Venofer outpatient for 5 week.  Hemoglobin on admission 9.8.  ED team discussed plan with GI team and will admit patient for observation and hemoglobin monitoring.  Holding aspirin and DVT prophylaxis.  Monitor hemoglobin every 8 hours.  Transfuse if hemoglobin goes below 7.  If patient continues to drop hemoglobin will consider endoscopy on Monday.  Further recommendation per GI team

## 2025-07-19 NOTE — ASSESSMENT & PLAN NOTE
History of cirrhosis with recurrent ascites requiring weekly paracentesis.  History of varices s/p banding in 2024.  On maintenance carvedilol twice daily, Lasix 40 mg daily and spironolactone 50 mg daily  History of hepatic encephalopathy currently oriented x 3 with no asterixis on exam  HCC screening without concerning for lesion.

## 2025-07-19 NOTE — H&P
H&P - Hospitalist   Name: Bimal Lugo 79 y.o. male I MRN: [id number]  Unit/Bed#: -01 I Date of Admission: 7/19/2025   Date of Service: 7/19/2025 I Hospital Day: 0     Assessment & Plan  Melena  She admits having melena associated with dark red bowel movements this morning x 2.  This is a recurrent issue for the patient.  Known history of cirrhotic gastropathy that was noted during the last EGD from April 2025.  Patient also history of esophageal varices s/p banding.  Patient received IV Venofer outpatient for 5 week.  Hemoglobin on admission 9.8.  ED team discussed plan with GI team and will admit patient for observation and hemoglobin monitoring.  Holding aspirin and DVT prophylaxis.  Monitor hemoglobin every 8 hours.  Transfuse if hemoglobin goes below 7.  If patient continues to drop hemoglobin will consider endoscopy on Monday.  Further recommendation per GI team  Atherosclerosis of lower extremity with intermittent claudication (HCC)  Patient normally on aspirin and atorvastatin.  Can resume aspirin if hemoglobin remained stable  CAD (coronary artery disease)  Patient denies chest pain or shortness of breath.  Normally on aspirin 81 mg and atorvastatin.  Patient did not take aspirin in the a.m..  Will hold for today  Consider resuming aspirin if hemoglobin remains stable in the next 24-48 hours.  Continue with atorvastatin  Chronic kidney disease, stage 3a (HCC)  Lab Results   Component Value Date    EGFR 41 07/19/2025    EGFR 36 05/14/2025    EGFR 40 04/12/2025    CREATININE 1.54 (H) 07/19/2025    CREATININE 1.71 (H) 05/14/2025    CREATININE 1.60 (H) 04/12/2025     Patient has creatinine baseline 1.4-1.6.Remains at baseline on admission.  Follow-up on daily  Cirrhosis of liver with ascites  (HCC)  History of cirrhosis with recurrent ascites requiring weekly paracentesis.  History of varices s/p banding in 2024.  On maintenance carvedilol twice daily, Lasix 40 mg daily and spironolactone 50 mg  "daily  History of hepatic encephalopathy currently oriented x 3 with no asterixis on exam  HCC screening without concerning for lesion.    Thrombocytopenia (HCC)  Chronic thrombocytopenia in the setting of liver cirrhosis.  Will hold DVT given platelets below 50 and also due to concern for GI bleeding.  Diabetes mellitus, type 2 (HCC)  Lab Results   Component Value Date    HGBA1C 5.2 03/20/2025       No results for input(s): \"POCGLU\" in the last 72 hours.    Blood Sugar Average: Last 72 hrs:    Patient admits to taking basal bolus insulin with Tresiba 10 units at bedtime and NovoLog 10 units with meals  Continue home regimen.  Initiate sliding scale insulin  Accu-Cheks 4 times a day      VTE Pharmacologic Prophylaxis:   Moderate Risk (Score 3-4) - Pharmacological DVT Prophylaxis Contraindicated. Sequential Compression Devices Ordered.  Code Status: Level 1 - Full Code patient  Discussion with family: Updated  (wife) at bedside.    Anticipated Length of Stay: Patient will be admitted on an observation basis with an anticipated length of stay of less than 2 midnights secondary to rectal bleeding and hemoglobin monitoring.    History of Present Illness   Chief Complaint: Melena and rectal bleeding    Bimal Lugo is a 79 y.o. male with a PMH of liver cirrhosis with ascites complicated by hypertensive gastropathy, PAD, CAD, CKD, type 2 diabetes mellitus who presents with 2 episodes of melena and dark blood with bowel movements this morning.  Patient admits having abdominal discomfort, nausea along with melena.  Denies taking iron tablets, has been getting IV iron for the past 5 weeks.  Denies vomiting, hematemesis.  Known for history of recurrent GI bleeds in the setting of gastropathy last admission in April for the same reason.  On admission patient hemodynamically stable.  Hemoglobin 9.8.  Admitted for monitoring of the hemoglobin and further management.    Review of Systems   Constitutional:  Negative " for chills and fever.   HENT:  Negative for ear pain and sore throat.    Eyes:  Negative for pain and visual disturbance.   Respiratory:  Negative for cough and shortness of breath.    Cardiovascular:  Negative for chest pain and palpitations.   Gastrointestinal:  Positive for blood in stool and nausea. Negative for abdominal pain and vomiting.   Genitourinary:  Negative for dysuria and hematuria.   Musculoskeletal:  Negative for arthralgias and back pain.   Skin:  Negative for color change and rash.   Neurological:  Negative for seizures and syncope.   All other systems reviewed and are negative.      Historical Information   Past Medical History[1]  Past Surgical History[2]  Social History[3]  E-Cigarette/Vaping    E-Cigarette Use Never User      E-Cigarette/Vaping Substances    Nicotine No     THC No     CBD No     Flavoring No     Other No     Unknown No      Family History[4]  Social History:  Marital Status: /Civil Union   Occupation:   Patient Pre-hospital Living Situation: Home  Patient Pre-hospital Level of Mobility: walks  Patient Pre-hospital Diet Restrictions: none    Meds/Allergies   I have reviewed home medications with patient personally.  Prior to Admission medications    Medication Sig Start Date End Date Taking? Authorizing Provider   aspirin 81 MG tablet Take 81 mg by mouth daily in the early morning   Yes Historical Provider, MD   atorvastatin (LIPITOR) 40 mg tablet Take 40 mg by mouth daily in the early morning   Yes Historical Provider, MD   carvedilol (COREG) 6.25 mg tablet Take 1 tablet (6.25 mg total) by mouth 2 (two) times a day with meals 11/29/24  Yes Evert Galvan MD   furosemide (LASIX) 40 mg tablet Take 1 tablet (40 mg total) by mouth 2 (two) times a day  Patient taking differently: Take 40 mg by mouth daily 11/18/24  Yes Grazyna Mtz PA-C   glucose 4-6 GM-MG Chew 16 g daily as needed 2/16/24  Yes Historical Provider, MD   glucose blood test strip  3/18/16  Yes Historical  Provider, MD   hydrocortisone (PROCTOSOL HC) 2.5 % rectal cream Insert into the rectum   Yes Historical Provider, MD   insulin aspart (NovoLOG FlexPen) 100 UNIT/ML injection pen 14 in a.m , 10units in p.m. 11/29/24  Yes Evert Galvan MD   insulin degludec (Tresiba FlexTouch) 100 units/mL injection pen Inject 10 Units under the skin daily at bedtime   Yes Historical Provider, MD   isosorbide mononitrate (IMDUR) 30 mg 24 hr tablet  8/12/24  Yes Historical Provider, MD   Lancets (ONETOUCH ULTRASOFT) lancets  3/18/16  Yes Historical Provider, MD   Misc. Devices MISC Use   Yes Historical Provider, MD   montelukast (SINGULAIR) 10 mg tablet Take 10 mg by mouth in the morning. 9/9/24  Yes Historical Provider, MD   Multiple Vitamin (DAILY VALUE MULTIVITAMIN) TABS Take by mouth   Yes Historical Provider, MD   nystatin (MYCOSTATIN) powder Apply topically 1/2/24  Yes Historical Provider, MD   Omega-3 Fatty Acids (FISH OIL) 645 MG CAPS Take by mouth   Yes Historical Provider, MD   ondansetron (ZOFRAN) 4 mg tablet Take 1 tablet (4 mg total) by mouth every 8 (eight) hours as needed for nausea or vomiting 5/19/25  Yes Grazyna Mtz PA-C   pantoprazole (PROTONIX) 40 mg tablet Take 1 tablet (40 mg total) by mouth 2 (two) times a day before meals 5/14/25  Yes Arya Fried MD   spironolactone (ALDACTONE) 50 mg tablet Take 1 tablet (50 mg total) by mouth daily 11/29/24  Yes Evert Galvan MD   albuterol (PROVENTIL HFA,VENTOLIN HFA) 90 mcg/act inhaler Inhale 2 puffs every 4 (four) hours as needed 6/16/15   Historical Provider, MD   ferrous sulfate 325 (65 Fe) mg tablet Take 1 tablet by mouth daily with breakfast 10/22/24   Historical Provider, MD   Insulin Glargine Solostar 100 UNIT/ML SOPN Inject 12 Units under the skin daily  Patient not taking: Reported on 7/19/2025 12/27/24   Historical Provider, MD   nitroglycerin (NITROSTAT) 0.4 mg SL tablet Place under the tongue 11/7/24   Historical Provider, MD     Allergies   Allergen  Reactions    Gemfibrozil Swelling and Rash    Carbamazepine     Carbamazepine, Eslicarbazepine, And Oxcarbazepine      swelling    Oxycodone Other (See Comments)     Pt states he hallucinates       Objective :  Temp:  [98.4 °F (36.9 °C)] 98.4 °F (36.9 °C)  HR:  [69] 69  BP: (133-152)/(62-66) 133/62  Resp:  [20] 20  SpO2:  [99 %-100 %] 100 %  O2 Device: None (Room air)    Physical Exam  Vitals and nursing note reviewed.   Constitutional:       General: He is not in acute distress.     Appearance: He is well-developed.   HENT:      Head: Normocephalic and atraumatic.     Eyes:      Conjunctiva/sclera: Conjunctivae normal.       Cardiovascular:      Rate and Rhythm: Normal rate and regular rhythm.      Heart sounds: No murmur heard.  Pulmonary:      Effort: Pulmonary effort is normal. No respiratory distress.      Breath sounds: Normal breath sounds. No wheezing or rales.   Abdominal:      General: There is distension.      Palpations: Abdomen is soft.      Tenderness: There is no abdominal tenderness.     Musculoskeletal:         General: No swelling.      Cervical back: Neck supple.      Right lower leg: No edema.      Left lower leg: No edema.     Skin:     General: Skin is warm and dry.      Capillary Refill: Capillary refill takes less than 2 seconds.     Neurological:      Mental Status: He is alert.     Psychiatric:         Mood and Affect: Mood normal.          Lines/Drains:            Lab Results: I have reviewed the following results:  Results from last 7 days   Lab Units 07/19/25  1215   WBC Thousand/uL 4.71   HEMOGLOBIN g/dL 9.8*   HEMATOCRIT % 28.8*   PLATELETS Thousands/uL 58*   SEGS PCT % 75   LYMPHO PCT % 13*   MONO PCT % 8   EOS PCT % 3     Results from last 7 days   Lab Units 07/19/25  1215   SODIUM mmol/L 135   POTASSIUM mmol/L 4.3   CHLORIDE mmol/L 105   CO2 mmol/L 24   BUN mg/dL 35*   CREATININE mg/dL 1.54*   ANION GAP mmol/L 6   CALCIUM mg/dL 8.8   ALBUMIN g/dL 3.4*   TOTAL BILIRUBIN mg/dL 1.02*    ALK PHOS U/L 63   ALT U/L 11   AST U/L 20   GLUCOSE RANDOM mg/dL 159*             Lab Results   Component Value Date    HGBA1C 5.2 03/20/2025    HGBA1C 5.2 12/20/2024    HGBA1C 5.6 11/25/2024           Imaging Results Review: No pertinent imaging studies reviewed.  Other Study Results Review: No additional pertinent studies reviewed.    Administrative Statements       ** Please Note: This note has been constructed using a voice recognition system. **         [1]   Past Medical History:  Diagnosis Date    Chronic bronchitis (HCC)     COPD (chronic obstructive pulmonary disease) (HCC)     Diabetes mellitus (HCC)     Esophageal varices (HCC) 11 apr 2024    Hyperlipidemia     Hypertension     Obesity     ARACELY (obstructive sleep apnea)    [2]   Past Surgical History:  Procedure Laterality Date    AORTIC VALVE REPLACEMENT      Jan 4 2024    IR MIDLINE PLACEMENT  6/5/2025    IR MIDLINE PLACEMENT  6/26/2025    IR MIDLINE PLACEMENT  7/17/2025    IR PARACENTESIS  04/04/2024    IR PARACENTESIS  03/07/2024    IR PARACENTESIS  04/11/2024    IR PARACENTESIS  04/26/2024    IR PARACENTESIS  05/09/2024    IR PARACENTESIS  05/16/2024    IR PARACENTESIS  05/23/2024    IR PARACENTESIS  06/06/2024    IR PARACENTESIS  06/13/2024    IR PARACENTESIS  06/20/2024    IR PARACENTESIS  06/27/2024    IR PARACENTESIS  07/03/2024    IR PARACENTESIS  07/11/2024    IR PARACENTESIS  07/18/2024    IR PARACENTESIS  07/24/2024    IR PARACENTESIS  08/01/2024    IR PARACENTESIS  8/8/2024    IR PARACENTESIS  8/15/2024    IR PARACENTESIS  8/22/2024    IR PARACENTESIS  8/29/2024    IR PARACENTESIS  9/5/2024    IR PARACENTESIS  9/12/2024    IR PARACENTESIS  9/19/2024    IR PARACENTESIS  10/3/2024    IR PARACENTESIS  9/26/2024    IR PARACENTESIS  10/10/2024    IR PARACENTESIS  10/17/2024    IR PARACENTESIS  10/24/2024    IR PARACENTESIS  10/31/2024    IR PARACENTESIS  11/7/2024    IR PARACENTESIS  11/14/2024    IR PARACENTESIS  11/21/2024    IR PARACENTESIS   2024    IR PARACENTESIS  2024    IR PARACENTESIS  2024    IR PARACENTESIS  2024    IR PARACENTESIS  2024    IR PARACENTESIS  2025    IR PARACENTESIS  2025    IR PARACENTESIS  2025    IR PARACENTESIS  2025    IR PARACENTESIS  2025    IR PARACENTESIS  2025    IR PARACENTESIS  2025    IR PARACENTESIS  2025    IR PARACENTESIS  2025    IR PARACENTESIS  3/13/2025    IR PARACENTESIS  3/6/2025    IR PARACENTESIS  3/20/2025    IR PARACENTESIS  3/27/2025    IR PARACENTESIS  4/3/2025    IR PARACENTESIS  4/10/2025    IR PARACENTESIS  2025    IR PARACENTESIS  2025    IR PARACENTESIS  2025    IR PARACENTESIS  2025    IR PARACENTESIS  5/15/2025    IR PARACENTESIS  2025    IR PARACENTESIS  2025    IR PARACENTESIS  2025    IR PARACENTESIS  2025    IR PARACENTESIS  2025    IR PARACENTESIS  2025    IR PARACENTESIS  7/3/2025    IR PARACENTESIS  7/10/2025    IR PARACENTESIS  2025    UPPER GASTROINTESTINAL ENDOSCOPY  2024   [3]   Social History  Tobacco Use    Smoking status: Former     Current packs/day: 0.00     Average packs/day: 5.0 packs/day for 35.0 years (175.0 ttl pk-yrs)     Types: Cigarettes     Start date:      Quit date:      Years since quittin.5    Smokeless tobacco: Never    Tobacco comments:     Quit in  (approx)   Vaping Use    Vaping status: Never Used   Substance and Sexual Activity    Alcohol use: Not Currently     Alcohol/week: 24.0 standard drinks of alcohol     Types: 24 Cans of beer per week     Comment: sober- quit     Drug use: Not Currently    Sexual activity: Not Currently     Partners: Female     Birth control/protection: Abstinence, Condom Male   [4] No family history on file.

## 2025-07-19 NOTE — ASSESSMENT & PLAN NOTE
Lab Results   Component Value Date    EGFR 41 07/19/2025    EGFR 36 05/14/2025    EGFR 40 04/12/2025    CREATININE 1.54 (H) 07/19/2025    CREATININE 1.71 (H) 05/14/2025    CREATININE 1.60 (H) 04/12/2025     Patient has creatinine baseline 1.4-1.6.Remains at baseline on admission.  Follow-up on daily

## 2025-07-20 LAB
ANION GAP SERPL CALCULATED.3IONS-SCNC: 6 MMOL/L (ref 4–13)
BASOPHILS # BLD AUTO: 0.02 THOUSANDS/ÂΜL (ref 0–0.1)
BASOPHILS NFR BLD AUTO: 1 % (ref 0–1)
BUN SERPL-MCNC: 32 MG/DL (ref 5–25)
CALCIUM SERPL-MCNC: 8.4 MG/DL (ref 8.4–10.2)
CHLORIDE SERPL-SCNC: 109 MMOL/L (ref 96–108)
CO2 SERPL-SCNC: 23 MMOL/L (ref 21–32)
CREAT SERPL-MCNC: 1.54 MG/DL (ref 0.6–1.3)
EOSINOPHIL # BLD AUTO: 0.18 THOUSAND/ÂΜL (ref 0–0.61)
EOSINOPHIL NFR BLD AUTO: 7 % (ref 0–6)
ERYTHROCYTE [DISTWIDTH] IN BLOOD BY AUTOMATED COUNT: 16.1 % (ref 11.6–15.1)
EST. AVERAGE GLUCOSE BLD GHB EST-MCNC: 111 MG/DL
GFR SERPL CREATININE-BSD FRML MDRD: 41 ML/MIN/1.73SQ M
GLUCOSE P FAST SERPL-MCNC: 106 MG/DL (ref 65–99)
GLUCOSE SERPL-MCNC: 102 MG/DL (ref 65–140)
GLUCOSE SERPL-MCNC: 106 MG/DL (ref 65–140)
GLUCOSE SERPL-MCNC: 142 MG/DL (ref 65–140)
GLUCOSE SERPL-MCNC: 156 MG/DL (ref 65–140)
GLUCOSE SERPL-MCNC: 166 MG/DL (ref 65–140)
HBA1C MFR BLD: 5.5 %
HCT VFR BLD AUTO: 26.4 % (ref 36.5–49.3)
HGB BLD-MCNC: 10.6 G/DL (ref 12–17)
HGB BLD-MCNC: 8.9 G/DL (ref 12–17)
HGB BLD-MCNC: 9.4 G/DL (ref 12–17)
HGB BLD-MCNC: 9.9 G/DL (ref 12–17)
IMM GRANULOCYTES # BLD AUTO: 0.01 THOUSAND/UL (ref 0–0.2)
IMM GRANULOCYTES NFR BLD AUTO: 0 % (ref 0–2)
LYMPHOCYTES # BLD AUTO: 0.52 THOUSANDS/ÂΜL (ref 0.6–4.47)
LYMPHOCYTES NFR BLD AUTO: 21 % (ref 14–44)
MCH RBC QN AUTO: 32.7 PG (ref 26.8–34.3)
MCHC RBC AUTO-ENTMCNC: 33.7 G/DL (ref 31.4–37.4)
MCV RBC AUTO: 97 FL (ref 82–98)
MONOCYTES # BLD AUTO: 0.26 THOUSAND/ÂΜL (ref 0.17–1.22)
MONOCYTES NFR BLD AUTO: 10 % (ref 4–12)
NEUTROPHILS # BLD AUTO: 1.52 THOUSANDS/ÂΜL (ref 1.85–7.62)
NEUTS SEG NFR BLD AUTO: 61 % (ref 43–75)
NRBC BLD AUTO-RTO: 0 /100 WBCS
PLATELET # BLD AUTO: 40 THOUSANDS/UL (ref 149–390)
PMV BLD AUTO: 10.9 FL (ref 8.9–12.7)
POTASSIUM SERPL-SCNC: 4 MMOL/L (ref 3.5–5.3)
RBC # BLD AUTO: 2.72 MILLION/UL (ref 3.88–5.62)
SODIUM SERPL-SCNC: 138 MMOL/L (ref 135–147)
WBC # BLD AUTO: 2.51 THOUSAND/UL (ref 4.31–10.16)

## 2025-07-20 PROCEDURE — 99222 1ST HOSP IP/OBS MODERATE 55: CPT | Performed by: INTERNAL MEDICINE

## 2025-07-20 PROCEDURE — 99232 SBSQ HOSP IP/OBS MODERATE 35: CPT | Performed by: FAMILY MEDICINE

## 2025-07-20 PROCEDURE — 85025 COMPLETE CBC W/AUTO DIFF WBC: CPT

## 2025-07-20 PROCEDURE — 82948 REAGENT STRIP/BLOOD GLUCOSE: CPT

## 2025-07-20 PROCEDURE — 80048 BASIC METABOLIC PNL TOTAL CA: CPT

## 2025-07-20 PROCEDURE — 85018 HEMOGLOBIN: CPT

## 2025-07-20 RX ORDER — ONDANSETRON 2 MG/ML
4 INJECTION INTRAMUSCULAR; INTRAVENOUS EVERY 8 HOURS PRN
Status: DISCONTINUED | OUTPATIENT
Start: 2025-07-20 | End: 2025-07-23 | Stop reason: HOSPADM

## 2025-07-20 RX ADMIN — Medication 1 TABLET: at 09:00

## 2025-07-20 RX ADMIN — ONDANSETRON 4 MG: 2 INJECTION INTRAMUSCULAR; INTRAVENOUS at 11:53

## 2025-07-20 RX ADMIN — ISOSORBIDE MONONITRATE 30 MG: 30 TABLET, EXTENDED RELEASE ORAL at 09:00

## 2025-07-20 RX ADMIN — SPIRONOLACTONE 50 MG: 25 TABLET ORAL at 09:00

## 2025-07-20 RX ADMIN — PANTOPRAZOLE SODIUM 40 MG: 40 TABLET, DELAYED RELEASE ORAL at 08:59

## 2025-07-20 RX ADMIN — PANTOPRAZOLE SODIUM 40 MG: 40 TABLET, DELAYED RELEASE ORAL at 17:25

## 2025-07-20 RX ADMIN — MONTELUKAST 10 MG: 10 TABLET, FILM COATED ORAL at 09:00

## 2025-07-20 RX ADMIN — ATORVASTATIN CALCIUM 40 MG: 40 TABLET, FILM COATED ORAL at 05:05

## 2025-07-20 RX ADMIN — CARVEDILOL 6.25 MG: 6.25 TABLET, FILM COATED ORAL at 17:25

## 2025-07-20 RX ADMIN — INSULIN GLARGINE 10 UNITS: 100 INJECTION, SOLUTION SUBCUTANEOUS at 22:02

## 2025-07-20 RX ADMIN — INSULIN LISPRO 10 UNITS: 100 INJECTION, SOLUTION INTRAVENOUS; SUBCUTANEOUS at 09:00

## 2025-07-20 RX ADMIN — CARVEDILOL 6.25 MG: 6.25 TABLET, FILM COATED ORAL at 09:00

## 2025-07-20 RX ADMIN — FUROSEMIDE 40 MG: 40 TABLET ORAL at 09:00

## 2025-07-20 RX ADMIN — INSULIN LISPRO 10 UNITS: 100 INJECTION, SOLUTION INTRAVENOUS; SUBCUTANEOUS at 11:53

## 2025-07-20 RX ADMIN — INSULIN LISPRO 1 UNITS: 100 INJECTION, SOLUTION INTRAVENOUS; SUBCUTANEOUS at 11:53

## 2025-07-20 NOTE — ASSESSMENT & PLAN NOTE
Lab Results   Component Value Date    EGFR 41 07/20/2025    EGFR 41 07/19/2025    EGFR 36 05/14/2025    CREATININE 1.54 (H) 07/20/2025    CREATININE 1.54 (H) 07/19/2025    CREATININE 1.71 (H) 05/14/2025      Verified patient. CCPD disconnected using sterile technique. UF 2462mL total fluid removed. Effluent clear. Report given to primary RN.

## 2025-07-20 NOTE — ASSESSMENT & PLAN NOTE
She admits having melena associated with dark red bowel movements this morning x 2.  This is a recurrent issue for the patient.  Known history of cirrhotic gastropathy that was noted during the last EGD from April 2025.  Patient also history of esophageal varices s/p banding.  Patient received IV Venofer outpatient for 5 week.  Hemoglobin on admission 9.8.  ED team discussed plan with GI team and will admit patient for observation and hemoglobin monitoring.  Holding aspirin and DVT prophylaxis.  Hemoglobin is stable but given history patient will get a scope tomorrow.  N.p.o. after midnight.  Last hemoglobin from this morning was 10.6

## 2025-07-20 NOTE — PLAN OF CARE
Problem: INFECTION - ADULT  Goal: Absence or prevention of progression during hospitalization  Description: INTERVENTIONS:  - Assess and monitor for signs and symptoms of infection  - Monitor lab/diagnostic results  - Monitor all insertion sites, i.e. indwelling lines, tubes, and drains  - Monitor endotracheal if appropriate and nasal secretions for changes in amount and color  - Bryan appropriate cooling/warming therapies per order  - Administer medications as ordered  - Instruct and encourage patient and family to use good hand hygiene technique  - Identify and instruct in appropriate isolation precautions for identified infection/condition  Outcome: Progressing     Problem: PAIN - ADULT  Goal: Verbalizes/displays adequate comfort level or baseline comfort level  Description: Interventions:  - Encourage patient to monitor pain and request assistance  - Assess pain using appropriate pain scale  - Administer analgesics as ordered based on type and severity of pain and evaluate response  - Implement non-pharmacological measures as appropriate and evaluate response  - Consider cultural and social influences on pain and pain management  - Notify physician/advanced practitioner if interventions unsuccessful or patient reports new pain  - Educate patient/family on pain management process including their role and importance of  reporting pain   - Provide non-pharmacologic/complimentary pain relief interventions  Outcome: Progressing

## 2025-07-20 NOTE — ASSESSMENT & PLAN NOTE
2 episodes of melena occurring yesterday  Hemodynamically stable  Hemoglobin level on 5/14/2025 was 8.7  Hemoglobin level on 7/19/2025 was 9.8  Hemoglobin level 7/20 is 8.9  EGD 4/11/25 demonstrated a single small varix of the distal esophagus and a severe diffuse hypertensive gastropathy with stigmata of recent hemorrhage  EGD 12/7/2024 demonstrated a single small grade 1 varix of the distal esophagus and a single small superficial round ulcer in the lower third of the esophagus which was the site of a previous banding in addition to severe portal hypertensive gastropathy without active bleeding  EGD performed 11/25/2024 demonstrated a small varix of the distal esophagus status post banding in addition to severe portal hypertensive gastropathy and a 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  EGD performed 9/24/2024 demonstrated a single small varix of the distal esophagus, a portal hypertensive gastropathy and a 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 after midnight  Clear liquid diet  EGD tomorrow  At this point in time, I think that consideration for possible TIPS should be made following tomorrow's EGD

## 2025-07-20 NOTE — PROGRESS NOTES
Progress Note - Hospitalist   Name: Bimal Lugo 79 y.o. male I MRN: [id number]  Unit/Bed#: -01 I Date of Admission: 7/19/2025   Date of Service: 7/20/2025 I Hospital Day: 0    Assessment & Plan  Melena  She admits having melena associated with dark red bowel movements this morning x 2.  This is a recurrent issue for the patient.  Known history of cirrhotic gastropathy that was noted during the last EGD from April 2025.  Patient also history of esophageal varices s/p banding.  Patient received IV Venofer outpatient for 5 week.  Hemoglobin on admission 9.8.  ED team discussed plan with GI team and will admit patient for observation and hemoglobin monitoring.  Holding aspirin and DVT prophylaxis.  Hemoglobin is stable but given history patient will get a scope tomorrow.  N.p.o. after midnight.  Last hemoglobin from this morning was 10.6  Atherosclerosis of lower extremity with intermittent claudication (HCC)  Patient normally on aspirin and atorvastatin.  Can resume aspirin if hemoglobin remained stable  CAD (coronary artery disease)  Patient denies chest pain or shortness of breath.  Normally on aspirin 81 mg and atorvastatin.  Patient did not take aspirin in the a.m..  Will hold for today  Consider resuming aspirin if hemoglobin remains stable in the next 24-48 hours.  Continue with atorvastatin  Chronic kidney disease, stage 3a (HCC)  Lab Results   Component Value Date    EGFR 41 07/20/2025    EGFR 41 07/19/2025    EGFR 36 05/14/2025    CREATININE 1.54 (H) 07/20/2025    CREATININE 1.54 (H) 07/19/2025    CREATININE 1.71 (H) 05/14/2025     Patient has creatinine baseline 1.4-1.6.Remains at baseline on admission.  Follow-up on daily  Cirrhosis of liver with ascites  (HCC)  History of cirrhosis with recurrent ascites requiring weekly paracentesis.  History of varices s/p banding in 2024.  On maintenance carvedilol twice daily, Lasix 40 mg daily and spironolactone 50 mg daily  History of hepatic encephalopathy  currently oriented x 3 with no asterixis on exam  HCC screening without concerning for lesion.    Thrombocytopenia (HCC)  Chronic thrombocytopenia in the setting of liver cirrhosis.  Will hold DVT given platelets below 50 and also due to concern for GI bleeding.  Diabetes mellitus, type 2 (HCC)  Lab Results   Component Value Date    HGBA1C 5.5 07/19/2025       Recent Labs     07/19/25  1637 07/19/25  2100 07/20/25  0727 07/20/25  1149   POCGLU 176* 151* 142* 156*       Blood Sugar Average: Last 72 hrs:  (P) 156.25  Patient admits to taking basal bolus insulin with Tresiba 10 units at bedtime and NovoLog 10 units with meals  Continue home regimen.  Initiate sliding scale insulin  Accu-Cheks 4 times a day    VTE Pharmacologic Prophylaxis:   High Risk (Score >/= 5) - Pharmacological DVT Prophylaxis Contraindicated. Sequential Compression Devices Ordered.    Mobility:   Basic Mobility Inpatient Raw Score: 23  JH-HLM Goal: 7: Walk 25 feet or more  JH-HLM Achieved: 7: Walk 25 feet or more  JH-HLM Goal achieved. Continue to encourage appropriate mobility.    Patient Centered Rounds: I performed bedside rounds with nursing staff today.   Discussions with Specialists or Other Care Team Provider: Gastroenterology    Education and Discussions with Family / Patient: Updated  (wife) at bedside.    Current Length of Stay: 0 day(s)  Current Patient Status: Inpatient   Certification Statement: The patient, admitted on an observation basis, will now require > 2 midnight hospital stay due to needing an EGD given history of cirrhosis coming in with dark stools  Discharge Plan: Anticipate discharge in 24-48 hrs to home.    Code Status: Level 1 - Full Code    Subjective   Patient seen and examined.  Was feeling nauseous but otherwise doing okay    Objective :  Temp:  [97.6 °F (36.4 °C)-98 °F (36.7 °C)] 98 °F (36.7 °C)  HR:  [58-69] 58  BP: (133-155)/(59-69) 146/65  Resp:  [19-20] 19  SpO2:  [99 %-100 %] 99 %  O2 Device:  None (Room air)    There is no height or weight on file to calculate BMI.     Input and Output Summary (last 24 hours):     Intake/Output Summary (Last 24 hours) at 7/20/2025 1413  Last data filed at 7/20/2025 0729  Gross per 24 hour   Intake 660 ml   Output --   Net 660 ml       Physical Exam  General Appearance:    Alert, cooperative, no distress, appears stated age                               Lungs:     Clear to auscultation bilaterally, respirations unlabored       Heart:    Regular rate and rhythm, S1 and S2 normal, no murmur, rub    or gallop   Abdomen:     Soft, non-tender, bowel sounds active all four quadrants,     no masses, no organomegaly           Extremities:   Extremities normal, atraumatic, no cyanosis or edema                         Lines/Drains:              Lab Results: I have reviewed the following results:   Results from last 7 days   Lab Units 07/20/25  0935 07/20/25  0440   WBC Thousand/uL  --  2.51*   HEMOGLOBIN g/dL 10.6* 8.9*   HEMATOCRIT %  --  26.4*   PLATELETS Thousands/uL  --  40*   SEGS PCT %  --  61   LYMPHO PCT %  --  21   MONO PCT %  --  10   EOS PCT %  --  7*     Results from last 7 days   Lab Units 07/20/25  0440 07/19/25  1215   SODIUM mmol/L 138 135   POTASSIUM mmol/L 4.0 4.3   CHLORIDE mmol/L 109* 105   CO2 mmol/L 23 24   BUN mg/dL 32* 35*   CREATININE mg/dL 1.54* 1.54*   ANION GAP mmol/L 6 6   CALCIUM mg/dL 8.4 8.8   ALBUMIN g/dL  --  3.4*   TOTAL BILIRUBIN mg/dL  --  1.02*   ALK PHOS U/L  --  63   ALT U/L  --  11   AST U/L  --  20   GLUCOSE RANDOM mg/dL 106 159*         Results from last 7 days   Lab Units 07/20/25  1149 07/20/25  0727 07/19/25  2100 07/19/25  1637   POC GLUCOSE mg/dl 156* 142* 151* 176*     Results from last 7 days   Lab Units 07/19/25  1647   HEMOGLOBIN A1C % 5.5           Recent Cultures (last 7 days):         Imaging Results Review: No pertinent imaging studies reviewed.  Other Study Results Review: No additional pertinent studies reviewed.    Last 24  Hours Medication List:     Current Facility-Administered Medications:     atorvastatin (LIPITOR) tablet 40 mg, Early Morning    carvedilol (COREG) tablet 6.25 mg, BID With Meals    furosemide (LASIX) tablet 40 mg, Daily    insulin glargine (LANTUS) subcutaneous injection 10 Units 0.1 mL, HS    insulin lispro (HumALOG/ADMELOG) 100 units/mL subcutaneous injection 1-6 Units, TID AC **AND** Fingerstick Glucose (POCT), 4x Daily AC and at bedtime    insulin lispro (HumALOG/ADMELOG) 100 units/mL subcutaneous injection 10 Units, TID With Meals    isosorbide mononitrate (IMDUR) 24 hr tablet 30 mg, Daily    montelukast (SINGULAIR) tablet 10 mg, Daily    multivitamin stress formula tablet 1 tablet, Daily    ondansetron (ZOFRAN) injection 4 mg, Q8H PRN    pantoprazole (PROTONIX) EC tablet 40 mg, BID AC    spironolactone (ALDACTONE) tablet 50 mg, Daily    Administrative Statements   Today, Patient Was Seen By: Nicolas Galvan MD  I have spent a total time of 20 minutes in caring for this patient on the day of the visit/encounter including Diagnostic results, Prognosis, Risks and benefits of tx options, Instructions for management, Patient and family education, Importance of tx compliance, Risk factor reductions, Impressions, Counseling / Coordination of care, Documenting in the medical record, Reviewing/placing orders in the medical record (including tests, medications, and/or procedures), and Obtaining or reviewing history  .    **Please Note: This note may have been constructed using a voice recognition system.**

## 2025-07-20 NOTE — CONSULTS
Consultation - Gastroenterology   Name: Bimal Lugo 79 y.o. male I MRN: [id number]  Unit/Bed#: -01 I Date of Admission: 7/19/2025   Date of Service: 7/20/2025 I Hospital Day: 0   Consults  Physician Requesting Evaluation: Nicolas Galvan MD   Reason for Evaluation / Principal Problem: Melena    Assessment & Plan  Melena  2 episodes of melena occurring yesterday  Hemodynamically stable  Hemoglobin level on 5/14/2025 was 8.7  Hemoglobin level on 7/19/2025 was 9.8  Hemoglobin level 7/20 is 8.9  EGD 4/11/25 demonstrated a single small varix of the distal esophagus and a severe diffuse hypertensive gastropathy with stigmata of recent hemorrhage  EGD 12/7/2024 demonstrated a single small grade 1 varix of the distal esophagus and a single small superficial round ulcer in the lower third of the esophagus which was the site of a previous banding in addition to severe portal hypertensive gastropathy without active bleeding  EGD performed 11/25/2024 demonstrated a small varix of the distal esophagus status post banding in addition to severe portal hypertensive gastropathy and a 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  EGD performed 9/24/2024 demonstrated a single small varix of the distal esophagus, a portal hypertensive gastropathy and a 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 after midnight  Clear liquid  diet  EGD tomorrow  At this point in time, I think that consideration for possible TIPS should be made following tomorrow's EGD  Atherosclerosis of lower extremity with intermittent claudication (HCC)    CAD (coronary artery disease)    Chronic kidney disease, stage 3a (HCC)  Lab Results   Component Value Date    EGFR 41 07/20/2025    EGFR 41 07/19/2025    EGFR 36 05/14/2025    CREATININE 1.54 (H) 07/20/2025    CREATININE 1.54 (H) 07/19/2025    CREATININE 1.71 (H) 05/14/2025     Cirrhosis of liver with ascites  (HCC)  MELD 3.0: 13 at 2/13/2025 10:34 AM  MELD-Na: 13 at 2/13/2025 10:34 AM  Calculated from:  Serum Creatinine: 1.47 mg/dL at 2/13/2025 10:34 AM  Serum Sodium: 138 mmol/L (Using max of 137 mmol/L) at 2/13/2025 10:34 AM  Total Bilirubin: 1.19 mg/dL at 2/13/2025 10:34 AM  Serum Albumin: 3.9 g/dL (Using max of 3.5 g/dL) at 2/13/2025 10:34 AM  INR(ratio): 1.24 at 2/13/2025 10:34 AM  Age at listing (hypothetical): 79 years  Sex: Male at 2/13/2025 10:34 AM    Varices: Varices have been identified on multiple endoscopies performed in 2024 and 2025 along with a portal gastropathy  Ascites: Patient has had significant ascites requiring weekly abdominal paracentesis  Encephalopathy: None, oriented x 3  HCC screening: No liver mass lesion identified on CT AP with contrast 5/14/2025    Liver panel on 7/19 demonstrates AST 20, ALT 11, alk phos 63, T. bili 1.02, albumin 3.4  BUN 35, creatinine 1.54  Between the significant ascites requiring weekly paracentesis and multiple hospitalizations over the past 2 years involving melena and bleeding associated with portal hypertension complications (varices and portal gastropathy), I feel that interventional radiology should be consulted for consideration of a TIPS  TIPS can be performed in patients with renal insufficiency, however, there is an increased risk of hepatic encephalopathy.  TIPS can improve renal function in some cases of portal hypertension but it can also worsen  renal function in patients with intrinsic renal disease.  Nephrology should be consulted regarding any consideration for TIPS procedure  Thrombocytopenia (HCC)    Diabetes mellitus, type 2 (HCC)  Lab Results   Component Value Date    HGBA1C 5.5 07/19/2025       Recent Labs     07/19/25  1637 07/19/25  2100 07/20/25  0727   POCGLU 176* 151* 142*       Blood Sugar Average: Last 72 hrs:        History of Present Illness   HPI:  Bimal Lugo is a 79 y.o. male who presents with a PMH of cirrhosis complicated by portal hypertension, ascites, esophageal varices, portal gastropathy, CAD, PAD, CKD, type II DM presented to the ED with a complaint of 2 episodes of melena associated with abdominal discomfort.  There was nausea without vomiting.  Patient admits to taking iron supplements orally.  He denies hematemesis or melena.  He has undergone multiple endoscopies in 2025 and 2024 demonstrating a moderate to severe portal gastropathy as well as distal esophageal varices frequently requiring banding.  In addition he has chronic ascites requiring weekly abdominal paracentesis.  Hemoglobin level on admission was 9.8.    A full review of all endoscopies have been listed in the cirrhosis assessment above.    Objective :  Temp:  [97.6 °F (36.4 °C)-98.4 °F (36.9 °C)] 98 °F (36.7 °C)  HR:  [58-69] 58  BP: (133-155)/(59-69) 146/65  Resp:  [19-20] 19  SpO2:  [99 %-100 %] 99 %  O2 Device: None (Room air)    Physical Exam  Vitals reviewed.   Constitutional:       General: He is not in acute distress.     Appearance: Normal appearance. He is not ill-appearing.     Eyes:      General: No scleral icterus.     Extraocular Movements: Extraocular movements intact.      Pupils: Pupils are equal, round, and reactive to light.       Cardiovascular:      Rate and Rhythm: Normal rate and regular rhythm.      Pulses: Normal pulses.      Heart sounds: Normal heart sounds. No murmur heard.  Pulmonary:      Effort: Pulmonary effort is normal.       "Breath sounds: Normal breath sounds. No wheezing, rhonchi or rales.   Abdominal:      General: Abdomen is protuberant. Bowel sounds are normal. There is distension.      Palpations: Abdomen is soft. There is shifting dullness and fluid wave. There is no mass.      Tenderness: There is no abdominal tenderness. There is no guarding or rebound.     Musculoskeletal:      Right lower leg: Edema present.      Left lower leg: Edema present.     Skin:     General: Skin is warm and dry.      Coloration: Skin is not jaundiced.      Findings: No rash.     Neurological:      General: No focal deficit present.      Mental Status: He is alert and oriented to person, place, and time.     Psychiatric:         Mood and Affect: Mood normal.         Behavior: Behavior normal.       Lab Results: I have reviewed the following results:CBC/BMP:   .     07/20/25  0440 07/20/25  0935   WBC 2.51*  --    HGB 8.9* 10.6*   HCT 26.4*  --    PLT 40*  --    SODIUM 138  --    K 4.0  --    *  --    CO2 23  --    BUN 32*  --    CREATININE 1.54*  --    GLUC 106  --     , Creatinine Clearance: Estimated Creatinine Clearance: 39 mL/min (A) (by C-G formula based on SCr of 1.54 mg/dL (H))., LFTs:   .     07/19/25  1215   AST 20   ALT 11   ALB 3.4*   TBILI 1.02*   ALKPHOS 63    , PTT/INR:No new results in last 24 hours. , Troponin,BNP:No new results in last 24 hours. , Lactic Acid: No new results in last 24 hours. , Lipase:   Lab Results   Component Value Date    LIPASE 33 07/19/2025   , Ammonia:   , Urinalysis: No results found for: \"COLORU\", \"CLARITYU\", \"SPECGRAV\", \"PHUR\", \"LEUKOCYTESUR\", \"NITRITE\", \"PROTEINUA\", \"GLUCOSEU\", \"KETONESU\", \"BILIRUBINUR\", \"BLOODU\"    Imaging Results Review: I personally reviewed the following image studies in PACS and associated radiology reports: CT abdomen/pelvis. My interpretation of the radiology images/reports is: 5/14/2025 demonstrating cirrhosis with stigmata of portal hypertension to include moderate volume " ascites, splenomegaly, varices, diffuse colon wall thickening most consistent with a hypertensive colopathy..  Other Study Results Review: No additional pertinent studies reviewed.

## 2025-07-20 NOTE — ASSESSMENT & PLAN NOTE
MELD 3.0: 13 at 2/13/2025 10:34 AM  MELD-Na: 13 at 2/13/2025 10:34 AM  Calculated from:  Serum Creatinine: 1.47 mg/dL at 2/13/2025 10:34 AM  Serum Sodium: 138 mmol/L (Using max of 137 mmol/L) at 2/13/2025 10:34 AM  Total Bilirubin: 1.19 mg/dL at 2/13/2025 10:34 AM  Serum Albumin: 3.9 g/dL (Using max of 3.5 g/dL) at 2/13/2025 10:34 AM  INR(ratio): 1.24 at 2/13/2025 10:34 AM  Age at listing (hypothetical): 79 years  Sex: Male at 2/13/2025 10:34 AM    Varices: Varices have been identified on multiple endoscopies performed in 2024 and 2025 along with a portal gastropathy  Ascites: Patient has had significant ascites requiring weekly abdominal paracentesis  Encephalopathy: None, oriented x 3  HCC screening: No liver mass lesion identified on CT AP with contrast 5/14/2025    Liver panel on 7/19 demonstrates AST 20, ALT 11, alk phos 63, T. bili 1.02, albumin 3.4  BUN 35, creatinine 1.54  Between the significant ascites requiring weekly paracentesis and multiple hospitalizations over the past 2 years involving melena and bleeding associated with portal hypertension complications (varices and portal gastropathy), I feel that interventional radiology should be consulted for consideration of a TIPS  TIPS can be performed in patients with renal insufficiency, however, there is an increased risk of hepatic encephalopathy.  TIPS can improve renal function in some cases of portal hypertension but it can also worsen renal function in patients with intrinsic renal disease.  Nephrology should be consulted regarding any consideration for TIPS procedure

## 2025-07-20 NOTE — PLAN OF CARE
Problem: INFECTION - ADULT  Goal: Absence or prevention of progression during hospitalization  Description: INTERVENTIONS:  - Assess and monitor for signs and symptoms of infection  - Monitor lab/diagnostic results  - Monitor all insertion sites, i.e. indwelling lines, tubes, and drains  - Monitor endotracheal if appropriate and nasal secretions for changes in amount and color  - [place] appropriate cooling/warming therapies per order  - Administer medications as ordered  - Instruct and encourage patient and family to use good hand hygiene technique  - Identify and instruct in appropriate isolation precautions for identified infection/condition  7/20/2025 0459 by Pearl Castanon RN  Outcome: Progressing  7/19/2025 2009 by Pearl Castanon, RN  Outcome: Progressing

## 2025-07-20 NOTE — PLAN OF CARE
Problem: PAIN - ADULT  Goal: Verbalizes/displays adequate comfort level or baseline comfort level  Description: Interventions:  - Encourage patient to monitor pain and request assistance  - Assess pain using appropriate pain scale  - Administer analgesics as ordered based on type and severity of pain and evaluate response  - Implement non-pharmacological measures as appropriate and evaluate response  - Consider cultural and social influences on pain and pain management  - Notify physician/advanced practitioner if interventions unsuccessful or patient reports new pain  - Educate patient/family on pain management process including their role and importance of  reporting pain   - Provide non-pharmacologic/complimentary pain relief interventions  Outcome: Progressing     Problem: INFECTION - ADULT  Goal: Absence or prevention of progression during hospitalization  Description: INTERVENTIONS:  - Assess and monitor for signs and symptoms of infection  - Monitor lab/diagnostic results  - Monitor all insertion sites, i.e. indwelling lines, tubes, and drains  - Monitor endotracheal if appropriate and nasal secretions for changes in amount and color  - [place] appropriate cooling/warming therapies per order  - Administer medications as ordered  - Instruct and encourage patient and family to use good hand hygiene technique  - Identify and instruct in appropriate isolation precautions for identified infection/condition  Outcome: Progressing  Goal: Absence of fever/infection during neutropenic period  Description: INTERVENTIONS:  - Monitor WBC  - Perform strict hand hygiene  - Limit to healthy visitors only  - No plants, dried, fresh or silk flowers with marie in patient room  Outcome: Progressing     Problem: SAFETY ADULT  Goal: Patient will remain free of falls  Description: INTERVENTIONS:  - Educate patient/family on patient safety including physical limitations  - Instruct patient to call for assistance with activity   -  Consider consulting OT/PT to assist with strengthening/mobility based on AM PAC & JH-HLM score  - Consult OT/PT to assist with strengthening/mobility   - Keep Call bell within reach  - Keep bed low and locked with side rails adjusted as appropriate  - Keep care items and personal belongings within reach  - Initiate and maintain comfort rounds  - Make Fall Risk Sign visible to staff  - Offer Toileting every  Hours, in advance of need  - Initiate/Maintain alarm  - Obtain necessary fall risk management equipment:   - Apply yellow socks and bracelet for high fall risk patients  - Consider moving patient to room near nurses station  Outcome: Progressing  Goal: Maintain or return to baseline ADL function  Description: INTERVENTIONS:  -  Assess patient's ability to carry out ADLs; assess patient's baseline for ADL function and identify physical deficits which impact ability to perform ADLs (bathing, care of mouth/teeth, toileting, grooming, dressing, etc.)  - Assess/evaluate cause of self-care deficits   - Assess range of motion  - Assess patient's mobility; develop plan if impaired  - Assess patient's need for assistive devices and provide as appropriate  - Encourage maximum independence but intervene and supervise when necessary  - Involve family in performance of ADLs  - Assess for home care needs following discharge   - Consider OT consult to assist with ADL evaluation and planning for discharge  - Provide patient education as appropriate  - Monitor functional capacity and physical performance, use of AM PAC & JH-HLM   - Monitor gait, balance and fatigue with ambulation    Outcome: Progressing  Goal: Maintains/Returns to pre admission functional level  Description: INTERVENTIONS:  - Perform AM-PAC 6 Click Basic Mobility/ Daily Activity assessment daily.  - Set and communicate daily mobility goal to care team and patient/family/caregiver.   - Collaborate with rehabilitation services on mobility goals if consulted  -  Perform Range of Motion  times a day.  - Reposition patient every  hours.  - Dangle patient  times a day  - Stand patient  times a day  - Ambulate patient  times a day  - Out of bed to chair  times a day   - Out of bed for meals  times a day  - Out of bed for toileting  - Record patient progress and toleration of activity level   Outcome: Progressing     Problem: DISCHARGE PLANNING  Goal: Discharge to home or other facility with appropriate resources  Description: INTERVENTIONS:  - Identify barriers to discharge w/patient and caregiver  - Arrange for needed discharge resources and transportation as appropriate  - Identify discharge learning needs (meds, wound care, etc.)  - Arrange for interpretive services to assist at discharge as needed  - Refer to Case Management Department for coordinating discharge planning if the patient needs post-hospital services based on physician/advanced practitioner order or complex needs related to functional status, cognitive ability, or social support system  Outcome: Progressing     Problem: Knowledge Deficit  Goal: Patient/family/caregiver demonstrates understanding of disease process, treatment plan, medications, and discharge instructions  Description: Complete learning assessment and assess knowledge base.  Interventions:  - Provide teaching at level of understanding  - Provide teaching via preferred learning methods  Outcome: Progressing     Problem: Prexisting or High Potential for Compromised Skin Integrity  Goal: Skin integrity is maintained or improved  Description: INTERVENTIONS:  - Identify patients at risk for skin breakdown  - Assess and monitor skin integrity including under and around medical devices   - Assess and monitor nutrition and hydration status  - Monitor labs  - Assess for incontinence   - Turn and reposition patient  - Assist with mobility/ambulation  - Relieve pressure over jacob prominences   - Avoid friction and shearing  - Provide appropriate hygiene  as needed including keeping skin clean and dry  - Evaluate need for skin moisturizer/barrier cream  - Collaborate with interdisciplinary team  - Patient/family teaching  - Consider wound care consult    Assess:  - Review Boby scale daily  - Clean and moisturize skin every   - Inspect skin when repositioning, toileting, and assisting with ADLS  - Assess under medical devices such as  every   - Assess extremities for adequate circulation and sensation     Bed Management:  - Have minimal linens on bed & keep smooth, unwrinkled  - Change linens as needed when moist or perspiring  - Avoid sitting or lying in one position for more than  hours while in bed?Keep HOB at degrees   - Toileting:  - Offer bedside commode  - Assess for incontinence every   - Use incontinent care products after each incontinent episode such as     Activity:  - Mobilize patient times a day  - Encourage activity and walks on unit  - Encourage or provide ROM exercises   - Turn and reposition patient every  Hours  - Use appropriate equipment to lift or move patient in bed  - Instruct/ Assist with weight shifting every  when out of bed in chair  - Consider limitation of chair time  hour intervals    Skin Care:  - Avoid use of baby powder, tape, friction and shearing, hot water or constrictive clothing  - Relieve pressure over bony prominences using   - Do not massage red bony areas    Next Steps:  - Teach patient strategies to minimize risks such as   - Consider consults to  interdisciplinary teams such as  Outcome: Progressing

## 2025-07-20 NOTE — ASSESSMENT & PLAN NOTE
Lab Results   Component Value Date    EGFR 41 07/20/2025    EGFR 41 07/19/2025    EGFR 36 05/14/2025    CREATININE 1.54 (H) 07/20/2025    CREATININE 1.54 (H) 07/19/2025    CREATININE 1.71 (H) 05/14/2025     Patient has creatinine baseline 1.4-1.6.Remains at baseline on admission.  Follow-up on daily

## 2025-07-20 NOTE — ASSESSMENT & PLAN NOTE
Lab Results   Component Value Date    HGBA1C 5.5 07/19/2025       Recent Labs     07/19/25  1637 07/19/25  2100 07/20/25  0727   POCGLU 176* 151* 142*       Blood Sugar Average: Last 72 hrs:

## 2025-07-20 NOTE — ASSESSMENT & PLAN NOTE
Lab Results   Component Value Date    HGBA1C 5.5 07/19/2025       Recent Labs     07/19/25  1637 07/19/25  2100 07/20/25  0727 07/20/25  1149   POCGLU 176* 151* 142* 156*       Blood Sugar Average: Last 72 hrs:  (P) 156.25  Patient admits to taking basal bolus insulin with Tresiba 10 units at bedtime and NovoLog 10 units with meals  Continue home regimen.  Initiate sliding scale insulin  Accu-Cheks 4 times a day

## 2025-07-21 ENCOUNTER — ANESTHESIA EVENT (INPATIENT)
Dept: GASTROENTEROLOGY | Facility: HOSPITAL | Age: 80
End: 2025-07-21
Payer: MEDICARE

## 2025-07-21 ENCOUNTER — ANESTHESIA (INPATIENT)
Dept: GASTROENTEROLOGY | Facility: HOSPITAL | Age: 80
End: 2025-07-21
Payer: MEDICARE

## 2025-07-21 ENCOUNTER — APPOINTMENT (INPATIENT)
Dept: GASTROENTEROLOGY | Facility: HOSPITAL | Age: 80
DRG: 441 | End: 2025-07-21
Attending: PHYSICIAN ASSISTANT
Payer: MEDICARE

## 2025-07-21 LAB
GLUCOSE SERPL-MCNC: 101 MG/DL (ref 65–140)
GLUCOSE SERPL-MCNC: 134 MG/DL (ref 65–140)
GLUCOSE SERPL-MCNC: 137 MG/DL (ref 65–140)
GLUCOSE SERPL-MCNC: 229 MG/DL (ref 65–140)
HGB BLD-MCNC: 8.5 G/DL (ref 12–17)
HGB BLD-MCNC: 9.6 G/DL (ref 12–17)

## 2025-07-21 PROCEDURE — 85018 HEMOGLOBIN: CPT

## 2025-07-21 PROCEDURE — 85014 HEMATOCRIT: CPT | Performed by: NURSE PRACTITIONER

## 2025-07-21 PROCEDURE — 0W3P8ZZ CONTROL BLEEDING IN GASTROINTESTINAL TRACT, VIA NATURAL OR ARTIFICIAL OPENING ENDOSCOPIC: ICD-10-PCS | Performed by: INTERNAL MEDICINE

## 2025-07-21 PROCEDURE — 82948 REAGENT STRIP/BLOOD GLUCOSE: CPT

## 2025-07-21 PROCEDURE — 85018 HEMOGLOBIN: CPT | Performed by: NURSE PRACTITIONER

## 2025-07-21 PROCEDURE — 99232 SBSQ HOSP IP/OBS MODERATE 35: CPT | Performed by: FAMILY MEDICINE

## 2025-07-21 PROCEDURE — C1886 CATHETER, ABLATION: HCPCS

## 2025-07-21 PROCEDURE — 43255 EGD CONTROL BLEEDING ANY: CPT | Performed by: INTERNAL MEDICINE

## 2025-07-21 RX ORDER — SODIUM CHLORIDE, SODIUM LACTATE, POTASSIUM CHLORIDE, CALCIUM CHLORIDE 600; 310; 30; 20 MG/100ML; MG/100ML; MG/100ML; MG/100ML
INJECTION, SOLUTION INTRAVENOUS CONTINUOUS PRN
Status: DISCONTINUED | OUTPATIENT
Start: 2025-07-21 | End: 2025-07-21

## 2025-07-21 RX ORDER — LIDOCAINE HYDROCHLORIDE 20 MG/ML
INJECTION, SOLUTION EPIDURAL; INFILTRATION; INTRACAUDAL; PERINEURAL AS NEEDED
Status: DISCONTINUED | OUTPATIENT
Start: 2025-07-21 | End: 2025-07-21

## 2025-07-21 RX ORDER — EPHEDRINE SULFATE 50 MG/ML
INJECTION INTRAVENOUS AS NEEDED
Status: DISCONTINUED | OUTPATIENT
Start: 2025-07-21 | End: 2025-07-21

## 2025-07-21 RX ORDER — PROPOFOL 10 MG/ML
INJECTION, EMULSION INTRAVENOUS AS NEEDED
Status: DISCONTINUED | OUTPATIENT
Start: 2025-07-21 | End: 2025-07-21

## 2025-07-21 RX ADMIN — MONTELUKAST 10 MG: 10 TABLET, FILM COATED ORAL at 09:07

## 2025-07-21 RX ADMIN — CARVEDILOL 6.25 MG: 6.25 TABLET, FILM COATED ORAL at 07:50

## 2025-07-21 RX ADMIN — INSULIN LISPRO 10 UNITS: 100 INJECTION, SOLUTION INTRAVENOUS; SUBCUTANEOUS at 12:55

## 2025-07-21 RX ADMIN — ATORVASTATIN CALCIUM 40 MG: 40 TABLET, FILM COATED ORAL at 05:21

## 2025-07-21 RX ADMIN — INSULIN LISPRO 2 UNITS: 100 INJECTION, SOLUTION INTRAVENOUS; SUBCUTANEOUS at 16:57

## 2025-07-21 RX ADMIN — PANTOPRAZOLE SODIUM 40 MG: 40 TABLET, DELAYED RELEASE ORAL at 16:57

## 2025-07-21 RX ADMIN — EPHEDRINE SULFATE 5 MG: 50 INJECTION INTRAVENOUS at 10:16

## 2025-07-21 RX ADMIN — INSULIN LISPRO 10 UNITS: 100 INJECTION, SOLUTION INTRAVENOUS; SUBCUTANEOUS at 16:57

## 2025-07-21 RX ADMIN — PANTOPRAZOLE SODIUM 40 MG: 40 TABLET, DELAYED RELEASE ORAL at 07:50

## 2025-07-21 RX ADMIN — LIDOCAINE HYDROCHLORIDE 100 MG: 20 INJECTION, SOLUTION EPIDURAL; INFILTRATION; INTRACAUDAL; PERINEURAL at 10:16

## 2025-07-21 RX ADMIN — INSULIN GLARGINE 10 UNITS: 100 INJECTION, SOLUTION SUBCUTANEOUS at 21:42

## 2025-07-21 RX ADMIN — SODIUM CHLORIDE, SODIUM LACTATE, POTASSIUM CHLORIDE, AND CALCIUM CHLORIDE: .6; .31; .03; .02 INJECTION, SOLUTION INTRAVENOUS at 10:14

## 2025-07-21 RX ADMIN — EPHEDRINE SULFATE 10 MG: 50 INJECTION INTRAVENOUS at 10:22

## 2025-07-21 RX ADMIN — Medication 1 TABLET: at 09:07

## 2025-07-21 RX ADMIN — FUROSEMIDE 40 MG: 40 TABLET ORAL at 09:07

## 2025-07-21 RX ADMIN — CARVEDILOL 6.25 MG: 6.25 TABLET, FILM COATED ORAL at 16:57

## 2025-07-21 RX ADMIN — ISOSORBIDE MONONITRATE 30 MG: 30 TABLET, EXTENDED RELEASE ORAL at 09:07

## 2025-07-21 RX ADMIN — SPIRONOLACTONE 50 MG: 25 TABLET ORAL at 09:07

## 2025-07-21 RX ADMIN — INSULIN LISPRO 10 UNITS: 100 INJECTION, SOLUTION INTRAVENOUS; SUBCUTANEOUS at 07:51

## 2025-07-21 RX ADMIN — PROPOFOL 100 MG: 10 INJECTION, EMULSION INTRAVENOUS at 10:16

## 2025-07-21 NOTE — ASSESSMENT & PLAN NOTE
She admits having melena associated with dark red bowel movements this morning x 2.  This is a recurrent issue for the patient.  Known history of cirrhotic gastropathy that was noted during the last EGD from April 2025.  Patient also history of esophageal varices s/p banding.  Patient received IV Venofer outpatient for 5 week.  Hemoglobin on admission 9.8.  ED team discussed plan with GI team and will admit patient for observation and hemoglobin monitoring.  Holding aspirin and DVT prophylaxis.  Hemoglobin is relatively stable.  There was some gastropathy noted.  Intervention done.  Hemoglobin is stable can be discharged tomorrow

## 2025-07-21 NOTE — PLAN OF CARE
Problem: INFECTION - ADULT  Goal: Absence or prevention of progression during hospitalization  Description: INTERVENTIONS:  - Assess and monitor for signs and symptoms of infection  - Monitor lab/diagnostic results  - Monitor all insertion sites, i.e. indwelling lines, tubes, and drains  - Monitor endotracheal if appropriate and nasal secretions for changes in amount and color  - [place] appropriate cooling/warming therapies per order  - Administer medications as ordered  - Instruct and encourage patient and family to use good hand hygiene technique  - Identify and instruct in appropriate isolation precautions for identified infection/condition  Outcome: Progressing     Problem: PAIN - ADULT  Goal: Verbalizes/displays adequate comfort level or baseline comfort level  Description: Interventions:  - Encourage patient to monitor pain and request assistance  - Assess pain using appropriate pain scale  - Administer analgesics as ordered based on type and severity of pain and evaluate response  - Implement non-pharmacological measures as appropriate and evaluate response  - Consider cultural and social influences on pain and pain management  - Notify physician/advanced practitioner if interventions unsuccessful or patient reports new pain  - Educate patient/family on pain management process including their role and importance of  reporting pain   - Provide non-pharmacologic/complimentary pain relief interventions  Outcome: Progressing

## 2025-07-21 NOTE — ANESTHESIA PREPROCEDURE EVALUATION
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)   (+) Cirrhosis of liver with ascites  (HCC)   (+) Hematemesis      /RENAL   (+) Acute kidney injury superimposed on stage 3a chronic kidney disease (HCC)   (+) Chronic kidney disease, stage 3a (HCC)   (+) Stage 3b chronic kidney disease (HCC)      HEMATOLOGY   (+) Acute blood loss anemia   (+) Iron deficiency anemia due to chronic blood loss   (+) Pancytopenia (HCC)   (+) Thrombocytopenia (HCC)      MUSCULOSKELETAL   (+) Back pain      PULMONARY   (+) Chronic obstructive pulmonary disease (HCC)   (+) Sleep apnea        Physical Exam    Airway     Mallampati score: II  TM Distance: >3 FB  Neck ROM: full      Cardiovascular      Dental    edentulous    Pulmonary      Neurological    He appears awake, alert and oriented x3.      Other Findings  post-pubertal.      Anesthesia Plan  ASA Score- 3     Anesthesia Type- IV sedation with anesthesia with ASA Monitors.         Additional Monitors:     Airway Plan:     Comment: Recent labs personally reviewed:  Lab Results       Component                Value               Date                       WBC                      2.51 (L)            07/20/2025                 HGB                      9.6 (L)             07/21/2025                 PLT                      40 (L)              07/20/2025            Lab Results       Component                Value               Date                       K                         4.0                 07/20/2025                 BUN                      32 (H)              07/20/2025                 CREATININE               1.54 (H)            07/20/2025            Lab Results       Component                Value               Date                       PTT                      40 (H)              04/10/2025             Lab Results       Component                Value               Date                       INR                      1.19                04/10/2025              Blood type O    Patient was consented for sedation with IV anesthetic. Discussed that we will maintain spontaneous respirations and utilize supplemental O2. I discussed the risks of aspiration, hypoxia, laryngospasm and bronchospasm. I discussed the scenarios related to conversion to general anesthetic. All questions answered.     I, Elli Leahy MD, have personally seen and evaluated the patient prior to anesthetic care.  I have reviewed the pre-anesthetic record, medical history, allergies, medications and any other medical records if appropriate to the anesthetic care.  If a CRNA is involved in the case, I have reviewed the CRNA assessment, if present, and agree. Patient consented for IV Sedation, general anesthesia as back up. Discussed risks of aspiration, IV infiltration, indications for conversion to general anesthesia. All questions and concerns addressed.   .       Plan Factors-Exercise tolerance (METS): >4 METS.    Chart reviewed. EKG reviewed. Imaging results reviewed. Existing labs reviewed. Patient summary reviewed.    Patient is not a current smoker.  Patient did not smoke on day of surgery.    Obstructive sleep apnea risk education given perioperatively.        Induction- intravenous.    Postoperative Plan- .   Monitoring Plan - Monitoring plan - standard ASA monitoring      Perioperative Resuscitation Plan - Level 1 - Full Code.       Informed Consent- Anesthetic plan and risks discussed  with patient.  I personally reviewed this patient with the CRNA. Discussed and agreed on the Anesthesia Plan with the CRNA..      NPO Status:  Vitals Value Taken Time   Date of last liquid 07/20/25 07/21/25 09:46   Time of last liquid 1730 07/21/25 09:46   Date of last solid 07/20/25 07/21/25 09:46   Time of last solid 1730 07/21/25 09:46

## 2025-07-21 NOTE — PROGRESS NOTES
Progress Note - Hospitalist   Name: Bimal Lugo 79 y.o. male I MRN: [id number]  Unit/Bed#: -01 I Date of Admission: 7/19/2025   Date of Service: 7/21/2025 I Hospital Day: 1    Assessment & Plan  Melena  She admits having melena associated with dark red bowel movements this morning x 2.  This is a recurrent issue for the patient.  Known history of cirrhotic gastropathy that was noted during the last EGD from April 2025.  Patient also history of esophageal varices s/p banding.  Patient received IV Venofer outpatient for 5 week.  Hemoglobin on admission 9.8.  ED team discussed plan with GI team and will admit patient for observation and hemoglobin monitoring.  Holding aspirin and DVT prophylaxis.  Hemoglobin is relatively stable.  There was some gastropathy noted.  Intervention done.  Hemoglobin is stable can be discharged tomorrow  Atherosclerosis of lower extremity with intermittent claudication (HCC)  Patient normally on aspirin and atorvastatin.  Resume aspirin tomorrow  CAD (coronary artery disease)  Patient denies chest pain or shortness of breath.  Normally on aspirin 81 mg and atorvastatin.  Patient did not take aspirin in the a.m..  Will hold for today  Resume aspirin tomorrow  Chronic kidney disease, stage 3a (HCC)  Lab Results   Component Value Date    EGFR 41 07/20/2025    EGFR 41 07/19/2025    EGFR 36 05/14/2025    CREATININE 1.54 (H) 07/20/2025    CREATININE 1.54 (H) 07/19/2025    CREATININE 1.71 (H) 05/14/2025     Patient has creatinine baseline 1.4-1.6.Remains at baseline on admission.  Follow-up on daily  Cirrhosis of liver with ascites  (HCC)  History of cirrhosis with recurrent ascites requiring weekly paracentesis.  History of varices s/p banding in 2024.  On maintenance carvedilol twice daily, Lasix 40 mg daily and spironolactone 50 mg daily  History of hepatic encephalopathy currently oriented x 3 with no asterixis on exam  HCC screening without concerning for lesion.    Thrombocytopenia  (HCC)  Chronic thrombocytopenia in the setting of liver cirrhosis.  Will hold DVT given platelets below 50 and also due to concern for GI bleeding.  Diabetes mellitus, type 2 (HCC)  Lab Results   Component Value Date    HGBA1C 5.5 07/19/2025       Recent Labs     07/20/25  1615 07/20/25  2037 07/21/25  0716 07/21/25  1224   POCGLU 102 166* 137 134       Blood Sugar Average: Last 72 hrs:  (P) 145.5  Patient admits to taking basal bolus insulin with Tresiba 10 units at bedtime and NovoLog 10 units with meals  Continue home regimen.  Initiate sliding scale insulin  Accu-Cheks 4 times a day    VTE Pharmacologic Prophylaxis: Contraindicated secondary GI bleed    Mobility:   Basic Mobility Inpatient Raw Score: 23  JH-HLM Goal: 7: Walk 25 feet or more  JH-HLM Achieved: 6: Walk 10 steps or more  JH-HLM Goal achieved. Continue to encourage appropriate mobility.    Patient Centered Rounds: I performed bedside rounds with nursing staff today.   Discussions with Specialists or Other Care Team Provider: None    Education and Discussions with Family / Patient: Updated  (wife) at bedside.    Current Length of Stay: 1 day(s)  Current Patient Status: Inpatient   Certification Statement: The patient will continue to require additional inpatient hospital stay due to anticipated  Discharge Plan: Anticipate discharge in 24-48 hrs to home.    Code Status: Level 1 - Full Code    Subjective   Patient seen and examined.  No acute events reported    Objective :  Temp:  [97.5 °F (36.4 °C)-98.6 °F (37 °C)] 97.5 °F (36.4 °C)  HR:  [52-71] 53  BP: (101-147)/(56-66) 146/62  Resp:  [11-20] 11  SpO2:  [94 %-100 %] 94 %  O2 Device: None (Room air)  Nasal Cannula O2 Flow Rate (L/min):  [6 L/min] 6 L/min    There is no height or weight on file to calculate BMI.     Input and Output Summary (last 24 hours):     Intake/Output Summary (Last 24 hours) at 7/21/2025 1332  Last data filed at 7/21/2025 1024  Gross per 24 hour   Intake 200 ml    Output --   Net 200 ml       Physical Exam  General Appearance:    Alert, cooperative, no distress, appears stated age                               Lungs:     Clear to auscultation bilaterally, respirations unlabored       Heart:    Regular rate and rhythm, S1 and S2 normal, no murmur, rub    or gallop   Abdomen:     Soft, non-tender, bowel sounds active all four quadrants,     no masses, no organomegaly           Extremities:   Extremities normal, atraumatic, no cyanosis or edema                         Lines/Drains:              Lab Results: I have reviewed the following results:   Results from last 7 days   Lab Units 07/21/25  0828 07/20/25  0935 07/20/25  0440   WBC Thousand/uL  --   --  2.51*   HEMOGLOBIN g/dL 9.6*   < > 8.9*   HEMATOCRIT %  --   --  26.4*   PLATELETS Thousands/uL  --   --  40*   SEGS PCT %  --   --  61   LYMPHO PCT %  --   --  21   MONO PCT %  --   --  10   EOS PCT %  --   --  7*    < > = values in this interval not displayed.     Results from last 7 days   Lab Units 07/20/25  0440 07/19/25  1215   SODIUM mmol/L 138 135   POTASSIUM mmol/L 4.0 4.3   CHLORIDE mmol/L 109* 105   CO2 mmol/L 23 24   BUN mg/dL 32* 35*   CREATININE mg/dL 1.54* 1.54*   ANION GAP mmol/L 6 6   CALCIUM mg/dL 8.4 8.8   ALBUMIN g/dL  --  3.4*   TOTAL BILIRUBIN mg/dL  --  1.02*   ALK PHOS U/L  --  63   ALT U/L  --  11   AST U/L  --  20   GLUCOSE RANDOM mg/dL 106 159*         Results from last 7 days   Lab Units 07/21/25  1224 07/21/25  0716 07/20/25  2037 07/20/25  1615 07/20/25  1149 07/20/25  0727 07/19/25  2100 07/19/25  1637   POC GLUCOSE mg/dl 134 137 166* 102 156* 142* 151* 176*     Results from last 7 days   Lab Units 07/19/25  1647   HEMOGLOBIN A1C % 5.5           Recent Cultures (last 7 days):         Imaging Results Review: No pertinent imaging studies reviewed.  Other Study Results Review: No additional pertinent studies reviewed.    Last 24 Hours Medication List:     Current Facility-Administered Medications:      atorvastatin (LIPITOR) tablet 40 mg, Early Morning    carvedilol (COREG) tablet 6.25 mg, BID With Meals    furosemide (LASIX) tablet 40 mg, Daily    insulin glargine (LANTUS) subcutaneous injection 10 Units 0.1 mL, HS    insulin lispro (HumALOG/ADMELOG) 100 units/mL subcutaneous injection 1-6 Units, TID AC **AND** Fingerstick Glucose (POCT), 4x Daily AC and at bedtime    insulin lispro (HumALOG/ADMELOG) 100 units/mL subcutaneous injection 10 Units, TID With Meals    isosorbide mononitrate (IMDUR) 24 hr tablet 30 mg, Daily    montelukast (SINGULAIR) tablet 10 mg, Daily    multivitamin stress formula tablet 1 tablet, Daily    ondansetron (ZOFRAN) injection 4 mg, Q8H PRN    pantoprazole (PROTONIX) EC tablet 40 mg, BID AC    spironolactone (ALDACTONE) tablet 50 mg, Daily    Administrative Statements   Today, Patient Was Seen By: Nicolas Galvan MD  I have spent a total time of 35 minutes in caring for this patient on the day of the visit/encounter including Diagnostic results, Risks and benefits of tx options, Instructions for management, Patient and family education, Importance of tx compliance, Risk factor reductions, Impressions, Counseling / Coordination of care, Documenting in the medical record, Reviewing/placing orders in the medical record (including tests, medications, and/or procedures), Obtaining or reviewing history  , and Communicating with other healthcare professionals .    **Please Note: This note may have been constructed using a voice recognition system.**

## 2025-07-21 NOTE — PLAN OF CARE
Problem: PAIN - ADULT  Goal: Verbalizes/displays adequate comfort level or baseline comfort level  Description: Interventions:  - Encourage patient to monitor pain and request assistance  - Assess pain using appropriate pain scale  - Administer analgesics as ordered based on type and severity of pain and evaluate response  - Implement non-pharmacological measures as appropriate and evaluate response  - Consider cultural and social influences on pain and pain management  - Notify physician/advanced practitioner if interventions unsuccessful or patient reports new pain  - Educate patient/family on pain management process including their role and importance of  reporting pain   - Provide non-pharmacologic/complimentary pain relief interventions  7/21/2025 1020 by Hannah Baker RN  Outcome: Progressing  7/21/2025 1019 by Hannah Baker RN  Outcome: Progressing     Problem: INFECTION - ADULT  Goal: Absence or prevention of progression during hospitalization  Description: INTERVENTIONS:  - Assess and monitor for signs and symptoms of infection  - Monitor lab/diagnostic results  - Monitor all insertion sites, i.e. indwelling lines, tubes, and drains  - Monitor endotracheal if appropriate and nasal secretions for changes in amount and color  - [place] appropriate cooling/warming therapies per order  - Administer medications as ordered  - Instruct and encourage patient and family to use good hand hygiene technique  - Identify and instruct in appropriate isolation precautions for identified infection/condition  7/21/2025 1020 by Hannah Baker RN  Outcome: Progressing  7/21/2025 1019 by Hannah Baker RN  Outcome: Progressing  Goal: Absence of fever/infection during neutropenic period  Description: INTERVENTIONS:  - Monitor WBC  - Perform strict hand hygiene  - Limit to healthy visitors only  - No plants, dried, fresh or silk flowers with marie in patient room  7/21/2025 1020 by Hannah Baker RN  Outcome:  Progressing  7/21/2025 1019 by Hannah Baker RN  Outcome: Progressing     Problem: SAFETY ADULT  Goal: Patient will remain free of falls  Description: INTERVENTIONS:  - Educate patient/family on patient safety including physical limitations  - Instruct patient to call for assistance with activity   - Consider consulting OT/PT to assist with strengthening/mobility based on AM PAC & JH-HLM score  - Consult OT/PT to assist with strengthening/mobility   - Keep Call bell within reach  - Keep bed low and locked with side rails adjusted as appropriate  - Keep care items and personal belongings within reach  - Initiate and maintain comfort rounds  - Make Fall Risk Sign visible to staff  - Offer Toileting every 2 Hours, in advance of need  - Initiate/Maintain bed/chair alarm  - Obtain necessary fall risk management equipment  - Apply yellow socks and bracelet for high fall risk patients  - Consider moving patient to room near nurses station  7/21/2025 1020 by Hannah Baker RN  Outcome: Progressing  7/21/2025 1019 by Hannah Baker RN  Outcome: Progressing  Goal: Maintain or return to baseline ADL function  Description: INTERVENTIONS:  -  Assess patient's ability to carry out ADLs; assess patient's baseline for ADL function and identify physical deficits which impact ability to perform ADLs (bathing, care of mouth/teeth, toileting, grooming, dressing, etc.)  - Assess/evaluate cause of self-care deficits   - Assess range of motion  - Assess patient's mobility; develop plan if impaired  - Assess patient's need for assistive devices and provide as appropriate  - Encourage maximum independence but intervene and supervise when necessary  - Involve family in performance of ADLs  - Assess for home care needs following discharge   - Consider OT consult to assist with ADL evaluation and planning for discharge  - Provide patient education as appropriate  - Monitor functional capacity and physical performance, use of AM PAC & JH-HLM   -  Monitor gait, balance and fatigue with ambulation    7/21/2025 1020 by Hannah Baker RN  Outcome: Progressing  7/21/2025 1019 by Hannah Baker RN  Outcome: Progressing  Goal: Maintains/Returns to pre admission functional level  Description: INTERVENTIONS:  - Perform AM-PAC 6 Click Basic Mobility/ Daily Activity assessment daily.  - Set and communicate daily mobility goal to care team and patient/family/caregiver.   - Collaborate with rehabilitation services on mobility goals if consulted  - Perform Range of Motion 3 times a day.  - Reposition patient every 2 hours.  - Dangle patient 3 times a day  - Stand patient 3 times a day  - Ambulate patient 3 times a day  - Out of bed to chair 3 times a day   - Out of bed for meals 3 times a day  - Out of bed for toileting  - Record patient progress and toleration of activity level   7/21/2025 1020 by Hannah Baker RN  Outcome: Progressing  7/21/2025 1019 by Hannah Baker RN  Outcome: Progressing     Problem: DISCHARGE PLANNING  Goal: Discharge to home or other facility with appropriate resources  Description: INTERVENTIONS:  - Identify barriers to discharge w/patient and caregiver  - Arrange for needed discharge resources and transportation as appropriate  - Identify discharge learning needs (meds, wound care, etc.)  - Arrange for interpretive services to assist at discharge as needed  - Refer to Case Management Department for coordinating discharge planning if the patient needs post-hospital services based on physician/advanced practitioner order or complex needs related to functional status, cognitive ability, or social support system  7/21/2025 1020 by Hannah Baker RN  Outcome: Progressing  7/21/2025 1019 by Hannah Baker RN  Outcome: Progressing     Problem: Knowledge Deficit  Goal: Patient/family/caregiver demonstrates understanding of disease process, treatment plan, medications, and discharge instructions  Description: Complete learning assessment and assess knowledge  base.  Interventions:  - Provide teaching at level of understanding  - Provide teaching via preferred learning methods  7/21/2025 1020 by Hannah Baker RN  Outcome: Progressing  7/21/2025 1019 by Hannah Baker RN  Outcome: Progressing     Problem: Prexisting or High Potential for Compromised Skin Integrity  Goal: Skin integrity is maintained or improved  Description: INTERVENTIONS:  - Identify patients at risk for skin breakdown  - Assess and monitor skin integrity including under and around medical devices   - Assess and monitor nutrition and hydration status  - Monitor labs  - Assess for incontinence   - Turn and reposition patient  - Assist with mobility/ambulation  - Relieve pressure over jacob prominences   - Avoid friction and shearing  - Provide appropriate hygiene as needed including keeping skin clean and dry  - Evaluate need for skin moisturizer/barrier cream  - Collaborate with interdisciplinary team  - Patient/family teaching  - Consider wound care consult    Assess:  - Review Boby scale daily  - Clean and moisturize skin   - Inspect skin when repositioning, toileting, and assisting with ADLS  - Assess under medical devices   - Assess extremities for adequate circulation and sensation     Bed Management:  - Have minimal linens on bed & keep smooth, unwrinkled  - Change linens as needed when moist or perspiring   - Toileting:  - Offer bedside commode  - Assess for incontinence   - Use incontinent care products after each incontinent episode     Activity:  - Mobilize patient 3 times a day  - Encourage activity and walks on unit  - Encourage or provide ROM exercises   - Turn and reposition patient every 2 Hours  - Use appropriate equipment to lift or move patient in bed  - Instruct/ Assist with weight shifting when out of bed in chair  - Consider limitation of chair time     Skin Care:  - Avoid use of baby powder, tape, friction and shearing, hot water or constrictive clothing  - Relieve pressure over  bony prominences  - Do not massage red bony areas    Next Steps:  - Teach patient strategies to minimize risks   - Consider consults to  interdisciplinary teams  7/21/2025 1020 by Hannah Baker RN  Outcome: Progressing  7/21/2025 1019 by Hannah Baker, RN  Outcome: Progressing

## 2025-07-21 NOTE — ANESTHESIA POSTPROCEDURE EVALUATION
Post-Op Assessment Note    CV Status:  Stable    Pain management: adequate       Mental Status:  Alert and awake   Hydration Status:  Euvolemic   PONV Controlled:  Controlled   Airway Patency:  Patent  Two or more mitigation strategies used for obstructive sleep apnea   Post Op Vitals Reviewed: Yes    No anethesia notable event occurred.    Staff: Anesthesiologist, CRNA           Last Filed PACU Vitals:  Vitals Value Taken Time   Temp 97.8 °F (36.6 °C) 07/21/25 10:27   Pulse 55 07/21/25 10:57   /56 07/21/25 10:57   Resp 11 07/21/25 10:57   SpO2 94 % 07/21/25 10:57       Modified Trip:     Vitals Value Taken Time   Activity 2 07/21/25 10:57   Respiration 2 07/21/25 10:57   Circulation 2 07/21/25 10:57   Consciousness 2 07/21/25 10:57   Oxygen Saturation 2 07/21/25 10:57     Modified Trip Score: 10

## 2025-07-21 NOTE — ASSESSMENT & PLAN NOTE
Lab Results   Component Value Date    HGBA1C 5.5 07/19/2025       Recent Labs     07/20/25  1615 07/20/25 2037 07/21/25  0716 07/21/25  1224   POCGLU 102 166* 137 134       Blood Sugar Average: Last 72 hrs:  (P) 145.5  Patient admits to taking basal bolus insulin with Tresiba 10 units at bedtime and NovoLog 10 units with meals  Continue home regimen.  Initiate sliding scale insulin  Accu-Cheks 4 times a day

## 2025-07-21 NOTE — ANESTHESIA POSTPROCEDURE EVALUATION
Post-Op Assessment Note    CV Status:  Stable  Pain Score: 0    Pain management: adequate       Mental Status:  Sleepy   Hydration Status:  Euvolemic   PONV Controlled:  None   Airway Patency:  Patent     Post Op Vitals Reviewed: Yes    No anethesia notable event occurred.    Staff: CRNA           Last Filed PACU Vitals:  Vitals Value Taken Time   Temp     Pulse     BP     Resp     SpO2

## 2025-07-21 NOTE — ASSESSMENT & PLAN NOTE
Patient denies chest pain or shortness of breath.  Normally on aspirin 81 mg and atorvastatin.  Patient did not take aspirin in the a.m..  Will hold for today  Resume aspirin tomorrow

## 2025-07-22 LAB
ANION GAP SERPL CALCULATED.3IONS-SCNC: 7 MMOL/L (ref 4–13)
BUN SERPL-MCNC: 32 MG/DL (ref 5–25)
CALCIUM SERPL-MCNC: 8.3 MG/DL (ref 8.4–10.2)
CHLORIDE SERPL-SCNC: 107 MMOL/L (ref 96–108)
CO2 SERPL-SCNC: 23 MMOL/L (ref 21–32)
CREAT SERPL-MCNC: 1.69 MG/DL (ref 0.6–1.3)
ERYTHROCYTE [DISTWIDTH] IN BLOOD BY AUTOMATED COUNT: 15.9 % (ref 11.6–15.1)
GFR SERPL CREATININE-BSD FRML MDRD: 37 ML/MIN/1.73SQ M
GLUCOSE SERPL-MCNC: 122 MG/DL (ref 65–140)
GLUCOSE SERPL-MCNC: 136 MG/DL (ref 65–140)
GLUCOSE SERPL-MCNC: 147 MG/DL (ref 65–140)
GLUCOSE SERPL-MCNC: 148 MG/DL (ref 65–140)
GLUCOSE SERPL-MCNC: 180 MG/DL (ref 65–140)
HCT VFR BLD AUTO: 25.4 % (ref 36.5–49.3)
HCT VFR BLD AUTO: 25.7 % (ref 36.5–49.3)
HCT VFR BLD AUTO: 26.8 % (ref 36.5–49.3)
HGB BLD-MCNC: 8.5 G/DL (ref 12–17)
HGB BLD-MCNC: 8.7 G/DL (ref 12–17)
HGB BLD-MCNC: 8.7 G/DL (ref 12–17)
MCH RBC QN AUTO: 32.6 PG (ref 26.8–34.3)
MCHC RBC AUTO-ENTMCNC: 33.5 G/DL (ref 31.4–37.4)
MCV RBC AUTO: 97 FL (ref 82–98)
PLATELET # BLD AUTO: 35 THOUSANDS/UL (ref 149–390)
PMV BLD AUTO: 11.6 FL (ref 8.9–12.7)
POTASSIUM SERPL-SCNC: 3.9 MMOL/L (ref 3.5–5.3)
RBC # BLD AUTO: 2.61 MILLION/UL (ref 3.88–5.62)
SODIUM SERPL-SCNC: 137 MMOL/L (ref 135–147)
WBC # BLD AUTO: 2.52 THOUSAND/UL (ref 4.31–10.16)

## 2025-07-22 PROCEDURE — 85014 HEMATOCRIT: CPT | Performed by: NURSE PRACTITIONER

## 2025-07-22 PROCEDURE — 99232 SBSQ HOSP IP/OBS MODERATE 35: CPT | Performed by: STUDENT IN AN ORGANIZED HEALTH CARE EDUCATION/TRAINING PROGRAM

## 2025-07-22 PROCEDURE — 85018 HEMOGLOBIN: CPT | Performed by: NURSE PRACTITIONER

## 2025-07-22 PROCEDURE — 82948 REAGENT STRIP/BLOOD GLUCOSE: CPT

## 2025-07-22 PROCEDURE — 80048 BASIC METABOLIC PNL TOTAL CA: CPT | Performed by: FAMILY MEDICINE

## 2025-07-22 PROCEDURE — 85027 COMPLETE CBC AUTOMATED: CPT | Performed by: FAMILY MEDICINE

## 2025-07-22 RX ADMIN — MONTELUKAST 10 MG: 10 TABLET, FILM COATED ORAL at 08:22

## 2025-07-22 RX ADMIN — PANTOPRAZOLE SODIUM 40 MG: 40 TABLET, DELAYED RELEASE ORAL at 16:35

## 2025-07-22 RX ADMIN — Medication 1 TABLET: at 08:22

## 2025-07-22 RX ADMIN — CARVEDILOL 6.25 MG: 6.25 TABLET, FILM COATED ORAL at 06:30

## 2025-07-22 RX ADMIN — CARVEDILOL 6.25 MG: 6.25 TABLET, FILM COATED ORAL at 16:35

## 2025-07-22 RX ADMIN — ATORVASTATIN CALCIUM 40 MG: 40 TABLET, FILM COATED ORAL at 06:28

## 2025-07-22 RX ADMIN — ISOSORBIDE MONONITRATE 30 MG: 30 TABLET, EXTENDED RELEASE ORAL at 08:22

## 2025-07-22 RX ADMIN — INSULIN LISPRO 1 UNITS: 100 INJECTION, SOLUTION INTRAVENOUS; SUBCUTANEOUS at 12:32

## 2025-07-22 RX ADMIN — INSULIN LISPRO 10 UNITS: 100 INJECTION, SOLUTION INTRAVENOUS; SUBCUTANEOUS at 08:23

## 2025-07-22 RX ADMIN — FUROSEMIDE 40 MG: 40 TABLET ORAL at 08:22

## 2025-07-22 RX ADMIN — SPIRONOLACTONE 50 MG: 25 TABLET ORAL at 08:22

## 2025-07-22 RX ADMIN — PANTOPRAZOLE SODIUM 40 MG: 40 TABLET, DELAYED RELEASE ORAL at 06:29

## 2025-07-22 RX ADMIN — INSULIN LISPRO 10 UNITS: 100 INJECTION, SOLUTION INTRAVENOUS; SUBCUTANEOUS at 12:32

## 2025-07-22 RX ADMIN — INSULIN LISPRO 10 UNITS: 100 INJECTION, SOLUTION INTRAVENOUS; SUBCUTANEOUS at 16:35

## 2025-07-22 NOTE — ASSESSMENT & PLAN NOTE
History of cirrhosis with recurrent ascites requiring weekly paracentesis.  History of varices s/p banding in 2024.  On maintenance carvedilol twice daily, Lasix 40 mg daily and spironolactone 50 mg daily  History of hepatic encephalopathy currently oriented x 3 with no asterixis on exam  HCC screening without concerning for lesion.  Continue outpatient follow-up with IR for weekly paracentesis as well as outpatient follow-up with IR to consider TIPS as per GI recommendation.

## 2025-07-22 NOTE — ASSESSMENT & PLAN NOTE
Lab Results   Component Value Date    EGFR 37 07/22/2025    EGFR 41 07/20/2025    EGFR 41 07/19/2025    CREATININE 1.69 (H) 07/22/2025    CREATININE 1.54 (H) 07/20/2025    CREATININE 1.54 (H) 07/19/2025     Patient has creatinine baseline 1.4-1.6.Remains at baseline on admission.  Follow-up on daily

## 2025-07-22 NOTE — PROGRESS NOTES
Progress Note - Hospitalist   Name: Bimal Lugo 79 y.o. male I MRN: [id number]  Unit/Bed#: -01 I Date of Admission: 7/19/2025   Date of Service: 7/22/2025 I Hospital Day: 2    Assessment & Plan  Melena  79-year-old patient with past medical history of alcoholic cirrhosis, GI bleed, varices, gastropathy, recurrent ascites requiring weekly paracentesis Hospital secondary to melena.    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    Hemoglobin currently 8.5.  Has been denies any further episodes of melanotic bowel movement.  Plan is to continue to trend hemoglobin tomorrow a.m. to ensure stability before discharge tomorrow.  Wife at the bedside agreeable with above plan.  Atherosclerosis of lower extremity with intermittent claudication (HCC)  Patient normally on aspirin and atorvastatin.  Hold aspirin for now on discharge.  Continue statin.  Outpatient follow-up with PCP to consider resuming aspirin after hemoglobin monitoring.  CAD (coronary artery disease)  Patient denies chest pain or shortness of breath.  Continue statin.  Hold aspirin for now until outpatient follow-up with PCP.  Chronic kidney disease, stage 3a (HCC)  Lab Results   Component Value Date    EGFR 37 07/22/2025    EGFR 41 07/20/2025    EGFR 41 07/19/2025    CREATININE 1.69 (H) 07/22/2025    CREATININE 1.54 (H) 07/20/2025    CREATININE 1.54 (H) 07/19/2025     Patient has creatinine baseline 1.4-1.6.Remains at baseline on admission.  Follow-up on daily  Cirrhosis of liver with ascites  (HCC)  History of cirrhosis with recurrent ascites requiring weekly paracentesis.  History of varices s/p banding in 2024.  On maintenance carvedilol twice daily, Lasix 40 mg daily and spironolactone 50 mg daily  History of hepatic encephalopathy currently  oriented x 3 with no asterixis on exam  HCC screening without concerning for lesion.  Continue outpatient follow-up with IR for weekly paracentesis as well as outpatient follow-up with IR to consider TIPS as per GI recommendation.    Thrombocytopenia (HCC)  Chronic thrombocytopenia in the setting of liver cirrhosis.  Currently platelet count 34,000.  Signs of active overt bleeding noted.  Monitor CBC with platelet tomorrow.  Hold pharmacological DVT prophylaxis.  Diabetes mellitus, type 2 (HCC)  Lab Results   Component Value Date    HGBA1C 5.5 07/19/2025       Recent Labs     07/21/25  1606 07/21/25  2033 07/22/25  0730 07/22/25  1103   POCGLU 229* 101 148* 180*       Blood Sugar Average: Last 72 hrs:  (P) 151.4152975418427795  Patient admits to taking basal bolus insulin with Tresiba 10 units at bedtime and NovoLog 10 units with meals  Continue home regimen.  Initiate sliding scale insulin  Accu-Cheks 4 times a day  Anemia  Patient does have chronic anemia and completed 5 IV iron infusions on outpatient settings.  Current hemoglobin 8.5.  Monitor CBC tomorrow.  Consider starting on iron supplements starting tomorrow.    VTE Pharmacologic Prophylaxis:   Moderate Risk (Score 3-4) - Pharmacological DVT Prophylaxis Contraindicated. Sequential Compression Devices Ordered.    Mobility:   Basic Mobility Inpatient Raw Score: 23  JH-HLM Goal: 7: Walk 25 feet or more  JH-HLM Achieved: 3: Sit at edge of bed      Patient Centered Rounds: I performed bedside rounds with nursing staff today.   Discussions with Specialists or Other Care Team Provider: Gastroenterology    Education and Discussions with Family / Patient: Updated  (wife) at bedside.    Current Length of Stay: 2 day(s)  Current Patient Status: Inpatient   Certification Statement: The patient will continue to require additional inpatient hospital stay due to monitoring CBC and platelet  Discharge Plan: Anticipate discharge tomorrow to home.    Code  Status: Level 1 - Full Code    Subjective   Appears comfortable not in distress.  Denies any further episode of melena.  Denies chest pain, dyspnea, fever, chills, abdominal pain, any other complaints.  He is eager to go home.    Objective :  Temp:  [97.7 °F (36.5 °C)-98.6 °F (37 °C)] 98 °F (36.7 °C)  HR:  [57-72] 62  BP: (113-152)/(58-65) 129/61  Resp:  [14-18] 16  SpO2:  [97 %-99 %] 97 %  O2 Device: None (Room air)    There is no height or weight on file to calculate BMI.     Input and Output Summary (last 24 hours):   No intake or output data in the 24 hours ending 07/22/25 1313    Physical Exam  Constitutional:       General: He is not in acute distress.     Appearance: Normal appearance. He is not ill-appearing, toxic-appearing or diaphoretic.   HENT:      Head: Normocephalic and atraumatic.     Cardiovascular:      Rate and Rhythm: Normal rate.      Pulses: Normal pulses.   Pulmonary:      Effort: Pulmonary effort is normal. No respiratory distress.      Breath sounds: No wheezing.   Abdominal:      General: There is distension.      Palpations: Abdomen is soft.      Tenderness: There is no abdominal tenderness.     Musculoskeletal:      Right lower leg: No edema.      Left lower leg: No edema.     Neurological:      Mental Status: He is alert and oriented to person, place, and time. Mental status is at baseline.     Psychiatric:         Mood and Affect: Mood normal.         Behavior: Behavior normal.           Lines/Drains:              Lab Results: I have reviewed the following results:   Results from last 7 days   Lab Units 07/22/25  1236 07/22/25  0507 07/20/25  0935 07/20/25  0440   WBC Thousand/uL  --  2.52*  --  2.51*   HEMOGLOBIN g/dL 8.7* 8.5*   < > 8.9*   HEMATOCRIT % 26.8* 25.4*   < > 26.4*   PLATELETS Thousands/uL  --  35*  --  40*   SEGS PCT %  --   --   --  61   LYMPHO PCT %  --   --   --  21   MONO PCT %  --   --   --  10   EOS PCT %  --   --   --  7*    < > = values in this interval not  displayed.     Results from last 7 days   Lab Units 07/22/25  0507 07/20/25  0440 07/19/25  1215   SODIUM mmol/L 137   < > 135   POTASSIUM mmol/L 3.9   < > 4.3   CHLORIDE mmol/L 107   < > 105   CO2 mmol/L 23   < > 24   BUN mg/dL 32*   < > 35*   CREATININE mg/dL 1.69*   < > 1.54*   ANION GAP mmol/L 7   < > 6   CALCIUM mg/dL 8.3*   < > 8.8   ALBUMIN g/dL  --   --  3.4*   TOTAL BILIRUBIN mg/dL  --   --  1.02*   ALK PHOS U/L  --   --  63   ALT U/L  --   --  11   AST U/L  --   --  20   GLUCOSE RANDOM mg/dL 147*   < > 159*    < > = values in this interval not displayed.         Results from last 7 days   Lab Units 07/22/25  1103 07/22/25  0730 07/21/25  2033 07/21/25  1606 07/21/25  1224 07/21/25  0716 07/20/25  2037 07/20/25  1615 07/20/25  1149 07/20/25  0727 07/19/25  2100 07/19/25  1637   POC GLUCOSE mg/dl 180* 148* 101 229* 134 137 166* 102 156* 142* 151* 176*     Results from last 7 days   Lab Units 07/19/25  1647   HEMOGLOBIN A1C % 5.5           Recent Cultures (last 7 days):               Last 24 Hours Medication List:     Current Facility-Administered Medications:     atorvastatin (LIPITOR) tablet 40 mg, Early Morning    carvedilol (COREG) tablet 6.25 mg, BID With Meals    furosemide (LASIX) tablet 40 mg, Daily    insulin glargine (LANTUS) subcutaneous injection 10 Units 0.1 mL, HS    insulin lispro (HumALOG/ADMELOG) 100 units/mL subcutaneous injection 1-6 Units, TID AC **AND** Fingerstick Glucose (POCT), 4x Daily AC and at bedtime    insulin lispro (HumALOG/ADMELOG) 100 units/mL subcutaneous injection 10 Units, TID With Meals    isosorbide mononitrate (IMDUR) 24 hr tablet 30 mg, Daily    montelukast (SINGULAIR) tablet 10 mg, Daily    multivitamin stress formula tablet 1 tablet, Daily    ondansetron (ZOFRAN) injection 4 mg, Q8H PRN    pantoprazole (PROTONIX) EC tablet 40 mg, BID AC    spironolactone (ALDACTONE) tablet 50 mg, Daily    Administrative Statements   Today, Patient Was Seen By: José Peralta MD  I  have spent a total time of   minutes in caring for this patient on the day of the visit/encounter including Diagnostic results, Prognosis, Risks and benefits of tx options, Instructions for management, Patient and family education, Risk factor reductions, Impressions, Counseling / Coordination of care, Documenting in the medical record, Reviewing/placing orders in the medical record (including tests, medications, and/or procedures), Obtaining or reviewing history  , and Communicating with other healthcare professionals .    **Please Note: This note may have been constructed using a voice recognition system.**

## 2025-07-22 NOTE — ASSESSMENT & PLAN NOTE
Patient does have chronic anemia and completed 5 IV iron infusions on outpatient settings.  Current hemoglobin 8.5.  Monitor CBC tomorrow.  Consider starting on iron supplements starting tomorrow.

## 2025-07-22 NOTE — ASSESSMENT & PLAN NOTE
Lab Results   Component Value Date    HGBA1C 5.5 07/19/2025       Recent Labs     07/21/25  1606 07/21/25  2033 07/22/25  0730 07/22/25  1103   POCGLU 229* 101 148* 180*       Blood Sugar Average: Last 72 hrs:  (P) [id number]  Patient admits to taking basal bolus insulin with Tresiba 10 units at bedtime and NovoLog 10 units with meals  Continue home regimen.  Initiate sliding scale insulin  Accu-Cheks 4 times a day

## 2025-07-22 NOTE — ASSESSMENT & PLAN NOTE
Patient denies chest pain or shortness of breath.  Continue statin.  Hold aspirin for now until outpatient follow-up with PCP.

## 2025-07-22 NOTE — ASSESSMENT & PLAN NOTE
79-year-old patient with past medical history of alcoholic cirrhosis, GI bleed, varices, gastropathy, recurrent ascites requiring weekly paracentesis Hospital secondary to melena.    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    Hemoglobin currently 8.5.  Has been denies any further episodes of melanotic bowel movement.  Plan is to continue to trend hemoglobin tomorrow a.m. to ensure stability before discharge tomorrow.  Wife at the bedside agreeable with above plan.

## 2025-07-22 NOTE — ASSESSMENT & PLAN NOTE
Patient normally on aspirin and atorvastatin.  Hold aspirin for now on discharge.  Continue statin.  Outpatient follow-up with PCP to consider resuming aspirin after hemoglobin monitoring.

## 2025-07-22 NOTE — ASSESSMENT & PLAN NOTE
Chronic thrombocytopenia in the setting of liver cirrhosis.  Currently platelet count 34,000.  Signs of active overt bleeding noted.  Monitor CBC with platelet tomorrow.  Hold pharmacological DVT prophylaxis.

## 2025-07-23 VITALS
TEMPERATURE: 97.9 F | HEART RATE: 63 BPM | SYSTOLIC BLOOD PRESSURE: 140 MMHG | DIASTOLIC BLOOD PRESSURE: 63 MMHG | RESPIRATION RATE: 19 BRPM | OXYGEN SATURATION: 95 %

## 2025-07-23 LAB
ANION GAP SERPL CALCULATED.3IONS-SCNC: 6 MMOL/L (ref 4–13)
BUN SERPL-MCNC: 29 MG/DL (ref 5–25)
CALCIUM SERPL-MCNC: 8.4 MG/DL (ref 8.4–10.2)
CHLORIDE SERPL-SCNC: 107 MMOL/L (ref 96–108)
CO2 SERPL-SCNC: 24 MMOL/L (ref 21–32)
CREAT SERPL-MCNC: 1.68 MG/DL (ref 0.6–1.3)
ERYTHROCYTE [DISTWIDTH] IN BLOOD BY AUTOMATED COUNT: 15.9 % (ref 11.6–15.1)
GFR SERPL CREATININE-BSD FRML MDRD: 37 ML/MIN/1.73SQ M
GLUCOSE SERPL-MCNC: 142 MG/DL (ref 65–140)
GLUCOSE SERPL-MCNC: 153 MG/DL (ref 65–140)
GLUCOSE SERPL-MCNC: 156 MG/DL (ref 65–140)
HCT VFR BLD AUTO: 25.4 % (ref 36.5–49.3)
HGB BLD-MCNC: 8.7 G/DL (ref 12–17)
MCH RBC QN AUTO: 33.2 PG (ref 26.8–34.3)
MCHC RBC AUTO-ENTMCNC: 34.3 G/DL (ref 31.4–37.4)
MCV RBC AUTO: 97 FL (ref 82–98)
PLATELET # BLD AUTO: 36 THOUSANDS/UL (ref 149–390)
PMV BLD AUTO: 11.9 FL (ref 8.9–12.7)
POTASSIUM SERPL-SCNC: 4 MMOL/L (ref 3.5–5.3)
RBC # BLD AUTO: 2.62 MILLION/UL (ref 3.88–5.62)
SODIUM SERPL-SCNC: 137 MMOL/L (ref 135–147)
WBC # BLD AUTO: 2.77 THOUSAND/UL (ref 4.31–10.16)

## 2025-07-23 PROCEDURE — 85027 COMPLETE CBC AUTOMATED: CPT | Performed by: STUDENT IN AN ORGANIZED HEALTH CARE EDUCATION/TRAINING PROGRAM

## 2025-07-23 PROCEDURE — 82948 REAGENT STRIP/BLOOD GLUCOSE: CPT

## 2025-07-23 PROCEDURE — 99239 HOSP IP/OBS DSCHRG MGMT >30: CPT | Performed by: STUDENT IN AN ORGANIZED HEALTH CARE EDUCATION/TRAINING PROGRAM

## 2025-07-23 PROCEDURE — 80048 BASIC METABOLIC PNL TOTAL CA: CPT | Performed by: STUDENT IN AN ORGANIZED HEALTH CARE EDUCATION/TRAINING PROGRAM

## 2025-07-23 RX ADMIN — FUROSEMIDE 40 MG: 40 TABLET ORAL at 08:19

## 2025-07-23 RX ADMIN — ISOSORBIDE MONONITRATE 30 MG: 30 TABLET, EXTENDED RELEASE ORAL at 08:19

## 2025-07-23 RX ADMIN — CARVEDILOL 6.25 MG: 6.25 TABLET, FILM COATED ORAL at 08:19

## 2025-07-23 RX ADMIN — PANTOPRAZOLE SODIUM 40 MG: 40 TABLET, DELAYED RELEASE ORAL at 08:19

## 2025-07-23 RX ADMIN — ATORVASTATIN CALCIUM 40 MG: 40 TABLET, FILM COATED ORAL at 06:33

## 2025-07-23 RX ADMIN — Medication 1 TABLET: at 08:19

## 2025-07-23 RX ADMIN — MONTELUKAST 10 MG: 10 TABLET, FILM COATED ORAL at 08:19

## 2025-07-23 RX ADMIN — SPIRONOLACTONE 50 MG: 25 TABLET ORAL at 08:19

## 2025-07-23 RX ADMIN — INSULIN LISPRO 1 UNITS: 100 INJECTION, SOLUTION INTRAVENOUS; SUBCUTANEOUS at 08:28

## 2025-07-23 RX ADMIN — INSULIN LISPRO 10 UNITS: 100 INJECTION, SOLUTION INTRAVENOUS; SUBCUTANEOUS at 08:19

## 2025-07-23 NOTE — DISCHARGE SUMMARY
Discharge Summary - Hospitalist   Name: Bimal Lugo 79 y.o. male I MRN: [id number]  Unit/Bed#: -01 I Date of Admission: 7/19/2025   Date of Service: 7/23/2025 I Hospital Day: 3     Assessment & Plan  Melena  79-year-old patient with past medical history of alcoholic cirrhosis, GI bleed, varices, gastropathy, recurrent ascites requiring weekly paracentesis Hospital secondary to melena.    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    Hemoglobin currently 8.7  Has been denies any further episodes of melanotic bowel movement.  Plan is to continue to tr current hemodynamically stable for discharge.  Recommend outpatient follow-up with PCP to have repeat blood work in 1 to 2 weeks to trend hemoglobin.  Resume home dose of aspirin   Wife at the bedside agreeable with above plan.  Atherosclerosis of lower extremity with intermittent claudication (HCC)  Patient normally on aspirin and atorvastatin.  Continue aspirin and statin.  Recommended discussion with primary cardiology to consider stopping aspirin in the settings of liver cirrhosis, thrombocytopenia.  Patient and family both reports that they do have an appointment coming with primary cardiology and will have discussion with them.  Recommended follow-up with primary cardiology.  CAD (coronary artery disease)  Patient denies chest pain or shortness of breath.  Continue st aspirin, statin.  Recommend outpatient follow-up with primary care provider and cardiology.  Chronic kidney disease, stage 3a (HCC)  Lab Results   Component Value Date    EGFR 37 07/23/2025    EGFR 37 07/22/2025    EGFR 41 07/20/2025    CREATININE 1.68 (H) 07/23/2025    CREATININE 1.69 (H) 07/22/2025    CREATININE 1.54 (H) 07/20/2025     Patient has creatinine baseline 1.4-1.6.Remains at  baseline on admission.  Outpatient follow-up with PCP.  Cirrhosis of liver with ascites  (HCC)  History of cirrhosis with recurrent ascites requiring weekly paracentesis.  History of varices s/p banding in 2024.  On maintenance carvedilol twice daily, Lasix 40 mg daily and spironolactone 50 mg daily  History of hepatic encephalopathy currently oriented x 3 with no asterixis on exam  HCC screening without concerning for lesion.  Continue outpatient follow-up with IR for weekly paracentesis as well as outpatient follow-up with IR to consider TIPS as per GI recommendation.    Thrombocytopenia (HCC)  Chronic thrombocytopenia in the setting of liver cirrhosis.  Currently platelet count 36,000.  No signs of active overt bleeding noted.  Recommend outpatient follow-up with PCP.  Diabetes mellitus, type 2 (HCC)  Lab Results   Component Value Date    HGBA1C 5.5 07/19/2025       Recent Labs     07/22/25  1103 07/22/25  1615 07/22/25  2117 07/23/25  0804   POCGLU 180* 136 122 153*       Blood Sugar Average: Last 72 hrs:  (P) [id number]  Patient admits to taking basal bolus insulin with Tresiba 10 units at bedtime and NovoLog 10 units with meals  Continue home regimen.  Initiate sliding scale insulin  Accu-Cheks 4 times a day  Anemia  Patient does have chronic anemia and completed 5 IV iron infusions on outpatient settings.  Current hemoglobin 8.7.  Recommended continue home dose of p.o. iron supplement and outpatient follow-up with PCP.     Medical Problems       Resolved Problems  Date Reviewed: 4/9/2025   None       Discharging Physician / Practitioner: José Peralta MD  PCP: Aly Carter MD  Admission Date:   Admission Orders (From admission, onward)       Ordered        07/20/25 0958  INPATIENT ADMISSION  Once            07/19/25 1426  Place in Observation  Once                          Discharge Date: 07/23/25    Next Steps for Physician/AP Assuming Care:  Recommend outpatient follow-up with  primary care provider to have repeat blood work for CBC in 1 to 2 weeks to trend hemoglobin and platelet level.        Medication Changes for Discharge & Rationale:     See after visit summary for reconciled discharge medications provided to patient and/or family.     Consultations During Hospital Stay:  Gastroenterology    Procedures Performed:   EGD    Hospital Course:     Bimal Lugo is a 79 y.o. male patient with past medical history of CAD, CABG, TAVR, COPD, PVD, liver cirrhosis complicated by portal hypertension, ascites, esophageal varices, portal gastropathy, requires weekly paracentesis, pancytopenia, COPD, diabetes, anemia, CKD, hypertension,who originally presented to the hospital on 7/19/2025 due to 2 episodes of melena associated with abdominal discomfort.  Hemoglobin remained stable around 8-9 range.had EGD on 7/21/2025 report reviewed noted with grade 1 varix in the esophagus, severe portal hypertensive gastropathy in the fundus of the stomach and the body of the stomach, multiple small angiectasia's in the body of the stomach treated with argon plasma.  GI recommended outpatient follow-up with IR for possible TIPS consideration given refractory ascites.  Patient also finished 5 IV iron infusions in outpatient settings and recommend outpatient follow-up with PCP and continue p.o. iron supplement.  Recommended to have repeat blood work in 1 to 2 weeks to trend CBC and platelet count.  Also discussed with patient and wife to have discussion with primary cardiology to consider discontinuation of aspirin in the settings of liver cirrhosis, bleeding, thrombocytopenia.  Patient and wife both prefers to have discussion with primary cardiology about stopping aspirin and they do have an appointment coming up soon.  No other events reported.  Currently he is hemodynamically stable for discharge.  Patient was hospitalized with PICC line in place.  He does have a PICC line on presentation which was exchanged on  outpatient settings on 7/17/2025 by IR and he is being discharged with PICC line in place.  No other events reported.  Refer to earlier notes for further verification.            Please see above list of diagnoses and related plan for additional information.     Discharge Day Visit / Exam:   Subjective: Appears comfortable not in distress.  No further episode of melena reported.  Hemoglobin currently stable around 8-9 range.  Patient and wife both are eager to go home.      Vitals: Blood Pressure: 140/63 (07/23/25 0807)  Pulse: 63 (07/23/25 0807)  Temperature: 97.9 °F (36.6 °C) (07/23/25 0807)  Temp Source: Oral (07/23/25 0807)  Respirations: 19 (07/23/25 0807)  SpO2: 95 % (07/23/25 0807)  Physical Exam  Constitutional:       General: He is not in acute distress.     Appearance: Normal appearance. He is not ill-appearing, toxic-appearing or diaphoretic.   HENT:      Head: Normocephalic and atraumatic.     Cardiovascular:      Rate and Rhythm: Normal rate.      Pulses: Normal pulses.   Pulmonary:      Effort: Pulmonary effort is normal. No respiratory distress.      Breath sounds: No wheezing.   Abdominal:      General: There is distension.      Palpations: Abdomen is soft.      Tenderness: There is no abdominal tenderness. There is no guarding.     Musculoskeletal:      Right lower leg: No edema.      Left lower leg: No edema.     Neurological:      Mental Status: He is alert and oriented to person, place, and time. Mental status is at baseline.     Psychiatric:         Mood and Affect: Mood normal.         Behavior: Behavior normal.          Discussion with Family: Updated  (wife) at bedside.    Discharge instructions/Information to patient and family:   See after visit summary for information provided to patient and family.      Provisions for Follow-Up Care:  See after visit summary for information related to follow-up care and any pertinent home health orders.      Mobility at time of Discharge:    Basic Mobility Inpatient Raw Score: 23  JH-HLM Goal: 7: Walk 25 feet or more  JH-HLM Achieved: 7: Walk 25 feet or more       Disposition:   Home    Planned Readmission:     Administrative Statements   Discharge Statement:  I have spent a total time of 35 minutes in caring for this patient on the day of the visit/encounter. >30 minutes of time was spent on: Diagnostic results, Prognosis, Risks and benefits of tx options, Patient and family education, Importance of tx compliance, Counseling / Coordination of care, Documenting in the medical record, Reviewing / ordering tests, medicine, procedures  , and Communicating with other healthcare professionals .    **Please Note: This note may have been constructed using a voice recognition system**

## 2025-07-23 NOTE — ASSESSMENT & PLAN NOTE
Patient does have chronic anemia and completed 5 IV iron infusions on outpatient settings.  Current hemoglobin 8.7.  Recommended continue home dose of p.o. iron supplement and outpatient follow-up with PCP.

## 2025-07-23 NOTE — ASSESSMENT & PLAN NOTE
79-year-old patient with past medical history of alcoholic cirrhosis, GI bleed, varices, gastropathy, recurrent ascites requiring weekly paracentesis Hospital secondary to melena.    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    Hemoglobin currently 8.7  Has been denies any further episodes of melanotic bowel movement.  Plan is to continue to tr current hemodynamically stable for discharge.  Recommend outpatient follow-up with PCP to have repeat blood work in 1 to 2 weeks to trend hemoglobin.  Resume home dose of aspirin   Wife at the bedside agreeable with above plan.

## 2025-07-23 NOTE — ASSESSMENT & PLAN NOTE
Chronic thrombocytopenia in the setting of liver cirrhosis.  Currently platelet count 36,000.  No signs of active overt bleeding noted.  Recommend outpatient follow-up with PCP.

## 2025-07-23 NOTE — ASSESSMENT & PLAN NOTE
Lab Results   Component Value Date    EGFR 37 07/23/2025    EGFR 37 07/22/2025    EGFR 41 07/20/2025    CREATININE 1.68 (H) 07/23/2025    CREATININE 1.69 (H) 07/22/2025    CREATININE 1.54 (H) 07/20/2025     Patient has creatinine baseline 1.4-1.6.Remains at baseline on admission.  Outpatient follow-up with PCP.

## 2025-07-23 NOTE — ASSESSMENT & PLAN NOTE
Patient normally on aspirin and atorvastatin.  Continue aspirin and statin.  Recommended discussion with primary cardiology to consider stopping aspirin in the settings of liver cirrhosis, thrombocytopenia.  Patient and family both reports that they do have an appointment coming with primary cardiology and will have discussion with them.  Recommended follow-up with primary cardiology.

## 2025-07-23 NOTE — ASSESSMENT & PLAN NOTE
Lab Results   Component Value Date    HGBA1C 5.5 07/19/2025       Recent Labs     07/22/25  1103 07/22/25  1615 07/22/25  2117 07/23/25  0804   POCGLU 180* 136 122 153*       Blood Sugar Average: Last 72 hrs:  (P) [id number]  Patient admits to taking basal bolus insulin with Tresiba 10 units at bedtime and NovoLog 10 units with meals  Continue home regimen.  Initiate sliding scale insulin  Accu-Cheks 4 times a day

## 2025-07-23 NOTE — DISCHARGE INSTR - AVS FIRST PAGE
Recommend close follow-up with primary care provider within 1 week of discharge.  Recommend repeat blood work with CBC within 1 to 2 weeks with primary care provider to trend hemoglobin level.

## 2025-07-23 NOTE — PLAN OF CARE
Problem: PAIN - ADULT  Goal: Verbalizes/displays adequate comfort level or baseline comfort level  Description: Interventions:  - Encourage patient to monitor pain and request assistance  - Assess pain using appropriate pain scale  - Administer analgesics as ordered based on type and severity of pain and evaluate response  - Implement non-pharmacological measures as appropriate and evaluate response  - Consider cultural and social influences on pain and pain management  - Notify physician/advanced practitioner if interventions unsuccessful or patient reports new pain  - Educate patient/family on pain management process including their role and importance of  reporting pain   - Provide non-pharmacologic/complimentary pain relief interventions  Outcome: Progressing     Problem: INFECTION - ADULT  Goal: Absence or prevention of progression during hospitalization  Description: INTERVENTIONS:  - Assess and monitor for signs and symptoms of infection  - Monitor lab/diagnostic results  - Monitor all insertion sites, i.e. indwelling lines, tubes, and drains  - Monitor endotracheal if appropriate and nasal secretions for changes in amount and color  - [place] appropriate cooling/warming therapies per order  - Administer medications as ordered  - Instruct and encourage patient and family to use good hand hygiene technique  - Identify and instruct in appropriate isolation precautions for identified infection/condition  Outcome: Progressing  Goal: Absence of fever/infection during neutropenic period  Description: INTERVENTIONS:  - Monitor WBC  - Perform strict hand hygiene  - Limit to healthy visitors only  - No plants, dried, fresh or silk flowers with marie in patient room  Outcome: Progressing     Problem: SAFETY ADULT  Goal: Patient will remain free of falls  Description: INTERVENTIONS:  - Educate patient/family on patient safety including physical limitations  - Instruct patient to call for assistance with activity   -  Consider consulting OT/PT to assist with strengthening/mobility based on AM PAC & JH-HLM score  - Consult OT/PT to assist with strengthening/mobility   - Keep Call bell within reach  - Keep bed low and locked with side rails adjusted as appropriate  - Keep care items and personal belongings within reach  - Initiate and maintain comfort rounds  - Make Fall Risk Sign visible to staff  - Offer Toileting every 2 Hours, in advance of need  - Initiate/Maintain alarm  - Obtain necessary fall risk management equipment  - Apply yellow socks and bracelet for high fall risk patients  - Consider moving patient to room near nurses station  Outcome: Progressing  Goal: Maintain or return to baseline ADL function  Description: INTERVENTIONS:  -  Assess patient's ability to carry out ADLs; assess patient's baseline for ADL function and identify physical deficits which impact ability to perform ADLs (bathing, care of mouth/teeth, toileting, grooming, dressing, etc.)  - Assess/evaluate cause of self-care deficits   - Assess range of motion  - Assess patient's mobility; develop plan if impaired  - Assess patient's need for assistive devices and provide as appropriate  - Encourage maximum independence but intervene and supervise when necessary  - Involve family in performance of ADLs  - Assess for home care needs following discharge   - Consider OT consult to assist with ADL evaluation and planning for discharge  - Provide patient education as appropriate  - Monitor functional capacity and physical performance, use of AM PAC & JH-HLM   - Monitor gait, balance and fatigue with ambulation    Outcome: Progressing  Goal: Maintains/Returns to pre admission functional level  Description: INTERVENTIONS:  - Perform AM-PAC 6 Click Basic Mobility/ Daily Activity assessment daily.  - Set and communicate daily mobility goal to care team and patient/family/caregiver.   - Collaborate with rehabilitation services on mobility goals if consulted  -  Perform Range of Motion 2 times a day.  - Reposition patient every 2 hours.  - Dangle patient 2 times a day  - Stand patient 2 times a day  - Ambulate patient 2 times a day  - Out of bed to chair 2 times a day   - Out of bed for meals 2 times a day  - Out of bed for toileting  - Record patient progress and toleration of activity level   Outcome: Progressing     Problem: DISCHARGE PLANNING  Goal: Discharge to home or other facility with appropriate resources  Description: INTERVENTIONS:  - Identify barriers to discharge w/patient and caregiver  - Arrange for needed discharge resources and transportation as appropriate  - Identify discharge learning needs (meds, wound care, etc.)  - Arrange for interpretive services to assist at discharge as needed  - Refer to Case Management Department for coordinating discharge planning if the patient needs post-hospital services based on physician/advanced practitioner order or complex needs related to functional status, cognitive ability, or social support system  Outcome: Progressing     Problem: Knowledge Deficit  Goal: Patient/family/caregiver demonstrates understanding of disease process, treatment plan, medications, and discharge instructions  Description: Complete learning assessment and assess knowledge base.  Interventions:  - Provide teaching at level of understanding  - Provide teaching via preferred learning methods  Outcome: Progressing     Problem: Prexisting or High Potential for Compromised Skin Integrity  Goal: Skin integrity is maintained or improved  Description: INTERVENTIONS:  - Identify patients at risk for skin breakdown  - Assess and monitor skin integrity including under and around medical devices   - Assess and monitor nutrition and hydration status  - Monitor labs  - Assess for incontinence   - Turn and reposition patient  - Assist with mobility/ambulation  - Relieve pressure over jacob prominences   - Avoid friction and shearing  - Provide appropriate  hygiene as needed including keeping skin clean and dry  - Evaluate need for skin moisturizer/barrier cream  - Collaborate with interdisciplinary team  - Patient/family teaching  - Consider wound care consult    Assess:  - Review Boby scale daily  - Clean and moisturize skin   - Inspect skin when repositioning, toileting, and assisting with ADLS  - Assess under medical devices   Assess extremities for adequate circulation and sensation     Bed Management:  - Have minimal linens on bed & keep smooth, unwrinkled  - Change linens as needed when moist or perspiring  - Avoid sitting or lying in one position for more than 2 hours while in bed?Keep HOB at 45 degrees   - Toileting:  - Offer bedside commode  - Assess for incontinence   - Use incontinent care products after each incontinent episode     Activity:  - Mobilize patient 2 times a day  - Encourage activity and walks on unit  - Encourage or provide ROM exercises   - Turn and reposition patient every 2 Hours  - Use appropriate equipment to lift or move patient in bed  - Instruct/ Assist with weight shifting when out of bed in chair  - Consider limitation of chair time    Skin Care:  - Avoid use of baby powder, tape, friction and shearing, hot water or constrictive clothing  - Relieve pressure over bony prominences  - Do not massage red bony areas    Next Steps:  - Teach patient strategies to minimize risks   - Consider consults to  interdisciplinary teams  Outcome: Progressing     Problem: PAIN - ADULT  Goal: Verbalizes/displays adequate comfort level or baseline comfort level  Description: Interventions:  - Encourage patient to monitor pain and request assistance  - Assess pain using appropriate pain scale  - Administer analgesics as ordered based on type and severity of pain and evaluate response  - Implement non-pharmacological measures as appropriate and evaluate response  - Consider cultural and social influences on pain and pain management  - Notify  physician/advanced practitioner if interventions unsuccessful or patient reports new pain  - Educate patient/family on pain management process including their role and importance of  reporting pain   - Provide non-pharmacologic/complimentary pain relief interventions  Outcome: Progressing     Problem: INFECTION - ADULT  Goal: Absence or prevention of progression during hospitalization  Description: INTERVENTIONS:  - Assess and monitor for signs and symptoms of infection  - Monitor lab/diagnostic results  - Monitor all insertion sites, i.e. indwelling lines, tubes, and drains  - Monitor endotracheal if appropriate and nasal secretions for changes in amount and color  - [place] appropriate cooling/warming therapies per order  - Administer medications as ordered  - Instruct and encourage patient and family to use good hand hygiene technique  - Identify and instruct in appropriate isolation precautions for identified infection/condition  Outcome: Progressing  Goal: Absence of fever/infection during neutropenic period  Description: INTERVENTIONS:  - Monitor WBC  - Perform strict hand hygiene  - Limit to healthy visitors only  - No plants, dried, fresh or silk flowers with marie in patient room  Outcome: Progressing     Problem: SAFETY ADULT  Goal: Patient will remain free of falls  Description: INTERVENTIONS:  - Educate patient/family on patient safety including physical limitations  - Instruct patient to call for assistance with activity   - Consider consulting OT/PT to assist with strengthening/mobility based on AM PAC & JH-HLM score  - Consult OT/PT to assist with strengthening/mobility   - Keep Call bell within reach  - Keep bed low and locked with side rails adjusted as appropriate  - Keep care items and personal belongings within reach  - Initiate and maintain comfort rounds  - Make Fall Risk Sign visible to staff  - Offer Toileting every 2 Hours, in advance of need  - Initiate/Maintain alarm  - Obtain necessary  fall risk management equipment  - Apply yellow socks and bracelet for high fall risk patients  - Consider moving patient to room near nurses station  Outcome: Progressing  Goal: Maintain or return to baseline ADL function  Description: INTERVENTIONS:  -  Assess patient's ability to carry out ADLs; assess patient's baseline for ADL function and identify physical deficits which impact ability to perform ADLs (bathing, care of mouth/teeth, toileting, grooming, dressing, etc.)  - Assess/evaluate cause of self-care deficits   - Assess range of motion  - Assess patient's mobility; develop plan if impaired  - Assess patient's need for assistive devices and provide as appropriate  - Encourage maximum independence but intervene and supervise when necessary  - Involve family in performance of ADLs  - Assess for home care needs following discharge   - Consider OT consult to assist with ADL evaluation and planning for discharge  - Provide patient education as appropriate  - Monitor functional capacity and physical performance, use of AM PAC & JH-HLM   - Monitor gait, balance and fatigue with ambulation    Outcome: Progressing  Goal: Maintains/Returns to pre admission functional level  Description: INTERVENTIONS:  - Perform AM-PAC 6 Click Basic Mobility/ Daily Activity assessment daily.  - Set and communicate daily mobility goal to care team and patient/family/caregiver.   - Collaborate with rehabilitation services on mobility goals if consulted  - Perform Range of Motion 2 times a day.  - Reposition patient every 2 hours.  - Dangle patient 2 times a day  - Stand patient 2 times a day  - Ambulate patient 2 times a day  - Out of bed to chair 2 times a day   - Out of bed for meals 2 times a day  - Out of bed for toileting  - Record patient progress and toleration of activity level   Outcome: Progressing     Problem: DISCHARGE PLANNING  Goal: Discharge to home or other facility with appropriate resources  Description:  INTERVENTIONS:  - Identify barriers to discharge w/patient and caregiver  - Arrange for needed discharge resources and transportation as appropriate  - Identify discharge learning needs (meds, wound care, etc.)  - Arrange for interpretive services to assist at discharge as needed  - Refer to Case Management Department for coordinating discharge planning if the patient needs post-hospital services based on physician/advanced practitioner order or complex needs related to functional status, cognitive ability, or social support system  Outcome: Progressing     Problem: Knowledge Deficit  Goal: Patient/family/caregiver demonstrates understanding of disease process, treatment plan, medications, and discharge instructions  Description: Complete learning assessment and assess knowledge base.  Interventions:  - Provide teaching at level of understanding  - Provide teaching via preferred learning methods  Outcome: Progressing     Problem: Prexisting or High Potential for Compromised Skin Integrity  Goal: Skin integrity is maintained or improved  Description: INTERVENTIONS:  - Identify patients at risk for skin breakdown  - Assess and monitor skin integrity including under and around medical devices   - Assess and monitor nutrition and hydration status  - Monitor labs  - Assess for incontinence   - Turn and reposition patient  - Assist with mobility/ambulation  - Relieve pressure over jacob prominences   - Avoid friction and shearing  - Provide appropriate hygiene as needed including keeping skin clean and dry  - Evaluate need for skin moisturizer/barrier cream  - Collaborate with interdisciplinary team  - Patient/family teaching  - Consider wound care consult    Assess:  - Review Boby scale daily  - Clean and moisturize skin   - Inspect skin when repositioning, toileting, and assisting with ADLS  - Assess under medical devices   - Assess extremities for adequate circulation and sensation     Bed Management:  - Have minimal  linens on bed & keep smooth, unwrinkled  - Change linens as needed when moist or perspiring  - Avoid sitting or lying in one position for more than 2 hours while in bed?Keep HOB at 45 degrees   - Toileting:  - Offer bedside commode  - Assess for incontinence   - Use incontinent care products after each incontinent episode     Activity:  - Mobilize patient 2 times a day  - Encourage activity and walks on unit  - Encourage or provide ROM exercises   - Turn and reposition patient every 2 Hours  - Use appropriate equipment to lift or move patient in bed  - Instruct/ Assist with weight shifting  - Consider limitation of chair time 2 hour intervals    Skin Care:  - Avoid use of baby powder, tape, friction and shearing, hot water or constrictive clothing  - Relieve pressure over bony prominences  - Do not massage red bony areas    Next Steps:  - Teach patient strategies to minimize risks  - Consider consults to  interdisciplinary teams   Outcome: Progressing

## 2025-07-23 NOTE — ASSESSMENT & PLAN NOTE
Patient denies chest pain or shortness of breath.  Continue st aspirin, statin.  Recommend outpatient follow-up with primary care provider and cardiology.

## 2025-07-24 ENCOUNTER — HOSPITAL ENCOUNTER (OUTPATIENT)
Dept: NON INVASIVE DIAGNOSTICS | Facility: HOSPITAL | Age: 80
Discharge: HOME | End: 2025-07-24
Attending: PHYSICIAN ASSISTANT
Payer: MEDICARE

## 2025-07-24 VITALS
OXYGEN SATURATION: 98 % | RESPIRATION RATE: 20 BRPM | DIASTOLIC BLOOD PRESSURE: 67 MMHG | HEART RATE: 61 BPM | SYSTOLIC BLOOD PRESSURE: 152 MMHG

## 2025-07-24 DIAGNOSIS — K70.31 ALCOHOLIC CIRRHOSIS OF LIVER WITH ASCITES (HCC): ICD-10-CM

## 2025-07-24 PROCEDURE — 49083 ABD PARACENTESIS W/IMAGING: CPT

## 2025-07-24 RX ORDER — LIDOCAINE WITH 8.4% SOD BICARB 0.9%(10ML)
SYRINGE (ML) INJECTION AS NEEDED
Status: COMPLETED | OUTPATIENT
Start: 2025-07-24 | End: 2025-07-24

## 2025-07-24 RX ORDER — ALBUMIN (HUMAN) 12.5 G/50ML
SOLUTION INTRAVENOUS
Status: COMPLETED | OUTPATIENT
Start: 2025-07-24 | End: 2025-07-24

## 2025-07-24 RX ADMIN — Medication 10 ML: at 09:53

## 2025-07-24 RX ADMIN — ALBUMIN (HUMAN) 50 G: 12.5 SOLUTION INTRAVENOUS at 10:49

## 2025-07-31 ENCOUNTER — HOSPITAL ENCOUNTER (OUTPATIENT)
Dept: NON INVASIVE DIAGNOSTICS | Facility: HOSPITAL | Age: 80
Discharge: HOME | End: 2025-07-31
Attending: PHYSICIAN ASSISTANT
Payer: MEDICARE

## 2025-07-31 VITALS
RESPIRATION RATE: 20 BRPM | SYSTOLIC BLOOD PRESSURE: 146 MMHG | OXYGEN SATURATION: 99 % | HEART RATE: 63 BPM | DIASTOLIC BLOOD PRESSURE: 66 MMHG

## 2025-07-31 DIAGNOSIS — K74.60 CIRRHOSIS OF LIVER WITH ASCITES, UNSPECIFIED HEPATIC CIRRHOSIS TYPE  (HCC): Primary | ICD-10-CM

## 2025-07-31 DIAGNOSIS — K76.6 PORTAL HYPERTENSIVE GASTROPATHY  (HCC): ICD-10-CM

## 2025-07-31 DIAGNOSIS — K31.89 PORTAL HYPERTENSIVE GASTROPATHY  (HCC): ICD-10-CM

## 2025-07-31 DIAGNOSIS — R18.8 CIRRHOSIS OF LIVER WITH ASCITES, UNSPECIFIED HEPATIC CIRRHOSIS TYPE  (HCC): Primary | ICD-10-CM

## 2025-07-31 DIAGNOSIS — K70.31 ALCOHOLIC CIRRHOSIS OF LIVER WITH ASCITES (HCC): ICD-10-CM

## 2025-07-31 PROCEDURE — 49083 ABD PARACENTESIS W/IMAGING: CPT

## 2025-07-31 RX ORDER — LIDOCAINE WITH 8.4% SOD BICARB 0.9%(10ML)
SYRINGE (ML) INJECTION AS NEEDED
Status: COMPLETED | OUTPATIENT
Start: 2025-07-31 | End: 2025-07-31

## 2025-07-31 RX ORDER — ALBUMIN (HUMAN) 12.5 G/50ML
SOLUTION INTRAVENOUS
Status: COMPLETED | OUTPATIENT
Start: 2025-07-31 | End: 2025-07-31

## 2025-07-31 RX ADMIN — ALBUMIN (HUMAN) 50 G: 12.5 SOLUTION INTRAVENOUS at 09:54

## 2025-07-31 RX ADMIN — Medication 10 ML: at 09:48

## 2025-08-07 ENCOUNTER — HOSPITAL ENCOUNTER (OUTPATIENT)
Dept: NON INVASIVE DIAGNOSTICS | Facility: HOSPITAL | Age: 80
Discharge: HOME | End: 2025-08-07
Attending: PHYSICIAN ASSISTANT
Payer: MEDICARE

## 2025-08-07 VITALS
HEART RATE: 68 BPM | OXYGEN SATURATION: 97 % | RESPIRATION RATE: 18 BRPM | SYSTOLIC BLOOD PRESSURE: 148 MMHG | DIASTOLIC BLOOD PRESSURE: 67 MMHG

## 2025-08-07 DIAGNOSIS — K70.31 ALCOHOLIC CIRRHOSIS OF LIVER WITH ASCITES (HCC): ICD-10-CM

## 2025-08-07 PROCEDURE — 49083 ABD PARACENTESIS W/IMAGING: CPT

## 2025-08-07 RX ORDER — LIDOCAINE WITH 8.4% SOD BICARB 0.9%(10ML)
SYRINGE (ML) INJECTION AS NEEDED
Status: COMPLETED | OUTPATIENT
Start: 2025-08-07 | End: 2025-08-07

## 2025-08-07 RX ADMIN — Medication 10 ML: at 09:42

## 2025-08-11 ENCOUNTER — HOSPITAL ENCOUNTER (OUTPATIENT)
Dept: ULTRASOUND IMAGING | Facility: HOSPITAL | Age: 80
Discharge: HOME | End: 2025-08-11
Attending: FAMILY MEDICINE
Payer: MEDICARE

## 2025-08-14 ENCOUNTER — HOSPITAL ENCOUNTER (OUTPATIENT)
Dept: INTERVENTIONAL RADIOLOGY/VASCULAR | Facility: HOSPITAL | Age: 80
Discharge: HOME | End: 2025-08-14
Attending: PHYSICIAN ASSISTANT
Payer: MEDICARE

## 2025-08-14 ENCOUNTER — HOSPITAL ENCOUNTER (OUTPATIENT)
Dept: NON INVASIVE DIAGNOSTICS | Facility: HOSPITAL | Age: 80
Discharge: HOME | End: 2025-08-14
Payer: MEDICARE

## 2025-08-14 ENCOUNTER — HOSPITAL ENCOUNTER (OUTPATIENT)
Dept: NON INVASIVE DIAGNOSTICS | Facility: HOSPITAL | Age: 80
Discharge: HOME | End: 2025-08-14
Attending: PHYSICIAN ASSISTANT
Payer: MEDICARE

## 2025-08-22 ENCOUNTER — HOSPITAL ENCOUNTER (OUTPATIENT)
Dept: NON INVASIVE DIAGNOSTICS | Facility: HOSPITAL | Age: 80
Discharge: HOME | End: 2025-08-22
Attending: PHYSICIAN ASSISTANT
Payer: MEDICARE

## 2025-08-22 VITALS — SYSTOLIC BLOOD PRESSURE: 139 MMHG | RESPIRATION RATE: 18 BRPM | DIASTOLIC BLOOD PRESSURE: 62 MMHG

## 2025-08-22 DIAGNOSIS — K70.31 ALCOHOLIC CIRRHOSIS OF LIVER WITH ASCITES (HCC): ICD-10-CM

## 2025-08-22 PROCEDURE — 49083 ABD PARACENTESIS W/IMAGING: CPT

## 2025-08-22 RX ORDER — LIDOCAINE WITH 8.4% SOD BICARB 0.9%(10ML)
SYRINGE (ML) INJECTION AS NEEDED
Status: COMPLETED | OUTPATIENT
Start: 2025-08-22 | End: 2025-08-22

## 2025-08-22 RX ORDER — ALBUMIN (HUMAN) 12.5 G/50ML
SOLUTION INTRAVENOUS
Status: COMPLETED | OUTPATIENT
Start: 2025-08-22 | End: 2025-08-22

## 2025-08-22 RX ADMIN — ALBUMIN (HUMAN) 50 G: 12.5 SOLUTION INTRAVENOUS at 10:08

## 2025-08-22 RX ADMIN — Medication 10 ML: at 10:08
